# Patient Record
Sex: FEMALE | Race: WHITE | NOT HISPANIC OR LATINO | Employment: OTHER | URBAN - METROPOLITAN AREA
[De-identification: names, ages, dates, MRNs, and addresses within clinical notes are randomized per-mention and may not be internally consistent; named-entity substitution may affect disease eponyms.]

---

## 2017-10-10 RX ORDER — NORGESTIMATE AND ETHINYL ESTRADIOL 7DAYSX3 28
1 KIT ORAL DAILY
Qty: 28 TABLET | Refills: 11 | Status: SHIPPED | OUTPATIENT
Start: 2017-10-10 | End: 2017-11-02 | Stop reason: SDUPTHER

## 2017-10-10 NOTE — TELEPHONE ENCOUNTER
----- Message from Mitra Brown sent at 10/10/2017  2:00 PM CDT -----  Contact: Patient  Carol, patient [phone], Calling for refill on Rx Trinessa, birth control. Please advise. Thanks.      [facility] [address] [place] [address] JANELL SMITH - [address] AT [facility]  [address] [address]  Phone: [phone] Fax: [phone]

## 2017-11-02 ENCOUNTER — OFFICE VISIT (OUTPATIENT)
Dept: PRIMARY CARE CLINIC | Facility: CLINIC | Age: 47
End: 2017-11-02
Payer: MEDICARE

## 2017-11-02 VITALS
HEIGHT: 62 IN | DIASTOLIC BLOOD PRESSURE: 83 MMHG | HEART RATE: 74 BPM | WEIGHT: 139 LBS | RESPIRATION RATE: 18 BRPM | BODY MASS INDEX: 25.58 KG/M2 | SYSTOLIC BLOOD PRESSURE: 125 MMHG | OXYGEN SATURATION: 96 % | TEMPERATURE: 98 F

## 2017-11-02 DIAGNOSIS — M54.32 SCIATICA OF LEFT SIDE: ICD-10-CM

## 2017-11-02 DIAGNOSIS — M54.32 SCIATICA OF LEFT SIDE: Primary | ICD-10-CM

## 2017-11-02 PROBLEM — M48.02 FORAMINAL STENOSIS OF CERVICAL REGION: Status: ACTIVE | Noted: 2017-11-02

## 2017-11-02 PROBLEM — M47.816 FACET ARTHRITIS OF LUMBAR REGION: Status: ACTIVE | Noted: 2017-11-02

## 2017-11-02 PROCEDURE — 99999 PR PBB SHADOW E&M-EST. PATIENT-LVL III: CPT | Mod: PBBFAC,,, | Performed by: FAMILY MEDICINE

## 2017-11-02 PROCEDURE — 99213 OFFICE O/P EST LOW 20 MIN: CPT | Mod: S$GLB,,, | Performed by: FAMILY MEDICINE

## 2017-11-02 RX ORDER — NORGESTIMATE AND ETHINYL ESTRADIOL 7DAYSX3 28
1 KIT ORAL DAILY
Qty: 28 TABLET | Refills: 11 | Status: SHIPPED | OUTPATIENT
Start: 2017-11-02 | End: 2018-10-10 | Stop reason: SDUPTHER

## 2017-11-02 RX ORDER — IBUPROFEN 800 MG/1
TABLET ORAL
Qty: 270 TABLET | Refills: 2 | Status: SHIPPED | OUTPATIENT
Start: 2017-11-02 | End: 2018-10-10 | Stop reason: SDUPTHER

## 2017-11-02 RX ORDER — IBUPROFEN 800 MG/1
800 TABLET ORAL 3 TIMES DAILY PRN
Qty: 60 TABLET | Refills: 2 | Status: SHIPPED | OUTPATIENT
Start: 2017-11-02 | End: 2017-11-02 | Stop reason: SDUPTHER

## 2017-11-02 NOTE — PROGRESS NOTES
"Subjective:       Patient ID: Carol Dewey is a 47 y.o. female.    Chief Complaint: Neck Pain; Back Pain; and Medication Refill    Slipped and fell in laundry room at home a few weeks ago, and has been having increased lower back pain radiating down left leg for the past ~2 weeks. Has been doing acupuncture for the past few months w/good results      Review of Systems   Constitutional: Negative for fever.   HENT: Negative for trouble swallowing.    Respiratory: Negative for shortness of breath.    Cardiovascular: Negative for chest pain.   Musculoskeletal: Positive for back pain and neck pain.   Neurological: Positive for numbness. Negative for weakness.       Objective:      Vitals:    11/02/17 1428   BP: 125/83   BP Location: Left arm   Patient Position: Sitting   BP Method: Medium (Automatic)   Pulse: 74   Resp: 18   Temp: 98.2 °F (36.8 °C)   TempSrc: Oral   SpO2: 96%   Weight: 63 kg (139 lb)   Height: 5' 2" (1.575 m)     Physical Exam   Constitutional: She is oriented to person, place, and time. She appears well-developed and well-nourished.   HENT:   Head: Normocephalic and atraumatic.   Cardiovascular: Normal rate, regular rhythm and normal heart sounds.    Pulmonary/Chest: Effort normal and breath sounds normal.   Musculoskeletal: She exhibits no edema.        Lumbar back: She exhibits decreased range of motion. She exhibits no tenderness and no deformity.   Neurological: She is alert and oriented to person, place, and time. She has normal strength.   Reflex Scores:       Patellar reflexes are 2+ on the right side and 2+ on the left side.  Skin: Skin is warm and dry.   Vitals reviewed.      Assessment:       1. Sciatica of left side        Plan:       Sciatica of left side  -     ibuprofen (MOTRIN) 800 MG tablet; Take 1 tablet (800 mg total) by mouth 3 (three) times daily as needed for Pain. 1 Tablet Oral Three times a day  Dispense: 60 tablet; Refill: 2    Other orders  -     norgestimate-ethinyl " estradiol (TRINESSA, 28,) 0.18/0.215/0.25 mg-35 mcg (28) tablet; Take 1 tablet by mouth once daily.  Dispense: 28 tablet; Refill: 11      Medication List with Changes/Refills   Changed and/or Refilled Medications    Modified Medication Previous Medication    IBUPROFEN (MOTRIN) 800 MG TABLET ibuprofen (MOTRIN) 800 MG tablet       Take 1 tablet (800 mg total) by mouth 3 (three) times daily as needed for Pain. 1 Tablet Oral Three times a day    Take by mouth. 1 Tablet Oral Three times a day    NORGESTIMATE-ETHINYL ESTRADIOL (TRINESSA, 28,) 0.18/0.215/0.25 MG-35 MCG (28) TABLET norgestimate-ethinyl estradiol (TRINESSA, 28,) 0.18/0.215/0.25 mg-35 mcg (28) tablet       Take 1 tablet by mouth once daily.    Take 1 tablet by mouth once daily.   Discontinued Medications    LIDOCAINE (LIDODERM) 5 %(700 MG/PATCH)    1 Adhesive Patch, Medicated Topical 24 hours    NORGESTIMATE-ETHINYL ESTRADIOL (ORTHO TRI-CYCLEN LO) 0.18/0.215/0.25 MG-25 MCG TABLET    Take by mouth.  Tablet Oral Every day

## 2018-10-10 DIAGNOSIS — M54.32 SCIATICA OF LEFT SIDE: ICD-10-CM

## 2018-10-10 RX ORDER — IBUPROFEN 800 MG/1
TABLET ORAL
Qty: 270 TABLET | Refills: 0 | Status: SHIPPED | OUTPATIENT
Start: 2018-10-10 | End: 2019-01-06 | Stop reason: SDUPTHER

## 2019-01-06 DIAGNOSIS — M54.32 SCIATICA OF LEFT SIDE: ICD-10-CM

## 2019-01-07 RX ORDER — IBUPROFEN 800 MG/1
TABLET ORAL
Qty: 270 TABLET | Refills: 0 | Status: SHIPPED | OUTPATIENT
Start: 2019-01-07 | End: 2019-05-06 | Stop reason: SDUPTHER

## 2019-01-10 ENCOUNTER — OFFICE VISIT (OUTPATIENT)
Dept: PRIMARY CARE CLINIC | Facility: CLINIC | Age: 49
End: 2019-01-10
Payer: MEDICARE

## 2019-01-10 VITALS
BODY MASS INDEX: 24.98 KG/M2 | DIASTOLIC BLOOD PRESSURE: 80 MMHG | HEIGHT: 63 IN | WEIGHT: 141 LBS | TEMPERATURE: 98 F | HEART RATE: 69 BPM | SYSTOLIC BLOOD PRESSURE: 130 MMHG | OXYGEN SATURATION: 99 % | RESPIRATION RATE: 16 BRPM

## 2019-01-10 DIAGNOSIS — Z12.4 CERVICAL CANCER SCREENING: ICD-10-CM

## 2019-01-10 DIAGNOSIS — M47.816 FACET ARTHRITIS OF LUMBAR REGION: ICD-10-CM

## 2019-01-10 DIAGNOSIS — M48.02 FORAMINAL STENOSIS OF CERVICAL REGION: Primary | ICD-10-CM

## 2019-01-10 DIAGNOSIS — Z12.31 ENCOUNTER FOR SCREENING MAMMOGRAM FOR BREAST CANCER: ICD-10-CM

## 2019-01-10 DIAGNOSIS — M51.36 DDD (DEGENERATIVE DISC DISEASE), LUMBAR: ICD-10-CM

## 2019-01-10 PROBLEM — M51.369 DDD (DEGENERATIVE DISC DISEASE), LUMBAR: Status: ACTIVE | Noted: 2019-01-10

## 2019-01-10 PROCEDURE — 99999 PR PBB SHADOW E&M-EST. PATIENT-LVL V: CPT | Mod: PBBFAC,,, | Performed by: FAMILY MEDICINE

## 2019-01-10 PROCEDURE — 99214 PR OFFICE/OUTPT VISIT, EST, LEVL IV, 30-39 MIN: ICD-10-PCS | Mod: S$PBB,,, | Performed by: FAMILY MEDICINE

## 2019-01-10 PROCEDURE — 99999 PR PBB SHADOW E&M-EST. PATIENT-LVL V: ICD-10-PCS | Mod: PBBFAC,,, | Performed by: FAMILY MEDICINE

## 2019-01-10 PROCEDURE — 99215 OFFICE O/P EST HI 40 MIN: CPT | Mod: PBBFAC,PN | Performed by: FAMILY MEDICINE

## 2019-01-10 PROCEDURE — 99214 OFFICE O/P EST MOD 30 MIN: CPT | Mod: S$PBB,,, | Performed by: FAMILY MEDICINE

## 2019-01-10 PROCEDURE — 96372 THER/PROPH/DIAG INJ SC/IM: CPT | Mod: PBBFAC,PN

## 2019-01-10 RX ORDER — NORGESTIMATE AND ETHINYL ESTRADIOL 7DAYSX3 28
1 KIT ORAL DAILY
Qty: 28 TABLET | Refills: 1 | Status: SHIPPED | OUTPATIENT
Start: 2019-01-10 | End: 2019-01-21 | Stop reason: SDUPTHER

## 2019-01-10 RX ORDER — BETAMETHASONE SODIUM PHOSPHATE AND BETAMETHASONE ACETATE 3; 3 MG/ML; MG/ML
12 INJECTION, SUSPENSION INTRA-ARTICULAR; INTRALESIONAL; INTRAMUSCULAR; SOFT TISSUE
Status: COMPLETED | OUTPATIENT
Start: 2019-01-10 | End: 2019-01-10

## 2019-01-10 RX ADMIN — BETAMETHASONE SODIUM PHOSPHATE AND BETAMETHASONE ACETATE 12 MG: 3; 3 INJECTION, SUSPENSION INTRA-ARTICULAR; INTRALESIONAL; INTRAMUSCULAR at 05:01

## 2019-01-10 NOTE — PROGRESS NOTES
"Subjective:       Patient ID: Carol Dewey is a 48 y.o. female.    Chief Complaint: Back Pain and Shoulder Pain (should pain and numbness in both arms )    Chronic neck and lower back pain for many years.  Most recent MRIs done in August of 2017 at [facility] in [place].  Has had epidural injections in the past with minimal response.  More recently, had been getting acupuncture, but no relief.  Takes ibuprofen, but does not want to take any additional medications.  Has been told most recently by the provider she was seeing last year that she needs to see a neurosurgeon for further management options, but her insurance changed prior to her initial consultation and she is requesting a new referral.  Lower back pain frequently radiates down her legs, worse on the left with tingling and numbness in her feet.  No bowel or bladder incontinence.  Neck pain frequently radiates to both arms with numbness and loss of strength.  No difficulty swallowing.  No chest pain or shortness of breath.      Review of Systems   Constitutional: Negative for fever.   HENT: Negative for trouble swallowing.    Eyes: Negative for visual disturbance.   Respiratory: Negative for shortness of breath.    Cardiovascular: Negative for chest pain.   Gastrointestinal: Negative for nausea and vomiting.   Genitourinary: Negative for difficulty urinating.   Musculoskeletal: Positive for back pain and neck pain.   Skin: Negative for rash.   Allergic/Immunologic: Negative for immunocompromised state.   Neurological: Positive for weakness and numbness.   Hematological: Does not bruise/bleed easily.   Psychiatric/Behavioral: Negative for agitation and confusion.       Objective:      Vitals:    01/10/19 1636   BP: 130/80   BP Location: Left arm   Patient Position: Sitting   BP Method: Medium (Automatic)   Pulse: 69   Resp: 16   Temp: 98.1 °F (36.7 °C)   TempSrc: Oral   SpO2: 99%   Weight: 64 kg (141 lb)   Height: 5' 3" (1.6 m)     Physical Exam "   Constitutional: She is oriented to person, place, and time. She appears well-developed and well-nourished.   HENT:   Head: Normocephalic and atraumatic.   Eyes: EOM are normal.   Neck: No JVD present.   Cardiovascular: Normal rate, regular rhythm and normal heart sounds.   Pulmonary/Chest: Effort normal and breath sounds normal.   Musculoskeletal: She exhibits no edema.        Cervical back: She exhibits decreased range of motion. She exhibits no deformity.        Lumbar back: She exhibits decreased range of motion. She exhibits no deformity.   Neurological: She is alert and oriented to person, place, and time. She has normal strength. Gait normal.   Skin: Skin is warm and dry.   Psychiatric: She has a normal mood and affect. Her behavior is normal.   Nursing note and vitals reviewed.      Assessment:       1. Foraminal stenosis of cervical region    2. DDD (degenerative disc disease), lumbar    3. Facet arthritis of lumbar region    4. Encounter for screening mammogram for breast cancer    5. Cervical cancer screening        Plan:       Foraminal stenosis of cervical region  -     Ambulatory referral to Obstetrics / Gynecology  -     betamethasone acetate-betamethasone sodium phosphate injection 12 mg    DDD (degenerative disc disease), lumbar  -     Ambulatory referral to Obstetrics / Gynecology  -     betamethasone acetate-betamethasone sodium phosphate injection 12 mg    Facet arthritis of lumbar region  -     Ambulatory referral to Obstetrics / Gynecology  -     betamethasone acetate-betamethasone sodium phosphate injection 12 mg    Encounter for screening mammogram for breast cancer  -     Mammo Digital Screening Bilat without CA; Future; Expected date: 01/24/2019    Cervical cancer screening  -     Ambulatory Referral to Neurosurgery    Other orders  -     norgestimate-ethinyl estradiol (TRINESSA, 28,) 0.18/0.215/0.25 mg-35 mcg (28) tablet; Take 1 tablet by mouth once daily.  Dispense: 28 tablet; Refill:  1  Needs to follow up with gyn prior to any additional refills on contraceptives.       Medication List           Accurate as of 1/10/19  5:23 PM. If you have any questions, ask your nurse or doctor.               CHANGE how you take these medications    norgestimate-ethinyl estradiol 0.18/0.215/0.25 mg-35 mcg (28) tablet  Commonly known as:  TRINESSA (28)  Take 1 tablet by mouth once daily.  What changed:  Another medication with the same name was removed. Continue taking this medication, and follow the directions you see here.  Changed by:  Robin Lemos MD        CONTINUE taking these medications    ibuprofen 800 MG tablet  Commonly known as:  ADVIL,MOTRIN  TAKE 1 TABLET BY MOUTH THREE TIMES DAILY AS NEEDED FOR PAIN           Where to Get Your Medications      These medications were sent to [id number] - JANELL SMITH - [address] AT [facility] [address]  [address], [place] [address]    Phone:  [phone]   · norgestimate-ethinyl estradiol 0.18/0.215/0.25 mg-35 mcg (28) tablet

## 2019-01-21 ENCOUNTER — OFFICE VISIT (OUTPATIENT)
Dept: OBSTETRICS AND GYNECOLOGY | Facility: CLINIC | Age: 49
End: 2019-01-21
Payer: MEDICARE

## 2019-01-21 VITALS
HEIGHT: 63 IN | SYSTOLIC BLOOD PRESSURE: 116 MMHG | DIASTOLIC BLOOD PRESSURE: 82 MMHG | BODY MASS INDEX: 24.55 KG/M2 | WEIGHT: 138.56 LBS

## 2019-01-21 DIAGNOSIS — Z01.419 WELL WOMAN EXAM WITH ROUTINE GYNECOLOGICAL EXAM: Primary | ICD-10-CM

## 2019-01-21 PROCEDURE — G0101 CA SCREEN;PELVIC/BREAST EXAM: HCPCS | Mod: S$PBB,,, | Performed by: OBSTETRICS & GYNECOLOGY

## 2019-01-21 PROCEDURE — 88175 CYTOPATH C/V AUTO FLUID REDO: CPT

## 2019-01-21 PROCEDURE — G0101 PR CA SCREEN;PELVIC/BREAST EXAM: ICD-10-PCS | Mod: S$PBB,,, | Performed by: OBSTETRICS & GYNECOLOGY

## 2019-01-21 PROCEDURE — 87624 HPV HI-RISK TYP POOLED RSLT: CPT

## 2019-01-21 PROCEDURE — 99213 OFFICE O/P EST LOW 20 MIN: CPT | Mod: PBBFAC,PN | Performed by: OBSTETRICS & GYNECOLOGY

## 2019-01-21 PROCEDURE — G0101 CA SCREEN;PELVIC/BREAST EXAM: HCPCS | Mod: PBBFAC,PN | Performed by: OBSTETRICS & GYNECOLOGY

## 2019-01-21 PROCEDURE — 99999 PR PBB SHADOW E&M-EST. PATIENT-LVL III: CPT | Mod: PBBFAC,,, | Performed by: OBSTETRICS & GYNECOLOGY

## 2019-01-21 PROCEDURE — 99999 PR PBB SHADOW E&M-EST. PATIENT-LVL III: ICD-10-PCS | Mod: PBBFAC,,, | Performed by: OBSTETRICS & GYNECOLOGY

## 2019-01-21 RX ORDER — NORGESTIMATE AND ETHINYL ESTRADIOL 7DAYSX3 28
1 KIT ORAL DAILY
Qty: 28 TABLET | Refills: 10 | Status: SHIPPED | OUTPATIENT
Start: 2019-01-21 | End: 2020-01-09

## 2019-01-21 NOTE — LETTER
January 21, 2019      Robin Lemos MD  [address]  [address]  [place] [address]           [facility]  [address], [facility] [address]  [place] [address]  Phone: [phone]  Fax: [phone]          Patient: Carol Dewey   MR Number: [id number]   YOB: 1970   Date of Visit: 1/21/2019       Dear Dr. Robin Lemos:    Thank you for referring Carol Dewey to me for evaluation. Attached you will find relevant portions of my assessment and plan of care.    If you have questions, please do not hesitate to call me. I look forward to following Carol Dewey along with you.    Sincerely,    Tirso Leija MD    Enclosure  CC:  No Recipients    If you would like to receive this communication electronically, please contact [email or web address] or [phone] to request more information on [facility] [employer] access.    For providers and/or their staff who would like to refer a patient to [facility], please contact us through our one-stop-shop provider referral line, [facility] [place], at [phone].    If you feel you have received this communication in error or would no longer like to receive these types of communications, please e-mail [email or web address]

## 2019-01-21 NOTE — PROGRESS NOTES
History & Physical  Gynecology      SUBJECTIVE:     Chief Complaint: Gynecologic Exam       History of Present Illness:  Ms. Dewey is a 48 yr old female who presents for annual, well woman exam. She has no complaints. Denies any hx of abnormal paps. Last pap was many years ago. She is currently sexually active. Denies hx of STIs. She is on OCPs for contraception. Desires continuation. Has regular cycles. Denies any fam hx of breast, ovarian or colon cancer. Feels safe at home, wears seatbelts, exercises frequently.       Review of patient's allergies indicates:   Allergen Reactions    Iodine and iodide containing products Rash    Shellfish containing products Hives     Other reaction(s): Hives       Past Medical History:   Diagnosis Date    Allergy     Degenerative disc disease      Past Surgical History:   Procedure Laterality Date     SECTION      TONSILLECTOMY       OB History      Para Term  AB Living    2 1     1      SAB TAB Ectopic Multiple Live Births    1                Family History   Problem Relation Age of Onset    Asthma Mother     Heart disease Mother     Cancer Father     Breast cancer Paternal Cousin     Colon cancer Neg Hx     Ovarian cancer Neg Hx      Social History     Tobacco Use    Smoking status: Never Smoker    Smokeless tobacco: Never Used   Substance Use Topics    Alcohol use: No    Drug use: No       Current Outpatient Medications   Medication Sig    ibuprofen (ADVIL,MOTRIN) 800 MG tablet TAKE 1 TABLET BY MOUTH THREE TIMES DAILY AS NEEDED FOR PAIN    norgestimate-ethinyl estradiol (TRINESSA, 28,) 0.18/0.215/0.25 mg-35 mcg (28) tablet Take 1 tablet by mouth once daily.     No current facility-administered medications for this visit.          Review of Systems:  Review of Systems   Constitutional: Negative for activity change, fatigue, fever and unexpected weight change.   Respiratory: Negative for cough and shortness of breath.    Cardiovascular:  Negative for chest pain and palpitations.   Gastrointestinal: Negative for abdominal pain, constipation, diarrhea and nausea.   Endocrine: Negative for hot flashes.   Genitourinary: Negative for dyspareunia, dysuria, menorrhagia, menstrual problem, pelvic pain and vaginal discharge.   Musculoskeletal: Negative for back pain.   Neurological: Negative for headaches.   Psychiatric/Behavioral: The patient is not nervous/anxious.    Breast: Negative for nipple discharge       OBJECTIVE:     Physical Exam:  Physical Exam   Constitutional: She is oriented to person, place, and time. She appears well-developed and well-nourished.   HENT:   Head: Normocephalic and atraumatic.   Neck: Normal range of motion. Neck supple.   Cardiovascular: Normal rate, regular rhythm, normal heart sounds and intact distal pulses.   Pulmonary/Chest: Effort normal and breath sounds normal. Right breast exhibits no inverted nipple, no mass, no nipple discharge, no skin change and no tenderness. Left breast exhibits no inverted nipple, no mass, no nipple discharge, no skin change and no tenderness.   Abdominal: Soft. Bowel sounds are normal. There is no tenderness. There is no guarding.   Genitourinary: There is no rash, tenderness, lesion or injury on the right labia. There is no rash, tenderness, lesion or injury on the left labia. Uterus is not deviated, not enlarged, not fixed and not tender. Cervix exhibits no motion tenderness, no discharge and no friability. Right adnexum displays no mass, no tenderness and no fullness. Left adnexum displays no mass, no tenderness and no fullness. No erythema, tenderness or bleeding in the vagina. No foreign body in the vagina. No signs of injury around the vagina. No vaginal discharge found.   Neurological: She is alert and oriented to person, place, and time.   Skin: Skin is warm and dry.   Psychiatric: She has a normal mood and affect.   Vitals reviewed.        ASSESSMENT:       ICD-10-CM ICD-9-CM    1.  Well woman exam with routine gynecological exam Z01.419 V72.31 Liquid-based pap smear, screening      HPV High Risk Genotypes, PCR          Plan:      Annual, well woman, pap/cotesting done today. No hx of abnormal paps  No GYN complaints.  Undesired fertility. Currently on OCPs. Desires to continue. Discussed risk of VTE, stroke. Patient assumes risks  Mammogram ordered and scheduled by PCP  RTC in 1 yr for annual exam or PRN    Counseling time: 15 minutes    Tirso Leija

## 2019-01-23 LAB
HPV HR 12 DNA CVX QL NAA+PROBE: NEGATIVE
HPV16 AG SPEC QL: NEGATIVE
HPV18 DNA SPEC QL NAA+PROBE: NEGATIVE

## 2019-01-28 ENCOUNTER — TELEPHONE (OUTPATIENT)
Dept: PRIMARY CARE CLINIC | Facility: CLINIC | Age: 49
End: 2019-01-28

## 2019-01-28 NOTE — TELEPHONE ENCOUNTER
----- Message from Dasha Louis sent at 1/28/2019 10:34 AM CST -----  Contact: pt  Type:  Patient Returning Call    Who Called:  pt  Who Left Message for Patient: Sorin  Does the patient know what this is regarding?:  n/a  Best Call Back Number:    Additional Information: pt returned a missed call

## 2019-02-28 ENCOUNTER — OFFICE VISIT (OUTPATIENT)
Dept: PRIMARY CARE CLINIC | Facility: CLINIC | Age: 49
End: 2019-02-28
Payer: MEDICARE

## 2019-02-28 VITALS
TEMPERATURE: 98 F | SYSTOLIC BLOOD PRESSURE: 155 MMHG | HEIGHT: 63 IN | WEIGHT: 152 LBS | HEART RATE: 88 BPM | DIASTOLIC BLOOD PRESSURE: 88 MMHG | BODY MASS INDEX: 26.93 KG/M2 | OXYGEN SATURATION: 97 % | RESPIRATION RATE: 18 BRPM

## 2019-02-28 DIAGNOSIS — H81.10 BENIGN PAROXYSMAL POSITIONAL VERTIGO, UNSPECIFIED LATERALITY: Primary | ICD-10-CM

## 2019-02-28 DIAGNOSIS — M48.02 FORAMINAL STENOSIS OF CERVICAL REGION: ICD-10-CM

## 2019-02-28 DIAGNOSIS — M51.36 DDD (DEGENERATIVE DISC DISEASE), LUMBAR: ICD-10-CM

## 2019-02-28 DIAGNOSIS — R42 DIZZINESS: ICD-10-CM

## 2019-02-28 PROCEDURE — 93000 ELECTROCARDIOGRAM COMPLETE: CPT | Mod: S$GLB,,, | Performed by: INTERNAL MEDICINE

## 2019-02-28 PROCEDURE — 93000 EKG 12-LEAD: ICD-10-PCS | Mod: S$GLB,,, | Performed by: INTERNAL MEDICINE

## 2019-02-28 PROCEDURE — 99999 PR PBB SHADOW E&M-EST. PATIENT-LVL IV: ICD-10-PCS | Mod: PBBFAC,,, | Performed by: FAMILY MEDICINE

## 2019-02-28 PROCEDURE — 99999 PR PBB SHADOW E&M-EST. PATIENT-LVL IV: CPT | Mod: PBBFAC,,, | Performed by: FAMILY MEDICINE

## 2019-02-28 PROCEDURE — 3008F PR BODY MASS INDEX (BMI) DOCUMENTED: ICD-10-PCS | Mod: CPTII,S$GLB,, | Performed by: FAMILY MEDICINE

## 2019-02-28 PROCEDURE — 99214 PR OFFICE/OUTPT VISIT, EST, LEVL IV, 30-39 MIN: ICD-10-PCS | Mod: S$GLB,,, | Performed by: FAMILY MEDICINE

## 2019-02-28 PROCEDURE — 99214 OFFICE O/P EST MOD 30 MIN: CPT | Mod: S$GLB,,, | Performed by: FAMILY MEDICINE

## 2019-02-28 PROCEDURE — 3008F BODY MASS INDEX DOCD: CPT | Mod: CPTII,S$GLB,, | Performed by: FAMILY MEDICINE

## 2019-02-28 RX ORDER — LIDOCAINE 50 MG/G
2 PATCH TOPICAL
COMMUNITY
End: 2019-03-12 | Stop reason: SDUPTHER

## 2019-02-28 RX ORDER — MECLIZINE HYDROCHLORIDE 25 MG/1
25 TABLET ORAL 3 TIMES DAILY PRN
Qty: 30 TABLET | Refills: 2 | Status: SHIPPED | OUTPATIENT
Start: 2019-02-28 | End: 2019-11-21

## 2019-02-28 NOTE — PROGRESS NOTES
"Subjective:       Patient ID: Carol eDwey is a 48 y.o. female.    Chief Complaint: Dizziness and Back Pain (want a referral to PM for YENI )    Complains of recurrent dizziness attributed vertigo, has had episodic flare-ups since 2011.  Gets very dizzy with a rotational sensation when she lies down.  Occasional nausea, no vomiting.  Has not taken anything for.  No associated chest pain, occasional palpitations.  Also complains worsening neck and lower back pain, last MRI done at outside imaging center last fall, requesting referral to someone for epidural steroid injections.      Review of Systems   Constitutional: Negative for fever.   HENT: Negative for ear pain.    Eyes: Negative for visual disturbance.   Respiratory: Negative for shortness of breath.    Cardiovascular: Positive for palpitations. Negative for chest pain.   Gastrointestinal: Positive for nausea. Negative for vomiting.   Genitourinary: Negative for difficulty urinating.   Musculoskeletal: Positive for back pain and neck pain.   Skin: Negative for rash.   Neurological: Positive for dizziness. Negative for seizures and syncope.   Hematological: Does not bruise/bleed easily.   Psychiatric/Behavioral: Negative for agitation and confusion.       Objective:      Vitals:    02/28/19 1001 02/28/19 1022 02/28/19 1023 02/28/19 1024   BP: 136/78 (!) 160/90 (!) 154/101 (!) 155/88   BP Location: Left arm Left arm Left arm Left arm   Patient Position: Sitting Lying Sitting Standing   BP Method: Medium (Automatic) Medium (Automatic) Medium (Automatic) Medium (Automatic)   Pulse: 75 71 80 88   Resp: 18      Temp: 98.1 °F (36.7 °C)      TempSrc: Oral      SpO2: 97%      Weight: 68.9 kg (152 lb)      Height: 5' 3" (1.6 m)        Physical Exam   Constitutional: She is oriented to person, place, and time. She appears well-developed and well-nourished.   HENT:   Head: Normocephalic and atraumatic.   Neck: No JVD present. Carotid bruit is not present. "   Cardiovascular: Normal rate, regular rhythm and normal heart sounds.   Pulmonary/Chest: Effort normal and breath sounds normal.   Musculoskeletal: She exhibits no edema.   Neurological: She is alert and oriented to person, place, and time.   Positive Mober[place]-Hallpike   Skin: Skin is warm and dry.   Psychiatric: She has a normal mood and affect. Her behavior is normal.   Nursing note and vitals reviewed.      Assessment:       1. Benign paroxysmal positional vertigo, unspecified laterality    2. Dizziness    3. DDD (degenerative disc disease), lumbar    4. Foraminal stenosis of cervical region        Plan:       Benign paroxysmal positional vertigo, unspecified laterality  -     EKG 12-lead  -     meclizine (ANTIVERT) 25 mg tablet; Take 1 tablet (25 mg total) by mouth 3 (three) times daily as needed for Dizziness.  Dispense: 30 tablet; Refill: 2  No acute EKG changes.  Dizziness  -     EKG 12-lead    DDD (degenerative disc disease), lumbar  -     Ambulatory referral to Pain Clinic    Foraminal stenosis of cervical region  -     Ambulatory referral to Pain Clinic      Medication List with Changes/Refills   New Medications    MECLIZINE (ANTIVERT) 25 MG TABLET    Take 1 tablet (25 mg total) by mouth 3 (three) times daily as needed for Dizziness.   Current Medications    IBUPROFEN (ADVIL,MOTRIN) 800 MG TABLET    TAKE 1 TABLET BY MOUTH THREE TIMES DAILY AS NEEDED FOR PAIN    LIDOCAINE (LIDODERM) 5 %    Place 2 patches onto the skin every 24 hours.    NORGESTIMATE-ETHINYL ESTRADIOL (TRINESSA, 28,) 0.18/0.215/0.25 MG-35 MCG (28) TABLET    Take 1 tablet by mouth once daily.

## 2019-03-04 ENCOUNTER — TELEPHONE (OUTPATIENT)
Dept: PAIN MEDICINE | Facility: CLINIC | Age: 49
End: 2019-03-04

## 2019-03-04 NOTE — TELEPHONE ENCOUNTER
Left call back message. Calling patient to discuss IPM, exchange information and answer patient questions.

## 2019-03-06 ENCOUNTER — TELEPHONE (OUTPATIENT)
Dept: PAIN MEDICINE | Facility: CLINIC | Age: 49
End: 2019-03-06

## 2019-03-06 NOTE — TELEPHONE ENCOUNTER
----- Message from Mitra Brown sent at 3/4/2019 12:52 PM CST -----  Contact: Patient  Type:  Patient Returning Call    Who Called:  Carol, patient  Who Left Message for Patient:  Ramiro  Does the patient know what this is regarding?:  About appointment  Best Call Back Number:  [phone]  Additional Information:  Missed your call, please call her back. Thanks.

## 2019-03-06 NOTE — TELEPHONE ENCOUNTER
Spoke with patient about her coming appointment with IPM. Patient stated she has had imaging done at another location and she currently there to obtain a disk and report from the imaging to bring with her to the appointment. Patient stated she is familiar with IPM and has had spinal injections done in the past. No further issues discussed.

## 2019-03-12 ENCOUNTER — OFFICE VISIT (OUTPATIENT)
Dept: PAIN MEDICINE | Facility: CLINIC | Age: 49
End: 2019-03-12
Attending: ANESTHESIOLOGY
Payer: MEDICARE

## 2019-03-12 VITALS
SYSTOLIC BLOOD PRESSURE: 147 MMHG | BODY MASS INDEX: 25.43 KG/M2 | DIASTOLIC BLOOD PRESSURE: 85 MMHG | WEIGHT: 143.5 LBS | HEART RATE: 84 BPM | HEIGHT: 63 IN

## 2019-03-12 DIAGNOSIS — M54.2 CHRONIC NECK PAIN: ICD-10-CM

## 2019-03-12 DIAGNOSIS — M79.18 MYOFASCIAL PAIN: ICD-10-CM

## 2019-03-12 DIAGNOSIS — G89.29 CHRONIC NECK PAIN: ICD-10-CM

## 2019-03-12 DIAGNOSIS — M54.16 LUMBAR RADICULITIS: Primary | ICD-10-CM

## 2019-03-12 DIAGNOSIS — M51.36 DDD (DEGENERATIVE DISC DISEASE), LUMBAR: ICD-10-CM

## 2019-03-12 DIAGNOSIS — M47.812 SPONDYLOSIS OF CERVICAL REGION WITHOUT MYELOPATHY OR RADICULOPATHY: ICD-10-CM

## 2019-03-12 PROCEDURE — 99999 PR PBB SHADOW E&M-EST. PATIENT-LVL III: CPT | Mod: PBBFAC,,, | Performed by: ANESTHESIOLOGY

## 2019-03-12 PROCEDURE — 99204 PR OFFICE/OUTPT VISIT, NEW, LEVL IV, 45-59 MIN: ICD-10-PCS | Mod: S$GLB,,, | Performed by: ANESTHESIOLOGY

## 2019-03-12 PROCEDURE — 99204 OFFICE O/P NEW MOD 45 MIN: CPT | Mod: S$GLB,,, | Performed by: ANESTHESIOLOGY

## 2019-03-12 PROCEDURE — 3008F BODY MASS INDEX DOCD: CPT | Mod: CPTII,S$GLB,, | Performed by: ANESTHESIOLOGY

## 2019-03-12 PROCEDURE — 99999 PR PBB SHADOW E&M-EST. PATIENT-LVL III: ICD-10-PCS | Mod: PBBFAC,,, | Performed by: ANESTHESIOLOGY

## 2019-03-12 PROCEDURE — 3008F PR BODY MASS INDEX (BMI) DOCUMENTED: ICD-10-PCS | Mod: CPTII,S$GLB,, | Performed by: ANESTHESIOLOGY

## 2019-03-12 RX ORDER — LIDOCAINE 50 MG/G
2 PATCH TOPICAL DAILY
Qty: 60 PATCH | Refills: 5 | Status: SHIPPED | OUTPATIENT
Start: 2019-03-12 | End: 2019-11-21

## 2019-03-12 NOTE — PROGRESS NOTES
Chronic Pain - New Consult    Referring Physician: Robin Lemos MD    Chief Complaint   Patient presents with    Neck Pain        SUBJECTIVE:    Carol Dewey presents to the clinic for the evaluation of chronic low back pain. The back pain started 10 years ago following a fall at work and symptoms have been progressively worsening. The pain is located in the bilateral low back area and radiates down the lateral aspect of the legs into the feet. The pain is described as sharp and shooting. Current pain intensity is 8/10. Average pain intensity is 8/10. The pain is made worse with walking, sitting and standing. The pain is mitigated by rest and laying down. She has tried Ibuprofen and IM steroid injection with no improvement. She tried PT in the past with minimal improvement. She tried acupuncture which helped significantly but had to stop secondary to cost. She had a Bilateral L5/S1  Transforaminal YENI in January 2013 for a similar pain which brought her significant relief for 1 year.    She also complains of chronic neck pain. The neck pain has been present for 6 years. The pain is located in the posterior cervical spine area and radiates into the shoulders.  The pain is described as sharp and is rated as 7/10.  The pain is exacerbated by driving and prolonged sitting.  The neck pain is mitigated by heat, massage, Salon Pas and rest. The back pain is more significant than the neck pain.    Patient denies night fever/night sweats, urinary incontinence, bowel incontinence, significant weight loss and significant motor weakness.    Physical Therapy/Home Exercise: no      Pain Disability Index Review:  Last 3 PDI Scores 3/12/2019   Pain Disability Index (PDI) 64       Pain Medications:    - Adjuvant Medications: Advil,Motrin ( Ibuprofen) as needed     report:  Reviewed     Pain Procedures:   Bilateral L5 Lumbar Transforaminal YENI (1/5/2013)     Imaging:     LUMBAR MRI WO CONTRAST (6/18/2018):    No lumbar  canal or neural foraminal stenosis or disc protrusions.    Mild annular bulging at L3-4 and dessication which appears stable.    Interval development of minimal annular bulging dorsally at L4-5.      CERVICAL MRI WO CONTRAST (2018):  Full report in Media tab.    Mild cervical spondylosis at multiple levels. No cervical or foraminal stenosis present.    Past Medical History:   Diagnosis Date    Allergy     Degenerative disc disease      Past Surgical History:   Procedure Laterality Date     SECTION      TONSILLECTOMY       Social History     Socioeconomic History    Marital status:      Spouse name: Not on file    Number of children: Not on file    Years of education: Not on file    Highest education level: Not on file   Social Needs    Financial resource strain: Not on file    Food insecurity - worry: Not on file    Food insecurity - inability: Not on file    Transportation needs - medical: Not on file    Transportation needs - non-medical: Not on file   Occupational History    Not on file   Tobacco Use    Smoking status: Never Smoker    Smokeless tobacco: Never Used   Substance and Sexual Activity    Alcohol use: No    Drug use: No    Sexual activity: Yes     Partners: Male     Birth control/protection: OCP   Other Topics Concern    Not on file   Social History Narrative    Not on file     Family History   Problem Relation Age of Onset    Asthma Mother     Heart disease Mother     Cancer Father     Breast cancer Paternal Cousin     Colon cancer Neg Hx     Ovarian cancer Neg Hx        Review of patient's allergies indicates:   Allergen Reactions    Iodine and iodide containing products Rash    Shellfish containing products Hives     Other reaction(s): Hives       Current Outpatient Medications   Medication Sig    ibuprofen (ADVIL,MOTRIN) 800 MG tablet TAKE 1 TABLET BY MOUTH THREE TIMES DAILY AS NEEDED FOR PAIN    lidocaine (LIDODERM) 5 % Place 2 patches onto the  "skin every 24 hours.    meclizine (ANTIVERT) 25 mg tablet Take 1 tablet (25 mg total) by mouth 3 (three) times daily as needed for Dizziness.    norgestimate-ethinyl estradiol (TRINESSA, 28,) 0.18/0.215/0.25 mg-35 mcg (28) tablet Take 1 tablet by mouth once daily.     No current facility-administered medications for this visit.        REVIEW OF SYSTEMS:    GENERAL:  No weight loss, malaise or fevers.  HEENT:  Negative for frequent or significant headaches.  NECK:  + neck pain  RESPIRATORY:  Negative for cough, wheezing or shortness of breath.  CARDIOVASCULAR:  Negative for chest pain, leg swelling or palpitations.  GI:  Negative for blood in stools or black stools or change in bowel habits.  MUSCULOSKELETAL:  See HPI.  SKIN:  Negative for lesions, rash, and itching.  PSYCH:  +  sleep disturbance  HEMATOLOGY/LYMPHOLOGY:  Negative for prolonged bleeding, bruising easily or swollen nodes.  NEURO:   No history of seizures or tremors. + hx of vertigo  All other reviewed and negative other than HPI.    OBJECTIVE:    BP (!) 147/85   Pulse 84   Ht 5' 3" (1.6 m)   Wt 65.1 kg (143 lb 8.3 oz)   BMI 25.42 kg/m²     PHYSICAL EXAMINATION:    GENERAL: Well appearing, in no acute distress.   PSYCH:  Mood and affect is appropriate.  Awake, alert, and oriented x 3.  SKIN: Skin color, texture, turgor normal, no rashes or lesions  HEENT: Normocephalic, atraumatic.  EOM intact.  CV: Radial pulses are 2+.  RESP:  Respirations are unlabored.  GI: Abdomen soft and non-tender.  MSK:  No atrophy or tone abnormalities are noted.                 Neck: No obvious deformity or signs of trauma.  Normal cervical spine range of motion. Pain with neck extension.  Tenderness to palpation over the cervical paraspinous muscles.     Back: Straight leg raising in the sitting and supine positions is negative for radicular pain. Tenderness to palpation over the lumbar spine and paraspinous muscles.  + pain with facet back extension/rotation. " Decreased range of motion on flexion and extension.     Buttocks:  No pain to palpation over the PSIS.     Extremities:  Peripheral joint ROM is full and pain free without obvious instability or laxity in all four extremities. No edema or skin discolorations noted.      Gait:  Gait is normal.     NEUR:  Bilateral upper and lower extremity coordination and muscle stretch reflexes are physiologic and symmetric. Strength testing is 5/5 throughout all muscle groups in the upper and lower extremities. No loss of sensation is noted.       ASSESSMENT: 48 y.o.  female with chronic low back and bilateral leg pain. Lumbar MRI unremarkable aside from mild disc bulge present at L3-4 and L4-5. No canal or foraminal stenosis present. Neck pain appears myofascial in nature.    1. Lumbar radiculitis    2. DDD (degenerative disc disease), lumbar    3. Chronic neck pain    4. Spondylosis of cervical region without myelopathy or radiculopathy    5. Myofascial pain            PLAN:     - I have stressed the importance of physical activity and a home exercise plan to help with pain and improve health.  - Schedule for a Bilateral Transforaminal epidural steroid injection at L5/S1 to help with pain and progress with a home exercise plan.  - RTC after procedure.    The above plan and management options were discussed at length with patient. Patient is in agreement with the above and verbalized understanding. It will be communicated with the referring physician via electronic record, fax, or mail.    Sridhar Albert III  03/12/2019

## 2019-03-12 NOTE — TELEPHONE ENCOUNTER
----- Message from iMtra Brown sent at 3/12/2019  3:51 PM CDT -----  Contact: Patient  Type:  RX Refill Request    Who Called:  Carol, patient  Refill or New Rx:  Refill  RX Name and Strength:  lidocaine (LIDODERM) 5 %  How is the patient currently taking it? (ex. 1XDay):  Place 2 patches onto the skin every 24 hours  Is this a 30 day or 90 day RX:  ?  Preferred Pharmacy with phone number:    [facility]  [facility] - [address] [facility] [address]  [place]  [address] [address]  Phone: [phone] Fax: [phone]  Local or Mail Order:  Local  Ordering Provider:  Dr Lemos  [facility] Call Back Number:  [phone]  Additional Information:  Please advise. Thanks.

## 2019-03-12 NOTE — LETTER
March 12, 2019      Robin Lemos MD  [address]  [address]  [place] [address]           [facility]  [address], [facility] [address]  [place] [address]  Phone: [phone]  Fax: [phone]          Patient: Carol Dewey   MR Number: [id number]   YOB: 1970   Date of Visit: 3/12/2019       Dear Dr. Robin Lemos:    Thank you for referring Carol Dewey to me for evaluation. Attached you will find relevant portions of my assessment and plan of care.    If you have questions, please do not hesitate to call me. I look forward to following Carol Dewey along with you.    Sincerely,    Sridhar Albert III, MD    Enclosure  CC:  No Recipients    If you would like to receive this communication electronically, please contact [email or web address] or [phone] to request more information on [employer] access.    For providers and/or their staff who would like to refer a patient to [facility], please contact us through our one-stop-shop provider referral line, [facility], at [phone].    If you feel you have received this communication in error or would no longer like to receive these types of communications, please e-mail [email or web address]

## 2019-03-21 PROBLEM — M54.16 LUMBAR RADICULOPATHY: Status: ACTIVE | Noted: 2019-03-21

## 2019-04-23 ENCOUNTER — OFFICE VISIT (OUTPATIENT)
Dept: PAIN MEDICINE | Facility: CLINIC | Age: 49
End: 2019-04-23
Attending: ANESTHESIOLOGY
Payer: MEDICARE

## 2019-04-23 VITALS
HEIGHT: 63 IN | HEART RATE: 64 BPM | BODY MASS INDEX: 25.41 KG/M2 | SYSTOLIC BLOOD PRESSURE: 139 MMHG | DIASTOLIC BLOOD PRESSURE: 80 MMHG | WEIGHT: 143.44 LBS

## 2019-04-23 DIAGNOSIS — G89.29 CHRONIC NECK PAIN: ICD-10-CM

## 2019-04-23 DIAGNOSIS — M51.36 DDD (DEGENERATIVE DISC DISEASE), LUMBAR: ICD-10-CM

## 2019-04-23 DIAGNOSIS — R42 VERTIGO: ICD-10-CM

## 2019-04-23 DIAGNOSIS — M79.18 MYOFASCIAL PAIN: ICD-10-CM

## 2019-04-23 DIAGNOSIS — M54.2 CHRONIC NECK PAIN: ICD-10-CM

## 2019-04-23 DIAGNOSIS — M54.16 LUMBAR RADICULITIS: Primary | ICD-10-CM

## 2019-04-23 PROCEDURE — 3008F BODY MASS INDEX DOCD: CPT | Mod: CPTII,S$GLB,, | Performed by: ANESTHESIOLOGY

## 2019-04-23 PROCEDURE — 3008F PR BODY MASS INDEX (BMI) DOCUMENTED: ICD-10-PCS | Mod: CPTII,S$GLB,, | Performed by: ANESTHESIOLOGY

## 2019-04-23 PROCEDURE — 99214 OFFICE O/P EST MOD 30 MIN: CPT | Mod: S$GLB,,, | Performed by: ANESTHESIOLOGY

## 2019-04-23 PROCEDURE — 99214 PR OFFICE/OUTPT VISIT, EST, LEVL IV, 30-39 MIN: ICD-10-PCS | Mod: S$GLB,,, | Performed by: ANESTHESIOLOGY

## 2019-04-23 PROCEDURE — 99999 PR PBB SHADOW E&M-EST. PATIENT-LVL III: ICD-10-PCS | Mod: PBBFAC,,, | Performed by: ANESTHESIOLOGY

## 2019-04-23 PROCEDURE — 99999 PR PBB SHADOW E&M-EST. PATIENT-LVL III: CPT | Mod: PBBFAC,,, | Performed by: ANESTHESIOLOGY

## 2019-04-23 RX ORDER — GABAPENTIN 300 MG/1
300 CAPSULE ORAL 2 TIMES DAILY
Qty: 60 CAPSULE | Refills: 11 | Status: SHIPPED | OUTPATIENT
Start: 2019-04-23 | End: 2019-11-07 | Stop reason: SDUPTHER

## 2019-04-23 NOTE — PROGRESS NOTES
Chronic Pain - Established    INTERVAL HISTORY (04/23/2019):    Carol Dewey presents to the clinic today for a follow-up appointment for low back pain. Since the last visit, the pain has been persistent. The patient reports 80% pain relief after Bilateral L5/S1 Transforaminal epidural steroid injection which lasted one week. One week after the injection, she reports falling at home in her kitchen which she attributes to vertigo. She hit the back of her head and left shoulder. No LOC. Her low back and leg pain returned after the fall. Current pain intensity in the low back is 7/10. The pain is located in the bilateral lower back area and radiates down the lateral aspect of the legs (R>L).       SUBJECTIVE:    Carol Dewey presents to the clinic for the evaluation of chronic low back pain. The back pain started 10 years ago following a fall at work and symptoms have been progressively worsening. The pain is located in the bilateral low back area and radiates down the lateral aspect of the legs into the feet. The pain is described as sharp and shooting. Current pain intensity is 8/10. Average pain intensity is 8/10. The pain is made worse with walking, sitting and standing. The pain is mitigated by rest and laying down. She has tried Ibuprofen and IM steroid injection with no improvement. She tried PT in the past with minimal improvement. She tried acupuncture which helped significantly but had to stop secondary to cost. She had a Bilateral L5/S1  Transforaminal YENI in January 2013 for a similar pain which brought her significant relief for 1 year.    She also complains of chronic neck pain. The neck pain has been present for 6 years. The pain is located in the posterior cervical spine area and radiates into the shoulders.  The pain is described as sharp and is rated as 7/10.  The pain is exacerbated by driving and prolonged sitting.  The neck pain is mitigated by heat, massage, Salon Pas and rest. The back pain  is more significant than the neck pain.    Patient denies night fever/night sweats, urinary incontinence, bowel incontinence, significant weight loss and significant motor weakness.    Physical Therapy/Home Exercise: no      Pain Disability Index Review:  Last 3 PDI Scores 2019 3/12/2019   Pain Disability Index (PDI) 41 64       Pain Medications:    - Adjuvant Medications: Advil,Motrin ( Ibuprofen) as needed     report:  Reviewed     Pain Procedures:   Bilateral L5/S1 Lumbar Transforaminal YENI (3/21/2019)  Bilateral L5/S1 Lumbar Transforaminal YENI (2013)     Imaging:     LUMBAR MRI WO CONTRAST (2018):    No lumbar canal or neural foraminal stenosis or disc protrusions.    Mild annular bulging at L3-4 and dessication which appears stable.    Interval development of minimal annular bulging dorsally at L4-5.      CERVICAL MRI WO CONTRAST (2018):  Full report in Media tab.    Mild cervical spondylosis at multiple levels. No cervical or foraminal stenosis present.    Past Medical History:   Diagnosis Date    Allergy     Degenerative disc disease      Past Surgical History:   Procedure Laterality Date     SECTION      Injection,steroid,epidural,transforaminal approach---bilateral L5/S1 Bilateral 3/21/2019    Performed [facility]har [facility] at [facility] OR    TONSILLECTOMY       Social History     Socioeconomic History    Marital status:      Spouse name: Not on file    Number of children: Not on file    Years of education: Not on file    Highest education level: Not on file   Occupational History    Not on file   Social Needs    Financial resource strain: Not on file    Food insecurity:     Worry: Not on file     Inability: Not on file    Transportation needs:     Medical: Not on file     Non-medical: Not on file   Tobacco Use    Smoking status: Never Smoker    Smokeless tobacco: Never Used   Substance and Sexual Activity    Alcohol use: No    Drug use: No     Sexual activity: Yes     Partners: Male     Birth control/protection: OCP   Lifestyle    Physical activity:     Days per week: Not on file     Minutes per session: Not on file    Stress: Not on file   Relationships    Social connections:     Talks on phone: Not on file     Gets together: Not on file     Attends Anabaptism service: Not on file     Active member of club or organization: Not on file     Attends meetings of clubs or organizations: Not on file     Relationship status: Not on file   Other Topics Concern    Not on file   Social History Narrative    Not on file     Family History   Problem Relation Age of Onset    Asthma Mother     Heart disease Mother     Cancer Father     Breast cancer Paternal Cousin     Colon cancer Neg Hx     Ovarian cancer Neg Hx        Review of patient's allergies indicates:   Allergen Reactions    Iodine and iodide containing products Rash    Shellfish containing products Hives     Other reaction(s): Hives       Current Outpatient Medications   Medication Sig    ibuprofen (ADVIL,MOTRIN) 800 MG tablet TAKE 1 TABLET BY MOUTH THREE TIMES DAILY AS NEEDED FOR PAIN    meclizine (ANTIVERT) 25 mg tablet Take 1 tablet (25 mg total) by mouth 3 (three) times daily as needed for Dizziness.    norgestimate-ethinyl estradiol (TRINESSA, 28,) 0.18/0.215/0.25 mg-35 mcg (28) tablet Take 1 tablet by mouth once daily.    gabapentin (NEURONTIN) 300 MG capsule Take 1 capsule (300 mg total) by mouth 2 (two) times daily.    lidocaine (LIDODERM) 5 % Place 2 patches onto the skin once daily. Remove after 12 hr     No current facility-administered medications for this visit.        REVIEW OF SYSTEMS:    GENERAL:  No weight loss, malaise or fevers.  HEENT:  Negative for frequent or significant headaches.  NECK:  + neck pain  RESPIRATORY:  Negative for cough, wheezing or shortness of breath.  CARDIOVASCULAR:  Negative for chest pain, leg swelling or palpitations.  GI:  Negative for blood in  "stools or black stools or change in bowel habits.  MUSCULOSKELETAL:  See HPI.  SKIN:  Negative for lesions, rash, and itching.  PSYCH:  +  sleep disturbance  HEMATOLOGY/LYMPHOLOGY:  Negative for prolonged bleeding, bruising easily or swollen nodes.  NEURO:   No history of seizures or tremors. + hx of vertigo  All other reviewed and negative other than HPI.    OBJECTIVE:    /80   Pulse 64   Ht 5' 3" (1.6 m)   Wt 65.1 kg (143 lb 6.6 oz)   BMI 25.40 kg/m²     PHYSICAL EXAMINATION:    GENERAL: Well appearing, in no acute distress.   PSYCH:  Mood and affect is appropriate.  Awake, alert, and oriented x 3.  SKIN: Skin color, texture, turgor normal, no rashes or lesions  HEENT: Normocephalic, atraumatic.  EOM intact.  CV: Radial pulses are 2+.  RESP:  Respirations are unlabored.  GI: Abdomen soft and non-tender.  MSK:  No atrophy or tone abnormalities are noted.                 Neck: No obvious deformity or signs of trauma.  Normal cervical spine range of motion.     Back: Straight leg raising in the sitting and supine positions is negative for radicular pain. Tenderness to palpation over the lumbar spine and paraspinous muscles.  + pain with back extension/rotation.      Buttocks:  No pain to palpation over the PSIS.     Extremities:  Peripheral joint ROM is full and pain free without obvious instability or laxity in all four extremities. No edema or skin discolorations noted.      Gait:  Gait is normal.     NEUR:  Strength testing is 5/5 throughout all muscle groups in the upper and lower extremities. No loss of sensation is noted.       ASSESSMENT AND PLAN: 48 y.o.  female with chronic low back and bilateral leg (R>L) pain. Carol reports significant improvement of her back and leg pain after L5/S1 TESI which lasted up until a fall she had at home. We discussed additional options including PT and repeat TESI. She would like to discuss this with her  and will contact office with her decision. In the " meantime, will start Gabapentin 300 mg BID to help with pain in legs. Will also refer to Dr. Tran for further evaluation of her vertigo.    1. Lumbar radiculitis    2. DDD (degenerative disc disease), lumbar    3. Chronic neck pain    4. Myofascial pain    5. Vertigo        The above plan and management options were discussed at length with patient. Patient is in agreement with the above and verbalized understanding. It will be communicated with the referring physician via electronic record, fax, or mail.    Sridhar Albert III  04/23/2019

## 2019-05-06 DIAGNOSIS — M54.32 SCIATICA OF LEFT SIDE: ICD-10-CM

## 2019-05-07 RX ORDER — IBUPROFEN 800 MG/1
TABLET ORAL
Qty: 270 TABLET | Refills: 0 | Status: SHIPPED | OUTPATIENT
Start: 2019-05-07 | End: 2019-08-06 | Stop reason: SDUPTHER

## 2019-05-08 RX ORDER — NORGESTIMATE AND ETHINYL ESTRADIOL 7DAYSX3 28
KIT ORAL
Qty: 28 TABLET | Refills: 11 | Status: SHIPPED | OUTPATIENT
Start: 2019-05-08 | End: 2020-04-27

## 2019-08-06 DIAGNOSIS — M54.32 SCIATICA OF LEFT SIDE: ICD-10-CM

## 2019-08-06 RX ORDER — IBUPROFEN 800 MG/1
TABLET ORAL
Qty: 270 TABLET | Refills: 0 | Status: SHIPPED | OUTPATIENT
Start: 2019-08-06 | End: 2020-09-09 | Stop reason: ALTCHOICE

## 2019-11-07 ENCOUNTER — OFFICE VISIT (OUTPATIENT)
Dept: PRIMARY CARE CLINIC | Facility: CLINIC | Age: 49
End: 2019-11-07
Payer: MEDICARE

## 2019-11-07 VITALS
HEART RATE: 73 BPM | SYSTOLIC BLOOD PRESSURE: 140 MMHG | RESPIRATION RATE: 18 BRPM | OXYGEN SATURATION: 96 % | DIASTOLIC BLOOD PRESSURE: 78 MMHG | TEMPERATURE: 98 F | WEIGHT: 143.63 LBS | BODY MASS INDEX: 25.45 KG/M2 | HEIGHT: 63 IN

## 2019-11-07 DIAGNOSIS — M54.2 NECK PAIN: ICD-10-CM

## 2019-11-07 DIAGNOSIS — M54.16 LUMBAR RADICULOPATHY: ICD-10-CM

## 2019-11-07 DIAGNOSIS — M51.36 DDD (DEGENERATIVE DISC DISEASE), LUMBAR: Primary | ICD-10-CM

## 2019-11-07 DIAGNOSIS — H60.332 ACUTE SWIMMER'S EAR OF LEFT SIDE: ICD-10-CM

## 2019-11-07 DIAGNOSIS — M54.16 LUMBAR RADICULITIS: ICD-10-CM

## 2019-11-07 DIAGNOSIS — M48.02 FORAMINAL STENOSIS OF CERVICAL REGION: ICD-10-CM

## 2019-11-07 PROCEDURE — 99214 OFFICE O/P EST MOD 30 MIN: CPT | Mod: S$GLB,,, | Performed by: FAMILY MEDICINE

## 2019-11-07 PROCEDURE — 99214 PR OFFICE/OUTPT VISIT, EST, LEVL IV, 30-39 MIN: ICD-10-PCS | Mod: S$GLB,,, | Performed by: FAMILY MEDICINE

## 2019-11-07 PROCEDURE — 99999 PR PBB SHADOW E&M-EST. PATIENT-LVL IV: ICD-10-PCS | Mod: PBBFAC,,, | Performed by: FAMILY MEDICINE

## 2019-11-07 PROCEDURE — 3008F PR BODY MASS INDEX (BMI) DOCUMENTED: ICD-10-PCS | Mod: CPTII,S$GLB,, | Performed by: FAMILY MEDICINE

## 2019-11-07 PROCEDURE — 99999 PR PBB SHADOW E&M-EST. PATIENT-LVL IV: CPT | Mod: PBBFAC,,, | Performed by: FAMILY MEDICINE

## 2019-11-07 PROCEDURE — 3008F BODY MASS INDEX DOCD: CPT | Mod: CPTII,S$GLB,, | Performed by: FAMILY MEDICINE

## 2019-11-07 RX ORDER — GABAPENTIN 300 MG/1
300 CAPSULE ORAL 2 TIMES DAILY
Qty: 180 CAPSULE | Refills: 0 | Status: SHIPPED | OUTPATIENT
Start: 2019-11-07 | End: 2019-11-21

## 2019-11-07 RX ORDER — NEOMYCIN SULFATE, POLYMYXIN B SULFATE, HYDROCORTISONE 3.5; 10000; 1 MG/ML; [USP'U]/ML; MG/ML
4 SOLUTION/ DROPS AURICULAR (OTIC) 3 TIMES DAILY
Qty: 10 ML | Refills: 0 | Status: SHIPPED | OUTPATIENT
Start: 2019-11-07 | End: 2022-07-27 | Stop reason: SDUPTHER

## 2019-11-07 NOTE — PROGRESS NOTES
"Subjective:       Patient ID: Carol Dewey is a 49 y.o. female.    Chief Complaint: Back Pain (numbness/tingling everyday for "a long time")    Complains of persistent lower back and neck pain.  Had lumbar transforaminal YENI earlier this year, got about 80% pain relief, but then fell at home with increasing pain.  Followed up with Dr. Albert, who offered repeat YENI, but patient declined at that time.  He started her on gabapentin, but she only took for a month.  Unclear if this helped or not.  Has done physical therapy in the past, says it did not really help.  Has been having increasing neck pain numbness and tingling radiating down her left arm, as well.  Denies chest pain or palpitations.  No bowel or bladder incontinence.  Also complains of left ear drainage and mild discomfort for the past week or 2.    Review of Systems   Constitutional: Negative for fever.   HENT: Positive for ear discharge.    Respiratory: Negative for shortness of breath.    Cardiovascular: Negative for chest pain.   Musculoskeletal: Positive for back pain and neck pain.   Skin: Negative for rash and wound.   Allergic/Immunologic: Negative for immunocompromised state.   Neurological: Positive for numbness. Negative for weakness.   Psychiatric/Behavioral: Negative for agitation and confusion.       Objective:      Vitals:    11/07/19 1030   BP: (!) 140/78   BP Location: Left arm   Patient Position: Sitting   BP Method: Medium (Manual)   Pulse: 73   Resp: 18   Temp: 98.4 °F (36.9 °C)   TempSrc: Oral   SpO2: 96%   Weight: 65.1 kg (143 lb 9.6 oz)   Height: 5' 3" (1.6 m)     Physical Exam   Constitutional: She is oriented to person, place, and time. She appears well-developed and well-nourished.   HENT:   Head: Normocephalic and atraumatic.   Right Ear: External ear normal.   Mild erythema to left external ear canal   Cardiovascular: Normal rate, regular rhythm and normal heart sounds.   Pulmonary/Chest: Effort normal and breath sounds " normal.   Musculoskeletal: She exhibits no edema.        Cervical back: She exhibits decreased range of motion.        Lumbar back: She exhibits decreased range of motion.   Neurological: She is alert and oriented to person, place, and time. She has normal strength.   Reflex Scores:       Patellar reflexes are 2+ on the right side and 2+ on the left side.  Skin: Skin is warm and dry.   Psychiatric: She has a normal mood and affect. Her behavior is normal.   Nursing note and vitals reviewed.      Assessment:       1. DDD (degenerative disc disease), lumbar    2. Lumbar radiculopathy    3. Foraminal stenosis of cervical region    4. Lumbar radiculitis    5. Neck pain    6. Acute swimmer's ear of left side        Plan:       DDD (degenerative disc disease), lumbar  -     Ambulatory referral to Pain Clinic  -     MRI Lumbar Spine Without Contrast; Future; Expected date: 11/07/2019  Restart gabapentin.  Will refer back to interventional pain management.  Will get updated imaging.  Lumbar radiculopathy  -     Ambulatory referral to Pain Clinic  -     MRI Lumbar Spine Without Contrast; Future; Expected date: 11/07/2019    Foraminal stenosis of cervical region  -     Ambulatory referral to Pain Clinic  -     MRI Cervical Spine Without Contrast; Future; Expected date: 11/07/2019    Lumbar radiculitis  -     gabapentin (NEURONTIN) 300 MG capsule; Take 1 capsule (300 mg total) by mouth 2 (two) times daily.  Dispense: 180 capsule; Refill: 0  -     MRI Lumbar Spine Without Contrast; Future; Expected date: 11/07/2019    Neck pain  -     MRI Cervical Spine Without Contrast; Future; Expected date: 11/07/2019    Acute swimmer's ear of left side  -     neomycin-polymyxin-hydrocortisone (CORTISPORIN) otic solution; Place 4 drops into the left ear 3 (three) times daily. for 7 days  Dispense: 10 mL; Refill: 0      Medication List with Changes/Refills   New Medications    NEOMYCIN-POLYMYXIN-HYDROCORTISONE (CORTISPORIN) OTIC SOLUTION     Place 4 drops into the left ear 3 (three) times daily. for 7 days   Current Medications    IBUPROFEN (ADVIL,MOTRIN) 800 MG TABLET    TAKE 1 TABLET BY MOUTH THREE TIMES DAILY AS NEEDED FOR PAIN    LIDOCAINE (LIDODERM) 5 %    Place 2 patches onto the skin once daily. Remove after 12 hr    MECLIZINE (ANTIVERT) 25 MG TABLET    Take 1 tablet (25 mg total) by mouth 3 (three) times daily as needed for Dizziness.    NORGESTIMATE-ETHINYL ESTRADIOL (TRINESSA, 28,) 0.18/0.215/0.25 MG-35 MCG (28) TABLET    Take 1 tablet by mouth once daily.    TRI-SPRINTEC, 28, 0.18/0.215/0.25 MG-35 MCG (28) TABLET    TAKE 1 TABLET BY MOUTH EVERY DAY   Changed and/or Refilled Medications    Modified Medication Previous Medication    GABAPENTIN (NEURONTIN) 300 MG CAPSULE gabapentin (NEURONTIN) 300 MG capsule       Take 1 capsule (300 mg total) by mouth 2 (two) times daily.    Take 1 capsule (300 mg total) by mouth 2 (two) times daily.

## 2019-11-11 ENCOUNTER — TELEPHONE (OUTPATIENT)
Dept: PAIN MEDICINE | Facility: CLINIC | Age: 49
End: 2019-11-11

## 2019-11-11 NOTE — TELEPHONE ENCOUNTER
Spoke with patient about making an appointment with Dr Mcrae. Patient has been seen previously by Dr Albert. She is due to have an MRI on Friday so an appointment with [facility] has been scheduled after. No further issues discussed.

## 2019-11-11 NOTE — TELEPHONE ENCOUNTER
Left call back message. Discuss making an appointment with Dr Mcrae in [facility] and the treatment provided by [facility].

## 2019-11-11 NOTE — TELEPHONE ENCOUNTER
----- Message from Kacey Brennan sent at 11/11/2019  1:28 PM CST -----  Contact: pt  Pt returning  Your call about appt with pain mangement in [facility] call [phone]     Thanks

## 2019-11-21 ENCOUNTER — OFFICE VISIT (OUTPATIENT)
Dept: PAIN MEDICINE | Facility: CLINIC | Age: 49
End: 2019-11-21
Payer: MEDICARE

## 2019-11-21 DIAGNOSIS — R20.0 BILATERAL HAND NUMBNESS: ICD-10-CM

## 2019-11-21 DIAGNOSIS — R20.0 NUMBNESS AND TINGLING OF FOOT: ICD-10-CM

## 2019-11-21 DIAGNOSIS — R20.2 NUMBNESS AND TINGLING OF FOOT: ICD-10-CM

## 2019-11-21 PROCEDURE — 99999 PR PBB SHADOW E&M-EST. PATIENT-LVL II: CPT | Mod: PBBFAC,,, | Performed by: PAIN MEDICINE

## 2019-11-21 PROCEDURE — 99214 OFFICE O/P EST MOD 30 MIN: CPT | Mod: S$GLB,,, | Performed by: PAIN MEDICINE

## 2019-11-21 PROCEDURE — 99999 PR PBB SHADOW E&M-EST. PATIENT-LVL II: ICD-10-PCS | Mod: PBBFAC,,, | Performed by: PAIN MEDICINE

## 2019-11-21 PROCEDURE — 99214 PR OFFICE/OUTPT VISIT, EST, LEVL IV, 30-39 MIN: ICD-10-PCS | Mod: S$GLB,,, | Performed by: PAIN MEDICINE

## 2019-11-21 NOTE — PROGRESS NOTES
Subjective:     Patient ID: Carol Dewey is a 49 y.o. female    Chief Complaint: Back Pain and Neck Pain      Referred by: Robin Lemos MD      HPI:    Initial Encounter (11/21/19):  Carol Dewey is a 49 y.o. female who presents today with chronic neck and low back pain. Patient reports that her neck pain radiates the bilateral upper extremities with numbness and tingling.  Patient reports that she also shooting pain her low back to her lower extremities with numbness in her feet.  She reports diffuse weakness.  She denies any bowel bladder dysfunction.  Patient has been treated in the past by Dr. Seo.  He perform bilateral L5 transforaminal epidural steroid injections.  Patient reported 1 week of 80% relief.  After this period time she had a fall secondary to vertigo her pain returned.  Upon follow-up with Dr. audelia stafford she was not interested in repeat interventional procedures..   This pain is described in detail below.    Physical Therapy:  Yes.  Not effective    Non-pharmacologic Treatment:  Nothing helps         · TENS?  No    Pain Medications:         · Currently taking:  Ibuprofen    · Has tried in the past:  Gabapentin, Lidoderm patches, Tylenol    · Has not tried:  Opioids, Muscle relaxants, TCAs, SNRIs    Blood thinners:  None    Interventional Therapies:   3/21/19 - bilateral L5 transforaminal epidural steroid injections - 80% relief for 1 week    Relevant Surgeries:  None    Affecting sleep?  Yes    Affecting daily activities? yes    Depressive symptoms? yes          · SI/HI? No    Work status: Disabled    Pain Scores:    Best:       5/10  Worst:     10/10  Usually:   7/10  Today:    9/10    Review of Systems   Constitutional: Negative for activity change, appetite change, chills, fatigue, fever and unexpected weight change.   HENT: Negative for hearing loss.    Eyes: Negative for visual disturbance.   Respiratory: Negative for chest tightness and shortness of breath.     Cardiovascular: Negative for chest pain.   Gastrointestinal: Negative for abdominal pain, constipation, diarrhea, nausea and vomiting.   Genitourinary: Negative for difficulty urinating.   Musculoskeletal: Positive for back pain, myalgias and neck pain. Negative for gait problem.   Skin: Negative for rash.   Neurological: Positive for weakness and numbness. Negative for dizziness, light-headedness and headaches.   Psychiatric/Behavioral: Positive for sleep disturbance. Negative for hallucinations and suicidal ideas. The patient is not nervous/anxious.        Past Medical History:   Diagnosis Date    Allergy     Degenerative disc disease        Past Surgical History:   Procedure Laterality Date     SECTION      TONSILLECTOMY      TRANSFORAMINAL EPIDURAL INJECTION OF STEROID Bilateral 3/21/2019    Procedure: Injection,steroid,epidural,transforaminal approach---bilateral L5/S1;  Surgeon: Sridhar Albert III, MD;  Location: [facility];  Service: Pain Management;  Laterality: Bilateral;       Social History     Socioeconomic History    Marital status:      Spouse name: Not on file    Number of children: Not on file    Years of education: Not on file    Highest education level: Not on file   Occupational History    Not on file   Social Needs    Financial resource strain: Not on file    Food insecurity:     Worry: Not on file     Inability: Not on file    Transportation needs:     Medical: Not on file     Non-medical: Not on file   Tobacco Use    Smoking status: Never Smoker    Smokeless tobacco: Never Used   Substance and Sexual Activity    Alcohol use: No    Drug use: No    Sexual activity: Yes     Partners: Male     Birth control/protection: OCP   Lifestyle    Physical activity:     Days per week: Not on file     Minutes per session: Not on file    Stress: Not on file   Relationships    Social connections:     Talks on phone: Not on file     Gets together: Not on file      Attends [employer] service: Not on file     Active member of club or organization: Not on file     Attends meetings of clubs or organizations: Not on file     Relationship status: Not on file   Other Topics Concern    Not on file   Social History Narrative    Not on file       Review of patient's allergies indicates:   Allergen Reactions    Iodine and iodide containing products Rash    Shellfish containing products Hives     Other reaction(s): Hives       Current Outpatient Medications on File Prior to Visit   Medication Sig Dispense Refill    ibuprofen (ADVIL,MOTRIN) 800 MG tablet TAKE 1 TABLET BY MOUTH THREE TIMES DAILY AS NEEDED FOR PAIN 270 tablet 0    norgestimate-ethinyl estradiol (TRINESSA, 28,) 0.18/0.215/0.25 mg-35 mcg (28) tablet Take 1 tablet by mouth once daily. 28 tablet 10    TRI-SPRINTEC, 28, 0.18/0.215/0.25 mg-35 mcg (28) tablet TAKE 1 TABLET BY MOUTH EVERY DAY 28 tablet 11    [DISCONTINUED] gabapentin (NEURONTIN) 300 MG capsule Take 1 capsule (300 mg total) by mouth 2 (two) times daily. (Patient not taking: Reported on 11/21/2019) 180 capsule 0    [DISCONTINUED] lidocaine (LIDODERM) 5 % Place 2 patches onto the skin once daily. Remove after 12 hr (Patient not taking: Reported on 11/7/2019) 60 patch 5    [DISCONTINUED] meclizine (ANTIVERT) 25 mg tablet Take 1 tablet (25 mg total) by mouth 3 (three) times daily as needed for Dizziness. (Patient not taking: Reported on 11/7/2019) 30 tablet 2     No current facility-administered medications on file prior to visit.        Objective:      There were no vitals taken for this visit.    Exam:  GEN:  Well developed, well nourished.  No acute distress.  Normal pain behavior.  HEENT:  No trauma.  Mucous membranes moist.  Nares patent bilaterally.  PSYCH: Normal affect. Thought content appropriate.  CHEST:  Breathing symmetric.  No audible wheezing.  ABD: Soft, non-distended.  SKIN:  Warm, pink, dry.  No rash on exposed areas.    EXT:  No cyanosis,  clubbing, or edema.  No color change or changes in nail or hair growth.  NEURO/MUSCULOSKELETAL:  Fully alert, oriented, and appropriate. Speech normal [place]. No cranial nerve deficits.   Gait:   normal.  5/5 motor strength throughout upper extremities.   Sensory:   no  sensory deficit in the upper extremities.   Reflexes:   2 + and symmetric throughout.   absent  Escalera's bilaterally.  C-Spine:   full  ROM with pain on  all movements.  negative  facet loading bilaterally.   negative  Spurling's bilaterally.    Diffusely  TTP over cervical facet joints, cervical paraspinal muscles, shoulders, elbows or hands.          Imaging:  Narrative     EXAMINATION:  MRI LUMBAR SPINE WITHOUT CONTRAST    CLINICAL HISTORY:  Low back pain, >6wks conservative tx, persistent-progressive sx, surgical candidate;.  Other intervertebral disc degeneration, lumbar region    TECHNIQUE:  Sagittal and axial T1 and T2 W images    COMPARISON:  MRI lumbar spine 07/02/2008.    FINDINGS:  Lumbar alignment is anatomic.  Desiccation L3-4 and very mild desiccation L4-5 discs noted.    No disc herniation, canal compromise or foraminal compromise appreciated lumbar or visualized lower thoracic disc levels.    The conus medullaris has a normal contour and signal.  Small probable hemangioma right lateral aspect L3 vertebral body.   Impression       No evidence of lumbar disc herniation, canal stenosis or foraminal compromise.  Desiccation noted L3-4 disc with very mild desiccation L4-5.      Electronically signed by: Melani Ferris MD  Date: 11/15/2019  Time: 13:09       Narrative     EXAMINATION:  MRI CERVICAL SPINE WITHOUT CONTRAST    CLINICAL HISTORY:  Neck pain, xray bone or disc margin destruction;.  Cervicalgia    TECHNIQUE:  Sagittal and axial T1 and T2 W images    COMPARISON:  MRI cervical spine 07/26/2012.    FINDINGS:  Cervical alignment is anatomic.  Mild desiccation C2-3 through C5-6 discs noted.    C3-4, tiny posterior midline protrusion  of the disc unchanged.    C4-5, mild annular bulging may be present.    No disc herniation, canal stenosis or foraminal compromise appreciated remaining cervical or visualized upper thoracic disc levels.  The cervical cord has a normal contour and signal throughout.  No focal marrow lesion appreciated.   Impression       Tiny posterior midline protrusion C3-4, unchanged.    Mild annular bulging C4-5 as previously seen.      Electronically signed by: Melani Ferris MD  Date: 11/15/2019  Time: 10:03           Assessment:       Encounter Diagnoses   Name Primary?    Bilateral hand numbness     Numbness and tingling of foot          Plan:       Carol was seen today for back pain and neck pain.    Diagnoses and all orders for this visit:    Bilateral hand numbness  -     EMG W/ ULTRASOUND AND NERVE CONDUCTION TEST 4 Extremities; Future    Numbness and tingling of foot  -     EMG W/ ULTRASOUND AND NERVE CONDUCTION TEST 4 Extremities; Future        Carol Dewey is a 49 y.o. female with chronic neck and low back pain. Patient reports pain radiates to all 4 extremities with numbness and tingling.  Cervical and lumbar MRIs without any significant evidence to suggest neurocompressive process. No neurologic abnormalities of upper extremities noted on examination.  Low suspicion for radiculopathy or myelopathy.  May have peripheral polyneuropathy versus peripheral nerve entrapment..    1.  Pertinent imaging studies reviewed by me. Imaging results were discussed with patient.  2.  EMG/nerve conduction study to evaluate for peripheral neuropathy.  3.  Return to clinic after EMG to discuss results.

## 2019-11-21 NOTE — LETTER
November 21, 2019      Robin Lemos MD  [address]  [address]  [place] [address]           [facility]  [address], [facility] [address]  [place] [address]  Phone: [phone]  Fax: [phone]          Patient: Carol Dewey   MR Number: [id number]   YOB: 1970   Date of Visit: 11/21/2019       Dear Dr. Robin Lemos:    Thank you for referring Carol Dewey to me for evaluation. Attached you will find relevant portions of my assessment and plan of care.    If you have questions, please do not hesitate to call me. I look forward to following Carol Dewey along with you.    Sincerely,    Alexandre Mcrae Jr., MD    Enclosure  CC:  No Recipients    If you would like to receive this communication electronically, please contact [email or web address] or [phone] to request more information on [facility] access.    For providers and/or their staff who would like to refer a patient to [facility], please contact us through our one-stop-shop provider referral line, [facility], at [phone].    If you feel you have received this communication in error or would no longer like to receive these types of communications, please e-mail [email or web address]

## 2019-12-05 ENCOUNTER — PROCEDURE VISIT (OUTPATIENT)
Dept: PHYSICAL MEDICINE AND REHAB | Facility: CLINIC | Age: 49
End: 2019-12-05
Payer: MEDICARE

## 2019-12-05 DIAGNOSIS — R20.0 NUMBNESS AND TINGLING OF FOOT: ICD-10-CM

## 2019-12-05 DIAGNOSIS — R20.0 BILATERAL HAND NUMBNESS: ICD-10-CM

## 2019-12-05 DIAGNOSIS — R20.2 NUMBNESS AND TINGLING OF FOOT: ICD-10-CM

## 2019-12-05 PROCEDURE — 95886 PR EMG COMPLETE, W/ NERVE CONDUCTION STUDIES, 5+ MUSCLES: ICD-10-PCS | Mod: S$GLB,,, | Performed by: PHYSICAL MEDICINE & REHABILITATION

## 2019-12-05 PROCEDURE — 95913 NRV CNDJ TEST 13/> STUDIES: CPT | Mod: S$GLB,,, | Performed by: PHYSICAL MEDICINE & REHABILITATION

## 2019-12-05 PROCEDURE — 95886 MUSC TEST DONE W/N TEST COMP: CPT | Mod: S$GLB,,, | Performed by: PHYSICAL MEDICINE & REHABILITATION

## 2019-12-05 PROCEDURE — 95913 PR NERVE CONDUCTION STUDY; 13 OR MORE STUDIES: ICD-10-PCS | Mod: S$GLB,,, | Performed by: PHYSICAL MEDICINE & REHABILITATION

## 2019-12-05 NOTE — PROCEDURES
Test Date:  2019    Patient: Carol Dewey : 1970 Physician: Cristian Medellin D.O.   ID#:  SEX: Female Ref. Phys: Alexandre Mcrae Jr*     HPI: Carol Dewey is a 49 y.o.female who presents for NCS/EMG to evaluate bilateral hand and foot numbness/tingling.  She has right sided low back pain with right leg radicular symptoms to the dorsum of the foot. She has no radicular symptoms from the neck to the arms.  Her hand symptoms are equal bilaterally.      NCV & EMG Findings:   Evaluation of the left median sensory nerve showed prolonged distal peak latency and decreased conduction velocity.   All remaining nerves (as indicated in the following tables) were within normal limits.   All examined muscles (as indicated in the following table) showed no evidence of electrical instability.    Impression:  1. There is evidence of a mild left sensory median mononeuropathy across the wrist (I.e. Carpal tunnel syndrome).  There is no motor axonal loss and no active denervation.    2. Normal study of the right upper extremity and bilateral lower extremities.      ___________________________  Cristian Medellin D.O.        NCS+  Motor Nerve Results      Latency Amplitude F-Lat Segment Distance CV Comment   Site (ms) Norm (mV) Norm (ms)  (cm) (m/s) Norm    Left Median (APB)   Wrist 4.4  < 4.4 9.2  > 4.2  Wrist-Palm - - -    Elbow 7.7 - 8.2 -  Elbow-Wrist 24 73  > 51    Right Median (APB)   Wrist 4.0  < 4.4 5.8  > 4.2  Wrist-Palm - - -    Elbow 7.9 - 5.8 -  Elbow-Wrist 24 62  > 51    Left Ulnar (ADM)   Wrist 2.7  < 3.7 8.0  > 3.0         Bel Elbow 5.6 - 5.3 -  Bel Elbow-Wrist 28 97  > 52    Right Ulnar (ADM)   Wrist 2.2  < 3.7 4.1  > 3.0         Bel Elbow 5.3 - 4.1 -  Bel Elbow-Wrist 26 84  > 52    Left Fibular (EDB)   Ankle 2.5  < 6.5 3.6  > 1.10         Bel Fib Head 9.0 - 3.8 -  Bel Fib Head-Ankle 34 52  > 39    Right Fibular (EDB)   Ankle 2.3  < 6.5 5.8  > 1.10         Bel Fib Head 8.7 - 5.1 -  Bel  Fib Head-Ankle 36 56  > 39    Pop Fossa 10.8 - 5.2 -  Pop Fossa-Bel Fib Head 16 76  > 42    Left Tibial (AHB)   Ankle 4.0  < 6.1 5.7  > 5.3         Right Tibial (AHB)   Ankle 4.6  < 6.1 6.6  > 5.3           Sensory Nerve Results      Latency (Peak) Amplitude (P-P) Segment Distance CV Comment   Site (ms) Norm (µV) Norm  (cm) (m/s) Norm    Left Median   Wrist-Dig II *4.6  < 4.0 37  > 13 Wrist-Dig II 14 *30  > 39    Right Median   Wrist-Dig II 3.9  < 4.0 50  > 13 Wrist-Dig II 16 41  > 39    Left Ulnar   Wrist-Dig V 2.5  < 4.0 83  > 8 Wrist-Dig V 14 56  > 38    Right Ulnar   Wrist-Dig V 2.6  < 4.0 80  > 8 Wrist-Dig V 14 54  > 38    Right Radial   Forearm-Wrist 1.48  < 2.8 33  > 11 Forearm-Wrist 10 68 -    Left Sural   Calf-Lat Mall 3.0  < 4.5 16  > 4 Calf-Lat Mall 14 47  > 35    Right Sural   Calf-Lat Mall 2.3  < 4.5 36  > 4 Calf-Lat Mall 14 61  > 35    Left Superficial Fibular   14 cm-Ankle 1.68  < 4.2 41  > 5 14 cm-Ankle 14 83  > 32      EMG+     Side Muscle Nerve Root Ins Act Fibs Psw Amp Dur Poly Recrt Int Pat Comment   Right Vastus Med Femoral L2-L4 Nml Nml Nml Nml Nml 0 Nml Nml    Right Add Longus Obturator L2-L4 Nml Nml Nml Nml Nml 0 Nml Nml    Right Tib Anterior Fibular,  Deep Fibula... L4-L5 Nml Nml Nml Nml Nml 0 Nml Nml    Right Gastroc Tibial S1-S2 Nml Nml Nml Nml Nml 0 Nml Nml    Right Gluteus Max Inf Gluteal L5-S2 Nml Nml Nml Nml Nml 0 Nml Nml    Right Gluteus Med Sup Gluteal L5-S1 Nml Nml Nml Nml Nml 0 Nml Nml    Right Lumbo Parasp (Mid) Rami L3-L4 Nml Nml Nml         Right Lumbo Parasp (Lower) Rami L5-S1 Nml Nml Nml         Right EDB Fibular,  Deep Fibula... L5-S1 Nml Nml Nml Nml Nml 0 Nml Nml    Right FDI Pedis Lateral Plantar,  Anni... S1-S2 Nml Nml Nml Nml Nml 0 Nml Nml    Left Biceps Musculocut C5-C6 Nml Nml Nml Nml Nml 0 Nml Nml    Left Triceps Radial C6-C8 Nml Nml Nml Nml Nml 0 Nml Nml    Left Pronator Teres Median C6-C7 Nml Nml Nml Nml Nml 0 Nml Nml    Left Brachiorad Radial C5-C6 Nml Nml Nml Nml  Nml 0 Nml Nml    Left FDI Ulnar C8-T1 Nml Nml Nml Nml Nml 0 Nml Nml    Left APB Median C8-T1 Nml Nml Nml Nml Nml 0 Nml Nml            Waveforms:    Motor                         Sensory

## 2019-12-13 ENCOUNTER — TELEPHONE (OUTPATIENT)
Dept: PAIN MEDICINE | Facility: CLINIC | Age: 49
End: 2019-12-13

## 2019-12-13 ENCOUNTER — TELEPHONE (OUTPATIENT)
Dept: PRIMARY CARE CLINIC | Facility: CLINIC | Age: 49
End: 2019-12-13

## 2019-12-13 NOTE — TELEPHONE ENCOUNTER
----- Message from Chana June sent at 12/13/2019 11:23 AM CST -----  Contact: Self  Pt stated that she needs a call back to schedule a follow up appt     Pt can be reached @ [phone]

## 2019-12-13 NOTE — TELEPHONE ENCOUNTER
Spoke with patient about scheduling a follow up appointment with Dr Mcrae to discuss her EMG test results. Appointment has been established. No further issues discussed.

## 2019-12-13 NOTE — TELEPHONE ENCOUNTER
----- Message from Skylar Kebede sent at 12/13/2019 11:50 AM CST -----  Contact: self   Patient would like to get test results  Name of test (lab, mammo, etc.):   MRI  Date of test:  11/15  Ordering provider: Aminta  Where was the test performed:  [facility]  Would the patient rather a call back or a response via [facility] [employer]?:  Call back  Comments:  Pt states she needs to  the results to bring to her chiropractor

## 2020-01-07 ENCOUNTER — TELEPHONE (OUTPATIENT)
Dept: PAIN MEDICINE | Facility: CLINIC | Age: 50
End: 2020-01-07

## 2020-01-07 NOTE — TELEPHONE ENCOUNTER
----- Message from Bk Antunez sent at 1/7/2020  3:16 PM CST -----  Contact: self  Pt states need to cancel her appointment due to waiting for her child to come from school Pt ask for a call to reschedule    Contact info  [phone]

## 2020-01-07 NOTE — TELEPHONE ENCOUNTER
Spoke with patient about rescheduling her appointment with Dr Mcrae. New date has been selected by patient. No further issues discussed.

## 2020-01-09 ENCOUNTER — OFFICE VISIT (OUTPATIENT)
Dept: PAIN MEDICINE | Facility: CLINIC | Age: 50
End: 2020-01-09
Payer: MEDICARE

## 2020-01-09 VITALS
SYSTOLIC BLOOD PRESSURE: 143 MMHG | HEIGHT: 63 IN | BODY MASS INDEX: 25.16 KG/M2 | HEART RATE: 66 BPM | WEIGHT: 142 LBS | DIASTOLIC BLOOD PRESSURE: 87 MMHG

## 2020-01-09 DIAGNOSIS — M50.30 DDD (DEGENERATIVE DISC DISEASE), CERVICAL: ICD-10-CM

## 2020-01-09 DIAGNOSIS — R20.0 BILATERAL HAND NUMBNESS: Primary | ICD-10-CM

## 2020-01-09 DIAGNOSIS — M47.812 CERVICAL SPONDYLOSIS: ICD-10-CM

## 2020-01-09 PROCEDURE — 99999 PR PBB SHADOW E&M-EST. PATIENT-LVL III: CPT | Mod: PBBFAC,,, | Performed by: PAIN MEDICINE

## 2020-01-09 PROCEDURE — 99213 PR OFFICE/OUTPT VISIT, EST, LEVL III, 20-29 MIN: ICD-10-PCS | Mod: S$GLB,,, | Performed by: PAIN MEDICINE

## 2020-01-09 PROCEDURE — 3008F PR BODY MASS INDEX (BMI) DOCUMENTED: ICD-10-PCS | Mod: CPTII,S$GLB,, | Performed by: PAIN MEDICINE

## 2020-01-09 PROCEDURE — 99213 OFFICE O/P EST LOW 20 MIN: CPT | Mod: S$GLB,,, | Performed by: PAIN MEDICINE

## 2020-01-09 PROCEDURE — 3008F BODY MASS INDEX DOCD: CPT | Mod: CPTII,S$GLB,, | Performed by: PAIN MEDICINE

## 2020-01-09 PROCEDURE — 99999 PR PBB SHADOW E&M-EST. PATIENT-LVL III: ICD-10-PCS | Mod: PBBFAC,,, | Performed by: PAIN MEDICINE

## 2020-01-09 NOTE — PROGRESS NOTES
Subjective:     Patient ID: Carol Dewey is a 49 y.o. female    Chief Complaint: Results (EMG)      Referred by: No ref. provider found      HPI:    Interval History (1/9/20):  She returns today for follow up and EMG review.  She reports that her symptoms have been unchanged in quality and location since last encounter.  Recent EMG did show mild left carpal tunnel syndrome but otherwise did not show any peripheral nerve entrapment, peripheral polyneuropathy or evidence for cervical radiculopathy.  She states that she has been evaluated by a chiropractor felt as though she has treatable cervical pathology.      Initial Encounter (11/21/19):  Carol Dewey is a 49 y.o. female who presents today with chronic neck and low back pain. Patient reports that her neck pain radiates the bilateral upper extremities with numbness and tingling.  Patient reports that she also shooting pain her low back to her lower extremities with numbness in her feet.  She reports diffuse weakness.  She denies any bowel bladder dysfunction.  Patient has been treated in the past by Dr. Seo.  He perform bilateral L5 transforaminal epidural steroid injections.  Patient reported 1 week of 80% relief.  After this period time she had a fall secondary to vertigo her pain returned.  Upon follow-up with Dr. audelia stafford she was not interested in repeat interventional procedures..   This pain is described in detail below.    Physical Therapy:  Yes.  Not effective    Non-pharmacologic Treatment:  Nothing helps         · TENS?  No    Pain Medications:         · Currently taking:  Ibuprofen    · Has tried in the past:  Gabapentin, Lidoderm patches, Tylenol    · Has not tried:  Opioids, Muscle relaxants, TCAs, SNRIs    Blood thinners:  None    Interventional Therapies:   3/21/19 - bilateral L5 transforaminal epidural steroid injections - 80% relief for 1 week    Relevant Surgeries:  None    Affecting sleep?  Yes    Affecting daily activities?  yes    Depressive symptoms? yes          · SI/HI? No    Work status: Disabled    Pain Scores:    Best:       5/10  Worst:     10/10  Usually:   7/10  Today:    /10    Review of Systems   Constitutional: Negative for activity change, appetite change, chills, fatigue, fever and unexpected weight change.   HENT: Negative for hearing loss.    Eyes: Negative for visual disturbance.   Respiratory: Negative for chest tightness and shortness of breath.    Cardiovascular: Negative for chest pain.   Gastrointestinal: Negative for abdominal pain, constipation, diarrhea, nausea and vomiting.   Genitourinary: Negative for difficulty urinating.   Musculoskeletal: Positive for back pain, myalgias and neck pain. Negative for gait problem.   Skin: Negative for rash.   Neurological: Positive for weakness and numbness. Negative for dizziness, light-headedness and headaches.   Psychiatric/Behavioral: Positive for sleep disturbance. Negative for hallucinations and suicidal ideas. The patient is not nervous/anxious.        Past Medical History:   Diagnosis Date    Allergy     Degenerative disc disease        Past Surgical History:   Procedure Laterality Date     SECTION      TONSILLECTOMY      TRANSFORAMINAL EPIDURAL INJECTION OF STEROID Bilateral 3/21/2019    Procedure: Injection,steroid,epidural,transforaminal approach---bilateral L5/S1;  Surgeon: Sridhar Albert III, MD;  Location: [facility] OR;  Service: Pain Management;  Laterality: Bilateral;       Social History     Socioeconomic History    Marital status:      Spouse name: Not on file    Number of children: Not on file    Years of education: Not on file    Highest education level: Not on file   Occupational History    Not on file   Social Needs    Financial resource strain: Not on file    Food insecurity:     Worry: Not on file     Inability: Not on file    Transportation needs:     Medical: Not on file     Non-medical: Not on file   Tobacco Use     "Smoking status: Never Smoker    Smokeless tobacco: Never Used   Substance and Sexual Activity    Alcohol use: No    Drug use: No    Sexual activity: Yes     Partners: Male     Birth control/protection: OCP   Lifestyle    Physical activity:     Days per week: Not on file     Minutes per session: Not on file    Stress: Not on file   Relationships    Social connections:     Talks on phone: Not on file     Gets together: Not on file     Attends Evangelical service: Not on file     Active member of club or organization: Not on file     Attends meetings of clubs or organizations: Not on file     Relationship status: Not on file   Other Topics Concern    Not on file   Social History Narrative    Not on file       Review of patient's allergies indicates:   Allergen Reactions    Iodine and iodide containing products Rash    Shellfish containing products Hives     Other reaction(s): Hives       Current Outpatient Medications on File Prior to Visit   Medication Sig Dispense Refill    ibuprofen (ADVIL,MOTRIN) 800 MG tablet TAKE 1 TABLET BY MOUTH THREE TIMES DAILY AS NEEDED FOR PAIN 270 tablet 0    TRI-SPRINTEC, 28, 0.18/0.215/0.25 mg-35 mcg (28) tablet TAKE 1 TABLET BY MOUTH EVERY DAY 28 tablet 11    [DISCONTINUED] norgestimate-ethinyl estradiol (TRINESSA, 28,) 0.18/0.215/0.25 mg-35 mcg (28) tablet Take 1 tablet by mouth once daily. (Patient not taking: Reported on 1/9/2020) 28 tablet 10     No current facility-administered medications on file prior to visit.        Objective:      BP (!) 143/87 (BP Location: Left arm, Patient Position: Sitting, BP Method: Large (Automatic))   Pulse 66   Ht 5' 3" (1.6 m)   Wt 64.4 kg (141 lb 15.6 oz)   BMI 25.15 kg/m²     Exam:  GEN:  Well developed, well nourished.  No acute distress.   HEENT:  No trauma.  Mucous membranes moist.  Nares patent bilaterally.  PSYCH: Normal affect. Thought content appropriate.  CHEST:  Breathing symmetric.  No audible wheezing.  ABD: Soft, " non-distended.  SKIN:  Warm, pink, dry.  No rash on exposed areas.    EXT:  No cyanosis, clubbing, or edema.  No color change or changes in nail or hair growth.  NEURO/MUSCULOSKELETAL:  Fully alert, oriented, and appropriate. Speech normal [place]. No cranial nerve deficits.   Gait:  Normal.  No focal motor deficits.         Imaging:  Narrative     EXAMINATION:  MRI LUMBAR SPINE WITHOUT CONTRAST    CLINICAL HISTORY:  Low back pain, >6wks conservative tx, persistent-progressive sx, surgical candidate;.  Other intervertebral disc degeneration, lumbar region    TECHNIQUE:  Sagittal and axial T1 and T2 W images    COMPARISON:  MRI lumbar spine 07/02/2008.    FINDINGS:  Lumbar alignment is anatomic.  Desiccation L3-4 and very mild desiccation L4-5 discs noted.    No disc herniation, canal compromise or foraminal compromise appreciated lumbar or visualized lower thoracic disc levels.    The conus medullaris has a normal contour and signal.  Small probable hemangioma right lateral aspect L3 vertebral body.   Impression       No evidence of lumbar disc herniation, canal stenosis or foraminal compromise.  Desiccation noted L3-4 disc with very mild desiccation L4-5.      Electronically signed by: Melani Ferris MD  Date: 11/15/2019  Time: 13:09       Narrative     EXAMINATION:  MRI CERVICAL SPINE WITHOUT CONTRAST    CLINICAL HISTORY:  Neck pain, xray bone or disc margin destruction;.  Cervicalgia    TECHNIQUE:  Sagittal and axial T1 and T2 W images    COMPARISON:  MRI cervical spine 07/26/2012.    FINDINGS:  Cervical alignment is anatomic.  Mild desiccation C2-3 through C5-6 discs noted.    C3-4, tiny posterior midline protrusion of the disc unchanged.    C4-5, mild annular bulging may be present.    No disc herniation, canal stenosis or foraminal compromise appreciated remaining cervical or visualized upper thoracic disc levels.  The cervical cord has a normal contour and signal throughout.  No focal marrow lesion  appreciated.   Impression       Tiny posterior midline protrusion C3-4, unchanged.    Mild annular bulging C4-5 as previously seen.      Electronically signed by: Melani Ferris MD  Date: 11/15/2019  Time: 10:03           Assessment:       Encounter Diagnoses   Name Primary?    Bilateral hand numbness Yes    DDD (degenerative disc disease), cervical     Cervical spondylosis          Plan:       Carol was seen today for results.    Diagnoses and all orders for this visit:    Bilateral hand numbness    DDD (degenerative disc disease), cervical    Cervical spondylosis        Carol Dewey is a 49 y.o. female with chronic neck and low back pain. Patient reports pain radiates to all 4 extremities with numbness and tingling.  Cervical and lumbar MRIs without any significant evidence to suggest neurocompressive process. No neurologic abnormalities of upper extremities noted on examination.  EMG/nerve conduction study showed mild left carpal tunnel syndrome but otherwise no evidence for peripheral polyneuropathy, radiculopathy.  Low suspicion for radiculopathy or myelopathy.     1.  Given that her chiropractor feels as though she has cervical pathology that is treatable, I encouraged her to continue care under her chiropractor to see if he can provide relief.  2.  At this time it is unclear what the source of her symptoms are.  I am reluctant to perform cervical epidural steroid injection as I do not feel the benefits outweigh the risks.  3.  Return to clinic as needed.

## 2020-01-23 ENCOUNTER — OFFICE VISIT (OUTPATIENT)
Dept: PRIMARY CARE CLINIC | Facility: CLINIC | Age: 50
End: 2020-01-23
Payer: MEDICARE

## 2020-01-23 VITALS
RESPIRATION RATE: 18 BRPM | BODY MASS INDEX: 25.34 KG/M2 | TEMPERATURE: 98 F | DIASTOLIC BLOOD PRESSURE: 76 MMHG | SYSTOLIC BLOOD PRESSURE: 120 MMHG | OXYGEN SATURATION: 95 % | HEIGHT: 63 IN | WEIGHT: 143 LBS | HEART RATE: 78 BPM

## 2020-01-23 DIAGNOSIS — M46.96 UNSPECIFIED INFLAMMATORY SPONDYLOPATHY, LUMBAR REGION: ICD-10-CM

## 2020-01-23 DIAGNOSIS — J30.2 SEASONAL ALLERGIC RHINITIS, UNSPECIFIED TRIGGER: Primary | ICD-10-CM

## 2020-01-23 DIAGNOSIS — Z23 NEED FOR VACCINATION: ICD-10-CM

## 2020-01-23 PROCEDURE — 99999 PR PBB SHADOW E&M-EST. PATIENT-LVL III: ICD-10-PCS | Mod: PBBFAC,,, | Performed by: FAMILY MEDICINE

## 2020-01-23 PROCEDURE — 99214 PR OFFICE/OUTPT VISIT, EST, LEVL IV, 30-39 MIN: ICD-10-PCS | Mod: 25,S$GLB,, | Performed by: FAMILY MEDICINE

## 2020-01-23 PROCEDURE — 90714 TD VACC NO PRESV 7 YRS+ IM: CPT | Mod: S$GLB,,, | Performed by: FAMILY MEDICINE

## 2020-01-23 PROCEDURE — 99999 PR PBB SHADOW E&M-EST. PATIENT-LVL III: CPT | Mod: PBBFAC,,, | Performed by: FAMILY MEDICINE

## 2020-01-23 PROCEDURE — 90471 IMMUNIZATION ADMIN: CPT | Mod: S$GLB,,, | Performed by: FAMILY MEDICINE

## 2020-01-23 PROCEDURE — 90471 TD VACCINE GREATER THAN OR EQUAL TO 7YO PRESERVATIVE FREE IM: ICD-10-PCS | Mod: S$GLB,,, | Performed by: FAMILY MEDICINE

## 2020-01-23 PROCEDURE — 90714 TD VACCINE GREATER THAN OR EQUAL TO 7YO PRESERVATIVE FREE IM: ICD-10-PCS | Mod: S$GLB,,, | Performed by: FAMILY MEDICINE

## 2020-01-23 PROCEDURE — 3008F BODY MASS INDEX DOCD: CPT | Mod: CPTII,S$GLB,, | Performed by: FAMILY MEDICINE

## 2020-01-23 PROCEDURE — 3008F PR BODY MASS INDEX (BMI) DOCUMENTED: ICD-10-PCS | Mod: CPTII,S$GLB,, | Performed by: FAMILY MEDICINE

## 2020-01-23 PROCEDURE — 99214 OFFICE O/P EST MOD 30 MIN: CPT | Mod: 25,S$GLB,, | Performed by: FAMILY MEDICINE

## 2020-01-23 RX ORDER — CETIRIZINE HYDROCHLORIDE 10 MG/1
10 TABLET ORAL DAILY
Qty: 90 TABLET | Refills: 0 | Status: SHIPPED | OUTPATIENT
Start: 2020-01-23 | End: 2020-07-08

## 2020-01-23 RX ORDER — MONTELUKAST SODIUM 10 MG/1
10 TABLET ORAL NIGHTLY
Qty: 90 TABLET | Refills: 0 | Status: SHIPPED | OUTPATIENT
Start: 2020-01-23 | End: 2020-04-20

## 2020-01-23 NOTE — PROGRESS NOTES
Verified pt ID using name and . Verified allergies. Administered TD in left deltoid per physician order using aseptic technique. Aspirated and no blood return noted. Pt tolerated well with no adverse reactions noted.

## 2020-01-23 NOTE — PROGRESS NOTES
"Subjective:       Patient ID: Carol Dewey is a 49 y.o. female.    Chief Complaint: Watery Eyes (patient says she has has taken OTC meds for her allergies and nothing helps ) and DDD (says she saw pain managment but it didn't help )    Runny nose, itchy watery eyes, sneezing for the past 3 weeks. Taking OTC generic antihistamine (not sure which one), but not helping as much as it used to. Symptoms worse at night. No cough, fever, wheezing or SoB.    Review of Systems   Constitutional: Negative for chills and fever.   HENT: Positive for rhinorrhea and sneezing. Negative for congestion, sinus pressure and sinus pain.    Eyes: Positive for discharge and itching. Negative for visual disturbance.   Respiratory: Negative for cough, chest tightness, shortness of breath and wheezing.    Cardiovascular: Negative for chest pain.   Gastrointestinal: Negative for nausea and vomiting.   Musculoskeletal: Positive for back pain and neck pain.   Skin: Negative for rash.   Allergic/Immunologic: Positive for environmental allergies. Negative for immunocompromised state.   Hematological: Does not bruise/bleed easily.   Psychiatric/Behavioral: Negative for agitation and confusion.       Objective:      Vitals:    01/23/20 1100   BP: 120/76   BP Location: Left arm   Patient Position: Sitting   BP Method: Medium (Manual)   Pulse: 78   Resp: 18   Temp: 98.3 °F (36.8 °C)   TempSrc: Oral   SpO2: 95%   Weight: 64.9 kg (143 lb)   Height: 5' 3" (1.6 m)     Physical Exam   Constitutional: She is oriented to person, place, and time. She appears well-developed and well-nourished.   HENT:   Head: Normocephalic and atraumatic.   Nose: Rhinorrhea (clear) present. Right sinus exhibits no maxillary sinus tenderness and no frontal sinus tenderness. Left sinus exhibits no maxillary sinus tenderness and no frontal sinus tenderness.   Eyes: Pupils are equal, round, and reactive to light. EOM are normal.   Cardiovascular: Normal rate, regular rhythm and " normal heart sounds.   Pulmonary/Chest: Effort normal and breath sounds normal.   Musculoskeletal: She exhibits no edema.   Neurological: She is alert and oriented to person, place, and time.   Skin: Skin is warm and dry.   Nursing note and vitals reviewed.      Lab Results   Component Value Date    WBC 9.03 12/22/2010    HGB 13.5 12/22/2010    HCT 41.9 12/22/2010     12/22/2010    ALT 22 12/22/2010    AST 15 12/22/2010     12/22/2010    K 3.4 (L) 12/22/2010     12/22/2010    CREATININE 0.7 12/22/2010    BUN 8 12/22/2010    CO2 21 (L) 12/22/2010      Assessment:       1. Seasonal allergic rhinitis, unspecified trigger    2. Unspecified inflammatory spondylopathy, lumbar region    3. Need for vaccination        Plan:       Seasonal allergic rhinitis, unspecified trigger  -     cetirizine (ZYRTEC) 10 MG tablet; Take 1 tablet (10 mg total) by mouth once daily.  Dispense: 90 tablet; Refill: 0  -     montelukast (SINGULAIR) 10 mg tablet; Take 1 tablet (10 mg total) by mouth every evening.  Dispense: 90 tablet; Refill: 0    Unspecified inflammatory spondylopathy, lumbar region  Comments:  seeing chiropractor    Need for vaccination  -     Td Vaccine (Adult) - Preservative Free      Medication List with Changes/Refills   New Medications    CETIRIZINE (ZYRTEC) 10 MG TABLET    Take 1 tablet (10 mg total) by mouth once daily.    MONTELUKAST (SINGULAIR) 10 MG TABLET    Take 1 tablet (10 mg total) by mouth every evening.   Current Medications    IBUPROFEN (ADVIL,MOTRIN) 800 MG TABLET    TAKE 1 TABLET BY MOUTH THREE TIMES DAILY AS NEEDED FOR PAIN    TRI-SPRINTEC, 28, 0.18/0.215/0.25 MG-35 MCG (28) TABLET    TAKE 1 TABLET BY MOUTH EVERY DAY

## 2020-04-19 DIAGNOSIS — J30.2 SEASONAL ALLERGIC RHINITIS, UNSPECIFIED TRIGGER: ICD-10-CM

## 2020-04-20 RX ORDER — MONTELUKAST SODIUM 10 MG/1
TABLET ORAL
Qty: 90 TABLET | Refills: 1 | Status: SHIPPED | OUTPATIENT
Start: 2020-04-20 | End: 2022-04-07 | Stop reason: SDUPTHER

## 2020-04-27 RX ORDER — NORGESTIMATE AND ETHINYL ESTRADIOL 7DAYSX3 28
KIT ORAL
Qty: 28 TABLET | Refills: 11 | Status: SHIPPED | OUTPATIENT
Start: 2020-04-27 | End: 2021-07-13 | Stop reason: SDUPTHER

## 2020-07-03 DIAGNOSIS — Z12.31 ENCOUNTER FOR SCREENING MAMMOGRAM FOR BREAST CANCER: ICD-10-CM

## 2020-07-07 DIAGNOSIS — J30.2 SEASONAL ALLERGIC RHINITIS, UNSPECIFIED TRIGGER: ICD-10-CM

## 2020-07-08 RX ORDER — CETIRIZINE HYDROCHLORIDE 10 MG/1
TABLET ORAL
Qty: 90 TABLET | Refills: 1 | Status: SHIPPED | OUTPATIENT
Start: 2020-07-08 | End: 2022-10-20

## 2020-09-09 ENCOUNTER — OFFICE VISIT (OUTPATIENT)
Dept: PRIMARY CARE CLINIC | Facility: CLINIC | Age: 50
End: 2020-09-09
Payer: MEDICARE

## 2020-09-09 VITALS
SYSTOLIC BLOOD PRESSURE: 138 MMHG | RESPIRATION RATE: 18 BRPM | WEIGHT: 141.13 LBS | BODY MASS INDEX: 25.01 KG/M2 | DIASTOLIC BLOOD PRESSURE: 80 MMHG | HEIGHT: 63 IN | HEART RATE: 71 BPM | TEMPERATURE: 99 F | OXYGEN SATURATION: 98 %

## 2020-09-09 DIAGNOSIS — M54.16 LUMBAR RADICULITIS: ICD-10-CM

## 2020-09-09 DIAGNOSIS — Z13.6 SCREENING FOR CARDIOVASCULAR CONDITION: ICD-10-CM

## 2020-09-09 DIAGNOSIS — R20.2 NUMBNESS AND TINGLING OF FOOT: ICD-10-CM

## 2020-09-09 DIAGNOSIS — M54.32 SCIATICA OF LEFT SIDE: ICD-10-CM

## 2020-09-09 DIAGNOSIS — M47.812 CERVICAL SPONDYLOSIS: ICD-10-CM

## 2020-09-09 DIAGNOSIS — M50.30 DDD (DEGENERATIVE DISC DISEASE), CERVICAL: Primary | ICD-10-CM

## 2020-09-09 DIAGNOSIS — R20.0 NUMBNESS AND TINGLING OF FOOT: ICD-10-CM

## 2020-09-09 PROCEDURE — 96372 PR INJECTION,THERAP/PROPH/DIAG2ST, IM OR SUBCUT: ICD-10-PCS | Mod: S$GLB,,, | Performed by: STUDENT IN AN ORGANIZED HEALTH CARE EDUCATION/TRAINING PROGRAM

## 2020-09-09 PROCEDURE — 3008F BODY MASS INDEX DOCD: CPT | Mod: CPTII,S$GLB,, | Performed by: STUDENT IN AN ORGANIZED HEALTH CARE EDUCATION/TRAINING PROGRAM

## 2020-09-09 PROCEDURE — 99214 OFFICE O/P EST MOD 30 MIN: CPT | Mod: 25,S$GLB,, | Performed by: STUDENT IN AN ORGANIZED HEALTH CARE EDUCATION/TRAINING PROGRAM

## 2020-09-09 PROCEDURE — 99214 PR OFFICE/OUTPT VISIT, EST, LEVL IV, 30-39 MIN: ICD-10-PCS | Mod: 25,S$GLB,, | Performed by: STUDENT IN AN ORGANIZED HEALTH CARE EDUCATION/TRAINING PROGRAM

## 2020-09-09 PROCEDURE — 99999 PR PBB SHADOW E&M-EST. PATIENT-LVL IV: ICD-10-PCS | Mod: PBBFAC,,, | Performed by: STUDENT IN AN ORGANIZED HEALTH CARE EDUCATION/TRAINING PROGRAM

## 2020-09-09 PROCEDURE — 3008F PR BODY MASS INDEX (BMI) DOCUMENTED: ICD-10-PCS | Mod: CPTII,S$GLB,, | Performed by: STUDENT IN AN ORGANIZED HEALTH CARE EDUCATION/TRAINING PROGRAM

## 2020-09-09 PROCEDURE — 96372 THER/PROPH/DIAG INJ SC/IM: CPT | Mod: S$GLB,,, | Performed by: STUDENT IN AN ORGANIZED HEALTH CARE EDUCATION/TRAINING PROGRAM

## 2020-09-09 PROCEDURE — 99999 PR PBB SHADOW E&M-EST. PATIENT-LVL IV: CPT | Mod: PBBFAC,,, | Performed by: STUDENT IN AN ORGANIZED HEALTH CARE EDUCATION/TRAINING PROGRAM

## 2020-09-09 RX ORDER — MELOXICAM 15 MG/1
15 TABLET ORAL DAILY
Qty: 30 TABLET | Refills: 0 | Status: SHIPPED | OUTPATIENT
Start: 2020-09-09 | End: 2020-10-09

## 2020-09-09 RX ORDER — BETAMETHASONE SODIUM PHOSPHATE AND BETAMETHASONE ACETATE 3; 3 MG/ML; MG/ML
12 INJECTION, SUSPENSION INTRA-ARTICULAR; INTRALESIONAL; INTRAMUSCULAR; SOFT TISSUE ONCE
Status: COMPLETED | OUTPATIENT
Start: 2020-09-09 | End: 2020-09-09

## 2020-09-09 RX ORDER — IBUPROFEN 800 MG/1
800 TABLET ORAL 3 TIMES DAILY PRN
Qty: 270 TABLET | Refills: 0 | Status: CANCELLED | OUTPATIENT
Start: 2020-09-09

## 2020-09-09 RX ORDER — BETAMETHASONE SODIUM PHOSPHATE AND BETAMETHASONE ACETATE 3; 3 MG/ML; MG/ML
12 INJECTION, SUSPENSION INTRA-ARTICULAR; INTRALESIONAL; INTRAMUSCULAR; SOFT TISSUE ONCE
Qty: 2 ML | Refills: 0 | Status: CANCELLED | OUTPATIENT
Start: 2020-09-09 | End: 2020-09-09

## 2020-09-09 RX ADMIN — BETAMETHASONE SODIUM PHOSPHATE AND BETAMETHASONE ACETATE 12 MG: 3; 3 INJECTION, SUSPENSION INTRA-ARTICULAR; INTRALESIONAL; INTRAMUSCULAR; SOFT TISSUE at 05:09

## 2020-09-09 NOTE — PROGRESS NOTES
Subjective:       Patient ID: Carol Dewey is a 49 y.o. female.    Chief Complaint: Neck Pain and Low-back Pain      State that she has pain in her neck after sitting in a chair at her friends house last night and worse after sleeping overnight.    She has chronic issues with her cervical and lumbar regions, but this is worse than usual.  Pain 8 of 10, radiates down back and into arms/legs.  Sharp sensation.  Has numbness and tingling that are more than typically.     States she get swelling in her hands bilaterally.      Ibuprofen 800mg 1 to 3 times daily which gives some relief.    States she has seen a chiropractor before with good effect.  But has not been able to go since COVID occurred.     States that she cannot use her hands at times due to numbness.    With walking, the pain can make walking difficult.  She need to stop and stretch her leg at times to prevent falls.        Review of Systems   Constitutional: Negative for chills, diaphoresis, fever and unexpected weight change.   HENT: Positive for ear discharge (uses q-tips daily) and tinnitus. Negative for congestion, facial swelling, sinus pressure and voice change.    Eyes: Negative for discharge and itching.   Respiratory: Negative for cough, choking, chest tightness and wheezing.    Cardiovascular: Negative for chest pain, palpitations and leg swelling.   Gastrointestinal: Positive for nausea (with pain at times). Negative for constipation, diarrhea and vomiting.   Endocrine: Negative for polydipsia and polyuria.   Genitourinary: Positive for frequency (at night). Negative for difficulty urinating, dysuria and hematuria.   Musculoskeletal: Positive for back pain, gait problem, joint swelling, myalgias and neck pain.   Skin: Negative for rash.   Neurological: Positive for dizziness (with pain at times), tremors (in legs with pain in certain postions), weakness, numbness and headaches (chronic migarnes). Negative for speech difficulty.  "  Psychiatric/Behavioral: Negative for agitation, hallucinations, self-injury and suicidal ideas. The patient is nervous/anxious.        Objective:      Vitals:    09/09/20 1613   BP: 138/80   BP Location: Right arm   Patient Position: Sitting   BP Method: Medium (Manual)   Pulse: 71   Resp: 18   Temp: 98.5 °F (36.9 °C)   TempSrc: Oral   SpO2: 98%   Weight: 64 kg (141 lb 1.6 oz)   Height: 5' 3" (1.6 m)     Physical Exam  Constitutional:       General: She is in acute distress.      Appearance: Normal appearance. She is obese. She is ill-appearing.   HENT:      Head: Normocephalic.      Right Ear: External ear normal.      Left Ear: External ear normal.      Mouth/Throat:      Mouth: Mucous membranes are moist.   Eyes:      Extraocular Movements: Extraocular movements intact.      Pupils: Pupils are equal, round, and reactive to light.   Neck:      Musculoskeletal: Neck rigidity present.      Comments: Neck extension limited to 20 degrees above neutral, 40 degrees below.  Can turn 70 degrees to left and 50 degrees to right on exam.   Cardiovascular:      Rate and Rhythm: Normal rate and regular rhythm.      Heart sounds: No murmur. No gallop.    Pulmonary:      Effort: Pulmonary effort is normal. No respiratory distress.      Breath sounds: Normal breath sounds.   Abdominal:      General: Abdomen is flat. Bowel sounds are normal. There is no distension.      Palpations: Abdomen is soft.   Musculoskeletal:         General: Tenderness present.      Comments: Patient has obvious pain to neck and lower back.  Moving gingerly in room, protecting herself from flexing/extending her back.  Mild pain on palpation over L4-5 area.  No pain over hip bursa.  Pain with leg raise bilaterally.  Strength 5/5 bilaterally for leg and ankle flexion/extension.     Lymphadenopathy:      Cervical: No cervical adenopathy.   Skin:     General: Skin is warm.      Capillary Refill: Capillary refill takes less than 2 seconds.      Coloration: " Skin is not jaundiced.   Neurological:      General: No focal deficit present.      Mental Status: She is alert and oriented to person, place, and time.      Motor: No weakness.   Psychiatric:         Mood and Affect: Mood normal.             Lab Results   Component Value Date     12/22/2010    K 3.4 (L) 12/22/2010     12/22/2010    CO2 21 (L) 12/22/2010    BUN 8 12/22/2010    CREATININE 0.7 12/22/2010    ANIONGAP 14 12/22/2010     No results found for: HGBA1C  No results found for: BNP, BNPTRIAGEBLO    Lab Results   Component Value Date    WBC 9.03 12/22/2010    HGB 13.5 12/22/2010    HCT 41.9 12/22/2010     12/22/2010    GRAN 5.2 12/22/2010    GRAN 57.8 12/22/2010     No results found for: CHOL, HDL, LDLCALC, TRIG       Current Outpatient Medications:     cetirizine (ZYRTEC) 10 MG tablet, TAKE 1 TABLET(10 MG) BY MOUTH EVERY DAY, Disp: 90 tablet, Rfl: 1    montelukast (SINGULAIR) 10 mg tablet, TAKE 1 TABLET(10 MG) BY MOUTH EVERY EVENING, Disp: 90 tablet, Rfl: 1    TRI-SPRINTEC, 28, 0.18/0.215/0.25 mg-35 mcg (28) tablet, TAKE 1 TABLET BY MOUTH EVERY DAY, Disp: 28 tablet, Rfl: 11    meloxicam (MOBIC) 15 MG tablet, Take 1 tablet (15 mg total) by mouth once daily., Disp: 30 tablet, Rfl: 0    Current Facility-Administered Medications:     betamethasone acetate-betamethasone sodium phosphate injection 12 mg, 12 mg, Intramuscular, Once, José Miguel Begum MD        Assessment:       1. DDD (degenerative disc disease), cervical    2. Sciatica of left side    3. Cervical spondylosis    4. Lumbar radiculitis    5. Numbness and tingling of foot    6. Screening for cardiovascular condition           Plan:       DDD (degenerative disc disease), cervical  -  meloxicam (MOBIC) 15 MG tablet fir 30 days, then resume ibuprofen or use could rx Meloxicam 7.5mg.    -  [facility] injection today - betamethasone acetate-betamethasone sodium phosphate injection 12 mg        -  CMP, Lipids, CBC        -  Patient  states she had gotten decent relief from chiropracter, but stopped going with COVID outbreak, advised to return to their care.   - Referring to Pain Management with hope that she can get another Epidural steroid injection as gave her years of relief earlier.   - Advised stretching and keeping mobile.    Sciatica of left side   - Encrouraged stretching and moving to keep from back tightening.   - Advised use of heat or cold as needed for pains.    - Should return to her Chiropractor for further treatment since that was helping.    Cervical spondylosis  -     meloxicam (MOBIC) 15 MG tablet; Take 1 tablet (15 mg total) by mouth once daily.  Dispense: 30 tablet; Refill: 0  -     betamethasone acetate-betamethasone sodium phosphate injection 12 mg   -     Lumbar radiculitis  -     meloxicam (MOBIC) 15 MG tablet; Take 1 tablet (15 mg total) by mouth once daily.  Dispense: 30 tablet; Refill: 0  -     betamethasone acetate-betamethasone sodium phosphate injection 12 mg   -     Numbness and tingling of foot   - Referral to Pain Mangment for possible Epidural spine injection.  Patient states she got years of relief previously.  Requested a new pain management doc, referral made to Dr. Napoles   - Per Neuro note: patient has had a transforaminal L5 injection and has previous EMG of the lower extremities that was done back in 06/2012 did show L5 radiculopathy.    Screening for cardiovascular condition  -     Lipid Panel; Future; Expected date: 09/09/2020

## 2020-09-09 NOTE — PROGRESS NOTES
Verified pt ID using name and . Allergies verified. Administered betamethasone acetate-betamethasone sodium phosphate injection 12 mg (2mL) in right VG per physician order using aseptic technique. Aspirated and no blood return noted. Pt tolerated well with no adverse reactions noted.

## 2020-09-14 ENCOUNTER — OFFICE VISIT (OUTPATIENT)
Dept: PAIN MEDICINE | Facility: CLINIC | Age: 50
End: 2020-09-14
Payer: MEDICARE

## 2020-09-14 VITALS
SYSTOLIC BLOOD PRESSURE: 147 MMHG | BODY MASS INDEX: 24.98 KG/M2 | DIASTOLIC BLOOD PRESSURE: 94 MMHG | HEART RATE: 67 BPM | WEIGHT: 141 LBS

## 2020-09-14 DIAGNOSIS — M50.30 DDD (DEGENERATIVE DISC DISEASE), CERVICAL: Primary | ICD-10-CM

## 2020-09-14 DIAGNOSIS — G89.29 CHRONIC NECK PAIN: ICD-10-CM

## 2020-09-14 DIAGNOSIS — M54.2 CHRONIC NECK PAIN: ICD-10-CM

## 2020-09-14 DIAGNOSIS — R42 VERTIGO: ICD-10-CM

## 2020-09-14 DIAGNOSIS — R20.0 NUMBNESS AND TINGLING OF FOOT: ICD-10-CM

## 2020-09-14 DIAGNOSIS — R20.2 NUMBNESS AND TINGLING OF FOOT: ICD-10-CM

## 2020-09-14 DIAGNOSIS — M79.18 MYOFASCIAL PAIN: ICD-10-CM

## 2020-09-14 PROCEDURE — 99999 PR PBB SHADOW E&M-EST. PATIENT-LVL III: ICD-10-PCS | Mod: PBBFAC,,, | Performed by: NURSE PRACTITIONER

## 2020-09-14 PROCEDURE — 99999 PR PBB SHADOW E&M-EST. PATIENT-LVL III: CPT | Mod: PBBFAC,,, | Performed by: NURSE PRACTITIONER

## 2020-09-14 PROCEDURE — 99214 PR OFFICE/OUTPT VISIT, EST, LEVL IV, 30-39 MIN: ICD-10-PCS | Mod: S$GLB,,, | Performed by: NURSE PRACTITIONER

## 2020-09-14 PROCEDURE — 3008F PR BODY MASS INDEX (BMI) DOCUMENTED: ICD-10-PCS | Mod: CPTII,S$GLB,, | Performed by: NURSE PRACTITIONER

## 2020-09-14 PROCEDURE — 99214 OFFICE O/P EST MOD 30 MIN: CPT | Mod: S$GLB,,, | Performed by: NURSE PRACTITIONER

## 2020-09-14 PROCEDURE — 3008F BODY MASS INDEX DOCD: CPT | Mod: CPTII,S$GLB,, | Performed by: NURSE PRACTITIONER

## 2020-09-14 RX ORDER — GABAPENTIN 100 MG/1
100 CAPSULE ORAL 3 TIMES DAILY
Qty: 90 CAPSULE | Refills: 0 | Status: SHIPPED | OUTPATIENT
Start: 2020-09-14 | End: 2020-09-14 | Stop reason: CLARIF

## 2020-09-14 RX ORDER — GABAPENTIN 100 MG/1
100 CAPSULE ORAL 3 TIMES DAILY
Qty: 90 CAPSULE | Refills: 0 | Status: SHIPPED | OUTPATIENT
Start: 2020-09-14 | End: 2022-01-31

## 2020-09-14 NOTE — PROGRESS NOTES
[facility] Established Patient Evaluation    Chief Complaint  Chief Complaint   Patient presents with    Low-back Pain       Last 3 PDI Scores 9/14/2020 1/9/2020 4/23/2019   Pain Disability Index (PDI) 30 29 41       Interval Updates  9/14/2020  Carol Dewey presents to me for the 1st time, she previously has seen Dr Gomez and Dr. Seo at the [facility] she is reporting  chronic neck and low back pain. Patient reports that her neck pain radiates the bilateral upper extremities with numbness and tingling.  Patient reports that she also shooting pain her low back to her lower extremities with numbness in her feet.  She reports diffuse weakness.  She denies any bowel bladder dysfunction.  Patient has been treated in the past by Dr. Seo.  He performed bilateral L5 transforaminal epidural steroid injections.  Patient reported 1 week of 80% relief for a few weeks,  After this period time she had a fall secondary to vertigo her pain returned.  Upon follow-up with Dr. audelia stafford she was not interested in repeat interventional procedures..   This pain is described in detail below.   Her most recent follow-up with  reported symptoms that were unchanged in quality and location since she previously saw him.  She did have EMG showed mild left carpal tunnel syndrome otherwise did not show any peripheral nerve entrapment, peripheral neuropathy or any evidence for cervical radiculopathy.  She did state that she has seen a chiropractor and felt as though she has been treating her cervical pathology.      Location: bilateral low back   Onset: 2011  Current Pain Score: 5/10  Daily Pain of Range: 7-8/10  Quality: Aching and Sharp  Radiation: right leg  Worsened by: sitting and standing  Improved by: heat, ice and medications    Background from Chart Review  49 y.o. female who presents today with chronic neck and low back pain. Patient reports that her neck pain radiates the  bilateral upper extremities with numbness and tingling.  Patient reports that she also shooting pain her low back to her lower extremities with numbness in her feet.  She reports diffuse weakness.  She denies any bowel bladder dysfunction.  Patient has been treated in the past by Dr. Seo.  He perform bilateral L5 transforaminal epidural steroid injections.  Patient reported 1 week of 80% relief.  After this period time she had a fall secondary to vertigo her pain returned.  Upon follow-up with Dr. audelia stafford she was not interested in repeat interventional procedures..   This pain is described in detail below.    Treatment History  PT/OT: Yes.  Not effective  HEP:   TENS:  Injections: 3/21/19 - bilateral L5 transforaminal epidural steroid injections - 80% relief for 1 week  Surgery:  No relevant surgeries  Medications:   - NSAIDS:   - MSK Relaxants:   - TCAs:   - SNRIs:   - Topicals:   - Opioids:   - Anticonvulsants:     Current Pain Medications  1. Meloxicam 15mg     Full Medication List    Current Outpatient Medications:     cetirizine (ZYRTEC) 10 MG tablet, TAKE 1 TABLET(10 MG) BY MOUTH EVERY DAY, Disp: 90 tablet, Rfl: 1    meloxicam (MOBIC) 15 MG tablet, Take 1 tablet (15 mg total) by mouth once daily., Disp: 30 tablet, Rfl: 0    montelukast (SINGULAIR) 10 mg tablet, TAKE 1 TABLET(10 MG) BY MOUTH EVERY EVENING, Disp: 90 tablet, Rfl: 1    TRI-SPRINTEC, 28, 0.18/0.215/0.25 mg-35 mcg (28) tablet, TAKE 1 TABLET BY MOUTH EVERY DAY, Disp: 28 tablet, Rfl: 11     Review of Systems  Review of Systems   Constitutional: Negative.    HENT: Negative.    Eyes: Negative.    Respiratory: Negative.    Gastrointestinal: Negative.    Genitourinary: Negative.    Musculoskeletal: Positive for back pain, falls, joint pain, myalgias and neck pain.   Skin: Negative.    Psychiatric/Behavioral: The patient has insomnia.        Medical History  Past Medical History:   Diagnosis Date    Allergy     Degenerative disc disease          Surgical History  Past Surgical History:   Procedure Laterality Date     SECTION      TONSILLECTOMY      TRANSFORAMINAL EPIDURAL INJECTION OF STEROID Bilateral 3/21/2019    Procedure: Injection,steroid,epidural,transforaminal approach---bilateral L5/S1;  Surgeon: Sridhar Albert III, MD;  Location: [facility];  Service: Pain Management;  Laterality: Bilateral;        Physical Exam  Vitals:    20 1331   BP: (!) 147/94   Pulse: 67   Weight: 64 kg (141 lb)   PainSc:   5         General Musculoskeletal Exam   Gait: normal     Back (L-Spine & T-Spine) / Neck (C-Spine) Exam     Neck (C-Spine) Range of Motion   Flexion:     Normal  Extension: Normal  Right Lateral Bend: normal  Left Lateral Bend: normal  Right Rotation: normal  Left Rotation: normal      Muscle Strength   Right Upper Extremity   Deltoid:  4/5  Triceps:  4/5  Left Upper Extremity  Deltoid:  4/5  Triceps:  4/5    Reflexes     Left Side  Biceps:  2+  Triceps:  2+  Achilles:  2+  Quadriceps:  2+    Right Side   Biceps:  2+  Triceps:  2+  Achilles:  2+  Quadriceps:  2+      Imaging  EXAMINATION:  MRI LUMBAR SPINE WITHOUT CONTRAST  MRI LUMBAR SPINE WITHOUT CONTRAST    CLINICAL HISTORY:  Low back pain, >6wks conservative tx, persistent-progressive sx, surgical candidate;.  Other intervertebral disc degeneration, lumbar region    TECHNIQUE:  Sagittal and axial T1 and T2 W images    COMPARISON:  MRI lumbar spine 2008.    FINDINGS:  Lumbar alignment is anatomic.  Desiccation L3-4 and very mild desiccation L4-5 discs noted.    No disc herniation, canal compromise or foraminal compromise appreciated lumbar or visualized lower thoracic disc levels.    The conus medullaris has a normal contour and signal.  Small probable hemangioma right lateral aspect L3 vertebral body.   Impression        No evidence of lumbar disc herniation, canal stenosis or foraminal compromise.  Desiccation noted L3-4 disc with very mild desiccation L4-5.       MRI CERVICAL SPINE WITHOUT CONTRAST    CLINICAL HISTORY:  Neck pain, xray bone or disc margin destruction;.  Cervicalgia    TECHNIQUE:  Sagittal and axial T1 and T2 W images    COMPARISON:  MRI cervical spine 07/26/2012.    FINDINGS:  Cervical alignment is anatomic.  Mild desiccation C2-3 through C5-6 discs noted.    C3-4, tiny posterior midline protrusion of the disc unchanged.    C4-5, mild annular bulging may be present.    No disc herniation, canal stenosis or foraminal compromise appreciated remaining cervical or visualized upper thoracic disc levels.  The cervical cord has a normal contour and signal throughout.  No focal marrow lesion appreciated.   Impression           CLINICAL HISTORY:  Low back pain, >6wks conservative tx, persistent-progressive sx, surgical candidate;.  Other intervertebral disc degeneration, lumbar region     TECHNIQUE:  Sagittal and axial T1 and T2 W images     COMPARISON:  MRI lumbar spine 07/02/2008.     FINDINGS:  Lumbar alignment is anatomic.  Desiccation L3-4 and very mild desiccation L4-5 discs noted.     No disc herniation, canal compromise or foraminal compromise appreciated lumbar or visualized lower thoracic disc levels.     The conus medullaris has a normal contour and signal.  Small probable hemangioma right lateral aspect L3 vertebral body.      Labs:  BMP  Lab Results   Component Value Date     12/22/2010    K 3.4 (L) 12/22/2010     12/22/2010    CO2 21 (L) 12/22/2010    BUN 8 12/22/2010    CREATININE 0.7 12/22/2010    CALCIUM 8.6 (L) 12/22/2010    ANIONGAP 14 12/22/2010    ESTGFRAFRICA >60 12/22/2010    EGFRNONAA >60 12/22/2010     Lab Results   Component Value Date    ALT 22 12/22/2010    AST 15 12/22/2010    ALKPHOS 42 (L) 12/22/2010    BILITOT 0.2 12/22/2010       Assessment and Recommendations     Problem List Items Addressed This Visit        Neuro    DDD (degenerative disc disease), cervical - Primary    Relevant Medications    gabapentin (NEURONTIN)  100 MG capsule       Other    Numbness and tingling of foot    Relevant Medications    gabapentin (NEURONTIN) 100 MG capsule      Other Visit Diagnoses     Myofascial pain        Vertigo        Chronic neck pain               9/14/2020-  Carol Dewey is a 49 y.o. female 49-year-old female with chronic neck and low back pain.  Patient reports pain radiates all 4 extremities with numbness and tingling.  Her cervical and lumbar MRI are without any significant evidence to suggest a neural compressive process.  No neurological abnormalities of the upper extremities noted upon exam pain after reviewing her EMG/nerve conduction study showed mild left see Carpal tunnel  Syndrome but otherwise no evidence for peripheral neuropathy, radiculopathy.  Low suspicion of radiculopathy or myelopathy.  Patient reported that her chiropractic and did support her cervical pathology and that I would recommend and encourage her to return to chiropractic care see if a provider for some relief.  This time I do not recommend any cervical or lumbar epidural injections.  My recommendations are noted         Activity: I recommend initiation of a home exercise regimen that includes daily cardiovascular exercise lasting at least 30 minutes.  This may include yoga, felipa chi, walking, swimming, aqua aerobics, or other exercises that maintain a heart rate of 50-70% of the calculated maximum heart rate.  I also recommend light, daily stretching focused on the neck, low back, gluteal, and thigh muscles.  These recommendations will help to improve function and facilitate weight loss when coupled with appropriate dietary modifications.       Diet: I recommend referral to Nutrition Services for evaluation of the patient's diet and so that the patient may receive recommendations for improved eating habits.  The patient was counseled to consider a low-meat or vegetarian diet that avoids animal fats toward reducing systemic inflammation, losing weight,  "and promoting overall health.   Psych: I recommend referral to psychiatry as the perception of pain is significantly impacted by comorbid conditions such as anxiety and depression, which a psychiatrist is best equipped to diagnose and manage.  Stress reduction and avoidance of Maladaptive Chronic Illness Behavior are also important and may be achieved through Cognitive Behavioral Therapy or mindfulness-based therapy.  I recommend referral to psychology for access to these programs.  CBT was found to be the most cost effective treatment for Fibromyalgia, and would be an excellent first-line therapy.  Other: I recommended patient education starting with "The Fibro Manual" which is an excellent book containing insight into this condition and ways to improve daily life.  Medications:  We recommend starting  Gabapentin 100 mg TID with the goal of 400 -800 mg TID for pain or Lyrica may also be trialed and titrated to effect.    Nortriptyline, Amitriptyline, and Duloxetine would all be excellent first line therapies.    There is little to no evidence supporting the use of chronic opioid therapy in patients with Fibromyalgia.      Follow Up: I will follow this patient along with the referring provider and/or PCP.    BUBBA Henson  [facility]     Disclaimer: This note was partly generated using dictation software which may occasionally result in transcription errors.      "

## 2020-09-14 NOTE — LETTER
September 14, 2020      José Miguel Begum MD  [address]  [address]  [facility] [address]           [facility]  [address]  [facility] [address]  Phone: [phone]          Patient: Carol Dewey   MR Number: [id number]   YOB: 1970   Date of Visit: 9/14/2020       Dear Dr. José Miguel Begum:    Thank you for referring Carol Dewey to me for evaluation. Attached you will find relevant portions of my assessment and plan of care.    If you have questions, please do not hesitate to call me. I look forward to following Carol Dewey along with you.    Sincerely,    Antoni Veloz, [facility]    Enclosure  CC:  No Recipients    If you would like to receive this communication electronically, please contact [email or web address] or [phone] to request more information on [employer] Link access.    For providers and/or their staff who would like to refer a patient to [facility], please contact us through our one-stop-shop provider referral line, [facility], at [phone].    If you feel you have received this communication in error or would no longer like to receive these types of communications, please e-mail [email or web address]

## 2020-09-15 ENCOUNTER — TELEPHONE (OUTPATIENT)
Dept: PAIN MEDICINE | Facility: CLINIC | Age: 50
End: 2020-09-15

## 2020-09-15 NOTE — TELEPHONE ENCOUNTER
----- Message from KINGSLEY Palm sent at 9/14/2020  3:57 PM CDT -----  Regarding: please call this patient  Please let this pt know that her cervical and lumbar pathology is not significant for an injection her cervical and lumbar mri's show no evidence for peripheral neuropathy, radiculopathy.  Low suspicion of radiculopathy or myelopathy. I would recommend she f/u with her chiropractor to continue treating her neck pain as this previously helped. Also she can f/u with her PCP as our  specialty does not support or can provide anything for her at this time.     Kiarra humphreys

## 2020-10-20 ENCOUNTER — TELEPHONE (OUTPATIENT)
Dept: PRIMARY CARE CLINIC | Facility: CLINIC | Age: 50
End: 2020-10-20

## 2020-10-20 NOTE — TELEPHONE ENCOUNTER
----- Message from Beata Boo sent at 10/20/2020 12:59 PM CDT -----  Contact: self/[phone]  Is this a refill or new RX:  refill  RX name and strength: meloxicam (MOBIC) 15 MG tablet 30 tablet    Directions: Sig - Route: Take 1 tablet (15 mg total) by mouth once daily.  Is this a 30 day or 90 day RX:    Pharmacy name and phone #: [id number] - [place], [place] - [address] [place] [facility] [address] [phone]     Comments:  talk about the back issues she is having    Please advise

## 2021-05-27 DIAGNOSIS — R22.43 LOCALIZED SWELLING, MASS, OR LUMP OF LOWER EXTREMITY, BILATERAL: Primary | ICD-10-CM

## 2021-07-06 ENCOUNTER — PATIENT MESSAGE (OUTPATIENT)
Dept: ADMINISTRATIVE | Facility: HOSPITAL | Age: 51
End: 2021-07-06

## 2021-07-12 ENCOUNTER — PATIENT OUTREACH (OUTPATIENT)
Dept: ADMINISTRATIVE | Facility: OTHER | Age: 51
End: 2021-07-12

## 2021-07-13 ENCOUNTER — OFFICE VISIT (OUTPATIENT)
Dept: OBSTETRICS AND GYNECOLOGY | Facility: CLINIC | Age: 51
End: 2021-07-13
Payer: COMMERCIAL

## 2021-07-13 VITALS
DIASTOLIC BLOOD PRESSURE: 82 MMHG | WEIGHT: 145.81 LBS | BODY MASS INDEX: 25.83 KG/M2 | SYSTOLIC BLOOD PRESSURE: 130 MMHG

## 2021-07-13 DIAGNOSIS — Z30.41 ENCOUNTER FOR SURVEILLANCE OF CONTRACEPTIVE PILLS: ICD-10-CM

## 2021-07-13 DIAGNOSIS — Z30.9 ENCOUNTER FOR CONTRACEPTIVE MANAGEMENT, UNSPECIFIED TYPE: ICD-10-CM

## 2021-07-13 DIAGNOSIS — Z01.419 ENCOUNTER FOR WELL WOMAN EXAM WITH ROUTINE GYNECOLOGICAL EXAM: Primary | ICD-10-CM

## 2021-07-13 LAB
B-HCG UR QL: NEGATIVE
CTP QC/QA: YES

## 2021-07-13 PROCEDURE — 87624 HPV HI-RISK TYP POOLED RSLT: CPT | Performed by: NURSE PRACTITIONER

## 2021-07-13 PROCEDURE — 81025 POCT URINE PREGNANCY: ICD-10-PCS | Mod: S$GLB,,, | Performed by: NURSE PRACTITIONER

## 2021-07-13 PROCEDURE — G0101 PR CA SCREEN;PELVIC/BREAST EXAM: ICD-10-PCS | Mod: S$GLB,,, | Performed by: NURSE PRACTITIONER

## 2021-07-13 PROCEDURE — G0101 CA SCREEN;PELVIC/BREAST EXAM: HCPCS | Mod: S$GLB,,, | Performed by: NURSE PRACTITIONER

## 2021-07-13 PROCEDURE — 88175 CYTOPATH C/V AUTO FLUID REDO: CPT | Performed by: NURSE PRACTITIONER

## 2021-07-13 PROCEDURE — 99999 PR PBB SHADOW E&M-EST. PATIENT-LVL II: ICD-10-PCS | Mod: PBBFAC,,, | Performed by: NURSE PRACTITIONER

## 2021-07-13 PROCEDURE — 99999 PR PBB SHADOW E&M-EST. PATIENT-LVL II: CPT | Mod: PBBFAC,,, | Performed by: NURSE PRACTITIONER

## 2021-07-13 PROCEDURE — 81025 URINE PREGNANCY TEST: CPT | Mod: S$GLB,,, | Performed by: NURSE PRACTITIONER

## 2021-07-13 RX ORDER — NORGESTIMATE AND ETHINYL ESTRADIOL 7DAYSX3 28
1 KIT ORAL DAILY
Qty: 28 TABLET | Refills: 11 | Status: SHIPPED | OUTPATIENT
Start: 2021-07-13 | End: 2022-04-08 | Stop reason: SDUPTHER

## 2021-07-18 LAB
FINAL PATHOLOGIC DIAGNOSIS: NORMAL
Lab: NORMAL

## 2021-07-20 LAB
HPV HR 12 DNA SPEC QL NAA+PROBE: NEGATIVE
HPV16 AG SPEC QL: NEGATIVE
HPV18 DNA SPEC QL NAA+PROBE: NEGATIVE

## 2021-10-05 ENCOUNTER — PATIENT MESSAGE (OUTPATIENT)
Dept: ADMINISTRATIVE | Facility: HOSPITAL | Age: 51
End: 2021-10-05

## 2022-01-31 ENCOUNTER — OFFICE VISIT (OUTPATIENT)
Dept: PRIMARY CARE CLINIC | Facility: CLINIC | Age: 52
End: 2022-01-31
Payer: MEDICARE

## 2022-01-31 VITALS
SYSTOLIC BLOOD PRESSURE: 128 MMHG | WEIGHT: 141.75 LBS | OXYGEN SATURATION: 98 % | HEIGHT: 63 IN | RESPIRATION RATE: 18 BRPM | HEART RATE: 60 BPM | BODY MASS INDEX: 25.12 KG/M2 | DIASTOLIC BLOOD PRESSURE: 82 MMHG

## 2022-01-31 DIAGNOSIS — M46.96 UNSPECIFIED INFLAMMATORY SPONDYLOPATHY, LUMBAR REGION: ICD-10-CM

## 2022-01-31 DIAGNOSIS — I10 ESSENTIAL HYPERTENSION, BENIGN: ICD-10-CM

## 2022-01-31 DIAGNOSIS — Z12.11 COLON CANCER SCREENING: ICD-10-CM

## 2022-01-31 DIAGNOSIS — M50.30 DDD (DEGENERATIVE DISC DISEASE), CERVICAL: Primary | ICD-10-CM

## 2022-01-31 DIAGNOSIS — M51.36 DDD (DEGENERATIVE DISC DISEASE), LUMBAR: ICD-10-CM

## 2022-01-31 PROCEDURE — 99999 PR PBB SHADOW E&M-EST. PATIENT-LVL III: ICD-10-PCS | Mod: PBBFAC,,, | Performed by: FAMILY MEDICINE

## 2022-01-31 PROCEDURE — 99214 PR OFFICE/OUTPT VISIT, EST, LEVL IV, 30-39 MIN: ICD-10-PCS | Mod: S$GLB,,, | Performed by: FAMILY MEDICINE

## 2022-01-31 PROCEDURE — 99999 PR PBB SHADOW E&M-EST. PATIENT-LVL III: CPT | Mod: PBBFAC,,, | Performed by: FAMILY MEDICINE

## 2022-01-31 PROCEDURE — 1159F PR MEDICATION LIST DOCUMENTED IN MEDICAL RECORD: ICD-10-PCS | Mod: CPTII,S$GLB,, | Performed by: FAMILY MEDICINE

## 2022-01-31 PROCEDURE — 1160F RVW MEDS BY RX/DR IN RCRD: CPT | Mod: CPTII,S$GLB,, | Performed by: FAMILY MEDICINE

## 2022-01-31 PROCEDURE — 3008F PR BODY MASS INDEX (BMI) DOCUMENTED: ICD-10-PCS | Mod: CPTII,S$GLB,, | Performed by: FAMILY MEDICINE

## 2022-01-31 PROCEDURE — 1159F MED LIST DOCD IN RCRD: CPT | Mod: CPTII,S$GLB,, | Performed by: FAMILY MEDICINE

## 2022-01-31 PROCEDURE — 3079F PR MOST RECENT DIASTOLIC BLOOD PRESSURE 80-89 MM HG: ICD-10-PCS | Mod: CPTII,S$GLB,, | Performed by: FAMILY MEDICINE

## 2022-01-31 PROCEDURE — 3079F DIAST BP 80-89 MM HG: CPT | Mod: CPTII,S$GLB,, | Performed by: FAMILY MEDICINE

## 2022-01-31 PROCEDURE — 3074F PR MOST RECENT SYSTOLIC BLOOD PRESSURE < 130 MM HG: ICD-10-PCS | Mod: CPTII,S$GLB,, | Performed by: FAMILY MEDICINE

## 2022-01-31 PROCEDURE — 99214 OFFICE O/P EST MOD 30 MIN: CPT | Mod: S$GLB,,, | Performed by: FAMILY MEDICINE

## 2022-01-31 PROCEDURE — 3074F SYST BP LT 130 MM HG: CPT | Mod: CPTII,S$GLB,, | Performed by: FAMILY MEDICINE

## 2022-01-31 PROCEDURE — 3008F BODY MASS INDEX DOCD: CPT | Mod: CPTII,S$GLB,, | Performed by: FAMILY MEDICINE

## 2022-01-31 PROCEDURE — 1160F PR REVIEW ALL MEDS BY PRESCRIBER/CLIN PHARMACIST DOCUMENTED: ICD-10-PCS | Mod: CPTII,S$GLB,, | Performed by: FAMILY MEDICINE

## 2022-01-31 RX ORDER — TIZANIDINE 4 MG/1
4 TABLET ORAL 3 TIMES DAILY PRN
Qty: 30 TABLET | Refills: 2 | Status: SHIPPED | OUTPATIENT
Start: 2022-01-31 | End: 2022-08-02

## 2022-01-31 RX ORDER — AMLODIPINE BESYLATE 5 MG/1
10 TABLET ORAL DAILY
COMMUNITY
Start: 2021-10-13 | End: 2022-08-02

## 2022-01-31 NOTE — PROGRESS NOTES
"  Subjective:       Patient ID: Carol Dewey is a 51 y.o. female.    Chief Complaint: DDD    Chronic neck and lower back pain.  No recent falls or injury.  No numbness or weakness.  Denies chest pain, palpitations or shortness of breath.  Diagnosed with hypertension in the past year so, well controlled on current regimen.    Review of Systems   Constitutional: Negative for chills, fatigue and fever.   HENT: Negative for congestion.    Eyes: Negative for visual disturbance.   Respiratory: Negative for cough and shortness of breath.    Cardiovascular: Negative for chest pain.   Gastrointestinal: Negative for abdominal pain, nausea and vomiting.   Genitourinary: Negative for difficulty urinating.   Musculoskeletal: Positive for arthralgias, back pain and neck pain.   Skin: Negative for rash.   Neurological: Negative for dizziness.   Psychiatric/Behavioral: Negative for agitation, confusion and sleep disturbance.       Objective:      Vitals:    01/31/22 1431   BP: 128/82   BP Location: Right arm   Patient Position: Sitting   BP Method: Medium (Manual)   Pulse: 60   Resp: 18   SpO2: 98%   Weight: 64.3 kg (141 lb 12.1 oz)   Height: 5' 3" (1.6 m)     Physical Exam  Vitals and nursing note reviewed.   Constitutional:       Appearance: She is well-developed and well-nourished.   HENT:      Head: Normocephalic and atraumatic.   Cardiovascular:      Rate and Rhythm: Normal rate and regular rhythm.      Heart sounds: Normal heart sounds.   Pulmonary:      Effort: Pulmonary effort is normal.      Breath sounds: Normal breath sounds.   Musculoskeletal:         General: No edema.      Cervical back: Spasms and tenderness present. No bony tenderness. Pain with movement present. Decreased range of motion.      Lumbar back: Decreased range of motion.        Back:    Skin:     General: Skin is warm and dry.   Neurological:      Mental Status: She is alert and oriented to person, place, and time.         Lab Results   Component " Value Date    WBC 6.70 09/10/2021    HGB 13.4 09/10/2021    HCT 40.0 09/10/2021     09/10/2021    CHOL 210 (H) 04/01/2021    TRIG 183 (H) 04/01/2021    HDL 50 04/01/2021    ALT 19 09/10/2021    AST 14 09/10/2021     09/10/2021    K 3.7 09/10/2021     09/10/2021    CREATININE 0.7 09/10/2021    BUN 8 09/10/2021    CO2 22 (L) 09/10/2021    TSH 0.83 04/01/2021    INR 0.9 09/10/2021    HGBA1C 5.8 (H) 04/01/2021      Assessment:       1. DDD (degenerative disc disease), cervical    2. Essential hypertension, benign    3. Unspecified inflammatory spondylopathy, lumbar region    4. DDD (degenerative disc disease), lumbar    5. Colon cancer screening        Plan:       DDD (degenerative disc disease), cervical  -     tiZANidine (ZANAFLEX) 4 MG tablet; Take 1 tablet (4 mg total) by mouth 3 (three) times daily as needed (muscle spasm).  Dispense: 30 tablet; Refill: 2  Might benefit from acupuncture  Essential hypertension, benign  Well controlled  Unspecified inflammatory spondylopathy, lumbar region    DDD (degenerative disc disease), lumbar  -     tiZANidine (ZANAFLEX) 4 MG tablet; Take 1 tablet (4 mg total) by mouth 3 (three) times daily as needed (muscle spasm).  Dispense: 30 tablet; Refill: 2    Colon cancer screening  -     Cologuard Screening (Multitarget Stool DNA); Future; Expected date: 01/31/2022      Medication List with Changes/Refills   New Medications    TIZANIDINE (ZANAFLEX) 4 MG TABLET    Take 1 tablet (4 mg total) by mouth 3 (three) times daily as needed (muscle spasm).   Current Medications    AMLODIPINE (NORVASC) 5 MG TABLET    Take 1 tablet by mouth once daily.    CETIRIZINE (ZYRTEC) 10 MG TABLET    TAKE 1 TABLET(10 MG) BY MOUTH EVERY DAY    MONTELUKAST (SINGULAIR) 10 MG TABLET    TAKE 1 TABLET(10 MG) BY MOUTH EVERY EVENING    NORGESTIMATE-ETHINYL ESTRADIOL (TRI-SPRINTEC, 28,) 0.18/0.215/0.25 MG-35 MCG (28) TABLET    Take 1 tablet by mouth once daily.   Discontinued Medications     DICLOFENAC (VOLTAREN) 75 MG EC TABLET    Take 1 tablet (75 mg total) by mouth 2 (two) times daily.    GABAPENTIN (NEURONTIN) 100 MG CAPSULE    Take 1 capsule (100 mg total) by mouth 3 (three) times daily.    HYDROCODONE-ACETAMINOPHEN (NORCO) 5-325 MG PER TABLET    Take 1 tablet by mouth every 4 (four) hours as needed.    PANTOPRAZOLE (PROTONIX) 40 MG TABLET    TAKE 1 TABLET BY MOUTH EVERY DAY

## 2022-04-07 DIAGNOSIS — J30.2 SEASONAL ALLERGIC RHINITIS, UNSPECIFIED TRIGGER: ICD-10-CM

## 2022-04-07 RX ORDER — MONTELUKAST SODIUM 10 MG/1
10 TABLET ORAL NIGHTLY
Qty: 90 TABLET | Refills: 1 | Status: SHIPPED | OUTPATIENT
Start: 2022-04-07 | End: 2022-10-20

## 2022-04-07 RX ORDER — PANTOPRAZOLE SODIUM 40 MG/1
40 TABLET, DELAYED RELEASE ORAL DAILY
Qty: 30 TABLET | Refills: 1 | Status: SHIPPED | OUTPATIENT
Start: 2022-04-07 | End: 2022-07-18

## 2022-04-07 NOTE — TELEPHONE ENCOUNTER
Patient called for refill of Singulair and Protonix. I pended the Singulair for refill, but the Protonix is not on her medication list. Called patient and she said that she takes the Protonix sometimes when her reflux acts up. Told patient I would send message on that medication with the Singulair refill request.

## 2022-04-07 NOTE — TELEPHONE ENCOUNTER
No new care gaps identified.  Powered by [employer]. Reference number: [id number].   4/07/2022 2:48:49 PM CDT

## 2022-04-08 DIAGNOSIS — Z30.41 ENCOUNTER FOR SURVEILLANCE OF CONTRACEPTIVE PILLS: ICD-10-CM

## 2022-04-08 RX ORDER — NORGESTIMATE AND ETHINYL ESTRADIOL 7DAYSX3 28
1 KIT ORAL DAILY
Qty: 90 TABLET | Refills: 0 | Status: SHIPPED | OUTPATIENT
Start: 2022-04-08 | End: 2022-08-04 | Stop reason: SDUPTHER

## 2022-07-19 ENCOUNTER — PATIENT OUTREACH (OUTPATIENT)
Dept: ADMINISTRATIVE | Facility: HOSPITAL | Age: 52
End: 2022-07-19
Payer: MEDICARE

## 2022-07-19 ENCOUNTER — PATIENT MESSAGE (OUTPATIENT)
Dept: ADMINISTRATIVE | Facility: HOSPITAL | Age: 52
End: 2022-07-19
Payer: MEDICARE

## 2022-07-26 ENCOUNTER — PATIENT OUTREACH (OUTPATIENT)
Dept: ADMINISTRATIVE | Facility: HOSPITAL | Age: 52
End: 2022-07-26
Payer: MEDICARE

## 2022-07-27 ENCOUNTER — OFFICE VISIT (OUTPATIENT)
Dept: PRIMARY CARE CLINIC | Facility: CLINIC | Age: 52
End: 2022-07-27
Payer: MEDICARE

## 2022-07-27 VITALS
HEIGHT: 63 IN | TEMPERATURE: 98 F | WEIGHT: 144.31 LBS | HEART RATE: 80 BPM | OXYGEN SATURATION: 99 % | DIASTOLIC BLOOD PRESSURE: 86 MMHG | RESPIRATION RATE: 18 BRPM | BODY MASS INDEX: 25.57 KG/M2 | SYSTOLIC BLOOD PRESSURE: 150 MMHG

## 2022-07-27 DIAGNOSIS — H92.13 EAR DRAINAGE, BILATERAL: ICD-10-CM

## 2022-07-27 DIAGNOSIS — R07.9 CHEST PAIN, UNSPECIFIED TYPE: ICD-10-CM

## 2022-07-27 DIAGNOSIS — H92.02 EAR PAIN, LEFT: ICD-10-CM

## 2022-07-27 DIAGNOSIS — Z13.6 ENCOUNTER FOR SCREENING FOR CARDIOVASCULAR DISORDERS: ICD-10-CM

## 2022-07-27 DIAGNOSIS — H73.92 ABNORMAL TYMPANIC MEMBRANE OF LEFT EAR: Primary | ICD-10-CM

## 2022-07-27 DIAGNOSIS — I10 ESSENTIAL HYPERTENSION: ICD-10-CM

## 2022-07-27 PROCEDURE — 1159F MED LIST DOCD IN RCRD: CPT | Mod: CPTII,S$GLB,, | Performed by: STUDENT IN AN ORGANIZED HEALTH CARE EDUCATION/TRAINING PROGRAM

## 2022-07-27 PROCEDURE — 4010F ACE/ARB THERAPY RXD/TAKEN: CPT | Mod: CPTII,S$GLB,, | Performed by: STUDENT IN AN ORGANIZED HEALTH CARE EDUCATION/TRAINING PROGRAM

## 2022-07-27 PROCEDURE — 99999 PR PBB SHADOW E&M-EST. PATIENT-LVL IV: ICD-10-PCS | Mod: PBBFAC,,, | Performed by: STUDENT IN AN ORGANIZED HEALTH CARE EDUCATION/TRAINING PROGRAM

## 2022-07-27 PROCEDURE — 3080F DIAST BP >= 90 MM HG: CPT | Mod: CPTII,S$GLB,, | Performed by: STUDENT IN AN ORGANIZED HEALTH CARE EDUCATION/TRAINING PROGRAM

## 2022-07-27 PROCEDURE — 1160F PR REVIEW ALL MEDS BY PRESCRIBER/CLIN PHARMACIST DOCUMENTED: ICD-10-PCS | Mod: CPTII,S$GLB,, | Performed by: STUDENT IN AN ORGANIZED HEALTH CARE EDUCATION/TRAINING PROGRAM

## 2022-07-27 PROCEDURE — 3008F PR BODY MASS INDEX (BMI) DOCUMENTED: ICD-10-PCS | Mod: CPTII,S$GLB,, | Performed by: STUDENT IN AN ORGANIZED HEALTH CARE EDUCATION/TRAINING PROGRAM

## 2022-07-27 PROCEDURE — 99999 PR PBB SHADOW E&M-EST. PATIENT-LVL IV: CPT | Mod: PBBFAC,,, | Performed by: STUDENT IN AN ORGANIZED HEALTH CARE EDUCATION/TRAINING PROGRAM

## 2022-07-27 PROCEDURE — 99214 PR OFFICE/OUTPT VISIT, EST, LEVL IV, 30-39 MIN: ICD-10-PCS | Mod: S$GLB,,, | Performed by: STUDENT IN AN ORGANIZED HEALTH CARE EDUCATION/TRAINING PROGRAM

## 2022-07-27 PROCEDURE — 1159F PR MEDICATION LIST DOCUMENTED IN MEDICAL RECORD: ICD-10-PCS | Mod: CPTII,S$GLB,, | Performed by: STUDENT IN AN ORGANIZED HEALTH CARE EDUCATION/TRAINING PROGRAM

## 2022-07-27 PROCEDURE — 3077F PR MOST RECENT SYSTOLIC BLOOD PRESSURE >= 140 MM HG: ICD-10-PCS | Mod: CPTII,S$GLB,, | Performed by: STUDENT IN AN ORGANIZED HEALTH CARE EDUCATION/TRAINING PROGRAM

## 2022-07-27 PROCEDURE — 99214 OFFICE O/P EST MOD 30 MIN: CPT | Mod: S$GLB,,, | Performed by: STUDENT IN AN ORGANIZED HEALTH CARE EDUCATION/TRAINING PROGRAM

## 2022-07-27 PROCEDURE — 93005 ELECTROCARDIOGRAM TRACING: CPT | Mod: S$GLB,,, | Performed by: STUDENT IN AN ORGANIZED HEALTH CARE EDUCATION/TRAINING PROGRAM

## 2022-07-27 PROCEDURE — 1160F RVW MEDS BY RX/DR IN RCRD: CPT | Mod: CPTII,S$GLB,, | Performed by: STUDENT IN AN ORGANIZED HEALTH CARE EDUCATION/TRAINING PROGRAM

## 2022-07-27 PROCEDURE — 93005 EKG 12-LEAD: ICD-10-PCS | Mod: S$GLB,,, | Performed by: STUDENT IN AN ORGANIZED HEALTH CARE EDUCATION/TRAINING PROGRAM

## 2022-07-27 PROCEDURE — 3080F PR MOST RECENT DIASTOLIC BLOOD PRESSURE >= 90 MM HG: ICD-10-PCS | Mod: CPTII,S$GLB,, | Performed by: STUDENT IN AN ORGANIZED HEALTH CARE EDUCATION/TRAINING PROGRAM

## 2022-07-27 PROCEDURE — 4010F PR ACE/ARB THEARPY RXD/TAKEN: ICD-10-PCS | Mod: CPTII,S$GLB,, | Performed by: STUDENT IN AN ORGANIZED HEALTH CARE EDUCATION/TRAINING PROGRAM

## 2022-07-27 PROCEDURE — 93010 EKG 12-LEAD: ICD-10-PCS | Mod: S$GLB,,, | Performed by: INTERNAL MEDICINE

## 2022-07-27 PROCEDURE — 3077F SYST BP >= 140 MM HG: CPT | Mod: CPTII,S$GLB,, | Performed by: STUDENT IN AN ORGANIZED HEALTH CARE EDUCATION/TRAINING PROGRAM

## 2022-07-27 PROCEDURE — 93010 ELECTROCARDIOGRAM REPORT: CPT | Mod: S$GLB,,, | Performed by: INTERNAL MEDICINE

## 2022-07-27 PROCEDURE — 3008F BODY MASS INDEX DOCD: CPT | Mod: CPTII,S$GLB,, | Performed by: STUDENT IN AN ORGANIZED HEALTH CARE EDUCATION/TRAINING PROGRAM

## 2022-07-27 RX ORDER — VALSARTAN 80 MG/1
80 TABLET ORAL DAILY
Qty: 90 TABLET | Refills: 3 | Status: SHIPPED | OUTPATIENT
Start: 2022-07-27 | End: 2022-08-02

## 2022-07-27 RX ORDER — AMOXICILLIN AND CLAVULANATE POTASSIUM 875; 125 MG/1; MG/1
1 TABLET, FILM COATED ORAL EVERY 12 HOURS
Qty: 20 TABLET | Refills: 0 | Status: SHIPPED | OUTPATIENT
Start: 2022-07-27 | End: 2022-07-28

## 2022-07-27 RX ORDER — IBUPROFEN 800 MG/1
800 TABLET ORAL 3 TIMES DAILY PRN
COMMUNITY
End: 2022-08-02 | Stop reason: SDUPTHER

## 2022-07-27 RX ORDER — NEOMYCIN SULFATE, POLYMYXIN B SULFATE, HYDROCORTISONE 3.5; 10000; 1 MG/ML; [USP'U]/ML; MG/ML
4 SOLUTION/ DROPS AURICULAR (OTIC) 3 TIMES DAILY
Qty: 10 ML | Refills: 0 | Status: SHIPPED | OUTPATIENT
Start: 2022-07-27 | End: 2022-07-27

## 2022-07-27 NOTE — PROGRESS NOTES
"Subjective:       Patient ID: Carol Dewey is a 51 y.o. female.    Chief Complaint: Otalgia (With drainage)    HPI:  51 y.o. female presents to [facility] with complaints of otalgia with ear drainage    Patient reports has been experiencing ear drainage for years. Has been experiencing ringing and pressure. If stops leaking pressure will build and recently noticed stopped leaking. Has seen ENT in the past for allergies. Didn't discuss ear symptoms. Would like referral for concerns at this time.    Uses Q-tips?: Yes, often  History of ear infection?: Uncertain, no smell, just leakage. Dr. Lemos Typically gives ear drops for symptoms in the past and will help with symptoms, reports some burning associated with ear drop use.    Review of Systems   Constitutional: Negative for chills, diaphoresis, fatigue and fever.   HENT: Negative for rhinorrhea, sinus pressure, sneezing and sore throat.    Eyes: Negative for discharge and itching.   Respiratory: Negative for cough and shortness of breath.    Cardiovascular: Positive for chest pain (Off and on. Can occur at rest. Needle sensation. Occurs randomly. [place] often this month. Can have jaw sensation right tingling when present. No nausea or sweats). Negative for palpitations.   Gastrointestinal: Negative for abdominal pain, diarrhea, nausea and vomiting.   Skin: Negative for rash and wound.   Neurological: Positive for headaches (associated with ear pain).       Objective:      Vitals:    07/27/22 1438   BP: (!) 150/90   BP Location: Left arm   Patient Position: Sitting   BP Method: Medium (Manual)   Pulse: 80   Resp: 18   Temp: 97.6 °F (36.4 °C)   TempSrc: Oral   SpO2: 99%   Weight: 65.5 kg (144 lb 4.7 oz)   Height: 5' 3" (1.6 m)     Physical Exam  Vitals reviewed.   Constitutional:       General: She is not in acute distress.     Appearance: Normal appearance. She is not ill-appearing.   HENT:      Head: Normocephalic and atraumatic.      Comments: Possible " cholesteatoma left ear     Right Ear: Tympanic membrane and external ear normal. There is no impacted cerumen.      Left Ear: External ear normal. There is no impacted cerumen.      Ears:      Comments: Bilateral erythema ear canals  Eyes:      General:         Right eye: No discharge.         Left eye: No discharge.      Conjunctiva/sclera: Conjunctivae normal.   Cardiovascular:      Rate and Rhythm: Normal rate and regular rhythm.      Heart sounds: Normal heart sounds.   Pulmonary:      Effort: Pulmonary effort is normal.      Breath sounds: Normal breath sounds.   Musculoskeletal:         General: No deformity.   Skin:     Coloration: Skin is not jaundiced or pale.   Neurological:      General: No focal deficit present.      Mental Status: She is alert and oriented to person, place, and time.   Psychiatric:         Mood and Affect: Mood normal.         Behavior: Behavior normal.             Lab Results   Component Value Date     09/10/2021    K 3.7 09/10/2021     09/10/2021    CO2 22 (L) 09/10/2021    BUN 8 09/10/2021    CREATININE 0.7 09/10/2021    ANIONGAP 9 09/10/2021     Lab Results   Component Value Date    HGBA1C 5.8 (H) 04/01/2021     Lab Results   Component Value Date    BNP 34 09/10/2021       Lab Results   Component Value Date    WBC 6.70 09/10/2021    HGB 13.4 09/10/2021    HCT 40.0 09/10/2021     09/10/2021    GRAN 3.5 09/10/2021    GRAN 52.5 09/10/2021     Lab Results   Component Value Date    CHOL 210 (H) 04/01/2021    HDL 50 04/01/2021    LDLCALC 123 (H) 04/01/2021    TRIG 183 (H) 04/01/2021          Current Outpatient Medications:     amLODIPine (NORVASC) 5 MG tablet, Take 10 mg by mouth once daily., Disp: , Rfl:     cetirizine (ZYRTEC) 10 MG tablet, TAKE 1 TABLET(10 MG) BY MOUTH EVERY DAY, Disp: 90 tablet, Rfl: 1    ibuprofen (ADVIL,MOTRIN) 800 MG tablet, Take 800 mg by mouth 3 (three) times daily as needed for Pain., Disp: , Rfl:     montelukast (SINGULAIR) 10 mg tablet,  Take 1 tablet (10 mg total) by mouth every evening., Disp: 90 tablet, Rfl: 1    norgestimate-ethinyl estradioL (TRI-SPRINTEC, 28,) 0.18/0.215/0.25 mg-35 mcg (28) tablet, Take 1 tablet by mouth once daily., Disp: 90 tablet, Rfl: 0    pantoprazole (PROTONIX) 40 MG tablet, TAKE 1 TABLET(40 MG) BY MOUTH EVERY DAY, Disp: 30 tablet, Rfl: 1    tiZANidine (ZANAFLEX) 4 MG tablet, Take 1 tablet (4 mg total) by mouth 3 (three) times daily as needed (muscle spasm)., Disp: 30 tablet, Rfl: 2    amoxicillin-clavulanate 875-125mg (AUGMENTIN) 875-125 mg per tablet, Take 1 tablet by mouth every 12 (twelve) hours. for 10 days, Disp: 20 tablet, Rfl: 0    valsartan (DIOVAN) 80 MG tablet, Take 1 tablet (80 mg total) by mouth once daily., Disp: 90 tablet, Rfl: 3        Assessment:       1. Abnormal tympanic membrane of left ear    2. Ear drainage, bilateral    3. Ear pain, left    4. Chest pain, unspecified type    5. Essential hypertension    6. Encounter for screening for cardiovascular disorders           Plan:       Abnormal tympanic membrane of left ear  Ear drainage, bilateral  Ear pain, left   -     Ambulatory referral/consult to ENT; Future; Expected date: 08/03/2022  -     amoxicillin-clavulanate 875-125mg (AUGMENTIN) 875-125 mg per tablet; Take 1 tablet by mouth every 12 (twelve) hours. for 10 days  Dispense: 20 tablet; Refill: 0  - Discouraged Q-tip use  - Will attempt antibiotic trial and refer to ENT with chronicity of symoptoms    Chest pain, unspecified type  -     EKG 12-lead  -     X-Ray Chest PA And Lateral; Future; Expected date: 07/27/2022  -     Exercise Stress - EKG; Future  -     CBC Auto Differential; Future; Expected date: 07/27/2022  -     Comprehensive Metabolic Panel; Future; Expected date: 07/27/2022    Essential hypertension  -     valsartan (DIOVAN) 80 MG tablet; Take 1 tablet (80 mg total) by mouth once daily.  Dispense: 90 tablet; Refill: 3    Encounter for screening for cardiovascular disorders  -      Lipid Panel; Future; Expected date: 07/27/2022    Other orders  -     Discontinue: neomycin-polymyxin-hydrocortisone (CORTISPORIN) otic solution; Place 4 drops into the left ear 3 (three) times daily. for 7 days  Dispense: 10 mL; Refill: 0    RTC PRN

## 2022-07-28 ENCOUNTER — TELEPHONE (OUTPATIENT)
Dept: PRIMARY CARE CLINIC | Facility: CLINIC | Age: 52
End: 2022-07-28
Payer: MEDICARE

## 2022-07-28 RX ORDER — AZITHROMYCIN 250 MG/1
TABLET, FILM COATED ORAL
Qty: 6 TABLET | Refills: 0 | Status: SHIPPED | OUTPATIENT
Start: 2022-07-28 | End: 2022-08-02

## 2022-07-28 NOTE — TELEPHONE ENCOUNTER
----- Message from Pooja Huynh sent at 7/28/2022  4:51 PM CDT -----  Contact: [phone]  Pt states she called this morning and no one has returned her call. She believes she is having an rash caused by antibiotics she was prescribed. She states this has happened to her in the past and she breaks out in a rash. She is having no other symptoms. She is requesting something be called in to assist with the itching and help her sleep. Please advise.     She is requesting something be called in by the end of the day. She states she cannot take benadryl.       [id number] - [place] - [address] [facility] [address]  [address] [address]  Phone: [phone] Fax: [phone]

## 2022-07-28 NOTE — TELEPHONE ENCOUNTER
----- Message from Aylin Erazo sent at 7/28/2022  8:39 AM CDT -----  Contact: Pt [phone]  [email or web address]     Patient is calling for Medical Advice regarding: allergic reaction to   amoxicillin-clavulanate 875-125mg (AUGMENTIN) 875-125 mg per tablet    How long has patient had these symptoms: 1 day    Pharmacy name and phone#:   [id number] - [place] - [address] AT [facility] [address]  [address] [address]  Phone: [phone] Fax: [phone]      Would like response via RetSKUhart:  call back    Comments: broke out with rash in arm pits, inner thighs, back of knee.  Requesting a different prescription as well as to sandra it in her file      Please call and advise.    Thank You

## 2022-07-29 ENCOUNTER — TELEPHONE (OUTPATIENT)
Dept: PRIMARY CARE CLINIC | Facility: CLINIC | Age: 52
End: 2022-07-29
Payer: MEDICARE

## 2022-07-29 NOTE — TELEPHONE ENCOUNTER
----- Message from Renae Cantu sent at 7/29/2022  9:31 AM CDT -----  Contact: [phone]  Pt called to see if she can have allergy labs added to her appointment for labs on Tuesday. Please Advise

## 2022-08-01 ENCOUNTER — TELEPHONE (OUTPATIENT)
Dept: PRIMARY CARE CLINIC | Facility: CLINIC | Age: 52
End: 2022-08-01
Payer: MEDICARE

## 2022-08-01 NOTE — TELEPHONE ENCOUNTER
----- Message from Maryann Quintanilla sent at 8/1/2022 11:33 AM CDT -----  Contact: [phone]  Pt is asking to be seen tomorrow she states still has a rash please give return call

## 2022-08-01 NOTE — TELEPHONE ENCOUNTER
----- Message from Lakesha Espana sent at 8/1/2022  2:26 PM CDT -----  Contact: Self/[phone]  Pt said that she is calling in regards to needing to speak with the nurse about a rash that she got from an antibiotic that she was prescribed by Dr Dickson pt stated that she has used Otc cortisone cream but it Is not helping pt stated that she wants to know if she can be seen tomorrow bc the rash is all over her body. Please advise

## 2022-08-02 ENCOUNTER — OFFICE VISIT (OUTPATIENT)
Dept: PRIMARY CARE CLINIC | Facility: CLINIC | Age: 52
End: 2022-08-02
Payer: MEDICARE

## 2022-08-02 VITALS
SYSTOLIC BLOOD PRESSURE: 124 MMHG | OXYGEN SATURATION: 98 % | DIASTOLIC BLOOD PRESSURE: 68 MMHG | TEMPERATURE: 98 F | HEIGHT: 63 IN | BODY MASS INDEX: 25.52 KG/M2 | HEART RATE: 86 BPM | WEIGHT: 144.06 LBS | RESPIRATION RATE: 16 BRPM

## 2022-08-02 DIAGNOSIS — R73.03 PREDIABETES: ICD-10-CM

## 2022-08-02 DIAGNOSIS — L27.2 FOOD ALLERGIC SKIN REACTION: ICD-10-CM

## 2022-08-02 DIAGNOSIS — H92.02 EAR PAIN, LEFT: ICD-10-CM

## 2022-08-02 DIAGNOSIS — T78.40XD ALLERGIC REACTION TO DRUG, SUBSEQUENT ENCOUNTER: Primary | ICD-10-CM

## 2022-08-02 DIAGNOSIS — M50.30 DDD (DEGENERATIVE DISC DISEASE), CERVICAL: ICD-10-CM

## 2022-08-02 PROCEDURE — 1159F MED LIST DOCD IN RCRD: CPT | Mod: CPTII,S$GLB,, | Performed by: STUDENT IN AN ORGANIZED HEALTH CARE EDUCATION/TRAINING PROGRAM

## 2022-08-02 PROCEDURE — 99214 OFFICE O/P EST MOD 30 MIN: CPT | Mod: S$GLB,,, | Performed by: STUDENT IN AN ORGANIZED HEALTH CARE EDUCATION/TRAINING PROGRAM

## 2022-08-02 PROCEDURE — 1160F RVW MEDS BY RX/DR IN RCRD: CPT | Mod: CPTII,S$GLB,, | Performed by: STUDENT IN AN ORGANIZED HEALTH CARE EDUCATION/TRAINING PROGRAM

## 2022-08-02 PROCEDURE — 3078F PR MOST RECENT DIASTOLIC BLOOD PRESSURE < 80 MM HG: ICD-10-PCS | Mod: CPTII,S$GLB,, | Performed by: STUDENT IN AN ORGANIZED HEALTH CARE EDUCATION/TRAINING PROGRAM

## 2022-08-02 PROCEDURE — 1160F PR REVIEW ALL MEDS BY PRESCRIBER/CLIN PHARMACIST DOCUMENTED: ICD-10-PCS | Mod: CPTII,S$GLB,, | Performed by: STUDENT IN AN ORGANIZED HEALTH CARE EDUCATION/TRAINING PROGRAM

## 2022-08-02 PROCEDURE — 3074F PR MOST RECENT SYSTOLIC BLOOD PRESSURE < 130 MM HG: ICD-10-PCS | Mod: CPTII,S$GLB,, | Performed by: STUDENT IN AN ORGANIZED HEALTH CARE EDUCATION/TRAINING PROGRAM

## 2022-08-02 PROCEDURE — 3078F DIAST BP <80 MM HG: CPT | Mod: CPTII,S$GLB,, | Performed by: STUDENT IN AN ORGANIZED HEALTH CARE EDUCATION/TRAINING PROGRAM

## 2022-08-02 PROCEDURE — 4010F PR ACE/ARB THEARPY RXD/TAKEN: ICD-10-PCS | Mod: CPTII,S$GLB,, | Performed by: STUDENT IN AN ORGANIZED HEALTH CARE EDUCATION/TRAINING PROGRAM

## 2022-08-02 PROCEDURE — 4010F ACE/ARB THERAPY RXD/TAKEN: CPT | Mod: CPTII,S$GLB,, | Performed by: STUDENT IN AN ORGANIZED HEALTH CARE EDUCATION/TRAINING PROGRAM

## 2022-08-02 PROCEDURE — 3008F BODY MASS INDEX DOCD: CPT | Mod: CPTII,S$GLB,, | Performed by: STUDENT IN AN ORGANIZED HEALTH CARE EDUCATION/TRAINING PROGRAM

## 2022-08-02 PROCEDURE — 99214 PR OFFICE/OUTPT VISIT, EST, LEVL IV, 30-39 MIN: ICD-10-PCS | Mod: S$GLB,,, | Performed by: STUDENT IN AN ORGANIZED HEALTH CARE EDUCATION/TRAINING PROGRAM

## 2022-08-02 PROCEDURE — 99999 PR PBB SHADOW E&M-EST. PATIENT-LVL IV: CPT | Mod: PBBFAC,,, | Performed by: STUDENT IN AN ORGANIZED HEALTH CARE EDUCATION/TRAINING PROGRAM

## 2022-08-02 PROCEDURE — 3008F PR BODY MASS INDEX (BMI) DOCUMENTED: ICD-10-PCS | Mod: CPTII,S$GLB,, | Performed by: STUDENT IN AN ORGANIZED HEALTH CARE EDUCATION/TRAINING PROGRAM

## 2022-08-02 PROCEDURE — 99999 PR PBB SHADOW E&M-EST. PATIENT-LVL IV: ICD-10-PCS | Mod: PBBFAC,,, | Performed by: STUDENT IN AN ORGANIZED HEALTH CARE EDUCATION/TRAINING PROGRAM

## 2022-08-02 PROCEDURE — 3074F SYST BP LT 130 MM HG: CPT | Mod: CPTII,S$GLB,, | Performed by: STUDENT IN AN ORGANIZED HEALTH CARE EDUCATION/TRAINING PROGRAM

## 2022-08-02 PROCEDURE — 1159F PR MEDICATION LIST DOCUMENTED IN MEDICAL RECORD: ICD-10-PCS | Mod: CPTII,S$GLB,, | Performed by: STUDENT IN AN ORGANIZED HEALTH CARE EDUCATION/TRAINING PROGRAM

## 2022-08-02 RX ORDER — AMLODIPINE BESYLATE 10 MG/1
10 TABLET ORAL DAILY
COMMUNITY
End: 2022-10-20 | Stop reason: SDUPTHER

## 2022-08-02 RX ORDER — IBUPROFEN 800 MG/1
800 TABLET ORAL 3 TIMES DAILY PRN
Qty: 60 TABLET | Refills: 2 | Status: SHIPPED | OUTPATIENT
Start: 2022-08-02 | End: 2023-12-01 | Stop reason: SDUPTHER

## 2022-08-02 RX ORDER — HYDROXYZINE PAMOATE 25 MG/1
25 CAPSULE ORAL EVERY 8 HOURS PRN
Qty: 21 CAPSULE | Refills: 0 | Status: SHIPPED | OUTPATIENT
Start: 2022-08-02 | End: 2022-08-09

## 2022-08-02 NOTE — PROGRESS NOTES
"Subjective:           Patient ID: Carol Dewey is a 51 y.o. female who presents today with a chief complaint of rash.    Chief Complaint:   Rash (Allergic reaction)      History of Present Illness:    52yo female with ear issue.  Was started on Augmentin for ear infection, but had allergic skin reaction immediately.  Stopped med that day, was started on Azithromycin which was tolerated well.    Has seen urgent ENT since the regular ENT would not see her until November.   Drops in the ear was painful, so ENT changed to a different drop that is not painful.     Was advised that she would need a CT scan of the head.     Has been using Montelicask and Citirzine.   Has used hydrocortisone     Review of Systems   Constitutional: Negative for chills, diaphoresis, fatigue and fever.   HENT: Positive for ear discharge and ear pain. Negative for rhinorrhea, sinus pressure, sneezing and sore throat.    Eyes: Negative for discharge and itching.   Respiratory: Negative for cough and shortness of breath.    Cardiovascular: Positive for chest pain (occasional with benadryl, not current). Negative for palpitations.   Gastrointestinal: Negative for abdominal pain, diarrhea, nausea and vomiting.   Musculoskeletal: Positive for back pain (chronic).   Skin: Positive for rash (skin itching and irriatation). Negative for wound.   Neurological: Negative for headaches.   Psychiatric/Behavioral: Negative for sleep disturbance.           Objective:        Vitals:    08/02/22 1127   BP: 124/68   BP Location: Right arm   Patient Position: Sitting   BP Method: Medium (Manual)   Pulse: 86   Resp: 16   Temp: 97.8 °F (36.6 °C)   TempSrc: Oral   SpO2: 98%   Weight: 65.4 kg (144 lb 1.1 oz)   Height: 5' 3" (1.6 m)       Body mass index is 25.52 kg/m².      Physical Exam  Vitals reviewed.   Constitutional:       General: She is not in acute distress.     Appearance: Normal appearance. She is not ill-appearing.   HENT:      Head: Normocephalic and " atraumatic.      Right Ear: Tympanic membrane and external ear normal. There is no impacted cerumen.      Left Ear: External ear normal. There is no impacted cerumen.      Ears:      Comments: Left TM with some red speckling, no pus and not distended.  Eyes:      General:         Right eye: No discharge.         Left eye: No discharge.      Conjunctiva/sclera: Conjunctivae normal.   Cardiovascular:      Rate and Rhythm: Normal rate and regular rhythm.      Heart sounds: Normal heart sounds.   Pulmonary:      Effort: Pulmonary effort is normal.      Breath sounds: Normal breath sounds.   Musculoskeletal:         General: No deformity.   Skin:     Capillary Refill: Capillary refill takes less than 2 seconds.      Coloration: Skin is not jaundiced or pale.      Findings: Rash (to arms, thighs, legs and torso) present.   Neurological:      General: No focal deficit present.      Mental Status: She is alert and oriented to person, place, and time.   Psychiatric:         Mood and Affect: Mood normal.         Behavior: Behavior normal.             Lab Results   Component Value Date     08/02/2022    K 3.6 08/02/2022     08/02/2022    CO2 21 (L) 08/02/2022    BUN 14 08/02/2022    CREATININE 0.8 08/02/2022    ANIONGAP 11 08/02/2022     Lab Results   Component Value Date    HGBA1C 5.8 (H) 04/01/2021     Lab Results   Component Value Date    BNP 34 09/10/2021       Lab Results   Component Value Date    WBC 8.60 08/02/2022    HGB 14.5 08/02/2022    HCT 44.5 08/02/2022     08/02/2022    GRAN 4.2 08/02/2022    GRAN 48.3 08/02/2022     Lab Results   Component Value Date    CHOL 230 (H) 08/02/2022    HDL 48 08/02/2022    LDLCALC 150.4 08/02/2022    TRIG 158 (H) 08/02/2022          Current Outpatient Medications:     amLODIPine (NORVASC) 10 MG tablet, Take 10 mg by mouth once daily., Disp: , Rfl:     cetirizine (ZYRTEC) 10 MG tablet, TAKE 1 TABLET(10 MG) BY MOUTH EVERY DAY, Disp: 90 tablet, Rfl: 1    ibuprofen  (ADVIL,MOTRIN) 800 MG tablet, Take 1 tablet (800 mg total) by mouth 3 (three) times daily as needed for Pain., Disp: 60 tablet, Rfl: 2    montelukast (SINGULAIR) 10 mg tablet, Take 1 tablet (10 mg total) by mouth every evening., Disp: 90 tablet, Rfl: 1    norgestimate-ethinyl estradioL (TRI-SPRINTEC, 28,) 0.18/0.215/0.25 mg-35 mcg (28) tablet, Take 1 tablet by mouth once daily., Disp: 90 tablet, Rfl: 0    pantoprazole (PROTONIX) 40 MG tablet, TAKE 1 TABLET(40 MG) BY MOUTH EVERY DAY, Disp: 30 tablet, Rfl: 1    hydrOXYzine pamoate (VISTARIL) 25 MG Cap, Take 1 capsule (25 mg total) by mouth every 8 (eight) hours as needed (itching and skin rash)., Disp: 21 capsule, Rfl: 0     Outpatient Encounter Medications as of 8/2/2022   Medication Sig Dispense Refill    amLODIPine (NORVASC) 10 MG tablet Take 10 mg by mouth once daily.      cetirizine (ZYRTEC) 10 MG tablet TAKE 1 TABLET(10 MG) BY MOUTH EVERY DAY 90 tablet 1    ibuprofen (ADVIL,MOTRIN) 800 MG tablet Take 1 tablet (800 mg total) by mouth 3 (three) times daily as needed for Pain. 60 tablet 2    montelukast (SINGULAIR) 10 mg tablet Take 1 tablet (10 mg total) by mouth every evening. 90 tablet 1    norgestimate-ethinyl estradioL (TRI-SPRINTEC, 28,) 0.18/0.215/0.25 mg-35 mcg (28) tablet Take 1 tablet by mouth once daily. 90 tablet 0    pantoprazole (PROTONIX) 40 MG tablet TAKE 1 TABLET(40 MG) BY MOUTH EVERY DAY 30 tablet 1    [DISCONTINUED] ibuprofen (ADVIL,MOTRIN) 800 MG tablet Take 800 mg by mouth 3 (three) times daily as needed for Pain.      hydrOXYzine pamoate (VISTARIL) 25 MG Cap Take 1 capsule (25 mg total) by mouth every 8 (eight) hours as needed (itching and skin rash). 21 capsule 0    [DISCONTINUED] amLODIPine (NORVASC) 5 MG tablet Take 10 mg by mouth once daily.      [DISCONTINUED] azithromycin (Z-BRADY) 250 MG tablet Take 2 tablets by mouth on day 1; Take 1 tablet by mouth on days 2-5 6 tablet 0    [DISCONTINUED] tiZANidine (ZANAFLEX) 4 MG tablet  Take 1 tablet (4 mg total) by mouth 3 (three) times daily as needed (muscle spasm). 30 tablet 2    [DISCONTINUED] valsartan (DIOVAN) 80 MG tablet Take 1 tablet (80 mg total) by mouth once daily. 90 tablet 3     No facility-administered encounter medications on file as of 8/2/2022.          Assessment:       1. Allergic reaction to drug, subsequent encounter    2. Prediabetes    3. Ear pain, left    4. Food allergic skin reaction    5. DDD (degenerative disc disease), cervical           Plan:       Allergic reaction to drug, subsequent encounter  -     Ambulatory referral/consult to Allergy; Future; Expected date: 08/09/2022  -     hydrOXYzine pamoate (VISTARIL) 25 MG Cap; Take 1 capsule (25 mg total) by mouth every 8 (eight) hours as needed (itching and skin rash).  Dispense: 21 capsule; Refill: 0    Prediabetes    Ear pain, left    Food allergic skin reaction  -     Ambulatory referral/consult to Allergy; Future; Expected date: 08/09/2022    DDD (degenerative disc disease), cervical  -     ibuprofen (ADVIL,MOTRIN) 800 MG tablet; Take 1 tablet (800 mg total) by mouth 3 (three) times daily as needed for Pain.  Dispense: 60 tablet; Refill: 2           Left ear pain:   - patient has new drops from ENT that she is using, states have been helpful and not painful like previous drops.   - exam shows red speckling on TM, is not protuberant and no pus is visualized.   - patient completed antibiotics as directed.    Drug reaction:   - patient had a diffuse body rash after starting Augmentin, stopped after 1 dose.  Was switched to azithromycin which she tolerated well and has completed.   - patient requesting to see Allergy/immunology to discuss her allergic reaction to this medicine into the multiple foods.   - patient's rash is not fully resolved at this time, was advised that she can use Vistaril 25 mg as needed up to 3 times a day to help reduce skin irritation, additionally can use a topical steroid.  Had used an oral  steroid with her recent ear issues and did not significantly reduce the rash, therefore will not use oral steroid at this time.    Food allergies:   - patient states she has several food allergies as well as an intolerance to several medications, would like to see Allergy.  Referral has been placed.    Mammogram:   - discussed mammogram with patient, states that they had seen something abnormal on her mammogram last year and had to do follow-up.  States that the exams were quite uncomfortable and ultimately she was found to not have concerning findings last year, declines having testing repeated this year due to degree of discomfort previously.

## 2022-08-02 NOTE — PATIENT INSTRUCTIONS
Left ear pain:   - patient has new drops from ENT that she is using, states have been helpful and not painful like previous drops.   - exam shows red speckling on TM, is not protuberant and no pus is visualized.   - patient completed antibiotics as directed.    Drug reaction:   - patient had a diffuse body rash after starting Augmentin, stopped after 1 dose.  Was switched to azithromycin which she tolerated well and has completed.   - patient requesting to see Allergy/immunology to discuss her allergic reaction to this medicine into the multiple foods.   - patient's rash is not fully resolved at this time, was advised that she can use Vistaril 25 mg as needed up to 3 times a day to help reduce skin irritation, additionally can use a topical steroid.  Had used an oral steroid with her recent ear issues and did not significantly reduce the rash, therefore will not use oral steroid at this time.    Food allergies:   - patient states she has several food allergies as well as an intolerance to several medications, would like to see Allergy.  Referral has been placed.    Mammogram:   - discussed mammogram with patient, states that they had seen something abnormal on her mammogram last year and had to do follow-up.  States that the exams were quite uncomfortable and ultimately she was found to not have concerning findings last year, declines having testing repeated this year due to degree of discomfort previously.

## 2022-08-04 DIAGNOSIS — Z30.41 ENCOUNTER FOR SURVEILLANCE OF CONTRACEPTIVE PILLS: ICD-10-CM

## 2022-08-04 RX ORDER — NORGESTIMATE AND ETHINYL ESTRADIOL 7DAYSX3 28
1 KIT ORAL DAILY
Qty: 30 TABLET | Refills: 0 | Status: SHIPPED | OUTPATIENT
Start: 2022-08-04 | End: 2022-09-02

## 2022-08-12 ENCOUNTER — LAB VISIT (OUTPATIENT)
Dept: LAB | Facility: HOSPITAL | Age: 52
End: 2022-08-12
Attending: ALLERGY & IMMUNOLOGY
Payer: MEDICARE

## 2022-08-12 ENCOUNTER — OFFICE VISIT (OUTPATIENT)
Dept: ALLERGY | Facility: CLINIC | Age: 52
End: 2022-08-12
Payer: MEDICARE

## 2022-08-12 VITALS — BODY MASS INDEX: 25.66 KG/M2 | HEART RATE: 82 BPM | HEIGHT: 63 IN | WEIGHT: 144.81 LBS | OXYGEN SATURATION: 98 %

## 2022-08-12 DIAGNOSIS — T78.1XXD POLLEN-FOOD ALLERGY, SUBSEQUENT ENCOUNTER: Primary | ICD-10-CM

## 2022-08-12 DIAGNOSIS — J31.0 CHRONIC RHINITIS: ICD-10-CM

## 2022-08-12 DIAGNOSIS — T78.40XD ALLERGIC REACTION TO DRUG, SUBSEQUENT ENCOUNTER: ICD-10-CM

## 2022-08-12 DIAGNOSIS — L27.2 FOOD ALLERGIC SKIN REACTION: ICD-10-CM

## 2022-08-12 DIAGNOSIS — T78.1XXD POLLEN-FOOD ALLERGY, SUBSEQUENT ENCOUNTER: ICD-10-CM

## 2022-08-12 PROCEDURE — 1159F MED LIST DOCD IN RCRD: CPT | Mod: CPTII,S$GLB,, | Performed by: ALLERGY & IMMUNOLOGY

## 2022-08-12 PROCEDURE — 99999 PR PBB SHADOW E&M-EST. PATIENT-LVL III: ICD-10-PCS | Mod: PBBFAC,,, | Performed by: ALLERGY & IMMUNOLOGY

## 2022-08-12 PROCEDURE — 36415 COLL VENOUS BLD VENIPUNCTURE: CPT | Mod: PO | Performed by: ALLERGY & IMMUNOLOGY

## 2022-08-12 PROCEDURE — 3008F PR BODY MASS INDEX (BMI) DOCUMENTED: ICD-10-PCS | Mod: CPTII,S$GLB,, | Performed by: ALLERGY & IMMUNOLOGY

## 2022-08-12 PROCEDURE — 1159F PR MEDICATION LIST DOCUMENTED IN MEDICAL RECORD: ICD-10-PCS | Mod: CPTII,S$GLB,, | Performed by: ALLERGY & IMMUNOLOGY

## 2022-08-12 PROCEDURE — 3008F BODY MASS INDEX DOCD: CPT | Mod: CPTII,S$GLB,, | Performed by: ALLERGY & IMMUNOLOGY

## 2022-08-12 PROCEDURE — 86008 ALLG SPEC IGE RECOMB EA: CPT | Mod: 59 | Performed by: ALLERGY & IMMUNOLOGY

## 2022-08-12 PROCEDURE — 99204 PR OFFICE/OUTPT VISIT, NEW, LEVL IV, 45-59 MIN: ICD-10-PCS | Mod: S$GLB,,, | Performed by: ALLERGY & IMMUNOLOGY

## 2022-08-12 PROCEDURE — 86003 ALLG SPEC IGE CRUDE XTRC EA: CPT | Mod: 59 | Performed by: ALLERGY & IMMUNOLOGY

## 2022-08-12 PROCEDURE — 1160F RVW MEDS BY RX/DR IN RCRD: CPT | Mod: CPTII,S$GLB,, | Performed by: ALLERGY & IMMUNOLOGY

## 2022-08-12 PROCEDURE — 4010F ACE/ARB THERAPY RXD/TAKEN: CPT | Mod: CPTII,S$GLB,, | Performed by: ALLERGY & IMMUNOLOGY

## 2022-08-12 PROCEDURE — 86003 ALLG SPEC IGE CRUDE XTRC EA: CPT | Performed by: ALLERGY & IMMUNOLOGY

## 2022-08-12 PROCEDURE — 1160F PR REVIEW ALL MEDS BY PRESCRIBER/CLIN PHARMACIST DOCUMENTED: ICD-10-PCS | Mod: CPTII,S$GLB,, | Performed by: ALLERGY & IMMUNOLOGY

## 2022-08-12 PROCEDURE — 4010F PR ACE/ARB THEARPY RXD/TAKEN: ICD-10-PCS | Mod: CPTII,S$GLB,, | Performed by: ALLERGY & IMMUNOLOGY

## 2022-08-12 PROCEDURE — 99999 PR PBB SHADOW E&M-EST. PATIENT-LVL III: CPT | Mod: PBBFAC,,, | Performed by: ALLERGY & IMMUNOLOGY

## 2022-08-12 PROCEDURE — 99204 OFFICE O/P NEW MOD 45 MIN: CPT | Mod: S$GLB,,, | Performed by: ALLERGY & IMMUNOLOGY

## 2022-08-12 NOTE — PROGRESS NOTES
Subjective:       Patient ID: Carol Dewey is a 51 y.o. female.    Chief Complaint:  Allergic Reaction (Seafood, fruits and nuts)      52 yo woman presents for consult from Dr José Miguel Begum for allergies. She states twice she has had Augmentin and reacted. first she had redness, itch and swelling under arms, groin. Second was recently and had red rash all over from ead to toe. Was given steroid and still took 2 weeks to resolve. No blisters but very itchy. No SOB.   She has H/ HA and nausea with muscle relaxer and narcotics.  she as jaw swelling and stomach pain for 3 days with crab and bumps al over with crawfish and shrimp so avoids seafood.   Several fruit - watermelon, avocado, khadijah fruit, papaya, German apple, all cause throat itch and swollen and goes down esophagus. Itchy throat wit pecans almonds, pistachio but ok with other nuts. She eats zaira and slight itch but eats.   No insect or latex. Has rhinitis with congestion mostly and on montelukast nightly and loratadine BID and controls her very well..no asthma. No eczema.       Environmental History: see history section for home environment  Review of Systems   Constitutional: Negative for appetite change, chills, fatigue and fever.   HENT: Positive for congestion, facial swelling, rhinorrhea, sneezing and trouble swallowing. Negative for ear discharge, ear pain, nosebleeds, postnasal drip, sinus pressure, sore throat and voice change.    Eyes: Negative for discharge, redness, itching and visual disturbance.   Respiratory: Negative for cough, choking, chest tightness, shortness of breath and wheezing.    Cardiovascular: Negative for chest pain, palpitations and leg swelling.   Gastrointestinal: Negative for abdominal distention, abdominal pain, constipation, diarrhea, nausea and vomiting.   Genitourinary: Negative for difficulty urinating.   Musculoskeletal: Positive for arthralgias and back pain. Negative for gait problem, joint swelling and  myalgias.   Skin: Positive for color change and rash.   Neurological: Negative for dizziness, syncope, weakness, light-headedness and headaches.   Hematological: Negative for adenopathy. Does not bruise/bleed easily.   Psychiatric/Behavioral: Negative for agitation, behavioral problems, confusion and sleep disturbance. The patient is not nervous/anxious.         Objective:      Physical Exam  Vitals and nursing note reviewed.   Constitutional:       General: She is not in acute distress.     Appearance: Normal appearance. She is not ill-appearing.   HENT:      Head: Normocephalic and atraumatic.      Right Ear: External ear normal.      Left Ear: External ear normal.      Nose: No rhinorrhea.   Eyes:      General:         Right eye: No discharge.         Left eye: No discharge.      Conjunctiva/sclera: Conjunctivae normal.   Cardiovascular:      Rate and Rhythm: Normal rate and regular rhythm.   Pulmonary:      Effort: Pulmonary effort is normal. No respiratory distress.   Abdominal:      General: There is no distension.   Musculoskeletal:         General: Normal range of motion.      Cervical back: Normal range of motion.   Skin:     General: Skin is warm and dry.      Findings: No erythema or rash.   Neurological:      Mental Status: She is alert and oriented to person, place, and time.   Psychiatric:         Mood and Affect: Mood normal.         Behavior: Behavior normal.         Thought Content: Thought content normal.         Judgment: Judgment normal.         Laboratory:   none performed   Assessment:       1. Pollen-food allergy, subsequent encounter    2. Food allergic skin reaction    3. Allergic reaction to drug, subsequent encounter    4. Chronic rhinitis         Plan:       1. Advised reaction to Augmentin is drug reaction given full rash twice on it but not C/w IgE mediated allergy. Avoid PCN   2. continue seafood and fruit avoidance but will send immunocaps to identify triggers  3. Also immunocaps to  inhalants  4. continue montelukast daily and loratadine BID  5. Phone review

## 2022-08-16 LAB
A ALTERNATA IGE QN: <0.1 KU/L
A FUMIGATUS IGE QN: <0.1 KU/L
ALLERGEN CHAETOMIUM GLOBOSUM IGE: <0.1 KU/L
ALLERGEN MELON IGE: <0.1 KU/L
ALLERGEN WALNUT TREE IGE: 0.43 KU/L
ALLERGEN WHITE PINE TREE IGE: <0.1 KU/L
ALLERGY INTERPRETATION: ABNORMAL
ALMOND IGE QN: 0.19 KU/L
APPLE IGE QN: 0.71 KU/L
AVOCADO IGE QN: <0.1 KU/L
BAHIA GRASS IGE QN: 0.11 KU/L
BALD CYPRESS IGE QN: 0.36 KU/L
BERMUDA GRASS IGE QN: 0.19 KU/L
BRAZIL NUT IGE QN: <0.1 KU/L
C HERBARUM IGE QN: <0.1 KU/L
C LUNATA IGE QN: <0.1 KU/L
CASHEW NUT IGE QN: <0.1 KU/L
CAT DANDER IGE QN: <0.1 KU/L
CATFISH IGE QN: <0.1 KU/L
CHAETOMIUM GLOB. CLASS: NORMAL
CLAM IGE QN: <0.1 KU/L
COCONUT IGE QN: <0.1 KU/L
CODFISH IGE QN: <0.1 KU/L
COMMON RAGWEED IGE QN: 0.32 KU/L
COTTONWOOD IGE QN: <0.1 KU/L
CRAB IGE QN: <0.1 KU/L
CRAWFISH IGE QN: <0.1 KU/L
D FARINAE IGE QN: 0.19 KU/L
D PTERONYSS IGE QN: 0.12 KU/L
DEPRECATED A ALTERNATA IGE RAST QL: NORMAL
DEPRECATED A FUMIGATUS IGE RAST QL: NORMAL
DEPRECATED ALMOND IGE RAST QL: ABNORMAL
DEPRECATED APPLE IGE RAST QL: ABNORMAL
DEPRECATED AVOCADO IGE RAST QL: NORMAL
DEPRECATED BAHIA GRASS IGE RAST QL: ABNORMAL
DEPRECATED BALD CYPRESS IGE RAST QL: ABNORMAL
DEPRECATED BERMUDA GRASS IGE RAST QL: ABNORMAL
DEPRECATED BRAZIL NUT IGE RAST QL: NORMAL
DEPRECATED C HERBARUM IGE RAST QL: NORMAL
DEPRECATED C LUNATA IGE RAST QL: NORMAL
DEPRECATED CASHEW NUT IGE RAST QL: NORMAL
DEPRECATED CAT DANDER IGE RAST QL: NORMAL
DEPRECATED CATFISH IGE RAST QL: NORMAL
DEPRECATED CLAM IGE RAST QL: NORMAL
DEPRECATED COCONUT IGE RAST QL: NORMAL
DEPRECATED CODFISH IGE RAST QL: NORMAL
DEPRECATED COMMON RAGWEED IGE RAST QL: ABNORMAL
DEPRECATED COTTONWOOD IGE RAST QL: NORMAL
DEPRECATED CRAB IGE RAST QL: NORMAL
DEPRECATED CRAWFISH IGE RAST QL: NORMAL
DEPRECATED D FARINAE IGE RAST QL: ABNORMAL
DEPRECATED D PTERONYSS IGE RAST QL: ABNORMAL
DEPRECATED DOG DANDER IGE RAST QL: NORMAL
DEPRECATED ELDER IGE RAST QL: NORMAL
DEPRECATED ENGL PLANTAIN IGE RAST QL: NORMAL
DEPRECATED FLOUNDER IGE RAST QL: NORMAL
DEPRECATED HAZELNUT IGE RAST QL: ABNORMAL
DEPRECATED JOHNSON GRASS IGE RAST QL: NORMAL
DEPRECATED LOBSTER IGE RAST QL: NORMAL
DEPRECATED LONDON PLANE IGE RAST QL: NORMAL
DEPRECATED MACADAMIA IGE RAST QL: NORMAL
DEPRECATED MANGO IGE RAST QL: NORMAL
DEPRECATED MUGWORT IGE RAST QL: NORMAL
DEPRECATED ORANGE IGE RAST QL: NORMAL
DEPRECATED OYSTER IGE RAST QL: NORMAL
DEPRECATED P NOTATUM IGE RAST QL: NORMAL
DEPRECATED PAPAYA IGE RAST QL: ABNORMAL
DEPRECATED PEACH IGE RAST QL: ABNORMAL
DEPRECATED PEANUT (RARA H) 2 IGE RAST QL: ABNORMAL
DEPRECATED PEANUT (RARA H) 2 IGE RAST QL: ABNORMAL
DEPRECATED PEANUT (RARA H) 3 IGE RAST QL: ABNORMAL
DEPRECATED PEANUT (RARA H) 6 IGE RAST QL: ABNORMAL
DEPRECATED PEANUT (RARA H) 8 IGE RAST QL: ABNORMAL
DEPRECATED PEANUT IGE RAST QL: ABNORMAL
DEPRECATED PEAR IGE RAST QL: ABNORMAL
DEPRECATED PECAN/HICK NUT IGE RAST QL: NORMAL
DEPRECATED PECAN/HICK TREE IGE RAST QL: ABNORMAL
DEPRECATED PISTACHIO IGE RAST QL: ABNORMAL
DEPRECATED ROACH IGE RAST QL: ABNORMAL
DEPRECATED S ROSTRATA IGE RAST QL: NORMAL
DEPRECATED SALMON IGE RAST QL: NORMAL
DEPRECATED SALTWORT IGE RAST QL: ABNORMAL
DEPRECATED SCALLOP IGE RAST QL: NORMAL
DEPRECATED SHRIMP IGE RAST QL: ABNORMAL
DEPRECATED SILVER BIRCH IGE RAST QL: ABNORMAL
DEPRECATED TIMOTHY IGE RAST QL: NORMAL
DEPRECATED TROUT IGE RAST QL: NORMAL
DEPRECATED TUNA IGE RAST QL: NORMAL
DEPRECATED WALNUT IGE RAST QL: NORMAL
DEPRECATED WATERMELON IGE RAST QL: NORMAL
DEPRECATED WHITE OAK IGE RAST QL: ABNORMAL
DEPRECATED WILLOW IGE RAST QL: NORMAL
DOG DANDER IGE QN: <0.1 KU/L
ELDER IGE QN: <0.1 KU/L
ENGL PLANTAIN IGE QN: <0.1 KU/L
FLOUNDER IGE QN: <0.1 KU/L
HAZELNUT IGE QN: 1.87 KU/L
JOHNSON GRASS IGE QN: <0.1 KU/L
LOBSTER IGE QN: <0.1 KU/L
LONDON PLANE IGE QN: 0.1 KU/L
MACADAMIA IGE QN: <0.1 KU/L
MANGO IGE QN: <0.1 KU/L
MELON CLASS: NORMAL
MUGWORT IGE QN: <0.1 KU/L
ORANGE IGE QN: <0.1 KU/L
OYSTER IGE QN: <0.1 KU/L
P NOTATUM IGE QN: <0.1 KU/L
PAPAYA IGE QN: 0.16 KU/L
PEACH IGE QN: 0.64 KU/L
PEANUT (RARA H) 1 IGE QN: <0.1 KU/L
PEANUT (RARA H) 2 IGE QN: <0.1 KU/L
PEANUT (RARA H) 3 IGE QN: <0.1 KU/L
PEANUT (RARA H) 6 IGE QN: <0.1 KU/L
PEANUT (RARA H) 8 IGE QN: 1.52 KU/L
PEANUT (RARA H) 9 IGE QN: <0.1 KU/L
PEANUT (RARA H) 9 IGE QN: ABNORMAL
PEANUT IGE QN: 0.23 KU/L
PEAR IGE QN: 0.57 KU/L
PECAN/HICK NUT IGE QN: <0.1 KU/L
PECAN/HICK TREE IGE QN: 2.23 KU/L
PISTACHIO IGE QN: 0.15 KU/L
ROACH IGE QN: 0.61 KU/L
S ROSTRATA IGE QN: <0.1 KU/L
SALMON IGE QN: <0.1 KU/L
SALTWORT IGE QN: 0.15 KU/L
SCALLOP IGE QN: <0.1 KU/L
SHRIMP IGE QN: 0.28 KU/L
SILVER BIRCH IGE QN: 3.12 KU/L
TIMOTHY IGE QN: <0.1 KU/L
TROUT IGE QN: <0.1 KU/L
TUNA IGE QN: <0.1 KU/L
WALNUT IGE QN: <0.1 KU/L
WALNUT TREE CLASS: ABNORMAL
WATERMELON IGE QN: <0.1 KU/L
WHITE OAK IGE QN: 4.3 KU/L
WHITE PINE CLASS: NORMAL
WILLOW IGE QN: <0.1 KU/L

## 2022-08-18 ENCOUNTER — PATIENT MESSAGE (OUTPATIENT)
Dept: ALLERGY | Facility: CLINIC | Age: 52
End: 2022-08-18
Payer: MEDICARE

## 2022-09-06 ENCOUNTER — OFFICE VISIT (OUTPATIENT)
Dept: OBSTETRICS AND GYNECOLOGY | Facility: CLINIC | Age: 52
End: 2022-09-06
Payer: MEDICARE

## 2022-09-06 ENCOUNTER — PATIENT MESSAGE (OUTPATIENT)
Dept: UROLOGY | Facility: CLINIC | Age: 52
End: 2022-09-06
Payer: MEDICARE

## 2022-09-06 VITALS
WEIGHT: 142.63 LBS | DIASTOLIC BLOOD PRESSURE: 80 MMHG | BODY MASS INDEX: 25.27 KG/M2 | SYSTOLIC BLOOD PRESSURE: 122 MMHG | HEIGHT: 63 IN

## 2022-09-06 DIAGNOSIS — R35.0 URINARY FREQUENCY: ICD-10-CM

## 2022-09-06 DIAGNOSIS — Z00.00 ANNUAL PHYSICAL EXAM: Primary | ICD-10-CM

## 2022-09-06 DIAGNOSIS — R35.1 NOCTURIA: ICD-10-CM

## 2022-09-06 DIAGNOSIS — Z30.011 OCP (ORAL CONTRACEPTIVE PILLS) INITIATION: ICD-10-CM

## 2022-09-06 DIAGNOSIS — N39.3 SUI (STRESS URINARY INCONTINENCE, FEMALE): ICD-10-CM

## 2022-09-06 DIAGNOSIS — Z12.31 ENCOUNTER FOR SCREENING MAMMOGRAM FOR BREAST CANCER: ICD-10-CM

## 2022-09-06 PROCEDURE — G0101 PR CA SCREEN;PELVIC/BREAST EXAM: ICD-10-PCS | Mod: S$GLB,,, | Performed by: OBSTETRICS & GYNECOLOGY

## 2022-09-06 PROCEDURE — 1159F MED LIST DOCD IN RCRD: CPT | Mod: CPTII,S$GLB,, | Performed by: OBSTETRICS & GYNECOLOGY

## 2022-09-06 PROCEDURE — 4010F ACE/ARB THERAPY RXD/TAKEN: CPT | Mod: CPTII,S$GLB,, | Performed by: OBSTETRICS & GYNECOLOGY

## 2022-09-06 PROCEDURE — 3008F PR BODY MASS INDEX (BMI) DOCUMENTED: ICD-10-PCS | Mod: CPTII,S$GLB,, | Performed by: OBSTETRICS & GYNECOLOGY

## 2022-09-06 PROCEDURE — 99999 PR PBB SHADOW E&M-EST. PATIENT-LVL III: CPT | Mod: PBBFAC,,, | Performed by: OBSTETRICS & GYNECOLOGY

## 2022-09-06 PROCEDURE — 87088 URINE BACTERIA CULTURE: CPT | Performed by: OBSTETRICS & GYNECOLOGY

## 2022-09-06 PROCEDURE — 3079F PR MOST RECENT DIASTOLIC BLOOD PRESSURE 80-89 MM HG: ICD-10-PCS | Mod: CPTII,S$GLB,, | Performed by: OBSTETRICS & GYNECOLOGY

## 2022-09-06 PROCEDURE — 3008F BODY MASS INDEX DOCD: CPT | Mod: CPTII,S$GLB,, | Performed by: OBSTETRICS & GYNECOLOGY

## 2022-09-06 PROCEDURE — 3079F DIAST BP 80-89 MM HG: CPT | Mod: CPTII,S$GLB,, | Performed by: OBSTETRICS & GYNECOLOGY

## 2022-09-06 PROCEDURE — 87077 CULTURE AEROBIC IDENTIFY: CPT | Performed by: OBSTETRICS & GYNECOLOGY

## 2022-09-06 PROCEDURE — 99999 PR PBB SHADOW E&M-EST. PATIENT-LVL III: ICD-10-PCS | Mod: PBBFAC,,, | Performed by: OBSTETRICS & GYNECOLOGY

## 2022-09-06 PROCEDURE — 3074F SYST BP LT 130 MM HG: CPT | Mod: CPTII,S$GLB,, | Performed by: OBSTETRICS & GYNECOLOGY

## 2022-09-06 PROCEDURE — 1160F RVW MEDS BY RX/DR IN RCRD: CPT | Mod: CPTII,S$GLB,, | Performed by: OBSTETRICS & GYNECOLOGY

## 2022-09-06 PROCEDURE — 1160F PR REVIEW ALL MEDS BY PRESCRIBER/CLIN PHARMACIST DOCUMENTED: ICD-10-PCS | Mod: CPTII,S$GLB,, | Performed by: OBSTETRICS & GYNECOLOGY

## 2022-09-06 PROCEDURE — 87186 SC STD MICRODIL/AGAR DIL: CPT | Performed by: OBSTETRICS & GYNECOLOGY

## 2022-09-06 PROCEDURE — 3074F PR MOST RECENT SYSTOLIC BLOOD PRESSURE < 130 MM HG: ICD-10-PCS | Mod: CPTII,S$GLB,, | Performed by: OBSTETRICS & GYNECOLOGY

## 2022-09-06 PROCEDURE — 87086 URINE CULTURE/COLONY COUNT: CPT | Performed by: OBSTETRICS & GYNECOLOGY

## 2022-09-06 PROCEDURE — G0101 CA SCREEN;PELVIC/BREAST EXAM: HCPCS | Mod: S$GLB,,, | Performed by: OBSTETRICS & GYNECOLOGY

## 2022-09-06 PROCEDURE — 1159F PR MEDICATION LIST DOCUMENTED IN MEDICAL RECORD: ICD-10-PCS | Mod: CPTII,S$GLB,, | Performed by: OBSTETRICS & GYNECOLOGY

## 2022-09-06 PROCEDURE — 4010F PR ACE/ARB THEARPY RXD/TAKEN: ICD-10-PCS | Mod: CPTII,S$GLB,, | Performed by: OBSTETRICS & GYNECOLOGY

## 2022-09-06 RX ORDER — ACETAMINOPHEN AND CODEINE PHOSPHATE 120; 12 MG/5ML; MG/5ML
1 SOLUTION ORAL DAILY
Qty: 30 TABLET | Refills: 11 | Status: SHIPPED | OUTPATIENT
Start: 2022-09-06 | End: 2023-08-02

## 2022-09-06 NOTE — PROGRESS NOTES
GYN Annual exam  2022    Chief Complaint   Patient presents with    Well Woman    Urinary Frequency         HISTORY OF PRESENT ILLNESS:  Pt is a  51 y.o.  alert female who presents today for GYN annual exam. She has regular periods each month. Sexually active with her  without issues. Feels safe.   She endorses worsening urinary frequency recently. She also notes that for the past year or so she has had worsening urinary incontinence with laughing. She also notes she wakes up multiple times throughout out the night to use the restroom which has been disrupting for her sleep. She denies concerns with pelvic pain.  She has HTN well controlled on amlodipine. She has been taking combined OCP's.   She denies breast concerns. She did not follow up after her last US in 2021 because her mammogram was painful.     Patient's last menstrual period was 2022.    Review of patient's allergies indicates:   Allergen Reactions    Augmentin [amoxicillin-pot clavulanate] Rash     Rash to arms, legs, shoulders and torso.  States has happened 2 times with this medication.    Iodine and iodide containing products Rash    Shellfish containing products Hives     Other reaction(s): Hives    Amoxapine Rash    Penicillins Rash       Current Outpatient Medications on File Prior to Visit   Medication Sig Dispense Refill    amLODIPine (NORVASC) 10 MG tablet Take 10 mg by mouth once daily.      cetirizine (ZYRTEC) 10 MG tablet TAKE 1 TABLET(10 MG) BY MOUTH EVERY DAY 90 tablet 1    ibuprofen (ADVIL,MOTRIN) 800 MG tablet Take 1 tablet (800 mg total) by mouth 3 (three) times daily as needed for Pain. 60 tablet 2    montelukast (SINGULAIR) 10 mg tablet Take 1 tablet (10 mg total) by mouth every evening. 90 tablet 1    pantoprazole (PROTONIX) 40 MG tablet TAKE 1 TABLET(40 MG) BY MOUTH EVERY DAY 30 tablet 1    [DISCONTINUED] norgestimate-ethinyl estradioL (TRI-SPRINTEC, 28,) 0.18/0.215/0.25 mg-35 mcg (28) tablet TAKE 1  "TABLET BY MOUTH EVERY DAY 28 tablet 0     No current facility-administered medications on file prior to visit.       Past Medical History:   Diagnosis Date    Allergy     Angio-edema     Degenerative disc disease     Urticaria             Past Surgical History:   Procedure Laterality Date     SECTION      TONSILLECTOMY      TRANSFORAMINAL EPIDURAL INJECTION OF STEROID Bilateral 3/21/2019    Procedure: Injection,steroid,epidural,transforaminal approach---bilateral L5/S1;  Surgeon: Sridhar Albert III, MD;  Location: [facility];  Service: Pain Management;  Laterality: Bilateral;       Social History     Socioeconomic History    Marital status:    Tobacco Use    Smoking status: Never    Smokeless tobacco: Never   Substance and Sexual Activity    Alcohol use: No    Drug use: No    Sexual activity: Yes     Partners: Male     Birth control/protection: OCP                     Family History   Problem Relation Age of Onset    Asthma Mother     Heart disease Mother     Cancer Father     Breast cancer Paternal Cousin     Colon cancer Neg Hx     Ovarian cancer Neg Hx        OB History    Para Term  AB Living   2 1 1   1     SAB IAB Ectopic Multiple Live Births   1              # Outcome Date GA Lbr Darrick/2nd Weight Sex Delivery Anes PTL Lv   2 SAB            1 Term                  Gynecological History:     Menses monthly and regular. Denies hx of abnormal paps.     REVIEW OF SYSTEMS:  Negative except as above.       PHYSICAL EXAM  /80   Ht 5' 3" (1.6 m)   Wt 64.7 kg (142 lb 10.2 oz)   LMP 2022   BMI 25.27 kg/m²   GENERAL APPEARANCE:  The patient is a pleasant, normal appearing female with normal affect and in no distress.  BREASTS: Breast exam performed supine.  No masses, non-tender, no nipple discharge or lymphadenopathy.  ABDOMEN: Soft, non-tender, non-distended.  No hepatosplenomegaly.  No umbilical or inguinal hernias.  SKIN:  Warm and dry to touch.  No lesions or rashes " noted.    PSYCHIATRIC/NEUROLOGIC:  Appropriate mood and affect, normal recall, alert and oriented x 3  EXTREMITIES:  Warm and well perfused.  No edema noted.  Muscle strength and sensation are normal bilaterally 5/5 in both upper and lower extremities.   :  Vulva: Inspection of her external genitalia reveals normal mons pubis, labia minora and labia majora.  Normal appearing clitoris, urethral meatus and Arnegard's glands.    Bladder:  No evidence of urethral or bladder tenderness.    Vagina:  Speculum exam reveals pink and moist vaginal mucosa.  Bartholin gland is normal to palpation.  Cervix:  Cervix is normal in appearance with no lesions.  There is no cervical motion tenderness.  Uterus:  Uterus is normal size, mobile and non-tender.  No adnexal masses are palpated.  Adnexa are non-tender to palpation  Perineum:  Perineum appears normal.  Anus:  Normal with no apparent lesions.       ASSESSMENT/PLAN:  Pt is a  51 y.o.  alert female who presents today for GYN annual exam.    - Pap up to date. NILM/HPV negative on 21. Denies hx of abnormal paps.   - Contraception: recommended stopping combined OCP's given cHTN on meds. She prefers pills. Discussed micronor and risks and benefits. Recommended taking at the same time every day. Micronor prescribed.   - Mammogram: the most current breast cancer screening guidelines were discussed with the patient; bilateral screening mammogram was discussed and offered which the patient is amenable to; order placed for imaging;     - Cologard ordered by PCP. Encouraged her to do this test.   - Pt has no major risk factors that would necessitate DEXA prior to the recommended age of 65  - We discussed consistent weight bearing aerobic exercise, consistent seatbelt use, and consistent women's multivitamin use with both vitamin D and calcium supplementation (dosing discussed - pt to aim for vitamin D 600 international units daily and calcium 1200 mg daily); self-breast  awareness was discussed and taught; the new Pap smear guidelines were reviewed     Urinary urgency, incontinence and nocturia: Urine culture sent.   Refer to urogyn    Pt voiced understanding of all counseling and instructions; no barriers to learning  RTO in 1 year or PRN      Tico Ramirez  9/6/2022

## 2022-09-07 ENCOUNTER — HOSPITAL ENCOUNTER (OUTPATIENT)
Dept: RADIOLOGY | Facility: OTHER | Age: 52
Discharge: HOME | End: 2022-09-07
Attending: OBSTETRICS & GYNECOLOGY
Payer: MEDICARE

## 2022-09-07 DIAGNOSIS — Z12.31 ENCOUNTER FOR SCREENING MAMMOGRAM FOR BREAST CANCER: ICD-10-CM

## 2022-09-07 PROCEDURE — 77067 SCR MAMMO BI INCL CAD: CPT | Mod: 26,,, | Performed by: RADIOLOGY

## 2022-09-07 PROCEDURE — 77067 SCR MAMMO BI INCL CAD: CPT | Mod: TC

## 2022-09-07 PROCEDURE — 77063 MAMMO DIGITAL SCREENING BILAT WITH TOMO: ICD-10-PCS | Mod: 26,,, | Performed by: RADIOLOGY

## 2022-09-07 PROCEDURE — 77063 BREAST TOMOSYNTHESIS BI: CPT | Mod: 26,,, | Performed by: RADIOLOGY

## 2022-09-07 PROCEDURE — 77067 MAMMO DIGITAL SCREENING BILAT WITH TOMO: ICD-10-PCS | Mod: 26,,, | Performed by: RADIOLOGY

## 2022-09-09 LAB — BACTERIA UR CULT: ABNORMAL

## 2022-09-09 RX ORDER — NITROFURANTOIN 25; 75 MG/1; MG/1
100 CAPSULE ORAL 2 TIMES DAILY
Qty: 14 CAPSULE | Refills: 0 | Status: SHIPPED | OUTPATIENT
Start: 2022-09-09 | End: 2022-09-16

## 2022-09-13 ENCOUNTER — PATIENT MESSAGE (OUTPATIENT)
Dept: UROGYNECOLOGY | Facility: CLINIC | Age: 52
End: 2022-09-13
Payer: MEDICARE

## 2022-09-13 ENCOUNTER — TELEPHONE (OUTPATIENT)
Dept: UROLOGY | Facility: CLINIC | Age: 52
End: 2022-09-13

## 2022-09-13 NOTE — TELEPHONE ENCOUNTER
----- Message from Tico Ramirez MD sent at 9/6/2022 11:32 AM CDT -----  Referral for DEJA and nocturia    Tico Ramirez

## 2022-09-14 ENCOUNTER — PATIENT MESSAGE (OUTPATIENT)
Dept: SURGERY | Facility: CLINIC | Age: 52
End: 2022-09-14
Payer: MEDICARE

## 2022-09-14 ENCOUNTER — OFFICE VISIT (OUTPATIENT)
Dept: UROGYNECOLOGY | Facility: CLINIC | Age: 52
End: 2022-09-14
Payer: MEDICARE

## 2022-09-14 VITALS
DIASTOLIC BLOOD PRESSURE: 72 MMHG | WEIGHT: 141.56 LBS | BODY MASS INDEX: 25.08 KG/M2 | HEIGHT: 63 IN | SYSTOLIC BLOOD PRESSURE: 118 MMHG

## 2022-09-14 DIAGNOSIS — R35.1 NOCTURIA: ICD-10-CM

## 2022-09-14 DIAGNOSIS — N94.10 DYSPAREUNIA, FEMALE: ICD-10-CM

## 2022-09-14 DIAGNOSIS — N39.46 MIXED URGE AND STRESS INCONTINENCE: Primary | ICD-10-CM

## 2022-09-14 DIAGNOSIS — R39.89 BLADDER PAIN: ICD-10-CM

## 2022-09-14 PROCEDURE — 99215 OFFICE O/P EST HI 40 MIN: CPT | Mod: S$GLB,,, | Performed by: OBSTETRICS & GYNECOLOGY

## 2022-09-14 PROCEDURE — 3008F PR BODY MASS INDEX (BMI) DOCUMENTED: ICD-10-PCS | Mod: CPTII,S$GLB,, | Performed by: OBSTETRICS & GYNECOLOGY

## 2022-09-14 PROCEDURE — 3078F DIAST BP <80 MM HG: CPT | Mod: CPTII,S$GLB,, | Performed by: OBSTETRICS & GYNECOLOGY

## 2022-09-14 PROCEDURE — 3074F PR MOST RECENT SYSTOLIC BLOOD PRESSURE < 130 MM HG: ICD-10-PCS | Mod: CPTII,S$GLB,, | Performed by: OBSTETRICS & GYNECOLOGY

## 2022-09-14 PROCEDURE — 3008F BODY MASS INDEX DOCD: CPT | Mod: CPTII,S$GLB,, | Performed by: OBSTETRICS & GYNECOLOGY

## 2022-09-14 PROCEDURE — 4010F ACE/ARB THERAPY RXD/TAKEN: CPT | Mod: CPTII,S$GLB,, | Performed by: OBSTETRICS & GYNECOLOGY

## 2022-09-14 PROCEDURE — 99999 PR PBB SHADOW E&M-EST. PATIENT-LVL IV: ICD-10-PCS | Mod: PBBFAC,,, | Performed by: OBSTETRICS & GYNECOLOGY

## 2022-09-14 PROCEDURE — 87086 URINE CULTURE/COLONY COUNT: CPT | Performed by: OBSTETRICS & GYNECOLOGY

## 2022-09-14 PROCEDURE — 99215 PR OFFICE/OUTPT VISIT, EST, LEVL V, 40-54 MIN: ICD-10-PCS | Mod: S$GLB,,, | Performed by: OBSTETRICS & GYNECOLOGY

## 2022-09-14 PROCEDURE — 3078F PR MOST RECENT DIASTOLIC BLOOD PRESSURE < 80 MM HG: ICD-10-PCS | Mod: CPTII,S$GLB,, | Performed by: OBSTETRICS & GYNECOLOGY

## 2022-09-14 PROCEDURE — 4010F PR ACE/ARB THEARPY RXD/TAKEN: ICD-10-PCS | Mod: CPTII,S$GLB,, | Performed by: OBSTETRICS & GYNECOLOGY

## 2022-09-14 PROCEDURE — 1159F PR MEDICATION LIST DOCUMENTED IN MEDICAL RECORD: ICD-10-PCS | Mod: CPTII,S$GLB,, | Performed by: OBSTETRICS & GYNECOLOGY

## 2022-09-14 PROCEDURE — 3074F SYST BP LT 130 MM HG: CPT | Mod: CPTII,S$GLB,, | Performed by: OBSTETRICS & GYNECOLOGY

## 2022-09-14 PROCEDURE — 99999 PR PBB SHADOW E&M-EST. PATIENT-LVL IV: CPT | Mod: PBBFAC,,, | Performed by: OBSTETRICS & GYNECOLOGY

## 2022-09-14 PROCEDURE — 1159F MED LIST DOCD IN RCRD: CPT | Mod: CPTII,S$GLB,, | Performed by: OBSTETRICS & GYNECOLOGY

## 2022-09-14 NOTE — PATIENT INSTRUCTIONS
Mixed urinary incontinence, urge > stress:   --Bladder diary for 3-7 days, write the number of times you go to the rest room and associated symptoms of urgency or leakage.   --Empty bladder every 3 hours.  Empty well: wait a minute, lean forward on toilet.    --Avoid dietary irritants (see sheet).  Keep diary x 3-5 days to determine your irritants.  --KEGELS: do 10 in AM and 10 in PM, holding each x 10 seconds.  When you feel urge to go, STOP, KEGEL, and when urge has passed, then go to bathroom.  Start pelvic floor PT.     --URGE: start Myrbetriq 25 mg night.  SE profile reviewed.    Takes 2-4 weeks to see if will have effect.  For dry mouth: get sour, sugar free lozenge or gum.    --STRESS:  Non surgical options: Pessary vs. Impressa vs Rivive - which represent urethral and bladder support devices; Surgical options including 1.  mid urethral sling; retropubic, transobturator vs single incision 2. Fascial slings 3. Muller procedure 4. Periurethral bulking-     2. Chronic right flank pain, bladder pain and Dyspareunia: CT in 2010 essentially negative   --Long discussion about possible causes for the pain, suspect bladder   --Start Pelvic floor therapy with    --Consider Elavil daily in the future, SE profile discussed   --Keep pelvic pain diary, detail when the pain is felt and where the pain extends to  --Discussed liberal use of lubrication and digitation prior to attempted penetration     3. Colonoscopy overdue will schedule with Colorectal     4. Recommend rheumatology to rule out Sjogren if the cotton mouth and excessive dry eyes persist.

## 2022-09-14 NOTE — PROGRESS NOTES
Subjective:       Patient ID: Carol Dewey is a 51 y.o. female.    Chief Complaint:  Urinary Incontinence and Nocturia      History of Present Illness  HPI 51 Y O F  has a past medical history of Allergy, Angio-edema, Degenerative disc disease, and Urticaria.  Referred by Dr. Ramirez for evaluation of urinary urgency and night time urination. Currently being treated for UTI with Macrobid. She has very bothersome cough since starting an ACE inhibitor which worsened the stress incontinence. She is most bothered by the leakage which happens without insensible loss.      [facility]    Question 9/14/2022  2:40 PM CDT - Filed by Emani Dunn MA   General Urogynecology: Are you experiencing the following?    Dysuria (painful urination) No   Nocturia:  waking up at night to empty your bladder  Yes   If you answered yes to the previous question, how many times does this happen per night? 5-6   Enuresis (urine loss during sleep) No   Dribbling urine after you urinate Yes   Hematuria (urine appears red) No   Type of stream Strong   Urinary frequency: How often a day are you going to the bathroom per day?  Less than 10   Urinary Tract Infections: How many Urinary Tract Infections have you had in the past year? I have not had a UTI in the past year   If you have had a UTI in the past year, what treatments have you had so far?  I have not had a UTI in the past year   Urinary Incontinence (General): Are you experiencing the following?    Past consultation for incontinence: Have you ever seen someone for the evaluation of incontinence? No   If you answered yes to the previous question, please select all the therapies you have tried.  N/a- I answered no to the previous question   Please note the effectiveness of the therapies.    Need to wear protection to keep clothes dry  Yes   If you answered yes to the previous question, please sandra the protection you use.  Panty liner    Pads    Poise   If you wear protection, how  "much wetness is typically on each pad? Moderate   If you wear protection, how often do you have to change per day, if applicable?  2   Stress Symptoms: Are you experiencing the following?    Leakage of urine with cough, laugh and/or sneeze Yes   If you answered yes to the previous question, what is the frequency in days, weeks and/or months? Several times a day   Leakage of urine with sex No   Leakage of urine with bending/ lifting No   Leakage of urine with briskly walking or jogging No   If you lose urine for any other reason not previously mentioned, please note it below, if applicable.     Urge Symptoms: Are you experiencing the following?    Urgency ("got to go" feeling) Yes   Urge: How frequently do you feel an urge to urinate (feeling like you "gotta go" to the bathroom and can't wait) Several times a day   Do you experience a leakage of urine when you have a feeling of urgency?  Yes   Leakage of urine when unaware Yes   Past use of anticholinergics (medications used to treat overactive bladder) No   If you answered yes to the previous question, please sandra the anticholinergics you have used:     Have you ever used Mirbetriq (aka Mirabegron)?  No   Prolapse Symptoms: Are you experiencing any of the following?     Falling out/ Bulging/ Heaviness in the vagina Yes   Vaginal/ Abdominal Pain/ Pressure No   Need to strain/ Push to void No   Need to wait on the toilet before you void Yes   Unusual position to urinate (using your hands to push back the vaginal bulge) No   Sensation of incomplete emptying Yes   Past use of pessary device No   If you answered yes to the previous question, please list the devices you have used below.     Bowel Symptoms: Are you experiencing any of the following?    Constipation No   Diarrhea  No   Hematochezia (bloody stool) No   Incomplete evacuation of stool No   Involuntary loss of formed stool No   Fecal smearing/urgency No   Involuntary loss of gas No   Vaginal Symptoms: Are you " experiencing any of the following?     Abnormal vaginal bleeding  Yes   Vaginal dryness Yes   Sexually active  Yes   Dyspareunia (painful intercourse) Yes   Estrogen use  No       GYN & OB History  Patient's last menstrual period was 2022.   Date of Last Pap: 2021    OB History    Para Term  AB Living   2 1 1   1     SAB IAB Ectopic Multiple Live Births   1              # Outcome Date GA Lbr Darrick/2nd Weight Sex Delivery Anes PTL Lv   2 SAB            1 Term              OB     x 0.  C/s x 1.    Largest: 7 # 12 oz     GYN  Menarche:  14  Menstrual cycle: 28/3-5/moderate flow/ no pain - micronor   Menopause: n/a   Hysterectomy:  N/a  Ovaries:  present  Tubal ligation: NO   Other abdominal surgeries:     Review of Systems  Review of Systems   Constitutional: Negative.  Negative for activity change, appetite change, chills, diaphoresis, fatigue, fever and unexpected weight change.   HENT: Negative.     Eyes: Negative.    Respiratory: Negative.  Negative for apnea, cough and wheezing.    Cardiovascular: Negative.  Negative for chest pain and palpitations.   Gastrointestinal:  Positive for constipation. Negative for abdominal distention, abdominal pain, anal bleeding, blood in stool, diarrhea, nausea, rectal pain and vomiting.   Endocrine: Negative.    Genitourinary:  Positive for frequency and urgency. Negative for decreased urine volume, difficulty urinating, dyspareunia, dysuria, enuresis, flank pain, genital sores, hematuria, menstrual problem, pelvic pain, vaginal bleeding, vaginal discharge and vaginal pain.   Musculoskeletal:  Negative for back pain and gait problem.   Skin:  Negative for color change, pallor, rash and wound.   Allergic/Immunologic: Negative for immunocompromised state.   Neurological: Negative.  Negative for dizziness and speech difficulty.   Hematological:  Negative for adenopathy.   Psychiatric/Behavioral:  Negative for agitation, behavioral problems, confusion and  sleep disturbance.          Objective:     Physical Exam   Constitutional: She is oriented to person, place, and time. She appears well-developed.   Patient has difficulty going from a lying down to a sitting position, due to back pain. S/p cortisone injection.    HENT:   Head: Normocephalic and atraumatic.   Eyes: Conjunctivae and EOM are normal.   Cardiovascular: Normal rate, regular rhythm, S1 normal, S2 normal, normal heart sounds and intact distal pulses.   Pulmonary/Chest: Effort normal and breath sounds normal. She exhibits no tenderness.   Abdominal: Soft. Bowel sounds are normal. She exhibits no distension and no mass. There is no splenomegaly or hepatomegaly. There is no abdominal tenderness. There is no rigidity, no rebound and no guarding. No hernia.   Genitourinary: Pelvic exam was performed with patient supine. Rectum normal, vagina normal, cervix normal, skenes normal and bartholins normal. Right labia normal and left labia normal. Urethra exhibits hypermobility. Urethra exhibits no urethral caruncle, no urethral diverticulum and no urethral mass. Right bartholin is not enlarged and not tender. Left bartholin is not enlarged and not tender. Rectal exam shows resting tone normal and active tone normal. Rectal exam shows no external hemorrhoid, no fissure, no tenderness, anal tone normal and no dovetailing. Guaiac negative stool. There is atrophy and lavator tenderness in the vagina. No foreign body, tenderness, bleeding, unspecified prolapse of vaginal walls, fistula or mesh exposure in the vagina. Right adnexum displays no mass and no tenderness. Left adnexum displays no mass and no tenderness. Cervix exhibits no motion tenderness and no discharge. Uterus is tender. Uterus is not experiencing uterine prolapse.   PVR: 10 ML  Empty cough stress test: Negative.  Kegel: 3/5    POP-Q  Aa: -2 Ba: -2 C: -5   GH: 2 PB: 2 TVL: 9   Ap: -2 Bp: -2 D: -6                      Genitourinary Comments: Bladder base  tenderness      Musculoskeletal:         General: Normal range of motion.      Cervical back: Normal range of motion and neck supple.   Neurological: She is alert and oriented to person, place, and time. She has normal strength and normal reflexes. Cranial nerves II through XII intact. No cranial nerve deficit.   Skin: Skin is warm and dry.   Psychiatric: She has a normal mood and affect. Her speech is normal and behavior is normal. Judgment normal.        HCM  Pap's: Normal last Pap: 2022  High Risk HPV: Negative  Mammo: BIRADS: 1 ; Last imaging  2022   Colonoscopy: N/a: overdue   Dexa:  n/a        Assessment:        1. Mixed urge and stress incontinence    2. Nocturia    3. Bladder pain    4. Dyspareunia, female                Plan:     Mixed urinary incontinence, urge > stress:   --Bladder diary for 3-7 days, write the number of times you go to the rest room and associated symptoms of urgency or leakage.   --Empty bladder every 3 hours.  Empty well: wait a minute, lean forward on toilet.    --Avoid dietary irritants (see sheet).  Keep diary x 3-5 days to determine your irritants.  --KEGELS: do 10 in AM and 10 in PM, holding each x 10 seconds.  When you feel urge to go, STOP, KEGEL, and when urge has passed, then go to bathroom.  Start pelvic floor PT.     --URGE: start Myrbetriq 25 mg night.  SE profile reviewed.    Takes 2-4 weeks to see if will have effect.  For dry mouth: get sour, sugar free lozenge or gum.    --STRESS:  Non surgical options: Pessary vs. Impressa vs Rivive - which represent urethral and bladder support devices; Surgical options including 1.  mid urethral sling; retropubic, transobturator vs single incision 2. Fascial slings 3. Muller procedure 4. Periurethral bulking-     2. Chronic right flank pain ( not reproducible on exam ), bladder pain and Dyspareunia: CT in 2010 essentially negative   --Long discussion about possible causes for the pain, suspect bladder   --Start Pelvic floor  therapy  --Consider Elavil daily in the future, SE profile discussed   --Keep pelvic pain diary, detail when the pain is felt and where the pain extends to  --Discussed liberal use of lubrication and digitation prior to attempted penetration     3. Colonoscopy overdue will schedule with Colorectal     4. Recommend rheumatology to rule out Sjogren if the cotton mouth and excessive dry eyes persist, medication list reviewed.      Approximately 45 minutes of total time spent in consult, 75 % in discussion. This includes face to face time and non-face to face time preparing to see the patient, obtaining and/or reviewing separately obtained history, documenting clinical information in the electronic or other health record, independently interpreting results (not separately reported) and communicating results to the patient/family/caregiver, or care coordination (not separately reported).        Thank you for requesting consultation of your patient.  I look forward to participating in her care.    Cristal Murphy DO  Female Pelvic Medicine and Reconstructive Surgery  [facility] [place], [place]

## 2022-09-15 ENCOUNTER — TELEPHONE (OUTPATIENT)
Dept: SURGERY | Facility: CLINIC | Age: 52
End: 2022-09-15
Payer: MEDICARE

## 2022-09-15 DIAGNOSIS — Z12.11 ENCOUNTER FOR SCREENING COLONOSCOPY: Primary | ICD-10-CM

## 2022-09-15 LAB — BACTERIA UR CULT: NO GROWTH

## 2022-09-15 NOTE — TELEPHONE ENCOUNTER
Spoke with pt regarding colonoscopy screening at [facility]. Explained to patient that a request can be made for pt to have scope done at [facility] d/t [facility] being contracted to [facility]. Pt verbalized understanding and confirmed number.    ----- Message from Paget Bazile, MA sent at 9/14/2022  3:37 PM CDT -----  This is a patient of Dr. Bear.  She is requesting she have a colonoscopy, but Dr. FORD is having issues putting it in the system.  Can we please have Dr. Chung put in the request for the colonoscopy and have it done at [facility] preferably before Dr. Chung goes out on maternity leave.    Thank you  Paget

## 2022-09-19 ENCOUNTER — PATIENT MESSAGE (OUTPATIENT)
Dept: UROGYNECOLOGY | Facility: CLINIC | Age: 52
End: 2022-09-19
Payer: MEDICARE

## 2022-09-21 ENCOUNTER — CLINICAL SUPPORT (OUTPATIENT)
Dept: REHABILITATION | Facility: HOSPITAL | Age: 52
End: 2022-09-21
Payer: MEDICARE

## 2022-09-21 DIAGNOSIS — M54.2 CHRONIC NECK PAIN: Primary | ICD-10-CM

## 2022-09-21 DIAGNOSIS — G89.29 CHRONIC NECK PAIN: Primary | ICD-10-CM

## 2022-09-21 DIAGNOSIS — G89.29 CHRONIC PAIN OF BOTH SHOULDERS: ICD-10-CM

## 2022-09-21 DIAGNOSIS — M25.512 CHRONIC PAIN OF BOTH SHOULDERS: ICD-10-CM

## 2022-09-21 DIAGNOSIS — M54.41 CHRONIC BILATERAL LOW BACK PAIN WITH BILATERAL SCIATICA: ICD-10-CM

## 2022-09-21 DIAGNOSIS — M25.511 CHRONIC PAIN OF BOTH SHOULDERS: ICD-10-CM

## 2022-09-21 DIAGNOSIS — M54.42 CHRONIC BILATERAL LOW BACK PAIN WITH BILATERAL SCIATICA: ICD-10-CM

## 2022-09-21 DIAGNOSIS — G89.29 CHRONIC BILATERAL LOW BACK PAIN WITH BILATERAL SCIATICA: ICD-10-CM

## 2022-09-21 PROCEDURE — 97810 ACUP 1/> WO ESTIM 1ST 15 MIN: CPT | Mod: PN

## 2022-09-21 PROCEDURE — 97811 ACUP 1/> W/O ESTIM EA ADD 15: CPT | Mod: PN

## 2022-09-21 NOTE — PROGRESS NOTES
"  Acupuncture Evaluation Note     Name: Carol Dewey  Clinic Number: [id number]    TCM Diagnosis: Qi and Blood stagnation in Neck-Shoulder, in Lower Back, and in both Thighs  Physician: Stephon Mehtareflissette    Date of Service: 9/21/2022     Medical Diagnosis: Neck-Shoulder Pain, Lower Back Pain radiating to both Thighs    Evaluation Date: 9/21/2022    Plan of Care Certification Period: 12  Visit #/Visits authorized: 1/ 12     Precautions: Standard    Subjective     Chief Concern: Pain in Neck-Shoulder area, Lower Back Pain radiating to both Thighs    Onset:  Neck Pain for 11 years, Lower Back Pain for 11 years                  Progression since onset:  has worsened    Aggravating Factors:  movement   Relieving Factors:  rest    Quality:  Sharp    Radiation: radiating to both Thighs    Severity:  7    Frequency:  continuously    Treatments Tried:  Injection(s), Medication, Therapy , and Chiropractic treatment    Diet/Taste/Fluids:  in general, a "healthy" diet  /is drinking plenty of fluids/denies use    Digestion:  WNL    Head and Body: WNL    Sleep: WNL    Exercise:  Doing some exercise    Energy Levels:  6/10    Emotional Symptoms:  None    Women's Symptoms:  None    Objective     Tongue:  Red with white fur    Pulse:  wiry    Observation:  Looks normal    Treatment     Treatment:  Increasing Qi and Blood  in Neck-Shoulder, in Lower Back, and in both Thighs    Physician: Tyson, Aaareferral    Acupuncture points used:      Bilateral: GB 20, Mandy on neck + 5, GB 21, Mandy on shoulder + 5, UB 25, Arjun Jah, UB 54, GB 30, Mandy on lower back + 4, GB 31, GB 32      Auricular Treatment: None    NEEDLES IN: 44    NEEDLES OUT: 44    NEEDLES W/O STIM  AT: 1:10 PM    NEEDLES W/O STIM REMOVED AT: 1:55 PM    Modalities:  None    Assessment     Patient felt good     Cause of Condition: Chronic muscle injury    Associated Risk Factors:  None    Traditional Chinese Medicine Diagnosis:  Qi and Blood stagnation in Neck-Shoulder, in Lower " Back, and in both Thighs    Patient prognosis is Good.     Patient will continue to benefit from acupuncture treatment to address the deficits listed in the problem list box on initial evaluation, provide patient family education and to maximize pt's level of independence in the home and community environment.     Patient's spiritual, cultural and educational needs considered and pt agreeable to plan of care and goals.     Anticipated barriers to treatment: None    Plan     Recommend 1 sessions per week for 12 sessions then re-assess.      Recommendations:  Exercise    Education:  Patient does understand the nature the acupuncture treatment

## 2022-09-26 ENCOUNTER — PATIENT MESSAGE (OUTPATIENT)
Dept: UROLOGY | Facility: CLINIC | Age: 52
End: 2022-09-26
Payer: MEDICARE

## 2022-10-03 ENCOUNTER — TELEPHONE (OUTPATIENT)
Dept: PRIMARY CARE CLINIC | Facility: CLINIC | Age: 52
End: 2022-10-03
Payer: MEDICARE

## 2022-10-03 NOTE — TELEPHONE ENCOUNTER
----- Message from Myriam Blas sent at 10/3/2022  1:14 PM CDT -----  Contact: [phone] Patient  Pt is calling regarding her ears. Pt stated she is leaving to go out of the country oct 20th and would like to be seen before that please. Pt will not be back in the country until the end of nov. Please call and advise.

## 2022-10-05 ENCOUNTER — CLINICAL SUPPORT (OUTPATIENT)
Dept: REHABILITATION | Facility: HOSPITAL | Age: 52
End: 2022-10-05
Payer: MEDICARE

## 2022-10-05 DIAGNOSIS — M54.41 CHRONIC BILATERAL LOW BACK PAIN WITH BILATERAL SCIATICA: ICD-10-CM

## 2022-10-05 DIAGNOSIS — G89.29 CHRONIC PAIN OF BOTH SHOULDERS: ICD-10-CM

## 2022-10-05 DIAGNOSIS — M54.42 CHRONIC BILATERAL LOW BACK PAIN WITH BILATERAL SCIATICA: ICD-10-CM

## 2022-10-05 DIAGNOSIS — G89.29 CHRONIC BILATERAL LOW BACK PAIN WITH BILATERAL SCIATICA: ICD-10-CM

## 2022-10-05 DIAGNOSIS — G89.29 CHRONIC NECK PAIN: Primary | ICD-10-CM

## 2022-10-05 DIAGNOSIS — M25.512 CHRONIC PAIN OF BOTH SHOULDERS: ICD-10-CM

## 2022-10-05 DIAGNOSIS — M25.511 CHRONIC PAIN OF BOTH SHOULDERS: ICD-10-CM

## 2022-10-05 DIAGNOSIS — M54.2 CHRONIC NECK PAIN: Primary | ICD-10-CM

## 2022-10-05 PROCEDURE — 97811 ACUP 1/> W/O ESTIM EA ADD 15: CPT | Mod: PN

## 2022-10-05 PROCEDURE — 97810 ACUP 1/> WO ESTIM 1ST 15 MIN: CPT | Mod: PN

## 2022-10-05 NOTE — PROGRESS NOTES
Acupuncture Treatment Note     Name: Carol Dewey  [facility] Number: [id number]    Traditional Chinese Medicine Diagnosis: Qi and Blood stagnation in Neck-Shoulder, in Lower Back, and in both Thighs    Physician: Self, Aaareferral    Date of Service: 10/5/2022     Medical Diagnosis: Neck-Shoulder Pain, Lower Back Pain with Sciatica    Evaluation Date: 9/21/2022    Plan of Care Certification Period: 12  Visit #/Visits authorized: 2/ 12     Precautions: Standard    Subjective     Chief Complaint:   Pain in Neck-Shoulder area, Lower Back Pain radiating to both Thighs    Response to Previous Treatment:  Good    Quality of Symptoms (Better/Worse):  Better    Other Condition/Symptoms:  None    Objective      New Findings:  None    Treatment Principles:   Increasing Qi and Blood  in Neck-Shoulder, in Lower Back, and in both Thighs    Acupuncture Points:     Bilateral: GB 20, Mandy on neck + 5, GB 21, Mandy on shoulder + 5, UB 25, Arjun Jah, UB 54, GB 30, Mandy on lower back + 4, GB 31, GB 32      NEEDLES W/O STIM  AT: 11:10 AM    NEEDLES W/O STIM REMOVED AT: 11:50 AM    #NEEDLES IN: 44    #NEEDLES OUT: 44    Other Traditional Chinese Medicine Modalities:  None    Recommendations:  Exercise    Education:  Patient is aware of cumulative effect of acupuncture      Assessment      Analysis of Treatment:  Patient felt good    Pt prognosis is Good.     Patient will continue to benefit from acupuncture treatment to address the deficits listed in the problem list box on initial evaluation, provide patient family education and to maximize pt's level of independence in the home and community environment.     Patient's spiritual, cultural and educational needs considered and pt agreeable to plan of care and goals.     Anticipated barriers to treatment: None    Plan     Recommend     1   /week for 12  treatments and re-assess.

## 2022-10-11 ENCOUNTER — OFFICE VISIT (OUTPATIENT)
Dept: PRIMARY CARE CLINIC | Facility: CLINIC | Age: 52
End: 2022-10-11
Payer: MEDICARE

## 2022-10-11 VITALS
HEART RATE: 77 BPM | TEMPERATURE: 98 F | BODY MASS INDEX: 25.24 KG/M2 | HEIGHT: 63 IN | OXYGEN SATURATION: 97 % | RESPIRATION RATE: 16 BRPM | DIASTOLIC BLOOD PRESSURE: 66 MMHG | WEIGHT: 142.44 LBS | SYSTOLIC BLOOD PRESSURE: 104 MMHG

## 2022-10-11 DIAGNOSIS — H92.13 EAR DRAINAGE, BILATERAL: ICD-10-CM

## 2022-10-11 DIAGNOSIS — H93.13 TINNITUS OF BOTH EARS: Primary | ICD-10-CM

## 2022-10-11 DIAGNOSIS — H93.8X3 EAR FULLNESS, BILATERAL: ICD-10-CM

## 2022-10-11 PROCEDURE — 3008F PR BODY MASS INDEX (BMI) DOCUMENTED: ICD-10-PCS | Mod: CPTII,S$GLB,, | Performed by: STUDENT IN AN ORGANIZED HEALTH CARE EDUCATION/TRAINING PROGRAM

## 2022-10-11 PROCEDURE — 3078F DIAST BP <80 MM HG: CPT | Mod: CPTII,S$GLB,, | Performed by: STUDENT IN AN ORGANIZED HEALTH CARE EDUCATION/TRAINING PROGRAM

## 2022-10-11 PROCEDURE — 3074F PR MOST RECENT SYSTOLIC BLOOD PRESSURE < 130 MM HG: ICD-10-PCS | Mod: CPTII,S$GLB,, | Performed by: STUDENT IN AN ORGANIZED HEALTH CARE EDUCATION/TRAINING PROGRAM

## 2022-10-11 PROCEDURE — 3008F BODY MASS INDEX DOCD: CPT | Mod: CPTII,S$GLB,, | Performed by: STUDENT IN AN ORGANIZED HEALTH CARE EDUCATION/TRAINING PROGRAM

## 2022-10-11 PROCEDURE — 1160F PR REVIEW ALL MEDS BY PRESCRIBER/CLIN PHARMACIST DOCUMENTED: ICD-10-PCS | Mod: CPTII,S$GLB,, | Performed by: STUDENT IN AN ORGANIZED HEALTH CARE EDUCATION/TRAINING PROGRAM

## 2022-10-11 PROCEDURE — 99999 PR PBB SHADOW E&M-EST. PATIENT-LVL IV: ICD-10-PCS | Mod: PBBFAC,,, | Performed by: STUDENT IN AN ORGANIZED HEALTH CARE EDUCATION/TRAINING PROGRAM

## 2022-10-11 PROCEDURE — 99213 PR OFFICE/OUTPT VISIT, EST, LEVL III, 20-29 MIN: ICD-10-PCS | Mod: S$GLB,,, | Performed by: STUDENT IN AN ORGANIZED HEALTH CARE EDUCATION/TRAINING PROGRAM

## 2022-10-11 PROCEDURE — 4010F ACE/ARB THERAPY RXD/TAKEN: CPT | Mod: CPTII,S$GLB,, | Performed by: STUDENT IN AN ORGANIZED HEALTH CARE EDUCATION/TRAINING PROGRAM

## 2022-10-11 PROCEDURE — 1159F MED LIST DOCD IN RCRD: CPT | Mod: CPTII,S$GLB,, | Performed by: STUDENT IN AN ORGANIZED HEALTH CARE EDUCATION/TRAINING PROGRAM

## 2022-10-11 PROCEDURE — 99999 PR PBB SHADOW E&M-EST. PATIENT-LVL IV: CPT | Mod: PBBFAC,,, | Performed by: STUDENT IN AN ORGANIZED HEALTH CARE EDUCATION/TRAINING PROGRAM

## 2022-10-11 PROCEDURE — 99213 OFFICE O/P EST LOW 20 MIN: CPT | Mod: S$GLB,,, | Performed by: STUDENT IN AN ORGANIZED HEALTH CARE EDUCATION/TRAINING PROGRAM

## 2022-10-11 PROCEDURE — 3074F SYST BP LT 130 MM HG: CPT | Mod: CPTII,S$GLB,, | Performed by: STUDENT IN AN ORGANIZED HEALTH CARE EDUCATION/TRAINING PROGRAM

## 2022-10-11 PROCEDURE — 4010F PR ACE/ARB THEARPY RXD/TAKEN: ICD-10-PCS | Mod: CPTII,S$GLB,, | Performed by: STUDENT IN AN ORGANIZED HEALTH CARE EDUCATION/TRAINING PROGRAM

## 2022-10-11 PROCEDURE — 1159F PR MEDICATION LIST DOCUMENTED IN MEDICAL RECORD: ICD-10-PCS | Mod: CPTII,S$GLB,, | Performed by: STUDENT IN AN ORGANIZED HEALTH CARE EDUCATION/TRAINING PROGRAM

## 2022-10-11 PROCEDURE — 1160F RVW MEDS BY RX/DR IN RCRD: CPT | Mod: CPTII,S$GLB,, | Performed by: STUDENT IN AN ORGANIZED HEALTH CARE EDUCATION/TRAINING PROGRAM

## 2022-10-11 PROCEDURE — 3078F PR MOST RECENT DIASTOLIC BLOOD PRESSURE < 80 MM HG: ICD-10-PCS | Mod: CPTII,S$GLB,, | Performed by: STUDENT IN AN ORGANIZED HEALTH CARE EDUCATION/TRAINING PROGRAM

## 2022-10-11 NOTE — PROGRESS NOTES
"Subjective:           Patient ID: Carol Dewey is a 51 y.o. female who presents today with a chief complaint of tinnitus and otalgia.    Chief Complaint:   Tinnitus and Otalgia (bilateral)      History of Present Illness:    Was last seen on August 2nd for ear issues.    States was [place] [facility] [place]  [place].  Had a CT ordered, last week.  Has not gotten a result on that and she will be going back to the [facility] this month (October 20th - end of November) again and gets pain with change in altitude each time.      She reports having had a CT done on sight at the ENT office, but has not gotten a result.   After asking several times, was advised by a staff member that her image was fine and she is healthy.     She recalls them taking a picture of the side of her head.    Has been having some migraines now, having continued tinnitus and pain.  Reporting some eching in her ear on the right side.         Review of Systems   Constitutional:  Negative for chills, diaphoresis, fatigue and fever.   HENT:  Positive for ear discharge, ear pain and tinnitus. Negative for rhinorrhea, sinus pressure, sneezing and sore throat.    Eyes:  Negative for discharge and itching.   Respiratory:  Negative for cough and shortness of breath.    Cardiovascular:  Positive for chest pain (occasional with benadryl, not current). Negative for palpitations.   Gastrointestinal:  Negative for abdominal pain, diarrhea, nausea and vomiting.   Musculoskeletal:  Positive for back pain (chronic).   Skin:  Negative for rash and wound.   Neurological:  Positive for headaches.   Psychiatric/Behavioral:  Negative for sleep disturbance.          Objective:        Vitals:    10/11/22 1116   BP: 104/66   BP Location: Right arm   Patient Position: Sitting   BP Method: Medium (Manual)   Pulse: 77   Resp: 16   Temp: 97.7 °F (36.5 °C)   TempSrc: Oral   SpO2: 97%   Weight: 64.6 kg (142 lb 6.7 oz)   Height: 5' 3" (1.6 m)       Body mass index is 25.23 " kg/m².      Physical Exam  Vitals reviewed.   Constitutional:       General: She is not in acute distress.     Appearance: Normal appearance. She is not ill-appearing.   HENT:      Head: Normocephalic and atraumatic.      Right Ear: External ear normal. There is no impacted cerumen.      Left Ear: External ear normal. There is no impacted cerumen.      Ears:      Comments: Bilateral TM injected with redness and increased fluid.   No pus.    Throat clear.  No nodes.     Nose: No rhinorrhea.      Mouth/Throat:      Pharynx: No posterior oropharyngeal erythema.   Eyes:      General:         Right eye: No discharge.         Left eye: No discharge.      Conjunctiva/sclera: Conjunctivae normal.   Cardiovascular:      Rate and Rhythm: Normal rate and regular rhythm.      Pulses: Normal pulses.      Heart sounds: Normal heart sounds. No murmur heard.  Pulmonary:      Effort: Pulmonary effort is normal.      Breath sounds: Normal breath sounds. No wheezing.   Musculoskeletal:         General: No deformity.   Lymphadenopathy:      Cervical: No cervical adenopathy.   Skin:     Capillary Refill: Capillary refill takes less than 2 seconds.      Coloration: Skin is not jaundiced or pale.      Findings: No rash.   Neurological:      General: No focal deficit present.      Mental Status: She is alert and oriented to person, place, and time.   Psychiatric:         Mood and Affect: Mood normal.         Behavior: Behavior normal.           Lab Results   Component Value Date     08/02/2022    K 3.6 08/02/2022     08/02/2022    CO2 21 (L) 08/02/2022    BUN 14 08/02/2022    CREATININE 0.8 08/02/2022    ANIONGAP 11 08/02/2022     Lab Results   Component Value Date    HGBA1C 5.8 (H) 04/01/2021     Lab Results   Component Value Date    BNP 34 09/10/2021       Lab Results   Component Value Date    WBC 8.60 08/02/2022    HGB 14.5 08/02/2022    HCT 44.5 08/02/2022     08/02/2022    GRAN 4.2 08/02/2022    GRAN 48.3 08/02/2022      Lab Results   Component Value Date    CHOL 230 (H) 08/02/2022    HDL 48 08/02/2022    LDLCALC 150.4 08/02/2022    TRIG 158 (H) 08/02/2022          Current Outpatient Medications:     amLODIPine (NORVASC) 10 MG tablet, Take 10 mg by mouth once daily., Disp: , Rfl:     cetirizine (ZYRTEC) 10 MG tablet, TAKE 1 TABLET(10 MG) BY MOUTH EVERY DAY, Disp: 90 tablet, Rfl: 1    ibuprofen (ADVIL,MOTRIN) 800 MG tablet, Take 1 tablet (800 mg total) by mouth 3 (three) times daily as needed for Pain., Disp: 60 tablet, Rfl: 2    mirabegron (MYRBETRIQ) 25 mg Tb24 ER tablet, Take 1 tablet (25 mg total) by mouth once daily., Disp: 30 tablet, Rfl: 11    montelukast (SINGULAIR) 10 mg tablet, Take 1 tablet (10 mg total) by mouth every evening., Disp: 90 tablet, Rfl: 1    norethindrone (ORTHO MICRONOR) 0.35 mg tablet, Take 1 tablet (0.35 mg total) by mouth once daily., Disp: 30 tablet, Rfl: 11    pantoprazole (PROTONIX) 40 MG tablet, TAKE 1 TABLET(40 MG) BY MOUTH EVERY DAY, Disp: 30 tablet, Rfl: 1     Outpatient Encounter Medications as of 10/11/2022   Medication Sig Dispense Refill    amLODIPine (NORVASC) 10 MG tablet Take 10 mg by mouth once daily.      cetirizine (ZYRTEC) 10 MG tablet TAKE 1 TABLET(10 MG) BY MOUTH EVERY DAY 90 tablet 1    ibuprofen (ADVIL,MOTRIN) 800 MG tablet Take 1 tablet (800 mg total) by mouth 3 (three) times daily as needed for Pain. 60 tablet 2    mirabegron (MYRBETRIQ) 25 mg Tb24 ER tablet Take 1 tablet (25 mg total) by mouth once daily. 30 tablet 11    montelukast (SINGULAIR) 10 mg tablet Take 1 tablet (10 mg total) by mouth every evening. 90 tablet 1    norethindrone (ORTHO MICRONOR) 0.35 mg tablet Take 1 tablet (0.35 mg total) by mouth once daily. 30 tablet 11    pantoprazole (PROTONIX) 40 MG tablet TAKE 1 TABLET(40 MG) BY MOUTH EVERY DAY 30 tablet 1     No facility-administered encounter medications on file as of 10/11/2022.          Assessment:       1. Tinnitus of both ears    2. Ear drainage, bilateral     3. Ear fullness, bilateral           Plan:       Tinnitus of both ears  -     Ambulatory referral/consult to ENT; Future; Expected date: 10/18/2022    Ear drainage, bilateral  -     Ambulatory referral/consult to ENT; Future; Expected date: 10/18/2022    Ear fullness, bilateral  -     Ambulatory referral/consult to ENT; Future; Expected date: 10/18/2022             Tenderness, ear pain:   - patient having continued tenderness in ear pain as well as drainage from left ear.   - reports she had testing done with [facility] in [place], was under impression this was a CT but happened in the [facility], therefore was likely an x-ray or other image.   - will request records.    - exam today shows some inflammation, but no pus.  No temp.  Clear lungs.  Not likely infected.   - patient has been having difficulty getting feedback from urgent ENT, advised to see an [facility].  - advised to use Debrox drops.

## 2022-10-11 NOTE — PATIENT INSTRUCTIONS
Tenderness, ear pain:   - patient having continued tenderness in ear pain as well as drainage from left ear.   - reports she had testing done with [facility] in [place], was under impression this was a CT but happened in the [facility], therefore was likely an x-ray or other image.   - will request records.    - exam today shows some inflammation, but no pus.  No temp.  Clear lungs.  Not likely infected.   - patient has been having difficulty getting feedback from urgent ENT, advised to see an [facility].  - advised to use Debrox drops.

## 2022-10-12 ENCOUNTER — CLINICAL SUPPORT (OUTPATIENT)
Dept: REHABILITATION | Facility: HOSPITAL | Age: 52
End: 2022-10-12
Attending: OBSTETRICS & GYNECOLOGY
Payer: MEDICARE

## 2022-10-12 DIAGNOSIS — N39.46 MIXED URGE AND STRESS INCONTINENCE: ICD-10-CM

## 2022-10-12 PROCEDURE — 97530 THERAPEUTIC ACTIVITIES: CPT | Mod: PO

## 2022-10-12 PROCEDURE — 97162 PT EVAL MOD COMPLEX 30 MIN: CPT | Mod: PO

## 2022-10-12 NOTE — PATIENT INSTRUCTIONS
"How to perform "the Knack"    This can be performed in any position. Firstly, try to relax your abdomen and pelvic floor. Next, without holding your breath, you will perform a Kegel (pelvic floor contraction) at about 50% strength (NOT a maximal contraction), then gently cough or clear your throat. You may do this up to 10 times, ensuring that you are fully relaxing your muscles between each one. You should perform this 3-4 times per day.    Home Exercise Program: 10/12/2022    DIAPHRAGMATIC BREATHING     The diaphragm is a dome shaped muscle that forms the floor of the rib cage. It is the most efficient muscle for breathing and relaxation, although most people are not used to using the diaphragm. Diaphragmatic or belly breathing is an important technique to learn because it helps settle down or relax the autonomic nervous system. The correct use of diaphragmatic breathing can help to quiet brain activity resulting in the relaxation of all the muscles and organs of the body. This is accomplished by slow rhythmic breathing concentrated in the diaphragm muscle rather than the chest.    How to do proper relaxation breathing:    Start by lying on your back or reclining in a chair in a relaxed position. Place one hand on your chest and the other on your abdomen.  Relax your jaw by placing your tongue on the roof of your mouth and keeping your teeth slightly apart.   Take a deep breath in, letting the abdomen expand and rise while you keep your upper chest, neck and shoulders relaxed. Think about sending air downwards into your pelvic floor and allow it to gently bulge outward.  As you breathe out, allow your abdomen and chest to fall. Exhale completely. Allow your pelvic floor to naturally come back to its resting position.  It doesn't matter if you breathe in/out through your nose and/or mouth. Do whichever feels comfortable.  Remember to breathe slowly.  Do not force your breathing. Do not hold your breath.  Repeat for " 3-5 minutes every day.

## 2022-10-13 ENCOUNTER — CLINICAL SUPPORT (OUTPATIENT)
Dept: REHABILITATION | Facility: HOSPITAL | Age: 52
End: 2022-10-13
Payer: MEDICARE

## 2022-10-13 DIAGNOSIS — M54.42 CHRONIC BILATERAL LOW BACK PAIN WITH BILATERAL SCIATICA: ICD-10-CM

## 2022-10-13 DIAGNOSIS — G89.29 CHRONIC NECK PAIN: Primary | ICD-10-CM

## 2022-10-13 DIAGNOSIS — M25.512 CHRONIC PAIN OF BOTH SHOULDERS: ICD-10-CM

## 2022-10-13 DIAGNOSIS — M54.2 CHRONIC NECK PAIN: Primary | ICD-10-CM

## 2022-10-13 DIAGNOSIS — M54.41 CHRONIC BILATERAL LOW BACK PAIN WITH BILATERAL SCIATICA: ICD-10-CM

## 2022-10-13 DIAGNOSIS — G89.29 CHRONIC BILATERAL LOW BACK PAIN WITH BILATERAL SCIATICA: ICD-10-CM

## 2022-10-13 DIAGNOSIS — M25.511 CHRONIC PAIN OF BOTH SHOULDERS: ICD-10-CM

## 2022-10-13 DIAGNOSIS — G89.29 CHRONIC PAIN OF BOTH SHOULDERS: ICD-10-CM

## 2022-10-13 PROCEDURE — 97811 ACUP 1/> W/O ESTIM EA ADD 15: CPT

## 2022-10-13 PROCEDURE — 97810 ACUP 1/> WO ESTIM 1ST 15 MIN: CPT

## 2022-10-13 NOTE — PROGRESS NOTES
Acupuncture Treatment Note     Name: Carol Dewey  [facility] Number: [id number]    Traditional Chinese Medicine Diagnosis: Qi and Blood stagnation in Neck-Shoulder, in Lower Back, and in both Thighs    Physician: Self, Aaareferral    Date of Service: 10/13/2022     Medical Diagnosis: Neck-Shoulder Pain, Lower Back Pain with Sciatica    Evaluation Date: 9/21/2022    Plan of Care Certification Period: 12  Visit #/Visits authorized: 3 / 12     Precautions: Standard    Subjective     Chief Complaint:   Pain in Neck-Shoulder area, Lower Back Pain radiating to both Thighs    Response to Previous Treatment:  Good    Quality of Symptoms (Better/Worse):  Better    Other Condition/Symptoms:  None    Objective      New Findings:  None    Treatment Principles:   Increasing Qi and Blood  in Neck-Shoulder, in Lower Back, and in both Thighs    Acupuncture Points:     Bilateral: GB 20, Mandy on neck + 4 , GB 21, Mandy on shoulder + 4, UB 25, Arjun Jah, UB 54, GB 30, Mandy on lower back + 4, GB 31, GB 32      NEEDLES W/O STIM  AT: 4:30 PM    NEEDLES W/O STIM REMOVED AT: 5:00 PM    #NEEDLES IN: 40    #NEEDLES OUT: 40    Other Traditional Chinese Medicine Modalities:  None    Recommendations:  Exercise    Education:  Patient is aware of cumulative effect of acupuncture      Assessment      Analysis of Treatment:  Patient felt good    Pt prognosis is Good.     Patient will continue to benefit from acupuncture treatment to address the deficits listed in the problem list box on initial evaluation, provide patient family education and to maximize pt's level of independence in the home and community environment.     Patient's spiritual, cultural and educational needs considered and pt agreeable to plan of care and goals.     Anticipated barriers to treatment: None    Plan     Recommend     1   /week for 12  treatments and re-assess.

## 2022-10-14 NOTE — PLAN OF CARE
[facility]  Pelvic Health Physical Therapy Initial Evaluation    Date: 10/12/2022   Name: Carol Dewey  Clinic Number: [id number]  Therapy Diagnosis:   Encounter Diagnosis   Name Primary?    Mixed urge and stress incontinence      Physician: Cristal Murphy,*    Physician Orders: Pelvic PT Eval and Treat  Medical Diagnosis from Referral: Mixed urge and stress incontinence [N39.46]  Evaluation Date: 10/12/2022  Authorization Period Expiration: TBD  Plan of Care Expiration: 02/01/23  Visit # / Visits authorized: 1/ 1    Time In: 1:03  Time Out: 2:00  Total Appointment Time (timed & untimed codes): 57 minutes    Precautions: universal    Subjective     Date of onset: a few months ago?    History of current condition - Carol reports: She was having some pelvic pain and urinary urgency - it was found that she had a UTI. She took antibiotics as prescribed and got better, but continues with urinary frequency. She took myrbetriq for several days and found complete resolution of this symptom. She stopped taking the Myrbetriq and now feels worsening urgency/frequency again (pt did not know that medication was supposed to be taken daily). Also notes DEJA when not taking Myrbetriq (laughing and sneezing).    OB/GYN History:  she changed birth controls recently, not getting regular periods. Uses pads  Sexually active? No  Pain with vaginal exams, intercourse or tampon use? Yes - throbbing in the pelvis with sex    Bladder/Bowel History:   Frequency of urination:   Daytime: while at work, has to hold her urine for several hours          Nighttime: a few times per night  Difficulty initiating urine stream: No  Urine stream: strong  Complete emptying: No - does not feel like she fully empties  Bladder leakage: Yes  Frequency of incidents: with laughing, sneezing. Happening up to once a day  Amount leaked (urine): large squirt  Urinary Urgency: Yes  Able to delay the urge for several minute(s) - hours while  "at work.  Frequency of bowel movements: 1 to 2 times a day  Difficulty initiating BM: No  Quality/Shape of BM: normal  Does Patient Feel Empty after BM? Yes  Fiber Supplements or Laxative Use?  No  Colon leakage: No  Form of protection: panty liner  Number of pads required in 24 hours: 1-2, depending on the day    Prior Therapy/Previous treatment included: medication for OAB  Social History: lives with their spouse  Current exercise: walks a lot at work  Occupation: Pt works at a store and job-related duties include standing, walking, lifting.  Prior Level of Function: independent   Current Level of Function: independent. DEJA, urgency, frequency when not taking medications.    Types of fluid intake: water 3-5 bottles per day. Sometimes sprite. Milk   Diet: traditional  - "lots of rice"  Habitus:well developed, well nourished  Abuse/Neglect: No     PAIN:  Location: suprapubic  Current 6/10, worst 9/10, best 0/10   Description: Aching and Throbbing  Aggravating Factors/Activities that cause symptoms: when she's very active   Easing Factors: nothing     Medical History: Carol  has a past medical history of Allergy, Angio-edema, Degenerative disc disease, and Urticaria.     Surgical History:  Carlo Dewey  has a past surgical history that includes  section; Tonsillectomy; and Transforaminal epidural injection of steroid (Bilateral, 3/21/2019).    Medications: Carol has a current medication list which includes the following prescription(s): amlodipine, carbamide peroxide, cetirizine, ibuprofen, mirabegron, montelukast, norethindrone, and pantoprazole.    Allergies:   Review of patient's allergies indicates:   Allergen Reactions    Augmentin [amoxicillin-pot clavulanate] Rash     Rash to arms, legs, shoulders and torso.  States has happened 2 times with this medication.    Iodine and iodide containing products Rash    Shellfish containing products Hives     Other reaction(s): Hives    Amoxapine Rash    " Penicillins Rash        Imaging See EMR for full imaging workup    Pts goals: reduce urinary urge, frequency, and leakage    OBJECTIVE     See EMR under MEDIA for written consent provided 10/12/2022  Chaperone: declined    ORTHO SCREEN  Posture in sitting: slouched   Posture in standing: forward head and forward and rounded shoulders   Pelvic alignment: Not assessed today      BREATHING MECHANICS ASSESSMENT   Thorax Assessment During Quiet Respiration: Decreased excursion of abdominal wall  and Decreased excursion bilaterally of lateral ribs   Thorax Assessment During Deep Respiration: WNL excursion of ribcage and WNL excursion of abdominal wall    VAGINAL PELVIC FLOOR EXAM    EXTERNAL ASSESSMENT  Introitus: WNL  Skin condition: WNL  Scarring: none   Sensation: WNL   Pain:  none  Voluntary contraction: accessory muscle use and minima lift  Voluntary relaxation: minimal drop  Involuntary contraction: nil  Bearing down: minimal excursion  Perineal descent: absent  Comments:        INTERNAL ASSESSMENT  Pain: none   Sensation: able to localized pressure appropriately   Vaginal vault: WNL   Muscle Bulk: WFL   Muscle Power: 3/5  Muscle Endurance: 5 sec  # Reps To Fatigue: not tested    Fast Contractions in 10 seconds: 3     Quality of contraction: decreased hold and slow relaxation   Specificity: patient contracts: adductors   Coordination: tends to hold breath during PFM contration   Prolapse check:did not assess  Comments: minimal excursion noted with active contraction/relaxation and diaphragmatic breath    Limitation/Restriction for FOTO Survey    FOTO not captured at initial evaluation.       TREATMENT     Treatment Time In: 1:50  Treatment Time Out: 2:00  Total Treatment time (time-based codes) separate from Evaluation: 10 minutes    Therapeutic Activity Patient participated in dynamic functional therapeutic activities to improve functional performance for 10 minutes. Including: Education as described below.      Patient Education provided:   general anatomy/physiology of urinary/ bowel  system, benefits of treatment, risks of treatment, and alternative methods of treatment were discussed with the pt. Additionally, anatomy/physiology of pelvic floor, diaphragmatic breathing, and Coordination of kegels with functional activities such as cough, laugh, sneeze, lift, etc.  were reviewed.     Home Exercises Provided:  Written Home Exercises Provided: yes.  Exercises were reviewed and Carol was able to demonstrate them prior to the end of the session.    Carol demonstrated good  understanding of the education provided.     See EMR under Patient Instructions for exercises provided 10/12/2022.    Assessment     Carol is a 51 y.o. female referred to outpatient Physical Therapy with a medical diagnosis of Mixed urge and stress incontinence [N39.46]. Pt presents with decreased pelvic muscle strength, decreased endurance of the pelvic muscles, decreased phasic ability of the pelvic muscles, poor quality of pelvic muscle contraction, poor coordination of pelvic floor muscles during ADL's leading to urinary or fecal leakage, and dysfunctional voiding. Symptom reports were somewhat inconsistent, unclear which symptoms are related to UTI vs. Current and how the OAB medication has affected her.    Pt prognosis is Good.   Pt will benefit from skilled outpatient Physical Therapy to address the deficits stated above and in the chart below, provide pt/family education, and to maximize pt's level of independence.     Plan of care discussed with patient: Yes  Pt's spiritual, cultural and educational needs considered and patient is agreeable to the plan of care and goals as stated below:       Anticipated Barriers for therapy: none    Medical Necessity is demonstrated by the following    History  Co-morbidities and personal factors that may impact the plan of care Co-morbidities:       Personal Factors:   lifestyle     moderate    Examination  Body Structures and Functions, activity limitations and participation restrictions that may impact the plan of care Body Regions/Systems/Functions:  decreased pelvic muscle strength, decreased endurance of the pelvic muscles, decreased phasic ability of the pelvic muscles, poor quality of pelvic muscle contraction, poor coordination of pelvic floor muscles during ADL's leading to urinary or fecal leakage, and dysfunctional voiding     Activity Limitations:  urgency  and incontinence with ADLs    Participation Restrictions:  work duties and exercise restrictions due to incontinence     Activity limitations:   Learning and applying knowledge  no deficits    General Tasks and Commands  no deficits    Communication  no deficits    Mobility  no deficits    Self care  no deficits    Domestic Life  no deficits    Interactions/Relationships  no deficits    Life Areas  no deficits    Community and Social Life  no deficits       moderate   Clinical Presentation evolving clinical presentation with changing clinical characteristics moderate   Decision Making/ Complexity Score: moderate       Short Term Goals: 6 weeks  Pt will verbalize improved awareness of PFM activity as palpated by PT in order to improve activity involvement with HEP.  Pt to be edu pelvic muscle bracing and be able to consistently perform correctly and quickly to help decrease incontinence with cough/laugh/sneeze.  Pt to demonstrate being able to correctly and consistently perform a kegel which is needed  to increase pelvic floor muscle coordination and strength needed for continence.  Pt to be able to delay the urge to urinate at least 5minutes with a strong urge to urinate in order to make it to the bathroom without leaking.  Pt to voice understanding of the role that diet plays on urinary urgency.       Long Term Goals: 16 weeks  Pt to be discharged with home plan for carry over after discharge.    Pt to be able to perform a 10 second kegel  x 10 reps to demonstrate improving strength and endurance needed for continence.  Pt to be able to delay the urge to urinate at least 10 minutes with a strong urge to urinate in order to make it to the bathroom without leaking.  Pt to report no longer feeling the need to urinate just in case when shopping or participating in social activities to demonstrate improving pelvic floor and bladder control.  Pt to report elimination of incontinence with ADLs to demonstrate improved pelvic floor muscle strength and coordination  Pt to increase pelvic floor strength to at least 4/5 to demonstrate improved strength needed for continence with ADLs.       Plan     Plan of care Certification: 10/12/2022 to 02/01/23.    Outpatient Physical Therapy 1 times weekly for 16 weeks to include the following interventions: therapeutic exercises, therapeutic activity, neuromuscular re-education, manual therapy, modalities PRN, patient/family education, dry needling, and self care/home management    I appreciate your consult and look forward to participating in this patient's care.    Audrey Barone, PT, DPT

## 2022-10-20 ENCOUNTER — OFFICE VISIT (OUTPATIENT)
Dept: OTOLARYNGOLOGY | Facility: CLINIC | Age: 52
End: 2022-10-20
Payer: MEDICARE

## 2022-10-20 VITALS
RESPIRATION RATE: 18 BRPM | SYSTOLIC BLOOD PRESSURE: 140 MMHG | HEART RATE: 76 BPM | DIASTOLIC BLOOD PRESSURE: 93 MMHG | HEIGHT: 63 IN | WEIGHT: 142.44 LBS | BODY MASS INDEX: 25.24 KG/M2

## 2022-10-20 DIAGNOSIS — H93.8X3 EAR FULLNESS, BILATERAL: ICD-10-CM

## 2022-10-20 DIAGNOSIS — H93.13 TINNITUS OF BOTH EARS: ICD-10-CM

## 2022-10-20 DIAGNOSIS — J30.2 SEASONAL ALLERGIC RHINITIS, UNSPECIFIED TRIGGER: ICD-10-CM

## 2022-10-20 DIAGNOSIS — H81.13 BPPV (BENIGN PAROXYSMAL POSITIONAL VERTIGO), BILATERAL: ICD-10-CM

## 2022-10-20 DIAGNOSIS — H92.13 EAR DRAINAGE, BILATERAL: ICD-10-CM

## 2022-10-20 DIAGNOSIS — H69.93 DYSFUNCTION OF BOTH EUSTACHIAN TUBES: Primary | ICD-10-CM

## 2022-10-20 PROCEDURE — 99213 PR OFFICE/OUTPT VISIT, EST, LEVL III, 20-29 MIN: ICD-10-PCS | Mod: S$GLB,,, | Performed by: PHYSICIAN ASSISTANT

## 2022-10-20 PROCEDURE — 99999 PR PBB SHADOW E&M-EST. PATIENT-LVL V: CPT | Mod: PBBFAC,,, | Performed by: PHYSICIAN ASSISTANT

## 2022-10-20 PROCEDURE — 3077F SYST BP >= 140 MM HG: CPT | Mod: CPTII,S$GLB,, | Performed by: PHYSICIAN ASSISTANT

## 2022-10-20 PROCEDURE — 3080F PR MOST RECENT DIASTOLIC BLOOD PRESSURE >= 90 MM HG: ICD-10-PCS | Mod: CPTII,S$GLB,, | Performed by: PHYSICIAN ASSISTANT

## 2022-10-20 PROCEDURE — 3077F PR MOST RECENT SYSTOLIC BLOOD PRESSURE >= 140 MM HG: ICD-10-PCS | Mod: CPTII,S$GLB,, | Performed by: PHYSICIAN ASSISTANT

## 2022-10-20 PROCEDURE — 3080F DIAST BP >= 90 MM HG: CPT | Mod: CPTII,S$GLB,, | Performed by: PHYSICIAN ASSISTANT

## 2022-10-20 PROCEDURE — 99213 OFFICE O/P EST LOW 20 MIN: CPT | Mod: S$GLB,,, | Performed by: PHYSICIAN ASSISTANT

## 2022-10-20 PROCEDURE — 4010F PR ACE/ARB THEARPY RXD/TAKEN: ICD-10-PCS | Mod: CPTII,S$GLB,, | Performed by: PHYSICIAN ASSISTANT

## 2022-10-20 PROCEDURE — 1160F RVW MEDS BY RX/DR IN RCRD: CPT | Mod: CPTII,S$GLB,, | Performed by: PHYSICIAN ASSISTANT

## 2022-10-20 PROCEDURE — 4010F ACE/ARB THERAPY RXD/TAKEN: CPT | Mod: CPTII,S$GLB,, | Performed by: PHYSICIAN ASSISTANT

## 2022-10-20 PROCEDURE — 1160F PR REVIEW ALL MEDS BY PRESCRIBER/CLIN PHARMACIST DOCUMENTED: ICD-10-PCS | Mod: CPTII,S$GLB,, | Performed by: PHYSICIAN ASSISTANT

## 2022-10-20 PROCEDURE — 99999 PR PBB SHADOW E&M-EST. PATIENT-LVL V: ICD-10-PCS | Mod: PBBFAC,,, | Performed by: PHYSICIAN ASSISTANT

## 2022-10-20 PROCEDURE — 1159F MED LIST DOCD IN RCRD: CPT | Mod: CPTII,S$GLB,, | Performed by: PHYSICIAN ASSISTANT

## 2022-10-20 PROCEDURE — 1159F PR MEDICATION LIST DOCUMENTED IN MEDICAL RECORD: ICD-10-PCS | Mod: CPTII,S$GLB,, | Performed by: PHYSICIAN ASSISTANT

## 2022-10-20 RX ORDER — LEVOCETIRIZINE DIHYDROCHLORIDE 5 MG/1
5 TABLET, FILM COATED ORAL NIGHTLY
Qty: 30 TABLET | Refills: 3 | Status: SHIPPED | OUTPATIENT
Start: 2022-10-20 | End: 2023-10-04 | Stop reason: SDUPTHER

## 2022-10-20 RX ORDER — AMLODIPINE BESYLATE 10 MG/1
10 TABLET ORAL DAILY
Qty: 90 TABLET | Refills: 1 | Status: SHIPPED | OUTPATIENT
Start: 2022-10-20 | End: 2023-04-05 | Stop reason: SDUPTHER

## 2022-10-20 RX ORDER — FLUTICASONE PROPIONATE 50 MCG
1 SPRAY, SUSPENSION (ML) NASAL 2 TIMES DAILY
Qty: 16 G | Refills: 3 | Status: SHIPPED | OUTPATIENT
Start: 2022-10-20

## 2022-10-20 RX ORDER — MONTELUKAST SODIUM 10 MG/1
TABLET ORAL
Qty: 90 TABLET | Refills: 1 | Status: SHIPPED | OUTPATIENT
Start: 2022-10-20 | End: 2023-04-24

## 2022-10-20 RX ORDER — PANTOPRAZOLE SODIUM 40 MG/1
TABLET, DELAYED RELEASE ORAL
Qty: 90 TABLET | Refills: 1 | Status: SHIPPED | OUTPATIENT
Start: 2022-10-20 | End: 2023-04-05 | Stop reason: SDUPTHER

## 2022-10-20 NOTE — TELEPHONE ENCOUNTER
No new care gaps identified.  [facility] Embedded Care Gaps. Reference number: [id number]. 10/20/2022   10:12:18 AM CDT

## 2022-10-20 NOTE — TELEPHONE ENCOUNTER
Refill Decision Note   Carol Dewey  is requesting a refill authorization.  Brief Assessment and Rationale for Refill:  Approve     Medication Therapy Plan:       Medication Reconciliation Completed: No   Comments:     No Care Gaps recommended.     Note composed:4:19 PM 10/20/2022

## 2022-10-20 NOTE — PATIENT INSTRUCTIONS
Pop your ears 4-6 times a day. Use flonase 1 puff to each nostril twice daily. Take xyzal 5mg at night to help with ear fullness. Follow up with physical therapy for correction of BPPV
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
## 2022-10-20 NOTE — TELEPHONE ENCOUNTER
No new care gaps identified.  [facility] Embedded Care Gaps. Reference number: [id number]. 10/20/2022   5:03:11 PM CDT

## 2022-10-20 NOTE — TELEPHONE ENCOUNTER
----- Message from Lakesha Espana sent at 10/20/2022  4:46 PM CDT -----  Contact: Self/[phone]  Requesting an RX refill or new RX.  Is this a refill or new RX: New  RX name and strength:  amLODIPine (NORVASC) 10 MG tablet  Is this a 30 day or 90 day RX: 30  [id number] - [place] - [address] AT [facility] [address]  [address] [address]  Phone: [phone] Fax: [phone]      The doctors have asked that we provide their patients with the following 2 reminders -- prescription refills can take up to 72 hours, and a friendly reminder that in the future you can use your [facility] account to request refills:

## 2022-11-01 ENCOUNTER — PATIENT OUTREACH (OUTPATIENT)
Dept: ADMINISTRATIVE | Facility: HOSPITAL | Age: 52
End: 2022-11-01
Payer: MEDICARE

## 2022-11-01 NOTE — PROGRESS NOTES
Health Maintenance Due   Topic Date Due    Colorectal Cancer Screening  Never done    Influenza Vaccine (1) Never done    COVID-19 Vaccine (4 - Booster for Pfizer series) 10/25/2022    Shingles Vaccine (1 of 2) Never done     Chart review done.  updated. Immunizations reviewed & updated. Care Everywhere updated.

## 2022-12-07 ENCOUNTER — OFFICE VISIT (OUTPATIENT)
Dept: PRIMARY CARE CLINIC | Facility: CLINIC | Age: 52
End: 2022-12-07
Payer: MEDICARE

## 2022-12-07 VITALS
RESPIRATION RATE: 18 BRPM | OXYGEN SATURATION: 98 % | HEART RATE: 69 BPM | WEIGHT: 143.44 LBS | HEIGHT: 63 IN | SYSTOLIC BLOOD PRESSURE: 118 MMHG | BODY MASS INDEX: 25.41 KG/M2 | DIASTOLIC BLOOD PRESSURE: 64 MMHG | TEMPERATURE: 98 F

## 2022-12-07 DIAGNOSIS — M50.30 DDD (DEGENERATIVE DISC DISEASE), CERVICAL: Primary | ICD-10-CM

## 2022-12-07 DIAGNOSIS — H93.13 TINNITUS OF BOTH EARS: ICD-10-CM

## 2022-12-07 PROCEDURE — 1160F PR REVIEW ALL MEDS BY PRESCRIBER/CLIN PHARMACIST DOCUMENTED: ICD-10-PCS | Mod: CPTII,S$GLB,, | Performed by: FAMILY MEDICINE

## 2022-12-07 PROCEDURE — 99999 PR PBB SHADOW E&M-EST. PATIENT-LVL III: ICD-10-PCS | Mod: PBBFAC,,, | Performed by: FAMILY MEDICINE

## 2022-12-07 PROCEDURE — 1159F MED LIST DOCD IN RCRD: CPT | Mod: CPTII,S$GLB,, | Performed by: FAMILY MEDICINE

## 2022-12-07 PROCEDURE — 3008F PR BODY MASS INDEX (BMI) DOCUMENTED: ICD-10-PCS | Mod: CPTII,S$GLB,, | Performed by: FAMILY MEDICINE

## 2022-12-07 PROCEDURE — 3074F PR MOST RECENT SYSTOLIC BLOOD PRESSURE < 130 MM HG: ICD-10-PCS | Mod: CPTII,S$GLB,, | Performed by: FAMILY MEDICINE

## 2022-12-07 PROCEDURE — 1159F PR MEDICATION LIST DOCUMENTED IN MEDICAL RECORD: ICD-10-PCS | Mod: CPTII,S$GLB,, | Performed by: FAMILY MEDICINE

## 2022-12-07 PROCEDURE — 3078F DIAST BP <80 MM HG: CPT | Mod: CPTII,S$GLB,, | Performed by: FAMILY MEDICINE

## 2022-12-07 PROCEDURE — 4010F ACE/ARB THERAPY RXD/TAKEN: CPT | Mod: CPTII,S$GLB,, | Performed by: FAMILY MEDICINE

## 2022-12-07 PROCEDURE — 3078F PR MOST RECENT DIASTOLIC BLOOD PRESSURE < 80 MM HG: ICD-10-PCS | Mod: CPTII,S$GLB,, | Performed by: FAMILY MEDICINE

## 2022-12-07 PROCEDURE — 99999 PR PBB SHADOW E&M-EST. PATIENT-LVL III: CPT | Mod: PBBFAC,,, | Performed by: FAMILY MEDICINE

## 2022-12-07 PROCEDURE — 3074F SYST BP LT 130 MM HG: CPT | Mod: CPTII,S$GLB,, | Performed by: FAMILY MEDICINE

## 2022-12-07 PROCEDURE — 3008F BODY MASS INDEX DOCD: CPT | Mod: CPTII,S$GLB,, | Performed by: FAMILY MEDICINE

## 2022-12-07 PROCEDURE — 4010F PR ACE/ARB THEARPY RXD/TAKEN: ICD-10-PCS | Mod: CPTII,S$GLB,, | Performed by: FAMILY MEDICINE

## 2022-12-07 PROCEDURE — 1160F RVW MEDS BY RX/DR IN RCRD: CPT | Mod: CPTII,S$GLB,, | Performed by: FAMILY MEDICINE

## 2022-12-07 PROCEDURE — 99214 PR OFFICE/OUTPT VISIT, EST, LEVL IV, 30-39 MIN: ICD-10-PCS | Mod: S$GLB,,, | Performed by: FAMILY MEDICINE

## 2022-12-07 PROCEDURE — 99214 OFFICE O/P EST MOD 30 MIN: CPT | Mod: S$GLB,,, | Performed by: FAMILY MEDICINE

## 2022-12-07 RX ORDER — PREGABALIN 50 MG/1
50 CAPSULE ORAL 2 TIMES DAILY
Qty: 60 CAPSULE | Refills: 0 | Status: SHIPPED | OUTPATIENT
Start: 2022-12-07 | End: 2023-04-05

## 2022-12-07 NOTE — PROGRESS NOTES
"Subjective:       Patient ID: Carol Dewey is a 52 y.o. female.    Chief Complaint: Follow-up (Pt in for follow-up on bilateral arm numbness) and Numbness    Says ears drain off and on for years, worse on the left. No pain recently. Has intermittent tinnitus when ears are clogged.  Also c/o numbness and tingling to upper extremities off and on for years due to cervical DDD, gradually worsening. Sometimes wakes her up at night, has to get up and shake her arms. No weakness. No trouble swallowing  At last visit with interventional pain management, "Cervical and lumbar MRIs without any significant evidence to suggest neurocompressive process. No neurologic abnormalities of upper extremities noted on examination.  EMG/nerve conduction study showed mild left carpal tunnel syndrome but otherwise no evidence for peripheral polyneuropathy, radiculopathy.  Low suspicion for radiculopathy or myelopathy.      1.  Given that her chiropractor feels as though she has cervical pathology that is treatable, I encouraged her to continue care under her chiropractor to see if he can provide relief.  2.  At this time it is unclear what the source of her symptoms are.  I am reluctant to perform cervical epidural steroid injection as I do not feel the benefits outweigh the risks"    Pt reports nothing has changed significantly in the interim. Does get temporary relief from chiropractic treatment, but only gets short-term relief, and say she cannot afford q2week visits. Says acupuncture didn't help. Tried gabapentin, but caused HA    Review of Systems   Constitutional:  Negative for chills and fever.   HENT:  Positive for ear discharge.    Respiratory:  Negative for shortness of breath.    Cardiovascular:  Negative for chest pain.   Neurological:  Positive for numbness. Negative for weakness.   Psychiatric/Behavioral:  Positive for sleep disturbance.      Objective:      Vitals:    12/07/22 1013   BP: 118/64   BP Location: Right arm " "  Patient Position: Sitting   BP Method: Medium (Manual)   Pulse: 69   Resp: 18   Temp: 97.8 °F (36.6 °C)   TempSrc: Temporal   SpO2: 98%   Weight: 65.1 kg (143 lb 6.6 oz)   Height: 5' 3" (1.6 m)     Physical Exam  Vitals and nursing note reviewed.   Constitutional:       General: She is not in acute distress.     Appearance: Normal appearance. She is well-developed.   HENT:      Head: Normocephalic and atraumatic.      Right Ear: Tympanic membrane normal.      Left Ear: A middle ear effusion is present.   Cardiovascular:      Rate and Rhythm: Normal rate and regular rhythm.      Heart sounds: Normal heart sounds.   Pulmonary:      Effort: Pulmonary effort is normal.      Breath sounds: Normal breath sounds.   Musculoskeletal:      Right lower leg: No edema.      Left lower leg: No edema.   Skin:     General: Skin is warm and dry.   Neurological:      Mental Status: She is alert and oriented to person, place, and time.      Motor: Motor function is intact.      Deep Tendon Reflexes:      Reflex Scores:       Patellar reflexes are 2+ on the right side and 2+ on the left side.  Psychiatric:         Mood and Affect: Mood normal.         Behavior: Behavior normal.       Lab Results   Component Value Date    WBC 8.60 08/02/2022    HGB 14.5 08/02/2022    HCT 44.5 08/02/2022     08/02/2022    CHOL 230 (H) 08/02/2022    TRIG 158 (H) 08/02/2022    HDL 48 08/02/2022    ALT 46 (H) 08/02/2022    AST 27 08/02/2022     08/02/2022    K 3.6 08/02/2022     08/02/2022    CREATININE 0.8 08/02/2022    BUN 14 08/02/2022    CO2 21 (L) 08/02/2022    TSH 0.83 04/01/2021    INR 0.9 09/10/2021    HGBA1C 5.8 (H) 04/01/2021      Assessment:       1. DDD (degenerative disc disease), cervical    2. Tinnitus of both ears        Plan:       DDD (degenerative disc disease), cervical  -     pregabalin (LYRICA) 50 MG capsule; Take 1 capsule (50 mg total) by mouth 2 (two) times daily.  Dispense: 60 capsule; Refill: 0  Trial of " Lyrica, can titrate to effect. May ultimately need to resume chiropractic treatments, as this has been most effective thus far  Tinnitus of both ears  F/u with ENT  Medication List with Changes/Refills   New Medications    PREGABALIN (LYRICA) 50 MG CAPSULE    Take 1 capsule (50 mg total) by mouth 2 (two) times daily.   Current Medications    AMLODIPINE (NORVASC) 10 MG TABLET    Take 1 tablet (10 mg total) by mouth once daily.    FLUTICASONE PROPIONATE (FLONASE) 50 MCG/ACTUATION NASAL SPRAY    1 spray (50 mcg total) by Each Nostril route 2 (two) times daily.    IBUPROFEN (ADVIL,MOTRIN) 800 MG TABLET    Take 1 tablet (800 mg total) by mouth 3 (three) times daily as needed for Pain.    LEVOCETIRIZINE (XYZAL) 5 MG TABLET    Take 1 tablet (5 mg total) by mouth every evening.    MIRABEGRON (MYRBETRIQ) 25 MG TB24 ER TABLET    Take 1 tablet (25 mg total) by mouth once daily.    MONTELUKAST (SINGULAIR) 10 MG TABLET    TAKE 1 TABLET(10 MG) BY MOUTH EVERY EVENING    NORETHINDRONE (ORTHO MICRONOR) 0.35 MG TABLET    Take 1 tablet (0.35 mg total) by mouth once daily.    PANTOPRAZOLE (PROTONIX) 40 MG TABLET    TAKE 1 TABLET(40 MG) BY MOUTH EVERY DAY

## 2023-03-13 ENCOUNTER — PATIENT OUTREACH (OUTPATIENT)
Dept: ADMINISTRATIVE | Facility: HOSPITAL | Age: 53
End: 2023-03-13
Payer: MEDICARE

## 2023-03-13 NOTE — PROGRESS NOTES
Health Maintenance Due   Topic Date Due    Colorectal Cancer Screening  Never done    Hemoglobin A1c (Prediabetes)  04/01/2022    COVID-19 Vaccine (4 - Booster for Pfizer series) 10/25/2022    Shingles Vaccine (1 of 2) Never done        Chart review done.    updated.   Immunizations reviewed & updated.   Care Everywhere updated.   [facility] reviewed

## 2023-03-15 ENCOUNTER — OFFICE VISIT (OUTPATIENT)
Dept: PRIMARY CARE CLINIC | Facility: CLINIC | Age: 53
End: 2023-03-15
Payer: MEDICARE

## 2023-03-15 VITALS
SYSTOLIC BLOOD PRESSURE: 118 MMHG | DIASTOLIC BLOOD PRESSURE: 70 MMHG | RESPIRATION RATE: 18 BRPM | HEIGHT: 63 IN | OXYGEN SATURATION: 98 % | BODY MASS INDEX: 25.98 KG/M2 | HEART RATE: 75 BPM | WEIGHT: 146.63 LBS

## 2023-03-15 DIAGNOSIS — M46.96 UNSPECIFIED INFLAMMATORY SPONDYLOPATHY, LUMBAR REGION: ICD-10-CM

## 2023-03-15 DIAGNOSIS — I10 ESSENTIAL HYPERTENSION, BENIGN: ICD-10-CM

## 2023-03-15 DIAGNOSIS — M50.30 DDD (DEGENERATIVE DISC DISEASE), CERVICAL: ICD-10-CM

## 2023-03-15 DIAGNOSIS — M51.36 DDD (DEGENERATIVE DISC DISEASE), LUMBAR: ICD-10-CM

## 2023-03-15 DIAGNOSIS — M54.16 LUMBAR RADICULOPATHY, CHRONIC: Primary | ICD-10-CM

## 2023-03-15 DIAGNOSIS — M54.2 CERVICALGIA: ICD-10-CM

## 2023-03-15 PROCEDURE — 99214 PR OFFICE/OUTPT VISIT, EST, LEVL IV, 30-39 MIN: ICD-10-PCS | Mod: 25,S$GLB,, | Performed by: FAMILY MEDICINE

## 2023-03-15 PROCEDURE — 99999 PR PBB SHADOW E&M-EST. PATIENT-LVL V: CPT | Mod: PBBFAC,,, | Performed by: FAMILY MEDICINE

## 2023-03-15 PROCEDURE — 3074F SYST BP LT 130 MM HG: CPT | Mod: CPTII,S$GLB,, | Performed by: FAMILY MEDICINE

## 2023-03-15 PROCEDURE — 1159F PR MEDICATION LIST DOCUMENTED IN MEDICAL RECORD: ICD-10-PCS | Mod: CPTII,S$GLB,, | Performed by: FAMILY MEDICINE

## 2023-03-15 PROCEDURE — 96372 PR INJECTION,THERAP/PROPH/DIAG2ST, IM OR SUBCUT: ICD-10-PCS | Mod: S$GLB,,, | Performed by: FAMILY MEDICINE

## 2023-03-15 PROCEDURE — 1159F MED LIST DOCD IN RCRD: CPT | Mod: CPTII,S$GLB,, | Performed by: FAMILY MEDICINE

## 2023-03-15 PROCEDURE — 3008F BODY MASS INDEX DOCD: CPT | Mod: CPTII,S$GLB,, | Performed by: FAMILY MEDICINE

## 2023-03-15 PROCEDURE — 3008F PR BODY MASS INDEX (BMI) DOCUMENTED: ICD-10-PCS | Mod: CPTII,S$GLB,, | Performed by: FAMILY MEDICINE

## 2023-03-15 PROCEDURE — 3078F DIAST BP <80 MM HG: CPT | Mod: CPTII,S$GLB,, | Performed by: FAMILY MEDICINE

## 2023-03-15 PROCEDURE — 3074F PR MOST RECENT SYSTOLIC BLOOD PRESSURE < 130 MM HG: ICD-10-PCS | Mod: CPTII,S$GLB,, | Performed by: FAMILY MEDICINE

## 2023-03-15 PROCEDURE — 99999 PR PBB SHADOW E&M-EST. PATIENT-LVL V: ICD-10-PCS | Mod: PBBFAC,,, | Performed by: FAMILY MEDICINE

## 2023-03-15 PROCEDURE — 99214 OFFICE O/P EST MOD 30 MIN: CPT | Mod: 25,S$GLB,, | Performed by: FAMILY MEDICINE

## 2023-03-15 PROCEDURE — 3078F PR MOST RECENT DIASTOLIC BLOOD PRESSURE < 80 MM HG: ICD-10-PCS | Mod: CPTII,S$GLB,, | Performed by: FAMILY MEDICINE

## 2023-03-15 PROCEDURE — 96372 THER/PROPH/DIAG INJ SC/IM: CPT | Mod: S$GLB,,, | Performed by: FAMILY MEDICINE

## 2023-03-15 RX ORDER — MELOXICAM 7.5 MG/1
TABLET ORAL
Qty: 60 TABLET | Refills: 5 | Status: SHIPPED | OUTPATIENT
Start: 2023-03-15 | End: 2023-12-01

## 2023-03-15 RX ORDER — PREDNISONE 20 MG/1
20 TABLET ORAL DAILY
Qty: 10 TABLET | Refills: 0 | Status: SHIPPED | OUTPATIENT
Start: 2023-03-15 | End: 2023-03-15

## 2023-03-15 RX ORDER — CYCLOBENZAPRINE HCL 10 MG
10 TABLET ORAL 3 TIMES DAILY PRN
Qty: 30 TABLET | Refills: 5 | Status: SHIPPED | OUTPATIENT
Start: 2023-03-15 | End: 2023-03-25

## 2023-03-15 RX ORDER — MELOXICAM 7.5 MG/1
7.5 TABLET ORAL DAILY
Qty: 60 TABLET | Refills: 5 | Status: SHIPPED | OUTPATIENT
Start: 2023-03-15 | End: 2023-03-15

## 2023-03-15 RX ORDER — PREDNISONE 20 MG/1
TABLET ORAL
Qty: 10 TABLET | Refills: 0 | Status: SHIPPED | OUTPATIENT
Start: 2023-03-15 | End: 2023-04-05

## 2023-03-15 RX ORDER — TRIAMCINOLONE ACETONIDE 40 MG/ML
40 INJECTION, SUSPENSION INTRA-ARTICULAR; INTRAMUSCULAR ONCE
Status: COMPLETED | OUTPATIENT
Start: 2023-03-15 | End: 2023-03-15

## 2023-03-15 RX ADMIN — TRIAMCINOLONE ACETONIDE 40 MG: 40 INJECTION, SUSPENSION INTRA-ARTICULAR; INTRAMUSCULAR at 03:03

## 2023-03-15 NOTE — PATIENT INSTRUCTIONS
Bilateral hand and arm numbness rule out carpal tunnel syndrome  Cervical disc disease with upper trapezius pain bilaterally  MRI the cervical spine  EMG of the arms bilaterally rule out cervical radiculopathy versus carpal tunnel syndrome  Lumbosacral strain  MRI the lumbar spine  For both neck and back  Kenalog 40 mg IM  Prednisone 20 mg 1 p.o. q.d. times 10 days start after Kenalog shot do not take with NSAIDs okay to take with Tylenol  Mobic 7.5 mg start after prednisone dose complete 1 p.o. b.i.d. for swelling of the neck and back  If needed patient can add Flexeril 1 p.o. q.h.s.  Moist heat/theragesic/range of motion exercise  See neurosurgeon for any additional testing may need physical therapy may do well with epidural steroid injection  Return 3 months see Dr. Lemos

## 2023-03-15 NOTE — PROGRESS NOTES
Subjective:       Patient ID: Carol Dewey is a 52 y.o. female.    Chief Complaint: Numbness (Both arms/)    HPI:  52-year-old white female in for pain and numbness in both arms--went to chiropractor --had neck cracked used to help but no longer gets relief.  Patient has problems especially at night left arm greater than right occurs from pain is from the left side of the neck to the left left greater than right.  Hx EMG's about 2 yrs ago. Told has carpel tunnel   Problems with neck and back for years---2011 fell [facility]--unable walk for 2 months--had epidural steroid injections in the lumbar spine]  Neck pain--occasionally feels good in if tries to map or clean house after few hours the pain comes back  Lower back painful if sits in 1 spot for long time swelling in both feet  No lupus rheumatoid gout  Work used work [employer]. Out of work since Oct 2022  Pain being treated with myrbetriq    ROS:  Skin: no psoriasis, eczema, skin cancer  HEENT: No headache, ocular pain, blurred vision, diplopia, epistaxis, hoarseness change in voice, thyroid trouble  Lung: No pneumonia, asthma, Tb, wheezing, SOB,  Heart: No chest pain, ankle edema, palpitations, MI, timoteo murmur, +hypertension, no  hyperlipidemia-no stent bypass arrhythmia  Abdomen:  History GERD helped with Protonix No nausea, vomiting, diarrhea, constipation, ulcers, hepatitis, gallbladder disease, melena, hematochezia, hematemesis  : no UTI, renal disease, stones  MS: no fractures, O/A, lupus, rheumatoid, gout  Neuro: No dizziness, LOC, seizures  see history of present illness  No diabetes, no anemia, no anxiety, no depression     Objective:   Physical Exam:  General: Well nourished, well developed, no acute distress  Skin: No lesions  HEENT: Eyes PERRLA, EOM intact, nose patent, throat non-erythematous   NECK: Supple, no bruits, No JVD, no nodes  Lungs: Clear, no rales, rhonchi, wheezing  Heart: Regular rate and rhythm, no murmurs, gallops, or  rubs  Abdomen: flat, bowel sounds positive, no tenderness, or organomegaly  MS:  Tenderness cervical spine C3 through 7--pain with lateral flexion rotation bilaterally with some muscle spasm in the left and right upper trapezius muscle---decreased hand grasps right hand decreased flexion extension forearms bilaterally some pain with abduction bilaterally can not raise arms overhead only up to 90°  Tenderness lumbar spine L1-S1 bilaterally anterior flexion 10° extension 10° lateral flexion rotation 10° straight leg lift negative able squat detention down hard to arise  Neuro: Alert, CN intact, oriented X 3  Extremities: No cyanosis, clubbing, or edema         Assessment:       1. Lumbar radiculopathy, chronic    2. Cervicalgia        Plan:       Lumbar radiculopathy, chronic    Cervicalgia

## 2023-03-15 NOTE — PROGRESS NOTES
Verified pt ID using name and . AMEE Lugo. Administered Kenalog 40  in Right Dorsal Gluteal  per physician order using aseptic technique. Aspirated and no blood return noted. Pt tolerated well with no adverse reactions noted.

## 2023-03-20 ENCOUNTER — TELEPHONE (OUTPATIENT)
Dept: PRIMARY CARE CLINIC | Facility: CLINIC | Age: 53
End: 2023-03-20
Payer: MEDICARE

## 2023-03-20 NOTE — TELEPHONE ENCOUNTER
Eyelid twitching is unlikely to be related to her back and neck issues.  This type of involuntary twitching can sometimes improve with over-the-counter magnesium supplementation.

## 2023-03-20 NOTE — TELEPHONE ENCOUNTER
Called pt regarding message. Pt should she concerned with her eye twitching. Pt stated she was seen on 03/15 with  for numbness in left arm. Pt stated the numbness started 1 month ago.

## 2023-03-20 NOTE — TELEPHONE ENCOUNTER
----- Message from Myriam Blas sent at 3/20/2023  2:36 PM CDT -----  Contact: [phone] Patient  [email or web address]     Patient is calling for Medical Advice regarding: eye twitching    How long has patient had these symptoms: 03/19/2023    Pharmacy name and phone#:    Would like response via [employer]:  Call Back Please. Pt saw Dr Sorin Begum on 03/15/2023 for numbness in arm and pt is wanting to know if the eye twitching has something to do with that. Please call back.     Comments:

## 2023-04-05 ENCOUNTER — TELEPHONE (OUTPATIENT)
Dept: PRIMARY CARE CLINIC | Facility: CLINIC | Age: 53
End: 2023-04-05
Payer: MEDICARE

## 2023-04-05 ENCOUNTER — OFFICE VISIT (OUTPATIENT)
Dept: PRIMARY CARE CLINIC | Facility: CLINIC | Age: 53
End: 2023-04-05
Payer: MEDICARE

## 2023-04-05 VITALS
OXYGEN SATURATION: 98 % | DIASTOLIC BLOOD PRESSURE: 74 MMHG | TEMPERATURE: 98 F | HEIGHT: 63 IN | RESPIRATION RATE: 16 BRPM | HEART RATE: 73 BPM | BODY MASS INDEX: 25.37 KG/M2 | SYSTOLIC BLOOD PRESSURE: 120 MMHG | WEIGHT: 143.19 LBS

## 2023-04-05 DIAGNOSIS — R73.03 PREDIABETES: ICD-10-CM

## 2023-04-05 DIAGNOSIS — I10 ESSENTIAL HYPERTENSION: ICD-10-CM

## 2023-04-05 DIAGNOSIS — M50.30 DDD (DEGENERATIVE DISC DISEASE), CERVICAL: ICD-10-CM

## 2023-04-05 DIAGNOSIS — M54.16 LUMBAR RADICULITIS: ICD-10-CM

## 2023-04-05 DIAGNOSIS — R20.0 ARM NUMBNESS: Primary | ICD-10-CM

## 2023-04-05 DIAGNOSIS — K21.9 GASTROESOPHAGEAL REFLUX DISEASE, UNSPECIFIED WHETHER ESOPHAGITIS PRESENT: ICD-10-CM

## 2023-04-05 DIAGNOSIS — M51.36 DDD (DEGENERATIVE DISC DISEASE), LUMBAR: ICD-10-CM

## 2023-04-05 DIAGNOSIS — R42 VERTIGO: ICD-10-CM

## 2023-04-05 DIAGNOSIS — R73.09 ELEVATED GLUCOSE: ICD-10-CM

## 2023-04-05 PROCEDURE — 99999 PR PBB SHADOW E&M-EST. PATIENT-LVL IV: CPT | Mod: PBBFAC,,, | Performed by: STUDENT IN AN ORGANIZED HEALTH CARE EDUCATION/TRAINING PROGRAM

## 2023-04-05 PROCEDURE — 3078F PR MOST RECENT DIASTOLIC BLOOD PRESSURE < 80 MM HG: ICD-10-PCS | Mod: CPTII,S$GLB,, | Performed by: STUDENT IN AN ORGANIZED HEALTH CARE EDUCATION/TRAINING PROGRAM

## 2023-04-05 PROCEDURE — 3074F SYST BP LT 130 MM HG: CPT | Mod: CPTII,S$GLB,, | Performed by: STUDENT IN AN ORGANIZED HEALTH CARE EDUCATION/TRAINING PROGRAM

## 2023-04-05 PROCEDURE — 99214 PR OFFICE/OUTPT VISIT, EST, LEVL IV, 30-39 MIN: ICD-10-PCS | Mod: S$GLB,,, | Performed by: STUDENT IN AN ORGANIZED HEALTH CARE EDUCATION/TRAINING PROGRAM

## 2023-04-05 PROCEDURE — 3008F PR BODY MASS INDEX (BMI) DOCUMENTED: ICD-10-PCS | Mod: CPTII,S$GLB,, | Performed by: STUDENT IN AN ORGANIZED HEALTH CARE EDUCATION/TRAINING PROGRAM

## 2023-04-05 PROCEDURE — 3074F PR MOST RECENT SYSTOLIC BLOOD PRESSURE < 130 MM HG: ICD-10-PCS | Mod: CPTII,S$GLB,, | Performed by: STUDENT IN AN ORGANIZED HEALTH CARE EDUCATION/TRAINING PROGRAM

## 2023-04-05 PROCEDURE — 1159F MED LIST DOCD IN RCRD: CPT | Mod: CPTII,S$GLB,, | Performed by: STUDENT IN AN ORGANIZED HEALTH CARE EDUCATION/TRAINING PROGRAM

## 2023-04-05 PROCEDURE — 99999 PR PBB SHADOW E&M-EST. PATIENT-LVL IV: ICD-10-PCS | Mod: PBBFAC,,, | Performed by: STUDENT IN AN ORGANIZED HEALTH CARE EDUCATION/TRAINING PROGRAM

## 2023-04-05 PROCEDURE — 1159F PR MEDICATION LIST DOCUMENTED IN MEDICAL RECORD: ICD-10-PCS | Mod: CPTII,S$GLB,, | Performed by: STUDENT IN AN ORGANIZED HEALTH CARE EDUCATION/TRAINING PROGRAM

## 2023-04-05 PROCEDURE — 99214 OFFICE O/P EST MOD 30 MIN: CPT | Mod: S$GLB,,, | Performed by: STUDENT IN AN ORGANIZED HEALTH CARE EDUCATION/TRAINING PROGRAM

## 2023-04-05 PROCEDURE — 3078F DIAST BP <80 MM HG: CPT | Mod: CPTII,S$GLB,, | Performed by: STUDENT IN AN ORGANIZED HEALTH CARE EDUCATION/TRAINING PROGRAM

## 2023-04-05 PROCEDURE — 3008F BODY MASS INDEX DOCD: CPT | Mod: CPTII,S$GLB,, | Performed by: STUDENT IN AN ORGANIZED HEALTH CARE EDUCATION/TRAINING PROGRAM

## 2023-04-05 RX ORDER — PANTOPRAZOLE SODIUM 40 MG/1
40 TABLET, DELAYED RELEASE ORAL DAILY
Qty: 90 TABLET | Refills: 3 | Status: SHIPPED | OUTPATIENT
Start: 2023-04-05 | End: 2024-03-04

## 2023-04-05 RX ORDER — AMLODIPINE BESYLATE 10 MG/1
10 TABLET ORAL DAILY
Qty: 90 TABLET | Refills: 3 | Status: SHIPPED | OUTPATIENT
Start: 2023-04-05

## 2023-04-05 NOTE — PATIENT INSTRUCTIONS
Lumbar MRI:  - MRI lumbar shows some concern at L3-4, L4-5.    - States she gets some pain in the lower back when laying down.  Also gets swelling in feet.  Tingling to her body.    - Using ibuprofen and salon pas for pain.   States this does help some.  After salon pas does not feel the pain.    - keep appt with Neurosurgery on 4/12/23.    Vertigo:  Has repeated episodes, worse when lots moving about her.  Has to be careful when driving.    Then will sit still for a time, lay rosa maria flat with bright lights to help herself    HTN:    Continue current meds.    GERD:   - taking PPI, continue for this time.   - consider transition to H2 blocker in future.

## 2023-04-05 NOTE — TELEPHONE ENCOUNTER
----- Message from Mitra Brown sent at 4/5/2023  4:03 PM CDT -----  Contact: Patient, [phone]  Calling again because she wants all her prescriptions transferred to         [facility] - [place] - [address]  [address]  [place] [address]  Phone: [phone] Fax: [phone]

## 2023-04-05 NOTE — PROGRESS NOTES
Subjective:           Patient ID: Carol Dewey is a 52 y.o. female who presents today with a chief complaint of transfer care.    Chief Complaint:   Follow-up (MRI, numbness,vertigo), Low-back Pain, and Neck Pain      History of Present Illness:    51yo female presenting with request to transfer care and review MRI.    Neck pain/Arm numbness:  States that both arms had been tingling bilaterally.   Left arm is always tingling as well.  States her left eye started twitching.  Was advised to use OTC magnesium which helps some.  Is not resolved, but not as bad with supplement.      Had EMGs done 2 year ago, dx with carpal tunnel 2 years ago.       Reports hx of neck and back pain.  Has had numbness in both arms.  Some vertigo.    Last seen by Dr. Sorin Begum on 3/15/23.  States pt has been seeing chiropracter, used to help but is not helping now.     MRI neck was reassuring, MRI back showing possible annular fissure, previous PCP advised could speak with Neurosurgery.   Has neurosurgery appt on 4/12/23.         Lumbar MRI:  MRI lumbar shows some concern at L3-4, L4-5.    States she gets some pain in the lower back when laying down.  Also gets swelling in feet.  Tingling to her body.       Using ibuprofen and salon pas for pain.   States this does help some.  After salon pas does not feel the pain.       Vertigo:  Has repeated episodes, worse when lots moving about her.  Has to be careful when driving.    Then will sit still for a time, lay [place]sa [place] flat with bright lights to help herself.      Review of Systems   Constitutional:  Negative for activity change, chills, fatigue, fever and unexpected weight change.   HENT:  Positive for ear discharge. Negative for congestion, nosebleeds, sinus pressure and sneezing.    Respiratory:  Negative for cough, shortness of breath and wheezing.    Cardiovascular:  Negative for chest pain, palpitations and leg swelling.   Gastrointestinal:  Negative for abdominal distention,  "constipation, diarrhea and nausea.   Genitourinary:  Negative for difficulty urinating and dysuria.   Musculoskeletal:  Positive for back pain and neck pain. Negative for gait problem.   Skin:  Negative for pallor and rash.   Neurological:  Positive for dizziness (gets vertigo if has rapid movement around her.), weakness and numbness. Negative for headaches.   Psychiatric/Behavioral:  Positive for sleep disturbance. Negative for agitation. The patient is not nervous/anxious.          Objective:        Vitals:    04/05/23 1356   BP: 120/74   BP Location: Right arm   Patient Position: Sitting   BP Method: Medium (Manual)   Pulse: 73   Resp: 16   Temp: 97.7 °F (36.5 °C)   TempSrc: Temporal   SpO2: 98%   Weight: 64.9 kg (143 lb 3 oz)   Height: 5' 3" (1.6 m)       Body mass index is 25.36 kg/m².      Physical Exam  Vitals reviewed.   Constitutional:       General: She is not in acute distress.     Appearance: Normal appearance. She is not ill-appearing.   HENT:      Head: Normocephalic and atraumatic.      Right Ear: External ear normal. There is no impacted cerumen.      Left Ear: External ear normal. There is no impacted cerumen.      Ears:      Comments: Right TM dry. Left TM with small bead of fluid.      Nose: No rhinorrhea.      Mouth/Throat:      Pharynx: No posterior oropharyngeal erythema.   Eyes:      General:         Right eye: No discharge.         Left eye: No discharge.      Conjunctiva/sclera: Conjunctivae normal.   Cardiovascular:      Rate and Rhythm: Normal rate and regular rhythm.      Pulses: Normal pulses.      Heart sounds: Normal heart sounds. No murmur heard.  Pulmonary:      Effort: Pulmonary effort is normal.      Breath sounds: Normal breath sounds. No wheezing.   Musculoskeletal:         General: No deformity.   Lymphadenopathy:      Cervical: No cervical adenopathy.   Skin:     Capillary Refill: Capillary refill takes less than 2 seconds.      Coloration: Skin is not jaundiced or pale.      " Findings: No rash.   Neurological:      General: No focal deficit present.      Mental Status: She is alert and oriented to person, place, and time.   Psychiatric:         Mood and Affect: Mood normal.         Behavior: Behavior normal.           Lab Results   Component Value Date     08/02/2022    K 3.6 08/02/2022     08/02/2022    CO2 21 (L) 08/02/2022    BUN 14 08/02/2022    CREATININE 0.8 08/02/2022    ANIONGAP 11 08/02/2022     Lab Results   Component Value Date    HGBA1C 5.8 (H) 04/01/2021     Lab Results   Component Value Date    BNP 34 09/10/2021       Lab Results   Component Value Date    WBC 8.60 08/02/2022    HGB 14.5 08/02/2022    HCT 44.5 08/02/2022     08/02/2022    GRAN 4.2 08/02/2022    GRAN 48.3 08/02/2022     Lab Results   Component Value Date    CHOL 230 (H) 08/02/2022    HDL 48 08/02/2022    LDLCALC 150.4 08/02/2022    TRIG 158 (H) 08/02/2022          Current Outpatient Medications:     fluticasone propionate (FLONASE) 50 mcg/actuation nasal spray, 1 spray (50 mcg total) by Each Nostril route 2 (two) times daily., Disp: 16 g, Rfl: 3    ibuprofen (ADVIL,MOTRIN) 800 MG tablet, Take 1 tablet (800 mg total) by mouth 3 (three) times daily as needed for Pain., Disp: 60 tablet, Rfl: 2    levocetirizine (XYZAL) 5 MG tablet, Take 1 tablet (5 mg total) by mouth every evening., Disp: 30 tablet, Rfl: 3    meloxicam (MOBIC) 7.5 MG tablet, After prednisone complete Mobic 7.5 mg 1 p.o. b.i.d. for swelling no other NSAIDs can take with Tylenol, Disp: 60 tablet, Rfl: 5    mirabegron (MYRBETRIQ) 25 mg Tb24 ER tablet, Take 1 tablet (25 mg total) by mouth once daily., Disp: 30 tablet, Rfl: 11    montelukast (SINGULAIR) 10 mg tablet, TAKE 1 TABLET(10 MG) BY MOUTH EVERY EVENING, Disp: 90 tablet, Rfl: 1    norethindrone (ORTHO MICRONOR) 0.35 mg tablet, Take 1 tablet (0.35 mg total) by mouth once daily., Disp: 30 tablet, Rfl: 11    amLODIPine (NORVASC) 10 MG tablet, Take 1 tablet (10 mg total) by  mouth once daily., Disp: 90 tablet, Rfl: 3    pantoprazole (PROTONIX) 40 MG tablet, Take 1 tablet (40 mg total) by mouth once daily., Disp: 90 tablet, Rfl: 3     Outpatient Encounter Medications as of 2023   Medication Sig Dispense Refill    fluticasone propionate (FLONASE) 50 mcg/actuation nasal spray 1 spray (50 mcg total) by Each Nostril route 2 (two) times daily. 16 g 3    ibuprofen (ADVIL,MOTRIN) 800 MG tablet Take 1 tablet (800 mg total) by mouth 3 (three) times daily as needed for Pain. 60 tablet 2    levocetirizine (XYZAL) 5 MG tablet Take 1 tablet (5 mg total) by mouth every evening. 30 tablet 3    meloxicam (MOBIC) 7.5 MG tablet After prednisone complete Mobic 7.5 mg 1 p.o. b.i.d. for swelling no other NSAIDs can take with Tylenol 60 tablet 5    mirabegron (MYRBETRIQ) 25 mg Tb24 ER tablet Take 1 tablet (25 mg total) by mouth once daily. 30 tablet 11    montelukast (SINGULAIR) 10 mg tablet TAKE 1 TABLET(10 MG) BY MOUTH EVERY EVENING 90 tablet 1    norethindrone (ORTHO MICRONOR) 0.35 mg tablet Take 1 tablet (0.35 mg total) by mouth once daily. 30 tablet 11    [DISCONTINUED] amLODIPine (NORVASC) 10 MG tablet Take 1 tablet (10 mg total) by mouth once daily. 90 tablet 1    [DISCONTINUED] pantoprazole (PROTONIX) 40 MG tablet TAKE 1 TABLET(40 MG) BY MOUTH EVERY DAY 90 tablet 1    amLODIPine (NORVASC) 10 MG tablet Take 1 tablet (10 mg total) by mouth once daily. 90 tablet 3    [] cyclobenzaprine (FLEXERIL) 10 MG tablet Take 1 tablet (10 mg total) by mouth 3 (three) times daily as needed. 30 tablet 5    pantoprazole (PROTONIX) 40 MG tablet Take 1 tablet (40 mg total) by mouth once daily. 90 tablet 3    [DISCONTINUED] predniSONE (DELTASONE) 20 MG tablet Start Thursday a.m. 1 p.o. q.d. for swelling no other NSAIDs can take with Tylenol 10 tablet 0    [DISCONTINUED] pregabalin (LYRICA) 50 MG capsule Take 1 capsule (50 mg total) by mouth 2 (two) times daily. 60 capsule 0     No facility-administered  encounter medications on file as of 4/5/2023.          Assessment:       1. Arm numbness    2. Vertigo    3. DDD (degenerative disc disease), cervical    4. DDD (degenerative disc disease), lumbar    5. Lumbar radiculitis    6. Prediabetes    7. Elevated glucose    8. Essential hypertension    9. Gastroesophageal reflux disease, unspecified whether esophagitis present           Plan:       Arm numbness    Vertigo  -     CBC Auto Differential; Future; Expected date: 04/05/2023  -     Comprehensive Metabolic Panel; Future; Expected date: 04/05/2023  -     TSH; Future; Expected date: 04/05/2023    DDD (degenerative disc disease), cervical    DDD (degenerative disc disease), lumbar  -     CBC Auto Differential; Future; Expected date: 04/05/2023  -     Comprehensive Metabolic Panel; Future; Expected date: 04/05/2023  -     TSH; Future; Expected date: 04/05/2023    Lumbar radiculitis  -     CBC Auto Differential; Future; Expected date: 04/05/2023  -     Comprehensive Metabolic Panel; Future; Expected date: 04/05/2023    Prediabetes  -     CBC Auto Differential; Future; Expected date: 04/05/2023  -     Comprehensive Metabolic Panel; Future; Expected date: 04/05/2023  -     Hemoglobin A1C; Future; Expected date: 04/05/2023  -     TSH; Future; Expected date: 04/05/2023    Elevated glucose  -     Hemoglobin A1C; Future; Expected date: 04/05/2023    Essential hypertension  -     amLODIPine (NORVASC) 10 MG tablet; Take 1 tablet (10 mg total) by mouth once daily.  Dispense: 90 tablet; Refill: 3    Gastroesophageal reflux disease, unspecified whether esophagitis present  -     pantoprazole (PROTONIX) 40 MG tablet; Take 1 tablet (40 mg total) by mouth once daily.  Dispense: 90 tablet; Refill: 3               Lumbar MRI:  - MRI lumbar shows some concern at L3-4, L4-5.    - States she gets some pain in the lower back when laying down.  Also gets swelling in feet.  Tingling to her body.    - Using ibuprofen and salon pas for pain.    States this does help some.  After salon pas does not feel the pain.    - keep appt with Neurosurgery on 4/12/23.    Vertigo:  Has repeated episodes, worse when lots moving about her.  Has to be careful when driving.    Then will sit still for a time, lay rosa maria flat with bright lights to help herself    HTN:    Continue current meds.    GERD:   - taking PPI, continue for this time.   - consider transition to H2 blocker in future.

## 2023-04-11 ENCOUNTER — TELEPHONE (OUTPATIENT)
Dept: NEUROSURGERY | Facility: CLINIC | Age: 53
End: 2023-04-11
Payer: MEDICARE

## 2023-04-11 DIAGNOSIS — M51.36 DDD (DEGENERATIVE DISC DISEASE), LUMBAR: Primary | ICD-10-CM

## 2023-04-12 ENCOUNTER — HOSPITAL ENCOUNTER (OUTPATIENT)
Dept: RADIOLOGY | Facility: HOSPITAL | Age: 53
Discharge: HOME | End: 2023-04-12
Attending: NEUROLOGICAL SURGERY
Payer: MEDICARE

## 2023-04-12 ENCOUNTER — TELEPHONE (OUTPATIENT)
Dept: NEUROSURGERY | Facility: CLINIC | Age: 53
End: 2023-04-12

## 2023-04-12 ENCOUNTER — OFFICE VISIT (OUTPATIENT)
Dept: NEUROSURGERY | Facility: CLINIC | Age: 53
End: 2023-04-12
Payer: MEDICARE

## 2023-04-12 ENCOUNTER — OFFICE VISIT (OUTPATIENT)
Dept: UROGYNECOLOGY | Facility: CLINIC | Age: 53
End: 2023-04-12
Payer: MEDICARE

## 2023-04-12 VITALS
WEIGHT: 146.19 LBS | OXYGEN SATURATION: 96 % | DIASTOLIC BLOOD PRESSURE: 74 MMHG | BODY MASS INDEX: 25.9 KG/M2 | HEIGHT: 63 IN | SYSTOLIC BLOOD PRESSURE: 126 MMHG | HEART RATE: 75 BPM

## 2023-04-12 VITALS
DIASTOLIC BLOOD PRESSURE: 74 MMHG | HEART RATE: 73 BPM | BODY MASS INDEX: 25.35 KG/M2 | HEIGHT: 63 IN | SYSTOLIC BLOOD PRESSURE: 120 MMHG | WEIGHT: 143.06 LBS

## 2023-04-12 DIAGNOSIS — M53.3 SI (SACROILIAC) JOINT DYSFUNCTION: Primary | ICD-10-CM

## 2023-04-12 DIAGNOSIS — M35.09 SJOGREN'S SYNDROME WITH OTHER ORGAN INVOLVEMENT: ICD-10-CM

## 2023-04-12 DIAGNOSIS — M47.818 SI JOINT ARTHRITIS: ICD-10-CM

## 2023-04-12 DIAGNOSIS — M51.36 DDD (DEGENERATIVE DISC DISEASE), LUMBAR: ICD-10-CM

## 2023-04-12 DIAGNOSIS — R39.89 BLADDER PAIN: Primary | ICD-10-CM

## 2023-04-12 DIAGNOSIS — M46.1 SACROILIITIS: ICD-10-CM

## 2023-04-12 DIAGNOSIS — Z12.11 SCREENING FOR COLON CANCER: ICD-10-CM

## 2023-04-12 PROCEDURE — 72110 XR LUMBAR SPINE AP AND LAT WITH FLEX/EXT: ICD-10-PCS | Mod: 26,59,, | Performed by: RADIOLOGY

## 2023-04-12 PROCEDURE — 3078F DIAST BP <80 MM HG: CPT | Mod: HCNC,CPTII,S$GLB, | Performed by: OBSTETRICS & GYNECOLOGY

## 2023-04-12 PROCEDURE — 99214 OFFICE O/P EST MOD 30 MIN: CPT | Mod: HCNC,S$GLB,, | Performed by: OBSTETRICS & GYNECOLOGY

## 2023-04-12 PROCEDURE — 1159F MED LIST DOCD IN RCRD: CPT | Mod: HCNC,CPTII,S$GLB, | Performed by: OBSTETRICS & GYNECOLOGY

## 2023-04-12 PROCEDURE — 3008F PR BODY MASS INDEX (BMI) DOCUMENTED: ICD-10-PCS | Mod: HCNC,CPTII,S$GLB, | Performed by: NEUROLOGICAL SURGERY

## 2023-04-12 PROCEDURE — 3074F PR MOST RECENT SYSTOLIC BLOOD PRESSURE < 130 MM HG: ICD-10-PCS | Mod: HCNC,CPTII,S$GLB, | Performed by: NEUROLOGICAL SURGERY

## 2023-04-12 PROCEDURE — 99999 PR PBB SHADOW E&M-EST. PATIENT-LVL IV: CPT | Mod: PBBFAC,,, | Performed by: NEUROLOGICAL SURGERY

## 2023-04-12 PROCEDURE — 99999 PR PBB SHADOW E&M-EST. PATIENT-LVL IV: ICD-10-PCS | Mod: PBBFAC,HCNC,, | Performed by: OBSTETRICS & GYNECOLOGY

## 2023-04-12 PROCEDURE — 72082 X-RAY EXAM ENTIRE SPI 2/3 VW: CPT | Mod: 26,,, | Performed by: RADIOLOGY

## 2023-04-12 PROCEDURE — 3008F BODY MASS INDEX DOCD: CPT | Mod: HCNC,CPTII,S$GLB, | Performed by: OBSTETRICS & GYNECOLOGY

## 2023-04-12 PROCEDURE — 3078F PR MOST RECENT DIASTOLIC BLOOD PRESSURE < 80 MM HG: ICD-10-PCS | Mod: HCNC,CPTII,S$GLB, | Performed by: OBSTETRICS & GYNECOLOGY

## 2023-04-12 PROCEDURE — 87086 URINE CULTURE/COLONY COUNT: CPT | Mod: HCNC | Performed by: OBSTETRICS & GYNECOLOGY

## 2023-04-12 PROCEDURE — 72110 X-RAY EXAM L-2 SPINE 4/>VWS: CPT | Mod: TC,FY

## 2023-04-12 PROCEDURE — 3008F BODY MASS INDEX DOCD: CPT | Mod: HCNC,CPTII,S$GLB, | Performed by: NEUROLOGICAL SURGERY

## 2023-04-12 PROCEDURE — 1159F PR MEDICATION LIST DOCUMENTED IN MEDICAL RECORD: ICD-10-PCS | Mod: HCNC,CPTII,S$GLB, | Performed by: OBSTETRICS & GYNECOLOGY

## 2023-04-12 PROCEDURE — 3074F PR MOST RECENT SYSTOLIC BLOOD PRESSURE < 130 MM HG: ICD-10-PCS | Mod: HCNC,CPTII,S$GLB, | Performed by: OBSTETRICS & GYNECOLOGY

## 2023-04-12 PROCEDURE — 3074F SYST BP LT 130 MM HG: CPT | Mod: HCNC,CPTII,S$GLB, | Performed by: OBSTETRICS & GYNECOLOGY

## 2023-04-12 PROCEDURE — 99204 OFFICE O/P NEW MOD 45 MIN: CPT | Mod: HCNC,S$GLB,, | Performed by: NEUROLOGICAL SURGERY

## 2023-04-12 PROCEDURE — 99214 PR OFFICE/OUTPT VISIT, EST, LEVL IV, 30-39 MIN: ICD-10-PCS | Mod: HCNC,S$GLB,, | Performed by: OBSTETRICS & GYNECOLOGY

## 2023-04-12 PROCEDURE — 1159F MED LIST DOCD IN RCRD: CPT | Mod: HCNC,CPTII,S$GLB, | Performed by: NEUROLOGICAL SURGERY

## 2023-04-12 PROCEDURE — 99999 PR PBB SHADOW E&M-EST. PATIENT-LVL IV: CPT | Mod: PBBFAC,HCNC,, | Performed by: OBSTETRICS & GYNECOLOGY

## 2023-04-12 PROCEDURE — 3008F PR BODY MASS INDEX (BMI) DOCUMENTED: ICD-10-PCS | Mod: HCNC,CPTII,S$GLB, | Performed by: OBSTETRICS & GYNECOLOGY

## 2023-04-12 PROCEDURE — 99999 PR PBB SHADOW E&M-EST. PATIENT-LVL IV: ICD-10-PCS | Mod: PBBFAC,,, | Performed by: NEUROLOGICAL SURGERY

## 2023-04-12 PROCEDURE — 3044F HG A1C LEVEL LT 7.0%: CPT | Mod: HCNC,CPTII,S$GLB, | Performed by: OBSTETRICS & GYNECOLOGY

## 2023-04-12 PROCEDURE — 72082 X-RAY EXAM ENTIRE SPI 2/3 VW: CPT | Mod: TC,FY

## 2023-04-12 PROCEDURE — 3078F PR MOST RECENT DIASTOLIC BLOOD PRESSURE < 80 MM HG: ICD-10-PCS | Mod: HCNC,CPTII,S$GLB, | Performed by: NEUROLOGICAL SURGERY

## 2023-04-12 PROCEDURE — 72110 X-RAY EXAM L-2 SPINE 4/>VWS: CPT | Mod: 26,59,, | Performed by: RADIOLOGY

## 2023-04-12 PROCEDURE — 3044F HG A1C LEVEL LT 7.0%: CPT | Mod: HCNC,CPTII,S$GLB, | Performed by: NEUROLOGICAL SURGERY

## 2023-04-12 PROCEDURE — 1159F PR MEDICATION LIST DOCUMENTED IN MEDICAL RECORD: ICD-10-PCS | Mod: HCNC,CPTII,S$GLB, | Performed by: NEUROLOGICAL SURGERY

## 2023-04-12 PROCEDURE — 3074F SYST BP LT 130 MM HG: CPT | Mod: HCNC,CPTII,S$GLB, | Performed by: NEUROLOGICAL SURGERY

## 2023-04-12 PROCEDURE — 3044F PR MOST RECENT HEMOGLOBIN A1C LEVEL <7.0%: ICD-10-PCS | Mod: HCNC,CPTII,S$GLB, | Performed by: OBSTETRICS & GYNECOLOGY

## 2023-04-12 PROCEDURE — 72082 XR SCOLIOSIS COMPLETE: ICD-10-PCS | Mod: 26,,, | Performed by: RADIOLOGY

## 2023-04-12 PROCEDURE — 99204 PR OFFICE/OUTPT VISIT, NEW, LEVL IV, 45-59 MIN: ICD-10-PCS | Mod: HCNC,S$GLB,, | Performed by: NEUROLOGICAL SURGERY

## 2023-04-12 PROCEDURE — 3078F DIAST BP <80 MM HG: CPT | Mod: HCNC,CPTII,S$GLB, | Performed by: NEUROLOGICAL SURGERY

## 2023-04-12 PROCEDURE — 3044F PR MOST RECENT HEMOGLOBIN A1C LEVEL <7.0%: ICD-10-PCS | Mod: HCNC,CPTII,S$GLB, | Performed by: NEUROLOGICAL SURGERY

## 2023-04-12 RX ORDER — NITROFURANTOIN 25; 75 MG/1; MG/1
100 CAPSULE ORAL 2 TIMES DAILY
Qty: 10 CAPSULE | Refills: 0 | Status: SHIPPED | OUTPATIENT
Start: 2023-04-12 | End: 2023-06-21

## 2023-04-12 NOTE — PROGRESS NOTES
NEUROSURGICAL OUTPATIENT CONSULTATION NOTE    DATE OF SERVICE:  04/12/2023    ATTENDING PHYSICIAN:  Andi Watson MD    CONSULT REQUESTED BY:  Dr Begum    REASON FOR CONSULT:  Low back pain        SUBJECTIVE:    HISTORY:  This is a 52 y.o. female who has been on disability since 2011 for low back pain.  She reports worsening low back pain.  Pain is radiating towards the lateral hip area.  Pain is worse on the right side at this time but sometimes can be worse on the left side.  Pain is worse with prolonged sitting.  Pain can last several days and then improved partially.  Pain interferes with walking and any kind of physical activity.  She has not done physical therapy recently for her back pain issue.  She has had bilateral L5-S1 transforaminal epidural steroid injection at 2 occasion with partial pain relief.  No new onset of motor weakness numbness or sphincter dysfunction symptoms.  She also complains of neck pain.  She has become quite sedentary due to the chronic pain.      PAST MEDICAL HISTORY:  Active Ambulatory Problems     Diagnosis Date Noted    Foraminal stenosis of cervical region 11/02/2017    Facet arthritis of lumbar region 11/02/2017    Sciatica of left side 11/02/2017    DDD (degenerative disc disease), lumbar 01/10/2019    Lumbar radiculitis 03/21/2019    Bilateral hand numbness 11/21/2019    Numbness and tingling of foot 11/21/2019    Cervical spondylosis 01/09/2020    DDD (degenerative disc disease), cervical 01/09/2020    Unspecified inflammatory spondylopathy, lumbar region 01/23/2020    Essential hypertension, benign 01/31/2022    Tinnitus of both ears 10/11/2022    Arm numbness 04/05/2023    Prediabetes 04/05/2023     Resolved Ambulatory Problems     Diagnosis Date Noted    Lumbar pain 08/31/2012    Right leg pain 08/31/2012    Weakness of right arm 01/20/2013    Cervical pain (neck) 01/20/2013    Low back pain 01/29/2013    Neck pain 01/29/2013    Cervical pain 02/08/2013     Past  Medical History:   Diagnosis Date    Allergy     Angio-edema     Degenerative disc disease     Urticaria        PAST SURGICAL HISTORY:  Past Surgical History:   Procedure Laterality Date     SECTION      TONSILLECTOMY      TRANSFORAMINAL EPIDURAL INJECTION OF STEROID Bilateral 3/21/2019    Procedure: Injection,steroid,epidural,transforaminal approach---bilateral L5/S1;  Surgeon: Sridhar Albert III, MD;  Location: [facility] [place];  Service: Pain Management;  Laterality: Bilateral;       SOCIAL HISTORY:   Social History     Socioeconomic History    Marital status:    Tobacco Use    Smoking status: Never    Smokeless tobacco: Never   Substance and Sexual Activity    Alcohol use: No    Drug use: No    Sexual activity: Yes     Partners: Male     Birth control/protection: OCP       FAMILY HISTORY:  Family History   Problem Relation Age of Onset    Asthma Mother     Heart disease Mother     Cancer Father     Breast cancer Paternal Cousin     Colon cancer Neg Hx     Ovarian cancer Neg Hx        CURRENTS MEDICATIONS:  Current Outpatient Medications on File Prior to Visit   Medication Sig Dispense Refill    amLODIPine (NORVASC) 10 MG tablet Take 1 tablet (10 mg total) by mouth once daily. 90 tablet 3    fluticasone propionate (FLONASE) 50 mcg/actuation nasal spray 1 spray (50 mcg total) by Each Nostril route 2 (two) times daily. 16 g 3    ibuprofen (ADVIL,MOTRIN) 800 MG tablet Take 1 tablet (800 mg total) by mouth 3 (three) times daily as needed for Pain. 60 tablet 2    levocetirizine (XYZAL) 5 MG tablet Take 1 tablet (5 mg total) by mouth every evening. 30 tablet 3    meloxicam (MOBIC) 7.5 MG tablet After prednisone complete Mobic 7.5 mg 1 p.o. b.i.d. for swelling no other NSAIDs can take with Tylenol 60 tablet 5    mirabegron (MYRBETRIQ) 25 mg Tb24 ER tablet Take 1 tablet (25 mg total) by mouth once daily. 30 tablet 11    montelukast (SINGULAIR) 10 mg tablet TAKE 1 TABLET(10 MG) BY MOUTH EVERY EVENING 90  tablet 1    norethindrone (ORTHO MICRONOR) 0.35 mg tablet Take 1 tablet (0.35 mg total) by mouth once daily. 30 tablet 11    pantoprazole (PROTONIX) 40 MG tablet Take 1 tablet (40 mg total) by mouth once daily. 90 tablet 3     No current facility-administered medications on file prior to visit.       ALLERGIES:  Review of patient's allergies indicates:   Allergen Reactions    Augmentin [amoxicillin-pot clavulanate] Rash     Rash to arms, legs, shoulders and torso.  States has happened 2 times with this medication.    Iodine and iodide containing products Rash    Shellfish containing products Hives     Other reaction(s): Hives    Watermelon Other (See Comments)     Irritates esophagus    Amoxapine Rash    Papaya Rash    Penicillins Rash       REVIEW OF SYSTEMS:  Review of Systems   Constitutional:  Negative for diaphoresis, fever and weight loss.   Respiratory:  Negative for shortness of breath.    Cardiovascular:  Negative for chest pain.   Gastrointestinal:  Negative for blood in stool.   Genitourinary:  Negative for hematuria.   Endo/Heme/Allergies:  Does not bruise/bleed easily.   All other systems reviewed and are negative.    OBJECTIVE:    PHYSICAL EXAMINATION:   Vitals:    04/12/23 1008   BP: 120/74   Pulse: 73       Physical Exam:  Vitals reviewed.    Constitutional: She appears well-developed and well-nourished.     Eyes: Pupils are equal, round, and reactive to light. Conjunctivae and EOM are normal.     Cardiovascular: Normal distal pulses and no edema.     Abdominal: Soft.     Skin: Skin displays no rash on trunk and no rash on extremities. Skin displays no lesions on trunk and no lesions on extremities.     Psych/Behavior: She is alert. She is oriented to person, place, and time. She has a normal mood and affect.     Musculoskeletal:        Neck: Range of motion is full.     Neurological:        DTRs: Tricep reflexes are 2+ on the right side and 2+ on the left side. Bicep reflexes are 2+ on the right  side and 2+ on the left side. Brachioradialis reflexes are 2+ on the right side and 2+ on the left side. Patellar reflexes are 2+ on the right side and 2+ on the left side. Achilles reflexes are 2+ on the right side and 2+ on the left side.     Back Exam     Tenderness   The patient is experiencing tenderness in the sacroiliac.    Range of Motion   Extension:  normal   Flexion:  40 abnormal   Lateral bend right:  normal   Lateral bend left:  normal   Rotation right:  normal   Rotation left:  normal     Muscle Strength   Right Quadriceps:  5/5   Left Quadriceps:  5/5   Right Hamstrings:  5/5   Left Hamstrings:  5/5     Tests   Straight leg raise right: negative  Straight leg raise left: negative    Other   Toe walk: normal  Heel walk: normal            Neurologic Exam     Mental Status   Oriented to person, place, and time.   Speech: speech is normal   Level of consciousness: alert    Cranial Nerves   Cranial nerves II through XII intact.     CN III, IV, VI   Pupils are equal, round, and reactive to light.  Extraocular motions are normal.     Motor Exam   Muscle bulk: normal  Overall muscle tone: normal    Strength   Right deltoid: 5/5  Left deltoid: 5/5  Right biceps: 5/5  Left biceps: 5/5  Right triceps: 5/5  Left triceps: 5/5  Right wrist flexion: 5/5  Left wrist flexion: 5/5  Right wrist extension: 5/5  Left wrist extension: 5/5  Right interossei: 5/5  Left interossei: 5/5  Right iliopsoas: 5/5  Left iliopsoas: 5/5  Right quadriceps: 5/5  Left quadriceps: 5/5  Right hamstrin/5  Left hamstrin/5  Right anterior tibial: 5/5  Left anterior tibial: 5/5  Right posterior tibial: 5/5  Left posterior tibial: 5/5  Right peroneal: 5/5  Left peroneal: 5/5  Right gastroc: 5/5  Left gastroc: 5/5    Sensory Exam   Light touch normal.   Pinprick normal.     Gait, Coordination, and Reflexes     Gait  Gait: normal    Coordination   Finger to nose coordination: normal  Tandem walking coordination: normal    Reflexes   Right  brachioradialis: 2+  Left brachioradialis: 2+  Right biceps: 2+  Left biceps: 2+  Right triceps: 2+  Left triceps: 2+  Right patellar: 2+  Left patellar: 2+  Right achilles: 2+  Left achilles: 2+  Right plantar: normal  Left plantar: normal  Right Escalera: absent  Left Escalera: absent  Right ankle clonus: absent  Left ankle clonus: absent    Sacroiliac joint exam shows positive Danika's sign bilaterally, positive TREASURE sign bilaterally, positive Gaenslen sign bilaterally, positive thigh thrust sign bilaterally    DIAGNOSTIC DATA:  I personally interpreted the following imaging:   Lumbar spine MRI shows mild disc space degeneration at L3-4 and L4-5, no significant stenosis.  Cervical spine MRI reviewed showing no significant stenosis or no significant degeneration.    Scoliosis parameters    Normal alignment  No significant hip pathology on x-ray      ASSESMENT:  This is a 52 y.o. female with     Problem List Items Addressed This Visit    None  Visit Diagnoses       SI (sacroiliac) joint dysfunction    -  Primary    Relevant Orders    CT Pelvis Without Contrast    Ambulatory referral/consult to Physical/Occupational Therapy    SI joint arthritis        Sacroiliitis        Relevant Orders    CT Pelvis Without Contrast    Ambulatory referral/consult to Physical/Occupational Therapy            PLAN:  Hip strengthening and flexibility exercises 3 times a week for 6 weeks for SI joint dysfunction  Bilateral SI joint block and steroid injection to diagnose and treat her SI joint dysfunction  The patient does not have lumbar radiculopathy  All questions answered            Andi Watson MD  Cell:[phone]

## 2023-04-12 NOTE — PROGRESS NOTES
Subjective:       Patient ID: Carol Dewey is a 52 y.o. female.    Chief Complaint:  Cystitis        History of Present Illness  HPI 51 Y O F  has a past medical history of Allergy, Angio-edema, Degenerative disc disease, and Urticaria.  Referred by Dr. Ramirez for evaluation of urinary urgency and night time urination. Currently being treated for UTI with Macrobid. She has very bothersome cough since starting an ACE inhibitor which worsened the stress incontinence. She is most bothered by the leakage which happens without insensible loss.        4/12/2023:  Bothersome leakage with cough and urgency  She wears depends and changes it twice a day.   Taking myrbetriq with little improvement of symptoms     [facility]    Question 9/14/2022  2:40 PM CDT - Filed by Emani Dunn MA   General Urogynecology: Are you experiencing the following?    Dysuria (painful urination) No   Nocturia:  waking up at night to empty your bladder  Yes   If you answered yes to the previous question, how many times does this happen per night? 5-6   Enuresis (urine loss during sleep) No   Dribbling urine after you urinate Yes   Hematuria (urine appears red) No   Type of stream Strong   Urinary frequency: How often a day are you going to the bathroom per day?  Less than 10   Urinary Tract Infections: How many Urinary Tract Infections have you had in the past year? I have not had a UTI in the past year   If you have had a UTI in the past year, what treatments have you had so far?  I have not had a UTI in the past year   Urinary Incontinence (General): Are you experiencing the following?    Past consultation for incontinence: Have you ever seen someone for the evaluation of incontinence? No   If you answered yes to the previous question, please select all the therapies you have tried.  N/a- I answered no to the previous question   Please note the effectiveness of the therapies.    Need to wear protection to keep clothes dry  Yes   If  "you answered yes to the previous question, please sandra the protection you use.  Panty liner    Pads    Poise   If you wear protection, how much wetness is typically on each pad? Moderate   If you wear protection, how often do you have to change per day, if applicable?  2   Stress Symptoms: Are you experiencing the following?    Leakage of urine with cough, laugh and/or sneeze Yes   If you answered yes to the previous question, what is the frequency in days, weeks and/or months? Several times a day   Leakage of urine with sex No   Leakage of urine with bending/ lifting No   Leakage of urine with briskly walking or jogging No   If you lose urine for any other reason not previously mentioned, please note it below, if applicable.     Urge Symptoms: Are you experiencing the following?    Urgency ("got to go" feeling) Yes   Urge: How frequently do you feel an urge to urinate (feeling like you "gotta go" to the bathroom and can't wait) Several times a day   Do you experience a leakage of urine when you have a feeling of urgency?  Yes   Leakage of urine when unaware Yes   Past use of anticholinergics (medications used to treat overactive bladder) No   If you answered yes to the previous question, please sandra the anticholinergics you have used:     Have you ever used Mirbetriq (aka Mirabegron)?  No   Prolapse Symptoms: Are you experiencing any of the following?     Falling out/ Bulging/ Heaviness in the vagina Yes   Vaginal/ Abdominal Pain/ Pressure No   Need to strain/ Push to void No   Need to wait on the toilet before you void Yes   Unusual position to urinate (using your hands to push back the vaginal bulge) No   Sensation of incomplete emptying Yes   Past use of pessary device No   If you answered yes to the previous question, please list the devices you have used below.     Bowel Symptoms: Are you experiencing any of the following?    Constipation No   Diarrhea  No   Hematochezia (bloody stool) No   Incomplete " evacuation of stool No   Involuntary loss of formed stool No   Fecal smearing/urgency No   Involuntary loss of gas No   Vaginal Symptoms: Are you experiencing any of the following?     Abnormal vaginal bleeding  Yes   Vaginal dryness Yes   Sexually active  Yes   Dyspareunia (painful intercourse) Yes   Estrogen use  No       GYN & OB History  Patient's last menstrual period was 2023 (approximate).   Date of Last Pap: 2021    OB History    Para Term  AB Living   2 1 1   1     SAB IAB Ectopic Multiple Live Births   1              # Outcome Date GA Lbr Darrick/2nd Weight Sex Delivery Anes PTL Lv   2 SAB            1 Term              OB     x 0.  C/s x 1.    Largest: 7 # 12 oz     GYN  Menarche:  14  Menstrual cycle: 28/3-5/moderate flow/ no pain - micronor   Menopause: n/a   Hysterectomy:  N/a  Ovaries:  present  Tubal ligation: NO   Other abdominal surgeries:     Review of Systems  Review of Systems   Constitutional: Negative.  Negative for activity change, appetite change, chills, diaphoresis, fatigue, fever and unexpected weight change.   HENT: Negative.     Eyes: Negative.    Respiratory: Negative.  Negative for apnea, cough and wheezing.    Cardiovascular: Negative.  Negative for chest pain and palpitations.   Gastrointestinal:  Positive for constipation. Negative for abdominal distention, abdominal pain, anal bleeding, blood in stool, diarrhea, nausea, rectal pain and vomiting.   Endocrine: Negative.    Genitourinary:  Positive for frequency and urgency. Negative for decreased urine volume, difficulty urinating, dyspareunia, dysuria, enuresis, flank pain, genital sores, hematuria, menstrual problem, pelvic pain, vaginal bleeding, vaginal discharge and vaginal pain.   Musculoskeletal:  Negative for back pain and gait problem.   Skin:  Negative for color change, pallor, rash and wound.   Allergic/Immunologic: Negative for immunocompromised state.   Neurological: Negative.  Negative for  dizziness and speech difficulty.   Hematological:  Negative for adenopathy.   Psychiatric/Behavioral:  Negative for agitation, behavioral problems, confusion and sleep disturbance.          Objective:     Physical Exam   Constitutional: She is oriented to person, place, and time. She appears well-developed.   Patient has difficulty going from a lying down to a sitting position, due to back pain. S/p cortisone injection.    HENT:   Head: Normocephalic and atraumatic.   Eyes: Conjunctivae and EOM are normal.   Cardiovascular: Normal rate, regular rhythm, S1 normal, S2 normal, normal heart sounds and intact distal pulses.   Pulmonary/Chest: Effort normal and breath sounds normal. She exhibits no tenderness.   Abdominal: Soft. Bowel sounds are normal. She exhibits no distension and no mass. There is no splenomegaly or hepatomegaly. There is no abdominal tenderness. There is no rigidity, no rebound and no guarding. No hernia.   Genitourinary: Pelvic exam was performed with patient supine. Rectum normal, vagina normal, cervix normal, skenes normal and bartholins normal. Right labia normal and left labia normal. Urethra exhibits hypermobility. Urethra exhibits no urethral caruncle, no urethral diverticulum and no urethral mass. Right bartholin is not enlarged and not tender. Left bartholin is not enlarged and not tender. Rectal exam shows resting tone normal and active tone normal. Rectal exam shows no external hemorrhoid, no fissure, no tenderness, anal tone normal and no dovetailing. Guaiac negative stool. There is atrophy and lavator tenderness in the vagina. No foreign body, tenderness, bleeding, unspecified prolapse of vaginal walls, fistula or mesh exposure in the vagina. Right adnexum displays no mass and no tenderness. Left adnexum displays no mass and no tenderness. Cervix exhibits no motion tenderness and no discharge. Uterus is tender. Uterus is not experiencing uterine prolapse.   PVR: 10 ML  Empty cough stress  test: Negative.  Kegel: 3/5    POP-Q  Aa: -2 Ba: -2 C: -5   GH: 2 PB: 2 TVL: 9   Ap: -2 Bp: -2 D: -6                      Genitourinary Comments: Bladder base tenderness      Musculoskeletal:         General: Normal range of motion.      Cervical back: Normal range of motion and neck supple.   Neurological: She is alert and oriented to person, place, and time. She has normal strength and normal reflexes. Cranial nerves II through XII intact. No cranial nerve deficit.   Skin: Skin is warm and dry.   Psychiatric: She has a normal mood and affect. Her speech is normal and behavior is normal. Judgment normal.        HCM  Pap's: Normal last Pap: 2022  High Risk HPV: Negative  Mammo: BIRADS: 1 ; Last imaging  2022   Colonoscopy: N/a: overdue   Dexa:  n/a        Assessment:        1. Bladder pain    2. Screening for colon cancer    3. Sjogren's syndrome with other organ involvement                  Plan:     Mixed urinary incontinence, urge > stress: very bothersome   --Bladder diary for 3-7 days, not done   --Empty bladder every 3 hours.  Empty well: wait a minute, lean forward on toilet.    --Avoid dietary irritants (see sheet).  Keep diary x 3-5 days to determine your irritants.  --KEGELS: do 10 in AM and 10 in PM, holding each x 10 seconds.  When you feel urge to go, STOP, KEGEL, and when urge has passed, then go to bathroom.    --URGE: increase  Myrbetriq 50 mg night.  SE profile reviewed.    Takes 2-4 weeks to see if will have effect.  For dry mouth: get sour, sugar free lozenge or gum.    --STRESS:  Non surgical options: Pessary vs. Impressa vs Rivive - which represent urethral and bladder support devices; Surgical options including 1.  mid urethral sling; retropubic, transobturator vs single incision 2. Fascial slings 3. Muller procedure 4. Periurethral bulking-     2. Chronic right flank pain ( not reproducible on exam ), bladder pain and Dyspareunia: CT in 2010 essentially negative   --Long discussion about  possible causes for the pain, suspect bladder   --S/p Pelvic floor therapy- only help a little bit   --Consider Elavil daily in the future, SE profile discussed   --Keep pelvic pain diary, detail when the pain is felt and where the pain extends to  --Discussed liberal use of lubrication and digitation prior to attempted penetration   --Suds     3. Colonoscopy overdue will schedule with Colorectal     4. Recommend rheumatology to rule out Sjogren       Approximately 35 minutes of total time spent in consult, 75 % in discussion. This includes face to face time and non-face to face time preparing to see the patient, obtaining and/or reviewing separately obtained history, documenting clinical information in the electronic or other health record, independently interpreting results (not separately reported) and communicating results to the patient/family/caregiver, or care coordination (not separately reported).        Cristal Murphy DO  Female Pelvic Medicine and Reconstructive Surgery  [facility] [place], [place]

## 2023-04-12 NOTE — PATIENT INSTRUCTIONS
Mixed urinary incontinence, urge > stress: very bothersome   --Bladder diary for 3-7 days, not done   --Empty bladder every 3 hours.  Empty well: wait a minute, lean forward on toilet.    --Avoid dietary irritants (see sheet).  Keep diary x 3-5 days to determine your irritants.  --KEGELS: do 10 in AM and 10 in PM, holding each x 10 seconds.  When you feel urge to go, STOP, KEGEL, and when urge has passed, then go to bathroom.    --URGE: increase  Myrbetriq 50 mg night.  SE profile reviewed.    Takes 2-4 weeks to see if will have effect.  For dry mouth: get sour, sugar free lozenge or gum.    --STRESS:  Non surgical options: Pessary vs. Impressa vs Rivive - which represent urethral and bladder support devices; Surgical options including 1.  mid urethral sling; retropubic, transobturator vs single incision 2. Fascial slings 3. Muller procedure 4. Periurethral bulking-     2. Chronic right flank pain ( not reproducible on exam ), bladder pain and Dyspareunia: CT in 2010 essentially negative   --Long discussion about possible causes for the pain, suspect bladder   --S/p Pelvic floor therapy- only help a little bit   --Consider Elavil daily in the future, SE profile discussed   --Keep pelvic pain diary, detail when the pain is felt and where the pain extends to  --Discussed liberal use of lubrication and digitation prior to attempted penetration   --Suds     3. Colonoscopy overdue will schedule with Colorectal     4. Recommend rheumatology to rule out Sjogren if the cotton mouth and excessive dry eyes persist, medication list reviewed.

## 2023-04-13 ENCOUNTER — TELEPHONE (OUTPATIENT)
Dept: UROGYNECOLOGY | Facility: CLINIC | Age: 53
End: 2023-04-13
Payer: MEDICARE

## 2023-04-13 LAB
BACTERIA UR CULT: NORMAL
BACTERIA UR CULT: NORMAL

## 2023-04-19 ENCOUNTER — TELEPHONE (OUTPATIENT)
Dept: UROGYNECOLOGY | Facility: CLINIC | Age: 53
End: 2023-04-19
Payer: MEDICARE

## 2023-04-19 NOTE — TELEPHONE ENCOUNTER
----- Message from Myriam Buckner RN sent at 4/19/2023 11:54 AM CDT -----    ----- Message -----  From: Kadie June  Sent: 4/19/2023  11:52 AM CDT  To: Jeffrey Marks Staff    Name of Who is Calling: FLOR DURANT [[id number]]              What is the request in detail: Patient requesting a call back to discuss rescheduling SUDS appt scheduled for 5/16. Patient says this is her 3rd time calling to reschedule              Can the clinic reply by [facility]: No              What Number to Call Back if not in [facility]: [phone]

## 2023-04-21 ENCOUNTER — PATIENT MESSAGE (OUTPATIENT)
Dept: ADMINISTRATIVE | Facility: HOSPITAL | Age: 53
End: 2023-04-21
Payer: MEDICARE

## 2023-04-25 PROBLEM — M46.1 SACROILIITIS: Status: ACTIVE | Noted: 2023-04-25

## 2023-04-25 PROBLEM — M53.3 SI (SACROILIAC) JOINT DYSFUNCTION: Status: ACTIVE | Noted: 2023-04-25

## 2023-05-04 ENCOUNTER — TELEPHONE (OUTPATIENT)
Dept: NEUROSURGERY | Facility: CLINIC | Age: 53
End: 2023-05-04
Payer: MEDICARE

## 2023-05-04 NOTE — TELEPHONE ENCOUNTER
----- Message from Ruthy Cornelius MA sent at 5/2/2023  4:23 PM CDT -----    ----- Message -----  From: Bessy Gibson MA  Sent: 5/1/2023   4:19 PM CDT  To: Rutyh Cornelius MA    Please se msg below, patient wants to reschedule surgery   ----- Message -----  From: Haydee Brown  Sent: 5/1/2023   3:48 PM CDT  To: Walter Escamilla Staff    Type:  Needs Medical Advice    Who Called:  pt  Symptoms (please be specific):  would like to get a call back to discuss rescheduling procedure to the follow ing week because she has another procedure scheduled for 5/16/2023  If possible pt would like to be scheduled for 5/24/2023    Would the patient rather a call back or a response via [facility]?  Call   Best Call Back Number:  [phone]  Additional Information:

## 2023-05-10 ENCOUNTER — PROCEDURE VISIT (OUTPATIENT)
Dept: NEUROLOGY | Facility: CLINIC | Age: 53
End: 2023-05-10
Payer: MEDICARE

## 2023-05-10 DIAGNOSIS — M54.2 CERVICALGIA: ICD-10-CM

## 2023-05-10 PROCEDURE — 95886 MUSC TEST DONE W/N TEST COMP: CPT | Mod: S$GLB,,, | Performed by: PHYSICAL MEDICINE & REHABILITATION

## 2023-05-10 PROCEDURE — 95911 NRV CNDJ TEST 9-10 STUDIES: CPT | Mod: S$GLB,,, | Performed by: PHYSICAL MEDICINE & REHABILITATION

## 2023-05-10 PROCEDURE — 95911 PR NERVE CONDUCTION STUDY; 9-10 STUDIES: ICD-10-PCS | Mod: S$GLB,,, | Performed by: PHYSICAL MEDICINE & REHABILITATION

## 2023-05-10 PROCEDURE — 95886 PR EMG COMPLETE, W/ NERVE CONDUCTION STUDIES, 5+ MUSCLES: ICD-10-PCS | Mod: S$GLB,,, | Performed by: PHYSICAL MEDICINE & REHABILITATION

## 2023-05-10 NOTE — PROCEDURES
Test Date:  5/10/2023    Patient: lana pinon : 1970 Physician: Cristian Medellin D.O.   ID#: [id number] SEX: Female Ref. Phys: Sorin Begum MD     HPI: Lana Pinon is a 52 y.o.female who presents for NCS/EMG to evaluate cervical radiculopathy.  Patient has a several year history of L>R hand n/t.  She had a 4 limb NCS/EMG done in 2019 which showed mild CTS on the left.  Recent MRI of the C and L spine were essentially unrevealing with no significant stenosis.        NCV & EMG Findings:  Evaluation of the left median motor nerve showed prolonged distal onset latency.  The left median sensory nerve showed prolonged distal peak latency and decreased conduction velocity.  The right Median-Radial (Dig I) sensory nerve showed abnormal peak latency difference ((Median Wrist-Dig I)-(Radial Wrist-Dig I)).  The right Median-Ulnar Palmar sensory nerve showed abnormal peak latency difference ((Median Palm-Wrist)-(Ulnar Palm-Wrist)).  All remaining nerves (as indicated in the following tables) were within normal limits.  All examined muscles (as indicated in the following table) showed no evidence of electrical instability.    Impression:  There is electrophysiologic evidence of a bilateral (sensory on the right, sensorimotor on the left) median mononeuropathy across the wrist (I.e. Carpal tunnel syndrome).  There is no motor axonal loss.  There is no active denervation.  This is graded as Mild in severity on the right, and Mild-Moderate on the left.  When compared to the prior study from 2019, there has been no significant change on the left.      There is no definitive evidence of a cervical radiculopathy on today's electrophysiologic study.  Please note that there are a few caveats to consider with radiculopathy as it relates to NCS/EMG testing.  NCS is typically normal in most radiculopathy, so we rely on the needle EMG for diagnosis.  The needle EMG will also be normal in purely demyelinating  radiculopathy lesions, in predominant sensory nerve root lesions, and in some cases hyperacute lesions.  In these cases, the EMG/NCS will be normal, and can miss radiculopathy.  Sensitivity for needle EMG is estimated to be between 50%-71% for cervical radiculopathy, so ruling out radiculopathy solely with NCS/EMG shouldn't be considered if there is a reasonable clinical suspicion.           ___________________________  Cristian Medellin D.O.        NCS+  Motor Nerve Results      Latency Amplitude F-Lat Segment Distance CV Comment   Site (ms) Norm (mV) Norm (ms)  (cm) (m/s) Norm    Left Median (APB)   Wrist *4.5  < 4.4 6.2  > 4.2  Wrist-Palm - - -    Elbow 8.3 - 5.3 -  Elbow-Wrist 21 55  > 51    Right Median (APB)   Wrist 4.1  < 4.4 9.0  > 4.2  Wrist-Palm - - -    Elbow 7.8 - 8.9 -  Elbow-Wrist 21 57  > 51    Left Ulnar (ADM)   Wrist 2.2  < 3.7 9.1  > 3.0         Bel Elbow 5.7 - 9.1 -  Bel Elbow-Wrist 22 63  > 52    Abv Elbow 6.8 - 9.7 -  Abv Elbow-Bel Elbow 8 73  > 43    Right Ulnar (ADM)   Wrist 2.3  < 3.7 8.0  > 3.0         Bel Elbow 5.7 - 8.6 -  Bel Elbow-Wrist 24 71  > 52    Abv Elbow 6.6 - 9.4 -  Abv Elbow-Bel Elbow 8 89  > 43      Sensory Nerve Results      Latency (Peak) Amplitude (P-P) Segment Distance CV Comment   Site (ms) Norm (µV) Norm  (cm) (m/s) Norm    Left Median   Wrist-Dig II *4.5  < 4.0 29  > 8 Wrist-Dig II 14 *31  > 39    Right Median   Wrist-Dig I 3.2 - 28 - Wrist-Dig I 10 31 -    Wrist-Dig II 3.6  < 4.0 42  > 8 Wrist-Dig II 15 42  > 39    Palm-Wrist 2.4 - 31 - Palm-Wrist - - -    Left Ulnar   Wrist-Dig V 2.5  < 4.0 60  > 4 Wrist-Dig V 14 56  > 38    Right Ulnar   Wrist-Dig V 2.4  < 4.0 84  > 4 Wrist-Dig V 14 58  > 38    Palm-Wrist 1.60 - 21 - Palm-Wrist - - -    Right Radial   Wrist-Dig I 2.5 - 18 - Wrist-Dig I 10 40 -      Inter-Nerve Comparisons     Nerve 1 Value 1 Nerve 2 Value 2 Parameter Result Normal   Sensory Sites   R Median Wrist-Dig I 3.2 ms R Radial Wrist-Dig I 2.5 ms Peak Lat  Diff *0.70 ms <0.40   R Median Palm-Wrist 2.4 ms R Ulnar Palm-Wrist 1.6 ms Peak Lat Diff *0.80 ms <0.30     EMG+     Side Muscle Nerve Root Ins Act Fibs Psw Amp Dur Poly Recrt Int Pat Comment   Left Deltoid Axillary C5-C6 Nml Nml Nml Nml Nml 0 Nml Nml    Left Triceps Radial C6-C8 Nml Nml Nml Nml Nml 0 Nml Nml    Left Biceps Musculocut C5-C6 Nml Nml Nml Nml Nml 0 Nml Nml    Left Pronator Teres Median C6-C7 Nml Nml Nml Nml Nml 0 Nml Nml    Left APB Median C8-T1 Nml Nml Nml Nml Nml 0 Nml Nml    Right Deltoid Axillary C5-C6 Nml Nml Nml Nml Nml 0 Nml Nml    Right Biceps Musculocut C5-C6 Nml Nml Nml Nml Nml 0 Nml Nml    Right Triceps Radial C6-C8 Nml Nml Nml Nml Nml 0 Nml Nml    Right Pronator Teres Median C6-C7 Nml Nml Nml Nml Nml 0 Nml Nml    Right APB Median C8-T1 Nml Nml Nml Nml Nml 0 Nml Nml            Waveforms:    Motor              Sensory

## 2023-05-11 ENCOUNTER — TELEPHONE (OUTPATIENT)
Dept: PRIMARY CARE CLINIC | Facility: CLINIC | Age: 53
End: 2023-05-11
Payer: MEDICARE

## 2023-05-11 NOTE — TELEPHONE ENCOUNTER
----- Message from Aylin Erazo sent at 5/11/2023  3:39 PM CDT -----  Contact: Pt [phone]  Patient is returning a phone call.  Who left a message for the patient: missed call  Does patient know what this is regarding:  unknown  Would you like a call back, or a response through your [facility] portal?:   Call back  Comments:     Please call and advise.    Thank You

## 2023-05-17 ENCOUNTER — TELEPHONE (OUTPATIENT)
Dept: NEUROSURGERY | Facility: CLINIC | Age: 53
End: 2023-05-17
Payer: MEDICARE

## 2023-05-18 ENCOUNTER — HOSPITAL ENCOUNTER (OUTPATIENT)
Facility: HOSPITAL | Age: 53
Discharge: HOME | End: 2023-05-18
Attending: NEUROLOGICAL SURGERY | Admitting: NEUROLOGICAL SURGERY
Payer: MEDICARE

## 2023-05-18 VITALS
HEIGHT: 63 IN | HEART RATE: 66 BPM | TEMPERATURE: 99 F | RESPIRATION RATE: 15 BRPM | BODY MASS INDEX: 25.69 KG/M2 | OXYGEN SATURATION: 99 % | SYSTOLIC BLOOD PRESSURE: 134 MMHG | DIASTOLIC BLOOD PRESSURE: 66 MMHG | WEIGHT: 145 LBS

## 2023-05-18 DIAGNOSIS — M53.3 SI (SACROILIAC) JOINT DYSFUNCTION: Primary | ICD-10-CM

## 2023-05-18 LAB
B-HCG UR QL: NEGATIVE
CTP QC/QA: YES

## 2023-05-18 PROCEDURE — 81025 URINE PREGNANCY TEST: CPT | Performed by: NEUROLOGICAL SURGERY

## 2023-05-18 PROCEDURE — 25500020 PHARM REV CODE 255: Performed by: NEUROLOGICAL SURGERY

## 2023-05-18 PROCEDURE — 63600175 PHARM REV CODE 636 W HCPCS: Performed by: NEUROLOGICAL SURGERY

## 2023-05-18 PROCEDURE — 27096 INJECT SACROILIAC JOINT: CPT | Mod: LT | Performed by: NEUROLOGICAL SURGERY

## 2023-05-18 PROCEDURE — 25000003 PHARM REV CODE 250: Performed by: NEUROLOGICAL SURGERY

## 2023-05-18 PROCEDURE — 27096 PR INJECTION,SACROILIAC JOINT: ICD-10-PCS | Mod: 50,,, | Performed by: NEUROLOGICAL SURGERY

## 2023-05-18 PROCEDURE — 27096 INJECT SACROILIAC JOINT: CPT | Mod: 50,,, | Performed by: NEUROLOGICAL SURGERY

## 2023-05-18 PROCEDURE — A9579 GAD-BASE MR CONTRAST NOS,1ML: HCPCS | Performed by: NEUROLOGICAL SURGERY

## 2023-05-18 RX ORDER — LIDOCAINE HYDROCHLORIDE 10 MG/ML
INJECTION, SOLUTION EPIDURAL; INFILTRATION; INTRACAUDAL; PERINEURAL
Status: DISCONTINUED | OUTPATIENT
Start: 2023-05-18 | End: 2023-05-18 | Stop reason: HOSPADM

## 2023-05-18 RX ORDER — BUPIVACAINE HYDROCHLORIDE 2.5 MG/ML
INJECTION, SOLUTION EPIDURAL; INFILTRATION; INTRACAUDAL
Status: DISCONTINUED | OUTPATIENT
Start: 2023-05-18 | End: 2023-05-18 | Stop reason: HOSPADM

## 2023-05-18 RX ORDER — METHYLPREDNISOLONE ACETATE 40 MG/ML
INJECTION, SUSPENSION INTRA-ARTICULAR; INTRALESIONAL; INTRAMUSCULAR; SOFT TISSUE
Status: DISCONTINUED | OUTPATIENT
Start: 2023-05-18 | End: 2023-05-18 | Stop reason: HOSPADM

## 2023-05-18 NOTE — DISCHARGE INSTRUCTIONS
Home Care Instructions Pain Management:    1.  DIET:    You may resume your normal diet today.    2.  BATHING:    You may shower with luke warm water.    3.  DRESSING:    You may remove your bandage today.    4.  ACTIVITY LEVEL:      You may resume your normal activities 24 hours after your procedure.    5.  MEDICATIONS:    You may resume your normal medications today.    6.  SPECIAL INSTRUCTIONS:    No heat to the injection site for 24 hours including bath or shower, heating pad, moist heat or hot tubs.    Use an ice pack to the injection site for any pain or discomfort.  Apply ice packs for 20 minute intervals as needed.    If you have received any sedatives by mouth today, you can not drive for 12 hours.    If you have received sedation through an IV, you can not drive for 24 hours.    PLEASE CALL YOUR DOCTOR FOR THE FOLLOWIN.  Redness or swelling around the injection site.  2.  Fever of 101 degrees.  3.  Drainage (pus) from the injection site.  4.  For any continuous bleeding (some dried blood over the incision is normal.)    FOR EMERGENCIES:    If any unusual problems or difficulties occur during clinic hours, call [phone] or dial [phone].    Follow up with with your physician in 2-3 weeks.

## 2023-05-18 NOTE — OP NOTE
DATE OF PROCEDURE: 05/18/23     PREOPERATIVE DIAGNOSES:  Bilateral Si jojnt dysfunction       POSTOPERATIVE DIAGNOSES:   Same     NAME OF OPERATION:  1. Bilateral intra-articular sacroiliac joint block and steroid injection under fluoroscopy        PRIMARY SURGEON: Andi Watson M.D.   This is a 52 y.o. female who has been on disability since 2011 for low back pain.  She reports worsening low back pain.  Pain is radiating towards the lateral hip area.  Pain is worse on the right side at this time but sometimes can be worse on the left side.  Pain is worse with prolonged sitting.  Pain can last several days and then improved partially.  Pain interferes with walking and any kind of physical activity.  She has not done physical therapy recently for her back pain issue.  She has had bilateral L5-S1 transforaminal epidural steroid injection at 2 occasion with partial pain relief.  No new onset of motor weakness numbness or sphincter dysfunction symptoms.  She also complains of neck pain.  She has become quite sedentary due to the chronic pain.    DESCRIPTION OF THE PROCEDURE     The patient was placed on MAC anesthesia and was prone on the Slider table.  All pressure points were carefully padded. The patient was prepped and draped   in the typical sterile fashion.   Using the lateral and outlet views, and after local anesthesia with 1% lidocaine, the 22 spinal livia needle was inserted inside the bilateral sacroiliac joints and 0.5cc of Omnipaque was injected to confirm the needle tip placement.  We then injected  3 cc of 0.25% marcaine mixed with 1 cc of 40 mg of Depomedrol  in the bilateral SI joint. The wound was dressed with a sterile dressing. The blood loss was less than 1 cc. The final count was completed and nothing was missing. There was no complication.

## 2023-05-18 NOTE — H&P
NEUROSURGICAL OUTPATIENT CONSULTATION NOTE     DATE OF SERVICE:  05/18/23     ATTENDING PHYSICIAN:  Andi Watson MD     CONSULT REQUESTED BY:  Dr Begum     REASON FOR CONSULT:  Low back pain      ASA score I  Malampatti score II     SUBJECTIVE:     HISTORY:  This is a 52 y.o. female who has been on disability since 2011 for low back pain.  She reports worsening low back pain.  Pain is radiating towards the lateral hip area.  Pain is worse on the right side at this time but sometimes can be worse on the left side.  Pain is worse with prolonged sitting.  Pain can last several days and then improved partially.  Pain interferes with walking and any kind of physical activity.  She has not done physical therapy recently for her back pain issue.  She has had bilateral L5-S1 transforaminal epidural steroid injection at 2 occasion with partial pain relief.  No new onset of motor weakness numbness or sphincter dysfunction symptoms.  She also complains of neck pain.  She has become quite sedentary due to the chronic pain.        PAST MEDICAL HISTORY:       Active Ambulatory Problems     Diagnosis Date Noted    Foraminal stenosis of cervical region 11/02/2017    Facet arthritis of lumbar region 11/02/2017    Sciatica of left side 11/02/2017    DDD (degenerative disc disease), lumbar 01/10/2019    Lumbar radiculitis 03/21/2019    Bilateral hand numbness 11/21/2019    Numbness and tingling of foot 11/21/2019    Cervical spondylosis 01/09/2020    DDD (degenerative disc disease), cervical 01/09/2020    Unspecified inflammatory spondylopathy, lumbar region 01/23/2020    Essential hypertension, benign 01/31/2022    Tinnitus of both ears 10/11/2022    Arm numbness 04/05/2023    Prediabetes 04/05/2023           Resolved Ambulatory Problems     Diagnosis Date Noted    Lumbar pain 08/31/2012    Right leg pain 08/31/2012    Weakness of right arm 01/20/2013    Cervical pain (neck) 01/20/2013    Low back pain 01/29/2013    Neck pain  2013    Cervical pain 2013           Past Medical History:   Diagnosis Date    Allergy      Angio-edema      Degenerative disc disease      Urticaria           PAST SURGICAL HISTORY:        Past Surgical History:   Procedure Laterality Date     SECTION        TONSILLECTOMY        TRANSFORAMINAL EPIDURAL INJECTION OF STEROID Bilateral 3/21/2019     Procedure: Injection,steroid,epidural,transforaminal approach---bilateral L5/S1;  Surgeon: Sridhar Albert III, MD;  Location: [facility];  Service: Pain Management;  Laterality: Bilateral;         SOCIAL HISTORY:   Social History               Socioeconomic History    Marital status:    Tobacco Use    Smoking status: Never    Smokeless tobacco: Never   Substance and Sexual Activity    Alcohol use: No    Drug use: No    Sexual activity: Yes       Partners: Male       Birth control/protection: OCP            FAMILY HISTORY:        Family History   Problem Relation Age of Onset    Asthma Mother      Heart disease Mother      Cancer Father      Breast cancer Paternal Cousin      Colon cancer Neg Hx      Ovarian cancer Neg Hx           CURRENTS MEDICATIONS:         Current Outpatient Medications on File Prior to Visit   Medication Sig Dispense Refill    amLODIPine (NORVASC) 10 MG tablet Take 1 tablet (10 mg total) by mouth once daily. 90 tablet 3    fluticasone propionate (FLONASE) 50 mcg/actuation nasal spray 1 spray (50 mcg total) by Each Nostril route 2 (two) times daily. 16 g 3    ibuprofen (ADVIL,MOTRIN) 800 MG tablet Take 1 tablet (800 mg total) by mouth 3 (three) times daily as needed for Pain. 60 tablet 2    levocetirizine (XYZAL) 5 MG tablet Take 1 tablet (5 mg total) by mouth every evening. 30 tablet 3    meloxicam (MOBIC) 7.5 MG tablet After prednisone complete Mobic 7.5 mg 1 p.o. b.i.d. for swelling no other NSAIDs can take with Tylenol 60 tablet 5    mirabegron (MYRBETRIQ) 25 mg Tb24 ER tablet Take 1 tablet (25 mg total) by mouth  once daily. 30 tablet 11    montelukast (SINGULAIR) 10 mg tablet TAKE 1 TABLET(10 MG) BY MOUTH EVERY EVENING 90 tablet 1    norethindrone (ORTHO MICRONOR) 0.35 mg tablet Take 1 tablet (0.35 mg total) by mouth once daily. 30 tablet 11    pantoprazole (PROTONIX) 40 MG tablet Take 1 tablet (40 mg total) by mouth once daily. 90 tablet 3      No current facility-administered medications on file prior to visit.         ALLERGIES:        Review of patient's allergies indicates:   Allergen Reactions    Augmentin [amoxicillin-pot clavulanate] Rash       Rash to arms, legs, shoulders and torso.  States has happened 2 times with this medication.    Iodine and iodide containing products Rash    Shellfish containing products Hives       Other reaction(s): Hives    Watermelon Other (See Comments)       Irritates esophagus    Amoxapine Rash    Papaya Rash    Penicillins Rash         REVIEW OF SYSTEMS:  Review of Systems   Constitutional:  Negative for diaphoresis, fever and weight loss.   Respiratory:  Negative for shortness of breath.    Cardiovascular:  Negative for chest pain.   Gastrointestinal:  Negative for blood in stool.   Genitourinary:  Negative for hematuria.   Endo/Heme/Allergies:  Does not bruise/bleed easily.   All other systems reviewed and are negative.     OBJECTIVE:     PHYSICAL EXAMINATION:       Vitals:     04/12/23 1008   BP: 120/74   Pulse: 73         Physical Exam:  Vitals reviewed.     Constitutional: She appears well-developed and well-nourished.      Eyes: Pupils are equal, round, and reactive to light. Conjunctivae and EOM are normal.      Cardiovascular: Normal distal pulses and no edema.      Abdominal: Soft.      Skin: Skin displays no rash on trunk and no rash on extremities. Skin displays no lesions on trunk and no lesions on extremities.      Psych/Behavior: She is alert. She is oriented to person, place, and time. She has a normal mood and affect.      Musculoskeletal:        Neck: Range of motion  is full.      Neurological:        DTRs: Tricep reflexes are 2+ on the right side and 2+ on the left side. Bicep reflexes are 2+ on the right side and 2+ on the left side. Brachioradialis reflexes are 2+ on the right side and 2+ on the left side. Patellar reflexes are 2+ on the right side and 2+ on the left side. Achilles reflexes are 2+ on the right side and 2+ on the left side.      Back Exam      Tenderness   The patient is experiencing tenderness in the sacroiliac.     Range of Motion   Extension:  normal   Flexion:  40 abnormal   Lateral bend right:  normal   Lateral bend left:  normal   Rotation right:  normal   Rotation left:  normal      Muscle Strength   Right Quadriceps:  5/5   Left Quadriceps:  5/5   Right Hamstrings:  5/5   Left Hamstrings:  5/5      Tests   Straight leg raise right: negative  Straight leg raise left: negative     Other   Toe walk: normal  Heel walk: normal                 Neurologic Exam      Mental Status   Oriented to person, place, and time.   Speech: speech is normal   Level of consciousness: alert     Cranial Nerves   Cranial nerves II through XII intact.      CN III, IV, VI   Pupils are equal, round, and reactive to light.  Extraocular motions are normal.      Motor Exam   Muscle bulk: normal  Overall muscle tone: normal     Strength   Right deltoid: 5/5  Left deltoid: 5/5  Right biceps: 5/5  Left biceps: 5/5  Right triceps: 5/5  Left triceps: 5/5  Right wrist flexion: 5/5  Left wrist flexion: 5/5  Right wrist extension: 5/5  Left wrist extension: 5/5  Right interossei: 5/5  Left interossei: 5/5  Right iliopsoas: 5/5  Left iliopsoas: 5/5  Right quadriceps: 5/5  Left quadriceps: 5/5  Right hamstrin/5  Left hamstrin/5  Right anterior tibial: 5/5  Left anterior tibial: 5/5  Right posterior tibial: 5/5  Left posterior tibial: 5/5  Right peroneal: 5/5  Left peroneal: 5/5  Right gastroc: 5/5  Left gastroc: 5/5     Sensory Exam   Light touch normal.   Pinprick normal.      Gait,  Coordination, and Reflexes      Gait  Gait: normal     Coordination   Finger to nose coordination: normal  Tandem walking coordination: normal     Reflexes   Right brachioradialis: 2+  Left brachioradialis: 2+  Right biceps: 2+  Left biceps: 2+  Right triceps: 2+  Left triceps: 2+  Right patellar: 2+  Left patellar: 2+  Right achilles: 2+  Left achilles: 2+  Right plantar: normal  Left plantar: normal  Right Escalera: absent  Left Escalera: absent  Right ankle clonus: absent  Left ankle clonus: absent     Sacroiliac joint exam shows positive Danika's sign bilaterally, positive TREASURE sign bilaterally, positive Gaenslen sign bilaterally, positive thigh thrust sign bilaterally     DIAGNOSTIC DATA:  I personally interpreted the following imaging:   Lumbar spine MRI shows mild disc space degeneration at L3-4 and L4-5, no significant stenosis.  Cervical spine MRI reviewed showing no significant stenosis or no significant degeneration.     Scoliosis parameters     Normal alignment  No significant hip pathology on x-ray        ASSESMENT:  This is a 52 y.o. female with      Problem List Items Addressed This Visit    None  Visit Diagnoses         SI (sacroiliac) joint dysfunction    -  Primary     Relevant Orders     CT Pelvis Without Contrast     Ambulatory referral/consult to Physical/Occupational Therapy     SI joint arthritis         Sacroiliitis         Relevant Orders     CT Pelvis Without Contrast     Ambulatory referral/consult to Physical/Occupational Therapy                PLAN:  Hip strengthening and flexibility exercises 3 times a week for 6 weeks for SI joint dysfunction  Bilateral SI joint block and steroid injection to diagnose and treat her SI joint dysfunction  The patient does not have lumbar radiculopathy  All questions answered                Andi Watson MD  Cell:[phone]

## 2023-05-18 NOTE — PLAN OF CARE
Upon discharge, patient denies any dizziness or weakness. Patient was able to exhibit stability upon standing and ambulating to wheelchair.

## 2023-05-19 NOTE — DISCHARGE SUMMARY
[facility] ([facility])  Neurosurgery  Discharge Summary      Patient Name: Carol Dewey  MRN: [id number]  Admission Date: 5/18/2023  Hospital Length of Stay: 0 days  Discharge Date and Time: 5/18/2023  9:40 AM  Attending Physician: No att. providers found   Discharging Provider: Andi Watson MD  Primary Care Provider: José Miguel Begum MD     HPI: This is a 52 y.o. female who has been on disability since 2011 for low back pain.  She reports worsening low back pain.  Pain is radiating towards the lateral hip area.  Pain is worse on the right side at this time but sometimes can be worse on the left side.  Pain is worse with prolonged sitting.  Pain can last several days and then improved partially.  Pain interferes with walking and any kind of physical activity.  She has not done physical therapy recently for her back pain issue.  She has had bilateral L5-S1 transforaminal epidural steroid injection at 2 occasion with partial pain relief.  No new onset of motor weakness numbness or sphincter dysfunction symptoms.  She also complains of neck pain.  She has become quite sedentary due to the chronic pain.    Procedure(s) (LRB):  1. Bilateral intra-articular sacroiliac joint block and steroid injection under fluoroscopy    Hospital Course: The surgery was uncomplicated and postoperative course uneventful.  Patient was able to be discharged after voiding.      Consults:     Significant Diagnostic Studies: N/A      Pending Diagnostic Studies:       None          Final Active Diagnoses:    Diagnosis Date Noted POA    PRINCIPAL PROBLEM:  SI (sacroiliac) joint dysfunction [M53.3] 04/25/2023 Yes      Problems Resolved During this Admission:      Discharged Condition: good    Disposition: Home or Self Care    Follow Up:    Patient Instructions:      Diet general     Medications:  Reconciled Home Medications:      Medication List        CONTINUE taking these medications      amLODIPine 10 MG tablet  Commonly  known as: NORVASC  Take 1 tablet (10 mg total) by mouth once daily.     fluticasone propionate 50 mcg/actuation nasal spray  Commonly known as: FLONASE  1 spray (50 mcg total) by Each Nostril route 2 (two) times daily.     ibuprofen 800 MG tablet  Commonly known as: ADVIL,MOTRIN  Take 1 tablet (800 mg total) by mouth 3 (three) times daily as needed for Pain.     levocetirizine 5 MG tablet  Commonly known as: XYZAL  Take 1 tablet (5 mg total) by mouth every evening.     meloxicam 7.5 MG tablet  Commonly known as: MOBIC  After prednisone complete Mobic 7.5 mg 1 p.o. b.i.d. for swelling no other NSAIDs can take with Tylenol     mirabegron 50 mg Tb24  Commonly known as: MYRBETRIQ  Take 1 tablet (50 mg total) by mouth once daily.     montelukast 10 mg tablet  Commonly known as: SINGULAIR  TAKE 1 TABLET(10 MG) BY MOUTH EVERY EVENING     nitrofurantoin (macrocrystal-monohydrate) 100 MG capsule  Commonly known as: MACROBID  Take 1 capsule (100 mg total) by mouth 2 (two) times daily.     norethindrone 0.35 mg tablet  Commonly known as: ORTHO MICRONOR  Take 1 tablet (0.35 mg total) by mouth once daily.     pantoprazole 40 MG tablet  Commonly known as: PROTONIX  Take 1 tablet (40 mg total) by mouth once daily.              Andi Watson MD  Neurosurgery  [place] - Pain Management ([facility])

## 2023-06-06 ENCOUNTER — OFFICE VISIT (OUTPATIENT)
Dept: NEUROSURGERY | Facility: CLINIC | Age: 53
End: 2023-06-06
Payer: MEDICARE

## 2023-06-06 ENCOUNTER — TELEPHONE (OUTPATIENT)
Dept: NEUROSURGERY | Facility: CLINIC | Age: 53
End: 2023-06-06
Payer: MEDICARE

## 2023-06-06 ENCOUNTER — TELEPHONE (OUTPATIENT)
Dept: NEUROSURGERY | Facility: CLINIC | Age: 53
End: 2023-06-06

## 2023-06-06 DIAGNOSIS — M53.3 SI (SACROILIAC) JOINT DYSFUNCTION: Primary | ICD-10-CM

## 2023-06-06 PROCEDURE — 1159F MED LIST DOCD IN RCRD: CPT | Mod: CPTII,95,, | Performed by: PHYSICIAN ASSISTANT

## 2023-06-06 PROCEDURE — 3044F PR MOST RECENT HEMOGLOBIN A1C LEVEL <7.0%: ICD-10-PCS | Mod: CPTII,95,, | Performed by: PHYSICIAN ASSISTANT

## 2023-06-06 PROCEDURE — 3044F HG A1C LEVEL LT 7.0%: CPT | Mod: CPTII,95,, | Performed by: PHYSICIAN ASSISTANT

## 2023-06-06 PROCEDURE — 99442 PR PHYSICIAN TELEPHONE EVALUATION 11-20 MIN: ICD-10-PCS | Mod: 95,,, | Performed by: PHYSICIAN ASSISTANT

## 2023-06-06 PROCEDURE — 99442 PR PHYSICIAN TELEPHONE EVALUATION 11-20 MIN: CPT | Mod: 95,,, | Performed by: PHYSICIAN ASSISTANT

## 2023-06-06 PROCEDURE — 1160F RVW MEDS BY RX/DR IN RCRD: CPT | Mod: CPTII,95,, | Performed by: PHYSICIAN ASSISTANT

## 2023-06-06 PROCEDURE — 1160F PR REVIEW ALL MEDS BY PRESCRIBER/CLIN PHARMACIST DOCUMENTED: ICD-10-PCS | Mod: CPTII,95,, | Performed by: PHYSICIAN ASSISTANT

## 2023-06-06 PROCEDURE — 1159F PR MEDICATION LIST DOCUMENTED IN MEDICAL RECORD: ICD-10-PCS | Mod: CPTII,95,, | Performed by: PHYSICIAN ASSISTANT

## 2023-06-06 NOTE — TELEPHONE ENCOUNTER
Please schedule fu with Dr. Watson in 3 months fo fu SI Joint PT and injection.    Thanks,  Phoebe Carroll, [facility], PA-C  Neurosurgery  [facility]  06/06/2023

## 2023-06-06 NOTE — PROGRESS NOTES
Established Patient - Audio Only Telehealth Visit     The patient location is: [place]  The chief complaint leading to consultation is: sp bl si joint dysfunction  Visit type: Virtual visit with audio only (telephone)  Total time spent with patient: 11 minutes    More than half of the time was spent counseling or coordinating care including prognosis, differential diagnosis, risks and benefits of treatment, instructions, compliance risk reductions         The reason for the audio only service rather than synchronous audio and video virtual visit was related to technical difficulties or patient preference/necessity.     Each patient to whom I provide medical services by telemedicine is:  (1) informed of the relationship between the physician and patient and the respective role of any other health care provider with respect to management of the patient; and (2) notified that they may decline to receive medical services by telemedicine and may withdraw from such care at any time. Patient verbally consented to receive this service via voice-only telephone call.       This service was not originating from a related E/M service provided within the previous 7 days nor will  to an E/M service or procedure within the next 24 hours or my soonest available appointment.  Prevailing standard of care was able to be met in this audio-only visit.        Subjective:     Patient ID:  Carol Dewey is a 52 y.o. female.    Walter    Chief Complaint: sp bl si joint injections    HPI    Carol Dewey is a 52 y.o. female who had bilateral SI joint injections on May 18, 2023.  For the 1st 6-8 hours after her injections her low back pain level went from a 7 to a 3/10.  The next day still 3/10.  Two days after the injection she started having itching all over her body.  She states at this point she is still able to do more than she did before the injections and but she still has severe pain when she lays down at night.  She has not  completed her physical therapy for SI joint therapy.      Patient denies any recent accidents or trauma, no saddle anesthesias, and no bowel or bladder incontinence.      Review of Systems:  Constitution: Negative for chills, fever, night sweats and weight loss.   Musculoskeletal: Negative for falls.   Gastrointestinal: Negative for bowel incontinence, nausea and vomiting.   Genitourinary: Negative for bladder incontinence.   Neurological: Negative for disturbances in coordination and loss of balance.      Objective:      There were no vitals filed for this visit.        Assessment:          1. SI (sacroiliac) joint dysfunction            Plan:             Message sent to her PT to schedule PT for SI joint PT  Fu with Dr. Watson in 3 months      Follow-Up:  Follow up in about 3 months (around 9/6/2023). If there are any questions prior to this, the patient was instructed to contact the office.       XIOMY Davison, CALIN  Neurosurgery  [facility]    Addendum:    06/14/2023     I reviewed everything with Dr. Watson.  She is to contact him when she has completed the PT. If pain comes back then he would repeat another si joint injection before doing the surgery     XIOMY Davison, CALIN  Neurosurgery  [facility]

## 2023-06-06 NOTE — TELEPHONE ENCOUNTER
Called patient to do audio only visit.  No answer.  Left message to return call.    XIOMY Davison, PA-C  Neurosurgery  [facility]  06/06/2023

## 2023-06-06 NOTE — TELEPHONE ENCOUNTER
Hi!    Patient did the intial eval with you but states she wasn't called for more PT sessions to be scheduled.    Can you reach out to her to get scheduled for SI joint PT?    Thanks,  Phoebe Carroll [facility], PA-C  Neurosurgery  [facility]  06/06/2023

## 2023-06-06 NOTE — TELEPHONE ENCOUNTER
Carol Mccracken is a 52 y.o. female who had bilateral SI joint injections on May 18, 2023.  For the 1st 6-8 hours after her injections her low back pain level went from a 7 to a 3/10.  The next day still 3/10.  Two days after the injection she started having itching all over her body.  She states at this point she is still able to do more than she did before the injections and but she still has severe pain when she lays down at night.  She has not completed her physical therapy for SI joint therapy.    She will do PT for SI joints and fu with you after.    Thanks,  Phoebe Carroll, [facility], PA-C  Neurosurgery  [facility]  06/06/2023

## 2023-06-06 NOTE — TELEPHONE ENCOUNTER
Returned pt's call, pt stated that she missed her audio visit with Phoebe today. I stated to pt that I will send a message to Phoebe about calling her back today at the end of the day. Pt voiced understanding

## 2023-06-21 ENCOUNTER — PROCEDURE VISIT (OUTPATIENT)
Dept: UROGYNECOLOGY | Facility: CLINIC | Age: 53
End: 2023-06-21
Payer: MEDICARE

## 2023-06-21 VITALS
SYSTOLIC BLOOD PRESSURE: 126 MMHG | BODY MASS INDEX: 25.12 KG/M2 | DIASTOLIC BLOOD PRESSURE: 80 MMHG | WEIGHT: 141.75 LBS | HEIGHT: 63 IN

## 2023-06-21 DIAGNOSIS — R39.89 BLADDER PAIN: ICD-10-CM

## 2023-06-21 LAB
BILIRUB SERPL-MCNC: NORMAL MG/DL
BLOOD URINE, POC: NORMAL
CLARITY, POC UA: CLEAR
COLOR, POC UA: NORMAL
GLUCOSE UR QL STRIP: NORMAL
KETONES UR QL STRIP: NORMAL
LEUKOCYTE ESTERASE URINE, POC: NORMAL
NITRITE, POC UA: NORMAL
PH, POC UA: 5
PROTEIN, POC: NORMAL
SPECIFIC GRAVITY, POC UA: 1.02
UROBILINOGEN, POC UA: NORMAL

## 2023-06-21 PROCEDURE — 51784 ANAL/URINARY MUSCLE STUDY: CPT | Mod: HCNC,51,S$GLB, | Performed by: OBSTETRICS & GYNECOLOGY

## 2023-06-21 PROCEDURE — 51797 INTRAABDOMINAL PRESSURE TEST: CPT | Mod: HCNC,S$GLB,, | Performed by: OBSTETRICS & GYNECOLOGY

## 2023-06-21 PROCEDURE — 81002 POCT URINE DIPSTICK WITHOUT MICROSCOPE: ICD-10-PCS | Mod: HCNC,S$GLB,, | Performed by: OBSTETRICS & GYNECOLOGY

## 2023-06-21 PROCEDURE — 51741 ELECTRO-UROFLOWMETRY FIRST: CPT | Mod: HCNC,51,S$GLB, | Performed by: OBSTETRICS & GYNECOLOGY

## 2023-06-21 PROCEDURE — 52000 CYSTOURETHROSCOPY: CPT | Mod: HCNC,59,S$GLB, | Performed by: OBSTETRICS & GYNECOLOGY

## 2023-06-21 PROCEDURE — 51728 PR COMPLEX CYSTOMETROGRAM VOIDING PRESSURE STUDIES: ICD-10-PCS | Mod: HCNC,S$GLB,, | Performed by: OBSTETRICS & GYNECOLOGY

## 2023-06-21 PROCEDURE — 51784 PR ANAL/URINARY MUSCLE STUDY: ICD-10-PCS | Mod: HCNC,51,S$GLB, | Performed by: OBSTETRICS & GYNECOLOGY

## 2023-06-21 PROCEDURE — 52000 PR CYSTOURETHROSCOPY: ICD-10-PCS | Mod: HCNC,59,S$GLB, | Performed by: OBSTETRICS & GYNECOLOGY

## 2023-06-21 PROCEDURE — 51797 PR VOIDING PRESS STUDY INTRA-ABDOMINAL VOID: ICD-10-PCS | Mod: HCNC,S$GLB,, | Performed by: OBSTETRICS & GYNECOLOGY

## 2023-06-21 PROCEDURE — 51741 PR UROFLOWMETRY, COMPLEX: ICD-10-PCS | Mod: HCNC,51,S$GLB, | Performed by: OBSTETRICS & GYNECOLOGY

## 2023-06-21 PROCEDURE — 51728 CYSTOMETROGRAM W/VP: CPT | Mod: HCNC,S$GLB,, | Performed by: OBSTETRICS & GYNECOLOGY

## 2023-06-21 PROCEDURE — 81002 URINALYSIS NONAUTO W/O SCOPE: CPT | Mod: HCNC,S$GLB,, | Performed by: OBSTETRICS & GYNECOLOGY

## 2023-06-21 RX ORDER — VIBEGRON 75 MG/1
75 TABLET, FILM COATED ORAL NIGHTLY
Qty: 30 TABLET | Refills: 11 | Status: SHIPPED | OUTPATIENT
Start: 2023-06-21 | End: 2023-08-21

## 2023-06-21 RX ORDER — METHENAMINE, SODIUM PHOSPHATE, MONOBASIC, MONOHYDRATE, PHENYL SALICYLATE, METHYLENE BLUE, AND HYOSCYAMINE SULFATE 118; 40.8; 36; 10; .12 MG/1; MG/1; MG/1; MG/1; MG/1
1 CAPSULE ORAL 4 TIMES DAILY PRN
Qty: 60 CAPSULE | Refills: 4 | Status: SHIPPED | OUTPATIENT
Start: 2023-06-21 | End: 2023-08-21

## 2023-06-21 RX ORDER — LIDOCAINE HYDROCHLORIDE 20 MG/ML
JELLY TOPICAL ONCE
Status: COMPLETED | OUTPATIENT
Start: 2023-06-21 | End: 2023-06-21

## 2023-06-21 RX ORDER — CIPROFLOXACIN 500 MG/1
500 TABLET ORAL
Status: COMPLETED | OUTPATIENT
Start: 2023-06-21 | End: 2023-06-21

## 2023-06-21 RX ADMIN — CIPROFLOXACIN 500 MG: 500 TABLET ORAL at 04:06

## 2023-06-21 RX ADMIN — LIDOCAINE HYDROCHLORIDE 5 ML: 20 JELLY TOPICAL at 04:06

## 2023-06-21 NOTE — PROCEDURES
Procedures  TITLE OF OPERATION:  Complex uroflowmetry  Complex cystometry  Electromyography with surface electrodes  Pressure voiding flow study  Urethral pressure profile       INDICATIONS:  52 y.o. Y O F  has a past medical history of Allergy, Angio-edema, Degenerative disc disease, and Urticaria.    PREOPERATIVE DIAGNOSIS:  Mixed urinary incontinence   Chronic pelvic plain     POSTOPERATIVE DIAGNOSIS:    Mixed urinary incontinence   Chronic pelvic pain     ANESTHESIA:  None.    SPECIMEN (BACTERIOLOGICAL, PATHOLOGICAL OR OTHER):  None.    PROSTHETIC DEVICE/IMPLANT:  None.    SURGEONS NARRATIVE:  A time out was performed in which the patient identity and procedure were confirmed.  Urodynamic evaluation was performed using a computerized system ([employer]  Uroflowmetry was performed on the patient in the sitting position without catheters in place.  Subsequent urodynamic testing was performed with the patient in the lithotomy position at 45 degrees. Air charged catheters were used with sterile water as the infusion medium. Vesical and abdominal (rectal) pressures were measured, and detrusor pressure was calculated. EMG activity was recorded with surface electrodes. During filling, room temperature sterile water was infused at a rate of 30 cubic centimeters per minute. The patient was asked cough after instillation of each 150 mL volume. Two Valsalva leak point pressures and two cough leak point pressures were performed with the catheters in place at 150, 300 cubic centimeters and again at maximum capacity. Valsalva leak point pressure was defined as the difference between vesical pressure at which leakage was noted (visualized at the external urethral meatus) and the baseline vesical pressure. Following urodynamic testing, a pressure flow study was performed with the patient in the sitting position. Vesical and abdominal pressures were monitored and detrusor pressures were calculated. After the  pressure flow study, the catheters were then removed. The patient tolerated the procedure well.     Urine dipstick: normal     Complex Urinary Flow Study    For uroflow, the patient voided 28 mL with a maximum flow of  7.5 mL/Sec and an average flow of 3 and with a post void residual of  5 mL.  The overall configuration of this uroflow study was : volume too small.      Cystometry:   Using calibrated electronic equipment, cystometry was done with a medium fill rate of 30 mL per minute and simultaneous measurement of intraabdominal pressure using a rectal catheter and bladder pressure using a urethral catheter. The first sensation occurred at 31 mL at a detrusor pressure of 19 cm H20 and first desire at 203 mL at a detrusor pressure of 18 cm H20 a strong desire to void occurred at 217 mL with a detrusor pressure of 16  cm H20. The patient was filled to a maximum capacity of 246 mL with a detrusor pressure of 16 cm H20.   Detrusor contraction was observed.     Voiding detrusor pressure:   The patient was instructed to void and the maximum detrusor pressure was measured. The patient voided   320 mL with a maximal detrusor flow rate of approximately 15 mL/sec.  Study was completed and performed utilizing both bladder and rectal catheters for detrusor, vesical, and abdominal pressure measurement.  The pressure flow, according to the differential between the abdominal and bladder pressure, showed a maximum detrusor pressure of 42  cm of water.  The average  flow rate was  7  mL per second.   She voided with a sustained detrusor pressure. The remaining volume of 0 mL  was measured presenting the post void residual.     Urethral Pressure Profile:      Detrusor instability ( UI)  was / was not noted  under the following condition of the test.  Leakage with a bladder volume of 44 mL and a pressure of 47 cm H2O    A stress test was performed at the following bladder bladder volumes:    150 mL with a peak pressure of  83 cm H20  and the patient did not leak   150 mL with a peak pressure of  78 cm H20 and the patient did not leak    218 mL with a calculated LLP of 24 and a Pves pressure of 71 cm H2O  218 mL with a calculated LLP of 15 and a Pves pressure of 70 cm H2O    Electromyography: Provocative maneuvers produced appropriate changes in waveforms. The EMG shows increased EMG activity with increased intraabdominal pressure. There was a decrease in the EMG activity during voiding  consistent with normal function of the pelvic floor.      These findings are consistent with Positive urodynamic stress incontinence .    Assessment:  UF   incompetent PF  well supported  Compliance: (decreased )   Max capacity  low .  DO (+).  DEJA (+).      Plan:    Reviewed anti-incontinence procedures including autologous fascial sling, mid urethral sling: Trans obturator/ retropubic/single incision/ periurethral bulking. Given the amount of bladder pain she is describing I'm not inclined to place a sling. Recommend continue pelvic floor PT, if no significant improvement periurethral bulking.  Recommend stop myrbetiq and start Gemtessa 75 mg nightly . Start uribel ok to take up to 4 times a day.   SE profile reviewed.    Takes 2-4 weeks to see if will have effect.        Title of Operation:   Cystourethroscopy.     INDICATIONS:  52 y.o. Y O F  has a past medical history of Allergy, Angio-edema, Degenerative disc disease, and Urticaria.    PREOPERATIVE DIAGNOSIS  Mixed urinary incontinence   Pelvic pain       POSTOPERATIVE DIAGNOSIS:   Mixed urinary incontinence   IC    Anesthesia:   2% Xylocaine gel.    Specimen (Bacteriological, Pathological or other):   None.     Prosthetic Device/Implant:   None.     Surgeons Narrative:     After informed consent was obtained, the patient was placed in the lithotomy position. The urethral meatus was prepped with Betadine and 10 cubic centimeters of 2% Xylocaine gel were introduced into the urethra. A flexible  cystourethroscope was introduced into the bladder. The bladder was distended with approximately 300 cubic centimeters of sterile water. A systematic survey was performed in which the bladder was surveyed using multiple sequential passes in a clockwise fashion from the bladder dome to the bladder base to the urethrovesical junction hypervascularity, focal petechia  no hunner's ulceration. The trigone and ureteral orifices were observed. The scope was then flipped back on itself, and the urethrovesical junction was viewed. A vaginal examining finger was then placed with pressure suburethrally at the urethrovesical junction as the telescope was withdrawn in order to perform positive pressure urethroscopy.  Standard maneuvers of cough, squeeze and Valsalva were performed. The telescope was then completely withdrawn.     Findings: Urethroscopy:  Normal.  Cystoscopy:   hypervascular bladder mucosa, bilateral ureteral flow was noted.      Assessment: IC     Plan:      IC  --Start Prelief to help alkinize the urine:   --Prelief Tablets : Each tablet contains 345 mg calcium glycerophosphate (65 mg of elemental calcium). The tablets also contain 0.25% magnesium  stearate as a processing aid. Two tablets are equivalent to 690 mg calcium glycerophosphate (130mg of elemental calcium).   --Prelief Powder : Each ¼ teaspoon usage of powder is comparable to two tablets. The powder dissolves rapidly in food or non-alcoholic beverages.  Tablets are recommended for taking with alcoholic beverages   --Usage:     --Tablets: 2-3 tablets, 2 times a day.    --Powder: ¼ teaspoon of powder 2 times a day. More can be used when needed  --Start the IC diet:  https://www.ichelp.org/wp-content/uploads/2015/07/food-list.pdf       The patient will follow up with Dr Murphy as scheduled.  See urodynamics note for further plan details.

## 2023-07-17 ENCOUNTER — TELEPHONE (OUTPATIENT)
Dept: PRIMARY CARE CLINIC | Facility: CLINIC | Age: 53
End: 2023-07-17
Payer: MEDICARE

## 2023-07-17 NOTE — TELEPHONE ENCOUNTER
----- Message from Ximenacandace Delaney sent at 7/17/2023  2:23 PM CDT -----  Contact: [phone]  1MEDICALADVICE     Patient is calling for Medical Advice regarding: right arm pain    How long has patient had these symptoms:months     Pharmacy name and phone#:  [id number] - [place] - [address] [facility] [address]  [address] [address]  Phone: [phone] Fax: [phone]        Would like response via vitalcliphart:  Call back     Comments:      Pt said she needs a call back.

## 2023-07-25 ENCOUNTER — TELEPHONE (OUTPATIENT)
Dept: OBSTETRICS AND GYNECOLOGY | Facility: CLINIC | Age: 53
End: 2023-07-25
Payer: MEDICARE

## 2023-07-25 NOTE — TELEPHONE ENCOUNTER
Called pt in regards to left message. No answer. Voicemail box full.   ----- Message from Amy Farmer sent at 7/25/2023 10:21 AM CDT -----  Name of Who is Calling: FLOR DURANT [[id number]]            What is the request in detail: Patient is requesting mammo order            Can the clinic reply by [facility]: rosa              What Number to Call Back if not in [facility]: [phone]

## 2023-07-26 ENCOUNTER — PATIENT MESSAGE (OUTPATIENT)
Dept: UROGYNECOLOGY | Facility: CLINIC | Age: 53
End: 2023-07-26
Payer: MEDICARE

## 2023-07-26 NOTE — TELEPHONE ENCOUNTER
Patient started taking Gemtesa 4 days ago after she completed bottle of Myrebtriq. Since then has felt light headed and weak. Took blood pressure, has been normal, was 142/84 yesterday. She has not taken it today. She took her Gemtesa last night. Recommended she not take it tonight. Re-take her BP. If symptoms do not improve or worsen, recommend she go to the ED to make sure it is not something more sinister than the Gemtesa. Patient agrees to plan.

## 2023-08-02 RX ORDER — ACETAMINOPHEN AND CODEINE PHOSPHATE 120; 12 MG/5ML; MG/5ML
1 SOLUTION ORAL WEEKLY
Qty: 28 TABLET | Refills: 1 | Status: SHIPPED | OUTPATIENT
Start: 2023-08-02 | End: 2023-09-08 | Stop reason: SDUPTHER

## 2023-08-02 NOTE — TELEPHONE ENCOUNTER
Refill Routing Note   Medication(s) are not appropriate for processing by [facility] for the following reason(s):      Medication outside of protocol    [facility] action(s):  Route Care Due:  None identified          Appointments  past 12m or future 3m with PCP    Date Provider   Last Visit   9/6/2022 Tico Ramirez MD   Next Visit   9/8/2023 Tico Ramirez MD   ED visits in past 90 days: 0        Note composed:6:12 PM 08/02/2023

## 2023-08-03 RX ORDER — ACETAMINOPHEN AND CODEINE PHOSPHATE 120; 12 MG/5ML; MG/5ML
1 SOLUTION ORAL WEEKLY
Qty: 84 TABLET | OUTPATIENT
Start: 2023-08-03

## 2023-08-03 NOTE — TELEPHONE ENCOUNTER
Refill Decision Note   Carol Dewey  is requesting a refill authorization.  Brief Assessment and Rationale for Refill:  Quick Discontinue     Medication Therapy Plan:  E-Prescribing Status: Receipt confirmed by pharmacy (8/2/2023  6:14 PM CDT)      Comments:     Note composed:10:48 AM 08/03/2023

## 2023-08-21 ENCOUNTER — PES CALL (OUTPATIENT)
Dept: ADMINISTRATIVE | Facility: CLINIC | Age: 53
End: 2023-08-21
Payer: MEDICARE

## 2023-08-21 ENCOUNTER — OFFICE VISIT (OUTPATIENT)
Dept: PRIMARY CARE CLINIC | Facility: CLINIC | Age: 53
End: 2023-08-21
Payer: MEDICARE

## 2023-08-21 VITALS
BODY MASS INDEX: 25.8 KG/M2 | HEIGHT: 63 IN | DIASTOLIC BLOOD PRESSURE: 66 MMHG | HEART RATE: 80 BPM | OXYGEN SATURATION: 98 % | WEIGHT: 145.63 LBS | RESPIRATION RATE: 16 BRPM | TEMPERATURE: 98 F | SYSTOLIC BLOOD PRESSURE: 118 MMHG

## 2023-08-21 DIAGNOSIS — M25.511 CHRONIC PAIN OF BOTH SHOULDERS: ICD-10-CM

## 2023-08-21 DIAGNOSIS — G89.29 CHRONIC PAIN OF BOTH SHOULDERS: ICD-10-CM

## 2023-08-21 DIAGNOSIS — R20.0 BILATERAL HAND NUMBNESS: ICD-10-CM

## 2023-08-21 DIAGNOSIS — M47.812 CERVICAL SPONDYLOSIS: ICD-10-CM

## 2023-08-21 DIAGNOSIS — M50.30 DDD (DEGENERATIVE DISC DISEASE), CERVICAL: ICD-10-CM

## 2023-08-21 DIAGNOSIS — M25.512 CHRONIC PAIN OF BOTH SHOULDERS: ICD-10-CM

## 2023-08-21 DIAGNOSIS — M54.16 LUMBAR RADICULITIS: Primary | ICD-10-CM

## 2023-08-21 PROCEDURE — 3074F SYST BP LT 130 MM HG: CPT | Mod: HCNC,CPTII,S$GLB, | Performed by: STUDENT IN AN ORGANIZED HEALTH CARE EDUCATION/TRAINING PROGRAM

## 2023-08-21 PROCEDURE — 3078F DIAST BP <80 MM HG: CPT | Mod: HCNC,CPTII,S$GLB, | Performed by: STUDENT IN AN ORGANIZED HEALTH CARE EDUCATION/TRAINING PROGRAM

## 2023-08-21 PROCEDURE — 1160F PR REVIEW ALL MEDS BY PRESCRIBER/CLIN PHARMACIST DOCUMENTED: ICD-10-PCS | Mod: HCNC,CPTII,S$GLB, | Performed by: STUDENT IN AN ORGANIZED HEALTH CARE EDUCATION/TRAINING PROGRAM

## 2023-08-21 PROCEDURE — 99999 PR PBB SHADOW E&M-EST. PATIENT-LVL V: ICD-10-PCS | Mod: PBBFAC,HCNC,, | Performed by: STUDENT IN AN ORGANIZED HEALTH CARE EDUCATION/TRAINING PROGRAM

## 2023-08-21 PROCEDURE — 3078F PR MOST RECENT DIASTOLIC BLOOD PRESSURE < 80 MM HG: ICD-10-PCS | Mod: HCNC,CPTII,S$GLB, | Performed by: STUDENT IN AN ORGANIZED HEALTH CARE EDUCATION/TRAINING PROGRAM

## 2023-08-21 PROCEDURE — 99999 PR PBB SHADOW E&M-EST. PATIENT-LVL V: CPT | Mod: PBBFAC,HCNC,, | Performed by: STUDENT IN AN ORGANIZED HEALTH CARE EDUCATION/TRAINING PROGRAM

## 2023-08-21 PROCEDURE — 3044F PR MOST RECENT HEMOGLOBIN A1C LEVEL <7.0%: ICD-10-PCS | Mod: HCNC,CPTII,S$GLB, | Performed by: STUDENT IN AN ORGANIZED HEALTH CARE EDUCATION/TRAINING PROGRAM

## 2023-08-21 PROCEDURE — 3044F HG A1C LEVEL LT 7.0%: CPT | Mod: HCNC,CPTII,S$GLB, | Performed by: STUDENT IN AN ORGANIZED HEALTH CARE EDUCATION/TRAINING PROGRAM

## 2023-08-21 PROCEDURE — 99214 PR OFFICE/OUTPT VISIT, EST, LEVL IV, 30-39 MIN: ICD-10-PCS | Mod: HCNC,S$GLB,, | Performed by: STUDENT IN AN ORGANIZED HEALTH CARE EDUCATION/TRAINING PROGRAM

## 2023-08-21 PROCEDURE — 1160F RVW MEDS BY RX/DR IN RCRD: CPT | Mod: HCNC,CPTII,S$GLB, | Performed by: STUDENT IN AN ORGANIZED HEALTH CARE EDUCATION/TRAINING PROGRAM

## 2023-08-21 PROCEDURE — 3074F PR MOST RECENT SYSTOLIC BLOOD PRESSURE < 130 MM HG: ICD-10-PCS | Mod: HCNC,CPTII,S$GLB, | Performed by: STUDENT IN AN ORGANIZED HEALTH CARE EDUCATION/TRAINING PROGRAM

## 2023-08-21 PROCEDURE — 3008F PR BODY MASS INDEX (BMI) DOCUMENTED: ICD-10-PCS | Mod: HCNC,CPTII,S$GLB, | Performed by: STUDENT IN AN ORGANIZED HEALTH CARE EDUCATION/TRAINING PROGRAM

## 2023-08-21 PROCEDURE — 99214 OFFICE O/P EST MOD 30 MIN: CPT | Mod: HCNC,S$GLB,, | Performed by: STUDENT IN AN ORGANIZED HEALTH CARE EDUCATION/TRAINING PROGRAM

## 2023-08-21 PROCEDURE — 3008F BODY MASS INDEX DOCD: CPT | Mod: HCNC,CPTII,S$GLB, | Performed by: STUDENT IN AN ORGANIZED HEALTH CARE EDUCATION/TRAINING PROGRAM

## 2023-08-21 PROCEDURE — 1159F PR MEDICATION LIST DOCUMENTED IN MEDICAL RECORD: ICD-10-PCS | Mod: HCNC,CPTII,S$GLB, | Performed by: STUDENT IN AN ORGANIZED HEALTH CARE EDUCATION/TRAINING PROGRAM

## 2023-08-21 PROCEDURE — 1159F MED LIST DOCD IN RCRD: CPT | Mod: HCNC,CPTII,S$GLB, | Performed by: STUDENT IN AN ORGANIZED HEALTH CARE EDUCATION/TRAINING PROGRAM

## 2023-08-21 RX ORDER — METHOCARBAMOL 500 MG/1
500 TABLET, FILM COATED ORAL NIGHTLY
Qty: 30 TABLET | Refills: 3 | Status: SHIPPED | OUTPATIENT
Start: 2023-08-21

## 2023-08-21 RX ORDER — MIRABEGRON 50 MG/1
1 TABLET, FILM COATED, EXTENDED RELEASE ORAL DAILY
COMMUNITY
Start: 2023-07-26

## 2023-08-21 NOTE — PROGRESS NOTES
Subjective:           Patient ID: Carol Dewey is a 52 y.o. female who presents today with a chief complaint of Shoulder Pain (R>L), Arm Pain (R>L), and Foot Swelling (Bilateral ).    Chief Complaint:   Shoulder Pain (R>L), Arm Pain (R>L), and Foot Swelling (Bilateral )      History of Present Illness:    52 y.o. female who presents today with a chief complaint of Shoulder Pain (R>L), Arm Pain (R>L), and Foot Swelling (Bilateral ).    States that right side is worse than left right now.  Has been worse when holding things.  Just when lifting drinks is painful.  At times the right arm will swell up.     Historically left hand numbness has been worse but not currently.     Has an rx for Motrin 800mg taking BID most days, but occasionally will not take at all.   But has also been taking the Meloxicam at night.    States that ibuprofen and Meloxicam help some.    Has used Salon pas which also helps.    MRI in March 2023, showed mild cervical spondylosis without any spinal canal stenosis or neural impingement.     Nerve conduction showed carpal tunnel bilaterally.     States has used MSK relaxant and she thinks there was a reaction, caused her dizziness and headache. Not sure what she took, appears was on Flexeril before.     States that gabapentin was not tolerated previously.     LE Swelling  States legs were swelling yesterday and felt shooting pain to hips.     Review of Systems   Constitutional:  Negative for activity change, chills, fatigue, fever and unexpected weight change.   HENT:  Positive for ear discharge. Negative for congestion, nosebleeds, sinus pressure and sneezing.    Respiratory:  Negative for cough, shortness of breath and wheezing.    Cardiovascular:  Negative for chest pain, palpitations and leg swelling.   Gastrointestinal:  Negative for abdominal distention, constipation, diarrhea and nausea.   Genitourinary:  Negative for difficulty urinating and dysuria.   Musculoskeletal:  Positive for  "arthralgias, back pain, myalgias and neck pain. Negative for gait problem.        Bilateral arm pain and shoulder pain, right worse than left.    Has pain primarily start in the right shoulder and radiate up to the shoulder.  States worse with activity.   Worse with carry things.   Feels like she gets numbness in hands (reports hx bilateral carpal tunnel) and some possibly weakness after holding things for a time.    Skin:  Negative for pallor and rash.   Neurological:  Positive for dizziness (gets vertigo if has rapid movement around her.), weakness and numbness. Negative for headaches.   Psychiatric/Behavioral:  Positive for sleep disturbance. Negative for agitation. The patient is not nervous/anxious.            Objective:        Vitals:    08/21/23 1312   BP: 118/66   BP Location: Right arm   Patient Position: Sitting   BP Method: Medium (Manual)   Pulse: 80   Resp: 16   Temp: 98 °F (36.7 °C)   TempSrc: Temporal   SpO2: 98%   Weight: 66 kg (145 lb 9.8 oz)   Height: 5' 3" (1.6 m)       Body mass index is 25.79 kg/m².      Physical Exam  Vitals reviewed.   Constitutional:       General: She is not in acute distress.     Appearance: Normal appearance. She is not ill-appearing.   HENT:      Head: Normocephalic and atraumatic.      Right Ear: External ear normal. There is no impacted cerumen.      Left Ear: External ear normal. There is no impacted cerumen.      Ears:      Comments: Right TM dry. Left TM with small bead of fluid.      Nose: No rhinorrhea.      Mouth/Throat:      Pharynx: No posterior oropharyngeal erythema.   Eyes:      General:         Right eye: No discharge.         Left eye: No discharge.      Conjunctiva/sclera: Conjunctivae normal.   Cardiovascular:      Rate and Rhythm: Normal rate and regular rhythm.      Pulses: Normal pulses.      Heart sounds: Normal heart sounds. No murmur heard.  Pulmonary:      Effort: Pulmonary effort is normal.      Breath sounds: Normal breath sounds. No wheezing. "   Musculoskeletal:         General: Tenderness present.      Right lower leg: No edema.      Left lower leg: No edema.      Comments: Tender to triceps bilaterall, and to glenohumeral joint, AC bilaterally and spine of scapula.     Has poor effort on arm exam, strength 4/5  and flex/extend bilaterally but does not appear to be trying very hard.    Lymphadenopathy:      Cervical: No cervical adenopathy.   Skin:     Capillary Refill: Capillary refill takes less than 2 seconds.      Coloration: Skin is not jaundiced or pale.      Findings: No rash.   Neurological:      General: No focal deficit present.      Mental Status: She is alert and oriented to person, place, and time.      Motor: Weakness present.   Psychiatric:         Mood and Affect: Mood normal.         Behavior: Behavior normal.             Lab Results   Component Value Date     04/05/2023    K 3.8 04/05/2023     04/05/2023    CO2 21 (L) 04/05/2023    BUN 11 04/05/2023    CREATININE 0.7 04/05/2023    ANIONGAP 11 04/05/2023     Lab Results   Component Value Date    HGBA1C 5.9 (H) 04/05/2023     Lab Results   Component Value Date    BNP 34 09/10/2021       Lab Results   Component Value Date    WBC 11.47 04/05/2023    HGB 15.3 04/05/2023    HCT 47.4 04/05/2023     04/05/2023    GRAN 7.0 04/05/2023    GRAN 60.5 04/05/2023     Lab Results   Component Value Date    CHOL 230 (H) 08/02/2022    HDL 48 08/02/2022    LDLCALC 150.4 08/02/2022    TRIG 158 (H) 08/02/2022          Current Outpatient Medications:     amLODIPine (NORVASC) 10 MG tablet, Take 1 tablet (10 mg total) by mouth once daily., Disp: 90 tablet, Rfl: 3    fluticasone propionate (FLONASE) 50 mcg/actuation nasal spray, 1 spray (50 mcg total) by Each Nostril route 2 (two) times daily., Disp: 16 g, Rfl: 3    ibuprofen (ADVIL,MOTRIN) 800 MG tablet, Take 1 tablet (800 mg total) by mouth 3 (three) times daily as needed for Pain., Disp: 60 tablet, Rfl: 2    levocetirizine (XYZAL) 5 MG  tablet, Take 1 tablet (5 mg total) by mouth every evening., Disp: 30 tablet, Rfl: 3    meloxicam (MOBIC) 7.5 MG tablet, After prednisone complete Mobic 7.5 mg 1 p.o. b.i.d. for swelling no other NSAIDs can take with Tylenol, Disp: 60 tablet, Rfl: 5    montelukast (SINGULAIR) 10 mg tablet, TAKE 1 TABLET(10 MG) BY MOUTH EVERY EVENING, Disp: 90 tablet, Rfl: 1    norethindrone (MICRONOR) 0.35 mg tablet, TAKE 1 TABLET(0.35 MG) BY MOUTH EVERY DAY, Disp: 28 tablet, Rfl: 1    pantoprazole (PROTONIX) 40 MG tablet, Take 1 tablet (40 mg total) by mouth once daily., Disp: 90 tablet, Rfl: 3    methocarbamoL (ROBAXIN) 500 MG Tab, Take 1 tablet (500 mg total) by mouth nightly., Disp: 30 tablet, Rfl: 3    MYRBETRIQ 50 mg Tb24, Take 1 tablet by mouth., Disp: , Rfl:      Outpatient Encounter Medications as of 8/21/2023   Medication Sig Dispense Refill    amLODIPine (NORVASC) 10 MG tablet Take 1 tablet (10 mg total) by mouth once daily. 90 tablet 3    fluticasone propionate (FLONASE) 50 mcg/actuation nasal spray 1 spray (50 mcg total) by Each Nostril route 2 (two) times daily. 16 g 3    ibuprofen (ADVIL,MOTRIN) 800 MG tablet Take 1 tablet (800 mg total) by mouth 3 (three) times daily as needed for Pain. 60 tablet 2    levocetirizine (XYZAL) 5 MG tablet Take 1 tablet (5 mg total) by mouth every evening. 30 tablet 3    meloxicam (MOBIC) 7.5 MG tablet After prednisone complete Mobic 7.5 mg 1 p.o. b.i.d. for swelling no other NSAIDs can take with Tylenol 60 tablet 5    montelukast (SINGULAIR) 10 mg tablet TAKE 1 TABLET(10 MG) BY MOUTH EVERY EVENING 90 tablet 1    norethindrone (MICRONOR) 0.35 mg tablet TAKE 1 TABLET(0.35 MG) BY MOUTH EVERY DAY 28 tablet 1    pantoprazole (PROTONIX) 40 MG tablet Take 1 tablet (40 mg total) by mouth once daily. 90 tablet 3    methocarbamoL (ROBAXIN) 500 MG Tab Take 1 tablet (500 mg total) by mouth nightly. 30 tablet 3    MYRBETRIQ 50 mg Tb24 Take 1 tablet by mouth.      [DISCONTINUED]  methen-m.blue-s.phos-phsal-hyo (URIBEL) 118-10-40.8-36 mg Cap Take 1 capsule by mouth 4 (four) times daily as needed (bladder pain). 60 capsule 4    [DISCONTINUED] norethindrone (ORTHO MICRONOR) 0.35 mg tablet Take 1 tablet (0.35 mg total) by mouth once daily. 30 tablet 11    [DISCONTINUED] vibegron (GEMTESA) 75 mg Tab Take 75 mg by mouth nightly. 30 tablet 11     No facility-administered encounter medications on file as of 8/21/2023.          Assessment:       1. Lumbar radiculitis    2. Chronic pain of both shoulders    3. Cervical spondylosis    4. DDD (degenerative disc disease), cervical    5. Bilateral hand numbness           Plan:       Lumbar radiculitis  -     methocarbamoL (ROBAXIN) 500 MG Tab; Take 1 tablet (500 mg total) by mouth nightly.  Dispense: 30 tablet; Refill: 3    Chronic pain of both shoulders  -     X-Ray Shoulder 2 or More views Bilateral; Future; Expected date: 08/21/2023  -     Ambulatory referral/consult to Physical/Occupational Therapy; Future; Expected date: 08/28/2023  -     methocarbamoL (ROBAXIN) 500 MG Tab; Take 1 tablet (500 mg total) by mouth nightly.  Dispense: 30 tablet; Refill: 3    Cervical spondylosis  -     X-Ray Shoulder 2 or More views Bilateral; Future; Expected date: 08/21/2023  -     Ambulatory referral/consult to Physical/Occupational Therapy; Future; Expected date: 08/28/2023    DDD (degenerative disc disease), cervical    Bilateral hand numbness  -     X-Ray Shoulder 2 or More views Bilateral; Future; Expected date: 08/21/2023  -     Ambulatory referral/consult to Physical/Occupational Therapy; Future; Expected date: 08/28/2023  -     methocarbamoL (ROBAXIN) 500 MG Tab; Take 1 tablet (500 mg total) by mouth nightly.  Dispense: 30 tablet; Refill: 3                  Chronic pain of both shoulders  -     X-Ray Shoulder 2 or More views Bilateral; Future; Expected date: 08/21/2023  -     Ambulatory referral/consult to Physical/Occupational Therapy; Future; Expected date:  08/28/2023  -     methocarbamoL (ROBAXIN) 500 MG Tab; Take 1 tablet (500 mg total) by mouth nightly.  Dispense: 30 tablet; Refill: 3   - cervical MRI reassuring.   -  bilateral upper arm and shoulder pain.  - using 2 NSAIDS, advised needs to pick 1.  Will stop Meloxicam.  - use Ibuprofen 800mg up to 3 times daily, but ideally less.  - start Robaxin at [facility] to help with pain and sleep.  - start PT to help with pain.    Lumbar radiculitis  -     methocarbamoL (ROBAXIN) 500 MG Tab; Take 1 tablet (500 mg total) by mouth nightly.  Dispense: 30 tablet; Refill: 3    Cervical spondylosis  -     X-Ray Shoulder 2 or More views Bilateral; Future; Expected date: 08/21/2023  -     Ambulatory referral/consult to Physical/Occupational Therapy; Future; Expected date: 08/28/2023     DDD (degenerative disc disease), cervical  - doing PT for lower back pain.      Bilateral hand numbness  -     X-Ray Shoulder 2 or More views Bilateral; Future; Expected date: 08/21/2023  -     Ambulatory referral/consult to Physical/Occupational Therapy; Future; Expected date: 08/28/2023  -     methocarbamoL (ROBAXIN) 500 MG Tab; Take 1 tablet (500 mg total) by mouth nightly.  Dispense: 30 tablet; Refill: 3

## 2023-08-21 NOTE — PATIENT INSTRUCTIONS
Chronic pain of both shoulders  -     X-Ray Shoulder 2 or More views Bilateral; Future; Expected date: 08/21/2023  -     Ambulatory referral/consult to Physical/Occupational Therapy; Future; Expected date: 08/28/2023  -     methocarbamoL (ROBAXIN) 500 MG Tab; Take 1 tablet (500 mg total) by mouth nightly.  Dispense: 30 tablet; Refill: 3   - cervical MRI reassuring.   -  bilateral upper arm and shoulder pain.  - using 2 NSAIDS, advised needs to pick 1.  Will stop Meloxicam.  - use Ibuprofen 800mg up to 3 times daily, but ideally less.  - start Robaxin at [facility] to help with pain and sleep.  - start PT to help with pain.    Lumbar radiculitis  -     methocarbamoL (ROBAXIN) 500 MG Tab; Take 1 tablet (500 mg total) by mouth nightly.  Dispense: 30 tablet; Refill: 3    Cervical spondylosis  -     X-Ray Shoulder 2 or More views Bilateral; Future; Expected date: 08/21/2023  -     Ambulatory referral/consult to Physical/Occupational Therapy; Future; Expected date: 08/28/2023     DDD (degenerative disc disease), cervical  - doing PT for lower back pain.      Bilateral hand numbness  -     X-Ray Shoulder 2 or More views Bilateral; Future; Expected date: 08/21/2023  -     Ambulatory referral/consult to Physical/Occupational Therapy; Future; Expected date: 08/28/2023  -     methocarbamoL (ROBAXIN) 500 MG Tab; Take 1 tablet (500 mg total) by mouth nightly.  Dispense: 30 tablet; Refill: 3

## 2023-09-08 ENCOUNTER — OFFICE VISIT (OUTPATIENT)
Dept: OBSTETRICS AND GYNECOLOGY | Facility: CLINIC | Age: 53
End: 2023-09-08
Payer: MEDICARE

## 2023-09-08 ENCOUNTER — TELEPHONE (OUTPATIENT)
Dept: PRIMARY CARE CLINIC | Facility: CLINIC | Age: 53
End: 2023-09-08
Payer: MEDICARE

## 2023-09-08 VITALS
WEIGHT: 144.19 LBS | HEART RATE: 83 BPM | BODY MASS INDEX: 25.54 KG/M2 | DIASTOLIC BLOOD PRESSURE: 66 MMHG | SYSTOLIC BLOOD PRESSURE: 116 MMHG

## 2023-09-08 DIAGNOSIS — R42 VERTIGO: Primary | ICD-10-CM

## 2023-09-08 DIAGNOSIS — Z00.00 ANNUAL PHYSICAL EXAM: Primary | ICD-10-CM

## 2023-09-08 DIAGNOSIS — Z12.31 ENCOUNTER FOR SCREENING MAMMOGRAM FOR MALIGNANT NEOPLASM OF BREAST: ICD-10-CM

## 2023-09-08 DIAGNOSIS — N91.2 AMENORRHEA, UNSPECIFIED: ICD-10-CM

## 2023-09-08 LAB
B-HCG UR QL: NEGATIVE
CTP QC/QA: YES

## 2023-09-08 PROCEDURE — 3074F SYST BP LT 130 MM HG: CPT | Mod: HCNC,CPTII,S$GLB, | Performed by: OBSTETRICS & GYNECOLOGY

## 2023-09-08 PROCEDURE — 1159F MED LIST DOCD IN RCRD: CPT | Mod: HCNC,CPTII,S$GLB, | Performed by: OBSTETRICS & GYNECOLOGY

## 2023-09-08 PROCEDURE — 1160F RVW MEDS BY RX/DR IN RCRD: CPT | Mod: HCNC,CPTII,S$GLB, | Performed by: OBSTETRICS & GYNECOLOGY

## 2023-09-08 PROCEDURE — 3074F PR MOST RECENT SYSTOLIC BLOOD PRESSURE < 130 MM HG: ICD-10-PCS | Mod: HCNC,CPTII,S$GLB, | Performed by: OBSTETRICS & GYNECOLOGY

## 2023-09-08 PROCEDURE — 81025 URINE PREGNANCY TEST: CPT | Mod: HCNC,S$GLB,, | Performed by: OBSTETRICS & GYNECOLOGY

## 2023-09-08 PROCEDURE — 3044F PR MOST RECENT HEMOGLOBIN A1C LEVEL <7.0%: ICD-10-PCS | Mod: HCNC,CPTII,S$GLB, | Performed by: OBSTETRICS & GYNECOLOGY

## 2023-09-08 PROCEDURE — 99999 PR PBB SHADOW E&M-EST. PATIENT-LVL III: CPT | Mod: PBBFAC,HCNC,, | Performed by: OBSTETRICS & GYNECOLOGY

## 2023-09-08 PROCEDURE — 3044F HG A1C LEVEL LT 7.0%: CPT | Mod: HCNC,CPTII,S$GLB, | Performed by: OBSTETRICS & GYNECOLOGY

## 2023-09-08 PROCEDURE — G0101 PR CA SCREEN;PELVIC/BREAST EXAM: ICD-10-PCS | Mod: HCNC,GZ,S$GLB, | Performed by: OBSTETRICS & GYNECOLOGY

## 2023-09-08 PROCEDURE — 3078F DIAST BP <80 MM HG: CPT | Mod: HCNC,CPTII,S$GLB, | Performed by: OBSTETRICS & GYNECOLOGY

## 2023-09-08 PROCEDURE — 3008F BODY MASS INDEX DOCD: CPT | Mod: HCNC,CPTII,S$GLB, | Performed by: OBSTETRICS & GYNECOLOGY

## 2023-09-08 PROCEDURE — 81025 POCT URINE PREGNANCY: ICD-10-PCS | Mod: HCNC,S$GLB,, | Performed by: OBSTETRICS & GYNECOLOGY

## 2023-09-08 PROCEDURE — 3078F PR MOST RECENT DIASTOLIC BLOOD PRESSURE < 80 MM HG: ICD-10-PCS | Mod: HCNC,CPTII,S$GLB, | Performed by: OBSTETRICS & GYNECOLOGY

## 2023-09-08 PROCEDURE — 1159F PR MEDICATION LIST DOCUMENTED IN MEDICAL RECORD: ICD-10-PCS | Mod: HCNC,CPTII,S$GLB, | Performed by: OBSTETRICS & GYNECOLOGY

## 2023-09-08 PROCEDURE — 3008F PR BODY MASS INDEX (BMI) DOCUMENTED: ICD-10-PCS | Mod: HCNC,CPTII,S$GLB, | Performed by: OBSTETRICS & GYNECOLOGY

## 2023-09-08 PROCEDURE — 1160F PR REVIEW ALL MEDS BY PRESCRIBER/CLIN PHARMACIST DOCUMENTED: ICD-10-PCS | Mod: HCNC,CPTII,S$GLB, | Performed by: OBSTETRICS & GYNECOLOGY

## 2023-09-08 PROCEDURE — 99999 PR PBB SHADOW E&M-EST. PATIENT-LVL III: ICD-10-PCS | Mod: PBBFAC,HCNC,, | Performed by: OBSTETRICS & GYNECOLOGY

## 2023-09-08 PROCEDURE — G0101 CA SCREEN;PELVIC/BREAST EXAM: HCPCS | Mod: HCNC,GZ,S$GLB, | Performed by: OBSTETRICS & GYNECOLOGY

## 2023-09-08 RX ORDER — ACETAMINOPHEN AND CODEINE PHOSPHATE 120; 12 MG/5ML; MG/5ML
1 SOLUTION ORAL WEEKLY
Qty: 28 TABLET | Refills: 11 | Status: SHIPPED | OUTPATIENT
Start: 2023-09-08

## 2023-09-08 NOTE — TELEPHONE ENCOUNTER
----- Message from Tico Ramirez MD sent at 9/8/2023 12:09 PM CDT -----  Bishop-    I saw Carol today for a GYN annual. As I was set to do her pelvic and breast exam she had a significant vertigo episode that did improve as the visit went on. I was unable to do her exam.    I tried to ask her if she ever saw neurology for this but couldn't get a clear answer.    Do you know if she saw neurology in the past? The way she describes it to me is it has quite a negative impact on her quality of life.    Just checking in and seeing if you have any other thoughts.    Kirk

## 2023-09-08 NOTE — PROGRESS NOTES
GYN Annual exam  2023    Chief Complaint   Patient presents with    Well Woman         HISTORY OF PRESENT ILLNESS:  Pt is a  52 y.o.  alert female who presents today for GYN annual exam. She has periods that are spacing out and becoming lighter. Last period was in May 2023 and then her next was not until 23 with dark, light blood. She has some hot flashes.  Sexually active with her  without issues. Feels safe.   She denies concerns with pelvic pain.  She has HTN well controlled on amlodipine. She has been taking micronor.   She denies breast concerns.   Her main concern today is many years of dizziness and vertigo. She is followed by her PCP for this.     Patient's last menstrual period was 2023 (exact date).    Review of patient's allergies indicates:   Allergen Reactions    Augmentin [amoxicillin-pot clavulanate] Rash     Rash to arms, legs, shoulders and torso.  States has happened 2 times with this medication.    Iodine and iodide containing products Rash    Gemtesa [vibegron] Other (See Comments)     Weakness & dizziness    Shellfish containing products Hives     Other reaction(s): Hives    Watermelon Other (See Comments)     Irritates esophagus    Amoxapine Rash    Papaya Rash    Penicillins Rash       Current Outpatient Medications on File Prior to Visit   Medication Sig Dispense Refill    amLODIPine (NORVASC) 10 MG tablet Take 1 tablet (10 mg total) by mouth once daily. 90 tablet 3    fluticasone propionate (FLONASE) 50 mcg/actuation nasal spray 1 spray (50 mcg total) by Each Nostril route 2 (two) times daily. 16 g 3    ibuprofen (ADVIL,MOTRIN) 800 MG tablet Take 1 tablet (800 mg total) by mouth 3 (three) times daily as needed for Pain. 60 tablet 2    levocetirizine (XYZAL) 5 MG tablet Take 1 tablet (5 mg total) by mouth every evening. 30 tablet 3    meloxicam (MOBIC) 7.5 MG tablet After prednisone complete Mobic 7.5 mg 1 p.o. b.i.d. for swelling no other NSAIDs can take with  Tylenol 60 tablet 5    methocarbamoL (ROBAXIN) 500 MG Tab Take 1 tablet (500 mg total) by mouth nightly. 30 tablet 3    montelukast (SINGULAIR) 10 mg tablet TAKE 1 TABLET(10 MG) BY MOUTH EVERY EVENING 90 tablet 1    MYRBETRIQ 50 mg Tb24 Take 1 tablet by mouth.      pantoprazole (PROTONIX) 40 MG tablet Take 1 tablet (40 mg total) by mouth once daily. 90 tablet 3    [DISCONTINUED] norethindrone (MICRONOR) 0.35 mg tablet TAKE 1 TABLET(0.35 MG) BY MOUTH EVERY DAY 28 tablet 1     No current facility-administered medications on file prior to visit.       Past Medical History:   Diagnosis Date    Allergy     Angio-edema     Degenerative disc disease     Urticaria     Vertigo             Past Surgical History:   Procedure Laterality Date     SECTION      EPIDURAL STEROID INJECTION Bilateral 2023    Procedure: Injection, Steroid, Epidural;  Surgeon: Andi Watson MD;  Location: [facility];  Service: Neurosurgery;  Laterality: Bilateral;  Procedure:Bilateral SI joint block and steriod injection  Length of procedure:30 minutes  LOS:0 minutes  Anesthesia:MAC  Radiology:C-arm  Bed:Regular Bed  Position:Prone    TONSILLECTOMY      TRANSFORAMINAL EPIDURAL INJECTION OF STEROID Bilateral 3/21/2019    Procedure: Injection,steroid,epidural,transforaminal approach---bilateral L5/S1;  Surgeon: Sridhar Albert III, MD;  Location: [facility];  Service: Pain Management;  Laterality: Bilateral;       Social History     Socioeconomic History    Marital status:    Tobacco Use    Smoking status: Never     Passive exposure: Never    Smokeless tobacco: Never   Substance and Sexual Activity    Alcohol use: No    Drug use: No    Sexual activity: Yes     Partners: Male     Birth control/protection: OCP                     Family History   Problem Relation Age of Onset    Asthma Mother     Heart disease Mother     Cancer Father     Breast cancer Paternal Cousin     Colon cancer Neg Hx     Ovarian cancer Neg Hx        OB  History    Para Term  AB Living   2 1 1   1     SAB IAB Ectopic Multiple Live Births   1              # Outcome Date GA Lbr Darrick/2nd Weight Sex Delivery Anes PTL Lv   2 SAB            1 Term                  Gynecological History:     Menses monthly and regular. Denies hx of abnormal paps. Last pap NILM/HPV negative on 21.     REVIEW OF SYSTEMS:  Negative except as above.       PHYSICAL EXAM  /66   Pulse 83   Wt 65.4 kg (144 lb 2.9 oz)   LMP 2023 (Exact Date)   BMI 25.54 kg/m²   GENERAL APPEARANCE:  The patient is a pleasant, normal appearing female with normal affect and in no distress.  When I had the patient lie back for the exam she got worsening vertigo and it took approximately 5 minutes for this to pass. She declined any further exam today and wanted to go home and rest.       ASSESSMENT/PLAN:  Pt is a  52 y.o.  alert female who presents today for GYN annual exam.    - Pap up to date. NILM/HPV negative on 21. Denies hx of abnormal paps.   - Contraception:  Micronor prescribed.   - Mammogram: the most current breast cancer screening guidelines were discussed with the patient; bilateral screening mammogram was discussed and offered which the patient is amenable to; order placed for imaging;     -Oligomenorrhea: Likely perimenopause. UPT negative. Reviewed absence of menses for 12 months = menopause.     - Cologard ordered by PCP. Encouraged her to do this test.   - Pt has no major risk factors that would necessitate DEXA prior to the recommended age of 65  - We discussed consistent weight bearing aerobic exercise, consistent seatbelt use, and consistent women's multivitamin use with both vitamin D and calcium supplementation (dosing discussed - pt to aim for vitamin D 600 international units daily and calcium 1200 mg daily); self-breast awareness was discussed and taught; the new Pap smear guidelines were reviewed     Vertigo: Encouraged her to f/u with PCP to see  if referral to neurology is indicated. Messaged her PCP.    Follow up in the near future to retry doing physical exam.    Pt voiced understanding of all counseling and instructions; no barriers to learning      Tico Ramirez  9/8/2023

## 2023-09-14 ENCOUNTER — HOSPITAL ENCOUNTER (OUTPATIENT)
Dept: RADIOLOGY | Facility: HOSPITAL | Age: 53
Discharge: HOME | End: 2023-09-14
Attending: OBSTETRICS & GYNECOLOGY
Payer: MEDICARE

## 2023-09-14 DIAGNOSIS — Z12.31 ENCOUNTER FOR SCREENING MAMMOGRAM FOR MALIGNANT NEOPLASM OF BREAST: ICD-10-CM

## 2023-09-14 PROCEDURE — 77067 SCR MAMMO BI INCL CAD: CPT | Mod: 26,HCNC,, | Performed by: RADIOLOGY

## 2023-09-14 PROCEDURE — 77067 SCR MAMMO BI INCL CAD: CPT | Mod: TC,HCNC

## 2023-09-14 PROCEDURE — 77063 MAMMO DIGITAL SCREENING BILAT WITH TOMO: ICD-10-PCS | Mod: 26,HCNC,, | Performed by: RADIOLOGY

## 2023-09-14 PROCEDURE — 77067 MAMMO DIGITAL SCREENING BILAT WITH TOMO: ICD-10-PCS | Mod: 26,HCNC,, | Performed by: RADIOLOGY

## 2023-09-14 PROCEDURE — 77063 BREAST TOMOSYNTHESIS BI: CPT | Mod: 26,HCNC,, | Performed by: RADIOLOGY

## 2023-09-18 ENCOUNTER — PATIENT MESSAGE (OUTPATIENT)
Dept: PRIMARY CARE CLINIC | Facility: CLINIC | Age: 53
End: 2023-09-18
Payer: MEDICARE

## 2023-09-20 ENCOUNTER — TELEPHONE (OUTPATIENT)
Dept: ENDOSCOPY | Facility: HOSPITAL | Age: 53
End: 2023-09-20
Payer: MEDICARE

## 2023-09-20 NOTE — TELEPHONE ENCOUNTER
Contacted the patient to schedule an endoscopy procedure(s):  colonoscopy. Scheduling refused- patient stated that she is going to ask her provider if she can do Cologuard instead.

## 2023-09-29 ENCOUNTER — OFFICE VISIT (OUTPATIENT)
Dept: OBSTETRICS AND GYNECOLOGY | Facility: CLINIC | Age: 53
End: 2023-09-29
Payer: MEDICARE

## 2023-09-29 VITALS
HEART RATE: 82 BPM | WEIGHT: 143.94 LBS | BODY MASS INDEX: 25.5 KG/M2 | SYSTOLIC BLOOD PRESSURE: 120 MMHG | DIASTOLIC BLOOD PRESSURE: 75 MMHG

## 2023-09-29 DIAGNOSIS — R23.2 HOT FLASHES: Primary | ICD-10-CM

## 2023-09-29 PROCEDURE — 1160F RVW MEDS BY RX/DR IN RCRD: CPT | Mod: HCNC,CPTII,S$GLB, | Performed by: OBSTETRICS & GYNECOLOGY

## 2023-09-29 PROCEDURE — 3074F PR MOST RECENT SYSTOLIC BLOOD PRESSURE < 130 MM HG: ICD-10-PCS | Mod: HCNC,CPTII,S$GLB, | Performed by: OBSTETRICS & GYNECOLOGY

## 2023-09-29 PROCEDURE — 1160F PR REVIEW ALL MEDS BY PRESCRIBER/CLIN PHARMACIST DOCUMENTED: ICD-10-PCS | Mod: HCNC,CPTII,S$GLB, | Performed by: OBSTETRICS & GYNECOLOGY

## 2023-09-29 PROCEDURE — 3008F BODY MASS INDEX DOCD: CPT | Mod: HCNC,CPTII,S$GLB, | Performed by: OBSTETRICS & GYNECOLOGY

## 2023-09-29 PROCEDURE — 1159F MED LIST DOCD IN RCRD: CPT | Mod: HCNC,CPTII,S$GLB, | Performed by: OBSTETRICS & GYNECOLOGY

## 2023-09-29 PROCEDURE — 3078F PR MOST RECENT DIASTOLIC BLOOD PRESSURE < 80 MM HG: ICD-10-PCS | Mod: HCNC,CPTII,S$GLB, | Performed by: OBSTETRICS & GYNECOLOGY

## 2023-09-29 PROCEDURE — 99999 PR PBB SHADOW E&M-EST. PATIENT-LVL III: CPT | Mod: PBBFAC,HCNC,, | Performed by: OBSTETRICS & GYNECOLOGY

## 2023-09-29 PROCEDURE — 3074F SYST BP LT 130 MM HG: CPT | Mod: HCNC,CPTII,S$GLB, | Performed by: OBSTETRICS & GYNECOLOGY

## 2023-09-29 PROCEDURE — 3044F HG A1C LEVEL LT 7.0%: CPT | Mod: HCNC,CPTII,S$GLB, | Performed by: OBSTETRICS & GYNECOLOGY

## 2023-09-29 PROCEDURE — 99213 PR OFFICE/OUTPT VISIT, EST, LEVL III, 20-29 MIN: ICD-10-PCS | Mod: HCNC,S$GLB,, | Performed by: OBSTETRICS & GYNECOLOGY

## 2023-09-29 PROCEDURE — 3044F PR MOST RECENT HEMOGLOBIN A1C LEVEL <7.0%: ICD-10-PCS | Mod: HCNC,CPTII,S$GLB, | Performed by: OBSTETRICS & GYNECOLOGY

## 2023-09-29 PROCEDURE — 3078F DIAST BP <80 MM HG: CPT | Mod: HCNC,CPTII,S$GLB, | Performed by: OBSTETRICS & GYNECOLOGY

## 2023-09-29 PROCEDURE — 99213 OFFICE O/P EST LOW 20 MIN: CPT | Mod: HCNC,S$GLB,, | Performed by: OBSTETRICS & GYNECOLOGY

## 2023-09-29 PROCEDURE — 3008F PR BODY MASS INDEX (BMI) DOCUMENTED: ICD-10-PCS | Mod: HCNC,CPTII,S$GLB, | Performed by: OBSTETRICS & GYNECOLOGY

## 2023-09-29 PROCEDURE — 1159F PR MEDICATION LIST DOCUMENTED IN MEDICAL RECORD: ICD-10-PCS | Mod: HCNC,CPTII,S$GLB, | Performed by: OBSTETRICS & GYNECOLOGY

## 2023-09-29 PROCEDURE — 99999 PR PBB SHADOW E&M-EST. PATIENT-LVL III: ICD-10-PCS | Mod: PBBFAC,HCNC,, | Performed by: OBSTETRICS & GYNECOLOGY

## 2023-09-29 RX ORDER — PAROXETINE 10 MG/1
10 TABLET, FILM COATED ORAL EVERY MORNING
Qty: 30 TABLET | Refills: 3 | Status: SHIPPED | OUTPATIENT
Start: 2023-09-29 | End: 2024-02-07

## 2023-09-30 NOTE — PROGRESS NOTES
GYN follow up  2023    Chief Complaint   Patient presents with    Gynecologic Exam    Well Woman         HISTORY OF PRESENT ILLNESS:  Pt is a  52 y.o.  alert female who presents today for GYN follow up. She recently had an annual exam. She felt dizziness during the exam so the physical exam was deferred by the patient.   She notes her hot flashes have worsened. She has tried conservative measures. She notes they are moderate to severe and mostly at night.   She has periods that are spacing out and becoming lighter. Last period was in May 2023 and then her next was not until 23 with dark, light blood. She has some hot flashes.  Sexually active with her  without issues. Feels safe.   She denies concerns with pelvic pain.  She has HTN well controlled on amlodipine. She has been taking micronor.   She denies breast concerns.   She is seeing neurology in the near future for her dizziness.     Patient's last menstrual period was 2023 (exact date).    Review of patient's allergies indicates:   Allergen Reactions    Augmentin [amoxicillin-pot clavulanate] Rash     Rash to arms, legs, shoulders and torso.  States has happened 2 times with this medication.    Iodine and iodide containing products Rash    Gemtesa [vibegron] Other (See Comments)     Weakness & dizziness    Shellfish containing products Hives     Other reaction(s): Hives    Watermelon Other (See Comments)     Irritates esophagus    Amoxapine Rash    Papaya Rash    Penicillins Rash       Current Outpatient Medications on File Prior to Visit   Medication Sig Dispense Refill    amLODIPine (NORVASC) 10 MG tablet Take 1 tablet (10 mg total) by mouth once daily. 90 tablet 3    fluticasone propionate (FLONASE) 50 mcg/actuation nasal spray 1 spray (50 mcg total) by Each Nostril route 2 (two) times daily. 16 g 3    ibuprofen (ADVIL,MOTRIN) 800 MG tablet Take 1 tablet (800 mg total) by mouth 3 (three) times daily as needed for Pain. 60 tablet  2    levocetirizine (XYZAL) 5 MG tablet Take 1 tablet (5 mg total) by mouth every evening. 30 tablet 3    meloxicam (MOBIC) 7.5 MG tablet After prednisone complete Mobic 7.5 mg 1 p.o. b.i.d. for swelling no other NSAIDs can take with Tylenol 60 tablet 5    methocarbamoL (ROBAXIN) 500 MG Tab Take 1 tablet (500 mg total) by mouth nightly. 30 tablet 3    montelukast (SINGULAIR) 10 mg tablet TAKE 1 TABLET(10 MG) BY MOUTH EVERY EVENING 90 tablet 1    MYRBETRIQ 50 mg Tb24 Take 1 tablet by mouth.      norethindrone (MICRONOR) 0.35 mg tablet Take 1 tablet (0.35 mg total) by mouth once a week. 28 tablet 11    pantoprazole (PROTONIX) 40 MG tablet Take 1 tablet (40 mg total) by mouth once daily. 90 tablet 3     No current facility-administered medications on file prior to visit.       Past Medical History:   Diagnosis Date    Allergy     Angio-edema     Degenerative disc disease     Urticaria     Vertigo             Past Surgical History:   Procedure Laterality Date     SECTION      EPIDURAL STEROID INJECTION Bilateral 2023    Procedure: Injection, Steroid, Epidural;  Surgeon: Andi Watson MD;  [facility];  Service: Neurosurgery;  Laterality: Bilateral;  Procedure:Bilateral SI joint block and steriod injection  Length of procedure:30 minutes  LOS:0 minutes  Anesthesia:MAC  Radiology:C-arm  Bed:Regular Bed  Position:Prone    TONSILLECTOMY      TRANSFORAMINAL EPIDURAL INJECTION OF STEROID Bilateral 3/21/2019    Procedure: Injection,steroid,epidural,transforaminal approach---bilateral L5/S1;  Surgeon: Sridhar Albert III, MD;  Location: [facility];  Service: Pain Management;  Laterality: Bilateral;       Social History     Socioeconomic History    Marital status:    Tobacco Use    Smoking status: Never     Passive exposure: Never    Smokeless tobacco: Never   Substance and Sexual Activity    Alcohol use: No    Drug use: No    Sexual activity: Yes     Partners: Male     Birth  control/protection: OCP                     Family History   Problem Relation Age of Onset    Asthma Mother     Heart disease Mother     Cancer Father     Breast cancer Paternal Cousin     Colon cancer Neg Hx     Ovarian cancer Neg Hx        OB History    Para Term  AB Living   2 1 1   1     SAB IAB Ectopic Multiple Live Births   1              # Outcome Date GA Lbr Darrick/2nd Weight Sex Delivery Anes PTL Lv   2 SAB            1 Term                  Gynecological History:     Menses monthly and regular. Denies hx of abnormal paps. Last pap NILM/HPV negative on 21.     REVIEW OF SYSTEMS:  Negative except as above.       PHYSICAL EXAM  /75   Pulse 82   Wt 65.3 kg (143 lb 15.4 oz)   LMP 2023 (Exact Date)   BMI 25.50 kg/m²   GENERAL APPEARANCE:  The patient is a pleasant, normal appearing female with normal affect and in no distress.  Breast exam completed supine. Normal breast exam bilaterally. Negative for masses, skin changes, tenderness. Axillary exam bilaterally did not show any enlarged lymph nodes or tenderness.  :  Vulva: Inspection of her external genitalia reveals normal mons pubis, labia minora and labia majora.  Normal appearing clitoris, urethral meatus and [place]'s glands.    Bladder:  No evidence of urethral or bladder tenderness.    Vagina:  Speculum exam reveals pink and moist vaginal mucosa.  Bartholin gland is normal to palpation.  Cervix:  Cervix is normal in appearance with no lesions.  There is no cervical motion tenderness.  Uterus:  Uterus is normal size, mobile and non-tender.  No adnexal masses are palpated.  Adnexa are non-tender to palpation  Perineum:  Perineum appears normal.  Anus:  Normal with no apparent lesions.        ASSESSMENT/PLAN:  Pt is a  52 y.o.  alert female who presents today for GYN follow up for physical exam and to discuss hot flashes.     - Pap up to date. NILM/HPV negative on 21. Denies hx of abnormal paps.   - Contraception:   Micronor   - Mammogram: birads 1 on 9/14/23    Hot flashes: Likely due to perimenopause. Discussed hormonal and non hormonal options. Discussed     Pt voiced understanding of all counseling and instructions; no barriers to learning paxil. She would like to try this. Risks and benefits reviewed. 10 mg paroxetine sent to preferred pharmacy.     F/U in 3 months to review hot flashes      Tico Ramirez  9/29/2023

## 2023-10-04 ENCOUNTER — OFFICE VISIT (OUTPATIENT)
Dept: PRIMARY CARE CLINIC | Facility: CLINIC | Age: 53
End: 2023-10-04
Payer: MEDICARE

## 2023-10-04 VITALS
SYSTOLIC BLOOD PRESSURE: 120 MMHG | RESPIRATION RATE: 16 BRPM | WEIGHT: 139.88 LBS | HEART RATE: 73 BPM | BODY MASS INDEX: 24.79 KG/M2 | TEMPERATURE: 98 F | OXYGEN SATURATION: 97 % | HEIGHT: 63 IN | DIASTOLIC BLOOD PRESSURE: 68 MMHG

## 2023-10-04 DIAGNOSIS — R42 VERTIGO: Primary | ICD-10-CM

## 2023-10-04 DIAGNOSIS — H69.93 DYSFUNCTION OF BOTH EUSTACHIAN TUBES: ICD-10-CM

## 2023-10-04 DIAGNOSIS — H93.13 TINNITUS OF BOTH EARS: ICD-10-CM

## 2023-10-04 DIAGNOSIS — R20.0 ARM NUMBNESS: ICD-10-CM

## 2023-10-04 PROCEDURE — 3074F PR MOST RECENT SYSTOLIC BLOOD PRESSURE < 130 MM HG: ICD-10-PCS | Mod: HCNC,CPTII,S$GLB, | Performed by: STUDENT IN AN ORGANIZED HEALTH CARE EDUCATION/TRAINING PROGRAM

## 2023-10-04 PROCEDURE — 1159F PR MEDICATION LIST DOCUMENTED IN MEDICAL RECORD: ICD-10-PCS | Mod: HCNC,CPTII,S$GLB, | Performed by: STUDENT IN AN ORGANIZED HEALTH CARE EDUCATION/TRAINING PROGRAM

## 2023-10-04 PROCEDURE — 99999 PR PBB SHADOW E&M-EST. PATIENT-LVL V: CPT | Mod: PBBFAC,HCNC,, | Performed by: STUDENT IN AN ORGANIZED HEALTH CARE EDUCATION/TRAINING PROGRAM

## 2023-10-04 PROCEDURE — 99999 PR PBB SHADOW E&M-EST. PATIENT-LVL V: ICD-10-PCS | Mod: PBBFAC,HCNC,, | Performed by: STUDENT IN AN ORGANIZED HEALTH CARE EDUCATION/TRAINING PROGRAM

## 2023-10-04 PROCEDURE — 99213 OFFICE O/P EST LOW 20 MIN: CPT | Mod: HCNC,S$GLB,, | Performed by: STUDENT IN AN ORGANIZED HEALTH CARE EDUCATION/TRAINING PROGRAM

## 2023-10-04 PROCEDURE — 3044F PR MOST RECENT HEMOGLOBIN A1C LEVEL <7.0%: ICD-10-PCS | Mod: HCNC,CPTII,S$GLB, | Performed by: STUDENT IN AN ORGANIZED HEALTH CARE EDUCATION/TRAINING PROGRAM

## 2023-10-04 PROCEDURE — 99213 PR OFFICE/OUTPT VISIT, EST, LEVL III, 20-29 MIN: ICD-10-PCS | Mod: HCNC,S$GLB,, | Performed by: STUDENT IN AN ORGANIZED HEALTH CARE EDUCATION/TRAINING PROGRAM

## 2023-10-04 PROCEDURE — 3008F PR BODY MASS INDEX (BMI) DOCUMENTED: ICD-10-PCS | Mod: HCNC,CPTII,S$GLB, | Performed by: STUDENT IN AN ORGANIZED HEALTH CARE EDUCATION/TRAINING PROGRAM

## 2023-10-04 PROCEDURE — 3074F SYST BP LT 130 MM HG: CPT | Mod: HCNC,CPTII,S$GLB, | Performed by: STUDENT IN AN ORGANIZED HEALTH CARE EDUCATION/TRAINING PROGRAM

## 2023-10-04 PROCEDURE — 1160F PR REVIEW ALL MEDS BY PRESCRIBER/CLIN PHARMACIST DOCUMENTED: ICD-10-PCS | Mod: HCNC,CPTII,S$GLB, | Performed by: STUDENT IN AN ORGANIZED HEALTH CARE EDUCATION/TRAINING PROGRAM

## 2023-10-04 PROCEDURE — 1160F RVW MEDS BY RX/DR IN RCRD: CPT | Mod: HCNC,CPTII,S$GLB, | Performed by: STUDENT IN AN ORGANIZED HEALTH CARE EDUCATION/TRAINING PROGRAM

## 2023-10-04 PROCEDURE — 3044F HG A1C LEVEL LT 7.0%: CPT | Mod: HCNC,CPTII,S$GLB, | Performed by: STUDENT IN AN ORGANIZED HEALTH CARE EDUCATION/TRAINING PROGRAM

## 2023-10-04 PROCEDURE — 1159F MED LIST DOCD IN RCRD: CPT | Mod: HCNC,CPTII,S$GLB, | Performed by: STUDENT IN AN ORGANIZED HEALTH CARE EDUCATION/TRAINING PROGRAM

## 2023-10-04 PROCEDURE — 3078F DIAST BP <80 MM HG: CPT | Mod: HCNC,CPTII,S$GLB, | Performed by: STUDENT IN AN ORGANIZED HEALTH CARE EDUCATION/TRAINING PROGRAM

## 2023-10-04 PROCEDURE — 3078F PR MOST RECENT DIASTOLIC BLOOD PRESSURE < 80 MM HG: ICD-10-PCS | Mod: HCNC,CPTII,S$GLB, | Performed by: STUDENT IN AN ORGANIZED HEALTH CARE EDUCATION/TRAINING PROGRAM

## 2023-10-04 PROCEDURE — 3008F BODY MASS INDEX DOCD: CPT | Mod: HCNC,CPTII,S$GLB, | Performed by: STUDENT IN AN ORGANIZED HEALTH CARE EDUCATION/TRAINING PROGRAM

## 2023-10-04 RX ORDER — LEVOCETIRIZINE DIHYDROCHLORIDE 5 MG/1
5 TABLET, FILM COATED ORAL NIGHTLY
Qty: 90 TABLET | Refills: 3 | Status: SHIPPED | OUTPATIENT
Start: 2023-10-04 | End: 2024-10-03

## 2023-10-04 NOTE — PATIENT INSTRUCTIONS
Vertigo:   - patient having weekly episodes of vertigo and dizziness.  Has some associated nausea.  No fever chills.   - physical therapy offered to do Epley maneuvers with patient if ordered by physician, order was placed.   - giving printed handout for vestibular exercises to help patient have treatment to use at home until able to see PT.    Shoulder/neck pain:   - patient is to continue use physical therapy to address her shoulder pain and neck pain.   - tolerating well at this time.

## 2023-10-04 NOTE — PROGRESS NOTES
Subjective:           Patient ID: Carol Dewey is a 52 y.o. female who presents today with a chief complaint of Follow-up (6 weeks)  .    Chief Complaint:   Follow-up (6 weeks)      History of Present Illness:    Carol Dewey is a 52 y.o. female who presents today with a chief complaint of Follow-up (6 weeks)  .    States she went to PT for her shoulder pain.    Was advised by PT that they need orders to do treatment for her vertigo.    States has been having dizziness.  Worse with laying flat.    With driving needs to look straight ahead, if cars comes into her peripheral vision, needs to stop as begins getting dizzy.    Will get nausea with s/s.    Needs to move slowly.   If careful will not get s/s for whole day, but does get episodes few times a week.    Says s/s last a few minutes, then 30-40 minutes to fully settle down.  If makes changes immediately, will only be for 10-15 min.     States that her ear is still leaking.       Review of Systems   Constitutional:  Negative for activity change, chills, fatigue, fever and unexpected weight change.   HENT:  Positive for ear discharge. Negative for congestion, nosebleeds, sinus pressure and sneezing.    Respiratory:  Negative for cough, shortness of breath and wheezing.    Cardiovascular:  Negative for chest pain, palpitations and leg swelling.   Gastrointestinal:  Negative for abdominal distention, constipation, diarrhea and nausea.   Genitourinary:  Negative for difficulty urinating and dysuria.   Musculoskeletal:  Positive for arthralgias, back pain, myalgias and neck pain. Negative for gait problem.        Bilateral arm pain and shoulder pain, right worse than left.    Has pain primarily start in the right shoulder and radiate up to the shoulder.  States worse with activity.   Worse with carry things.   Feels like she gets numbness in hands (reports hx bilateral carpal tunnel) and some possibly weakness after holding things for a time.    Skin:  Negative  "for pallor and rash.   Neurological:  Positive for dizziness (gets vertigo if has rapid movement around her.), weakness and numbness. Negative for headaches.   Psychiatric/Behavioral:  Positive for sleep disturbance. Negative for agitation. The patient is not nervous/anxious.            Objective:        Vitals:    10/04/23 1436   BP: 120/68   BP Location: Right arm   Patient Position: Sitting   BP Method: Medium (Manual)   Pulse: 73   Resp: 16   Temp: 97.9 °F (36.6 °C)   TempSrc: Temporal   SpO2: 97%   Weight: 63.5 kg (139 lb 14.1 oz)   Height: 5' 3" (1.6 m)       Body mass index is 24.78 kg/m².      Physical Exam  Vitals reviewed.   Constitutional:       General: She is not in acute distress.     Appearance: Normal appearance. She is not ill-appearing.   HENT:      Head: Normocephalic and atraumatic.      Right Ear: Tympanic membrane and external ear normal. There is no impacted cerumen.      Left Ear: External ear normal. There is no impacted cerumen.      Nose: No rhinorrhea.      Mouth/Throat:      Pharynx: No posterior oropharyngeal erythema.   Eyes:      General:         Right eye: No discharge.         Left eye: No discharge.      Conjunctiva/sclera: Conjunctivae normal.   Cardiovascular:      Rate and Rhythm: Normal rate and regular rhythm.      Pulses: Normal pulses.      Heart sounds: Normal heart sounds. No murmur heard.  Pulmonary:      Effort: Pulmonary effort is normal.      Breath sounds: Normal breath sounds. No wheezing.   Musculoskeletal:      Right lower leg: No edema.      Left lower leg: No edema.   Lymphadenopathy:      Cervical: No cervical adenopathy.   Skin:     Capillary Refill: Capillary refill takes less than 2 seconds.      Coloration: Skin is not jaundiced or pale.      Findings: No rash.   Neurological:      General: No focal deficit present.      Mental Status: She is alert and oriented to person, place, and time.      Motor: Weakness present.   Psychiatric:         Mood and Affect: " Mood normal.         Behavior: Behavior normal.             Lab Results   Component Value Date     04/05/2023    K 3.8 04/05/2023     04/05/2023    CO2 21 (L) 04/05/2023    BUN 11 04/05/2023    CREATININE 0.7 04/05/2023    ANIONGAP 11 04/05/2023     Lab Results   Component Value Date    HGBA1C 5.9 (H) 04/05/2023     Lab Results   Component Value Date    BNP 34 09/10/2021       Lab Results   Component Value Date    WBC 11.47 04/05/2023    HGB 15.3 04/05/2023    HCT 47.4 04/05/2023     04/05/2023    GRAN 7.0 04/05/2023    GRAN 60.5 04/05/2023     Lab Results   Component Value Date    CHOL 230 (H) 08/02/2022    HDL 48 08/02/2022    LDLCALC 150.4 08/02/2022    TRIG 158 (H) 08/02/2022          Current Outpatient Medications:     amLODIPine (NORVASC) 10 MG tablet, Take 1 tablet (10 mg total) by mouth once daily., Disp: 90 tablet, Rfl: 3    fluticasone propionate (FLONASE) 50 mcg/actuation nasal spray, 1 spray (50 mcg total) by Each Nostril route 2 (two) times daily., Disp: 16 g, Rfl: 3    ibuprofen (ADVIL,MOTRIN) 800 MG tablet, Take 1 tablet (800 mg total) by mouth 3 (three) times daily as needed for Pain., Disp: 60 tablet, Rfl: 2    meloxicam (MOBIC) 7.5 MG tablet, After prednisone complete Mobic 7.5 mg 1 p.o. b.i.d. for swelling no other NSAIDs can take with Tylenol, Disp: 60 tablet, Rfl: 5    methocarbamoL (ROBAXIN) 500 MG Tab, Take 1 tablet (500 mg total) by mouth nightly., Disp: 30 tablet, Rfl: 3    montelukast (SINGULAIR) 10 mg tablet, TAKE 1 TABLET(10 MG) BY MOUTH EVERY EVENING, Disp: 90 tablet, Rfl: 1    MYRBETRIQ 50 mg Tb24, Take 1 tablet by mouth., Disp: , Rfl:     norethindrone (MICRONOR) 0.35 mg tablet, Take 1 tablet (0.35 mg total) by mouth once a week., Disp: 28 tablet, Rfl: 11    pantoprazole (PROTONIX) 40 MG tablet, Take 1 tablet (40 mg total) by mouth once daily., Disp: 90 tablet, Rfl: 3    paroxetine (PAXIL) 10 MG tablet, Take 1 tablet (10 mg total) by mouth every morning., Disp: 30  tablet, Rfl: 3    levocetirizine (XYZAL) 5 MG tablet, Take 1 tablet (5 mg total) by mouth every evening., Disp: 90 tablet, Rfl: 3     Outpatient Encounter Medications as of 10/4/2023   Medication Sig Dispense Refill    amLODIPine (NORVASC) 10 MG tablet Take 1 tablet (10 mg total) by mouth once daily. 90 tablet 3    fluticasone propionate (FLONASE) 50 mcg/actuation nasal spray 1 spray (50 mcg total) by Each Nostril route 2 (two) times daily. 16 g 3    ibuprofen (ADVIL,MOTRIN) 800 MG tablet Take 1 tablet (800 mg total) by mouth 3 (three) times daily as needed for Pain. 60 tablet 2    meloxicam (MOBIC) 7.5 MG tablet After prednisone complete Mobic 7.5 mg 1 p.o. b.i.d. for swelling no other NSAIDs can take with Tylenol 60 tablet 5    methocarbamoL (ROBAXIN) 500 MG Tab Take 1 tablet (500 mg total) by mouth nightly. 30 tablet 3    montelukast (SINGULAIR) 10 mg tablet TAKE 1 TABLET(10 MG) BY MOUTH EVERY EVENING 90 tablet 1    MYRBETRIQ 50 mg Tb24 Take 1 tablet by mouth.      norethindrone (MICRONOR) 0.35 mg tablet Take 1 tablet (0.35 mg total) by mouth once a week. 28 tablet 11    pantoprazole (PROTONIX) 40 MG tablet Take 1 tablet (40 mg total) by mouth once daily. 90 tablet 3    paroxetine (PAXIL) 10 MG tablet Take 1 tablet (10 mg total) by mouth every morning. 30 tablet 3    [DISCONTINUED] levocetirizine (XYZAL) 5 MG tablet Take 1 tablet (5 mg total) by mouth every evening. 30 tablet 3    levocetirizine (XYZAL) 5 MG tablet Take 1 tablet (5 mg total) by mouth every evening. 90 tablet 3    [DISCONTINUED] norethindrone (MICRONOR) 0.35 mg tablet TAKE 1 TABLET(0.35 MG) BY MOUTH EVERY DAY 28 tablet 1     No facility-administered encounter medications on file as of 10/4/2023.          Assessment:       1. Vertigo    2. Dysfunction of both eustachian tubes    3. Tinnitus of both ears    4. Arm numbness           Plan:       Vertigo  -     Ambulatory referral/consult to Physical/Occupational Therapy; Future; Expected date:  10/11/2023    Dysfunction of both eustachian tubes  -     levocetirizine (XYZAL) 5 MG tablet; Take 1 tablet (5 mg total) by mouth every evening.  Dispense: 90 tablet; Refill: 3    Tinnitus of both ears  -     Ambulatory referral/consult to Physical/Occupational Therapy; Future; Expected date: 10/11/2023    Arm numbness  -     Ambulatory referral/consult to Physical/Occupational Therapy; Future; Expected date: 10/11/2023                 Vertigo:   - patient having weekly episodes of vertigo and dizziness.  Has some associated nausea.  No fever chills.   - physical therapy offered to do Epley maneuvers with patient if ordered by physician, order was placed.   - giving printed handout for vestibular exercises to help patient have treatment to use at home until able to see PT.    Shoulder/neck pain:   - patient is to continue use physical therapy to address her shoulder pain and neck pain.   - tolerating well at this time.

## 2023-10-18 ENCOUNTER — PATIENT MESSAGE (OUTPATIENT)
Dept: CARDIOLOGY | Facility: CLINIC | Age: 53
End: 2023-10-18
Payer: MEDICARE

## 2023-10-24 PROBLEM — G89.29 CHRONIC PAIN OF BOTH SHOULDERS: Status: ACTIVE | Noted: 2023-10-24

## 2023-10-24 PROBLEM — M25.511 CHRONIC PAIN OF BOTH SHOULDERS: Status: ACTIVE | Noted: 2023-10-24

## 2023-10-24 PROBLEM — M25.512 CHRONIC PAIN OF BOTH SHOULDERS: Status: ACTIVE | Noted: 2023-10-24

## 2023-10-29 DIAGNOSIS — J30.2 SEASONAL ALLERGIC RHINITIS, UNSPECIFIED TRIGGER: ICD-10-CM

## 2023-10-29 RX ORDER — MONTELUKAST SODIUM 10 MG/1
TABLET ORAL
Qty: 90 TABLET | Refills: 3 | Status: SHIPPED | OUTPATIENT
Start: 2023-10-29

## 2023-10-29 NOTE — TELEPHONE ENCOUNTER
No care due was identified.  [facility] Embedded Care Due Messages. Reference number: [id number].   10/29/2023 3:08:17 PM CDT

## 2023-10-29 NOTE — TELEPHONE ENCOUNTER
Refill Decision Note   Carol Zuleima  is requesting a refill authorization.  Brief Assessment and Rationale for Refill:  Approve     Medication Therapy Plan:         Comments:     Note composed:3:38 PM 10/29/2023

## 2023-12-01 ENCOUNTER — OFFICE VISIT (OUTPATIENT)
Dept: PRIMARY CARE CLINIC | Facility: CLINIC | Age: 53
End: 2023-12-01
Payer: MEDICARE

## 2023-12-01 VITALS
HEART RATE: 74 BPM | SYSTOLIC BLOOD PRESSURE: 110 MMHG | OXYGEN SATURATION: 99 % | HEIGHT: 63 IN | RESPIRATION RATE: 18 BRPM | BODY MASS INDEX: 25.01 KG/M2 | WEIGHT: 141.13 LBS | DIASTOLIC BLOOD PRESSURE: 70 MMHG

## 2023-12-01 DIAGNOSIS — M50.30 DDD (DEGENERATIVE DISC DISEASE), CERVICAL: ICD-10-CM

## 2023-12-01 DIAGNOSIS — M54.9 COSTOVERTEBRAL ANGLE TENDERNESS: ICD-10-CM

## 2023-12-01 DIAGNOSIS — R10.9 FLANK PAIN: Primary | ICD-10-CM

## 2023-12-01 PROCEDURE — 3008F BODY MASS INDEX DOCD: CPT | Mod: HCNC,CPTII,S$GLB, | Performed by: INTERNAL MEDICINE

## 2023-12-01 PROCEDURE — 99999 PR PBB SHADOW E&M-EST. PATIENT-LVL IV: CPT | Mod: PBBFAC,HCNC,, | Performed by: INTERNAL MEDICINE

## 2023-12-01 PROCEDURE — 3044F HG A1C LEVEL LT 7.0%: CPT | Mod: HCNC,CPTII,S$GLB, | Performed by: INTERNAL MEDICINE

## 2023-12-01 PROCEDURE — 99999 PR PBB SHADOW E&M-EST. PATIENT-LVL IV: ICD-10-PCS | Mod: PBBFAC,HCNC,, | Performed by: INTERNAL MEDICINE

## 2023-12-01 PROCEDURE — 3074F PR MOST RECENT SYSTOLIC BLOOD PRESSURE < 130 MM HG: ICD-10-PCS | Mod: HCNC,CPTII,S$GLB, | Performed by: INTERNAL MEDICINE

## 2023-12-01 PROCEDURE — 3044F PR MOST RECENT HEMOGLOBIN A1C LEVEL <7.0%: ICD-10-PCS | Mod: HCNC,CPTII,S$GLB, | Performed by: INTERNAL MEDICINE

## 2023-12-01 PROCEDURE — 3078F PR MOST RECENT DIASTOLIC BLOOD PRESSURE < 80 MM HG: ICD-10-PCS | Mod: HCNC,CPTII,S$GLB, | Performed by: INTERNAL MEDICINE

## 2023-12-01 PROCEDURE — 1159F PR MEDICATION LIST DOCUMENTED IN MEDICAL RECORD: ICD-10-PCS | Mod: HCNC,CPTII,S$GLB, | Performed by: INTERNAL MEDICINE

## 2023-12-01 PROCEDURE — 3078F DIAST BP <80 MM HG: CPT | Mod: HCNC,CPTII,S$GLB, | Performed by: INTERNAL MEDICINE

## 2023-12-01 PROCEDURE — 1160F RVW MEDS BY RX/DR IN RCRD: CPT | Mod: HCNC,CPTII,S$GLB, | Performed by: INTERNAL MEDICINE

## 2023-12-01 PROCEDURE — 3074F SYST BP LT 130 MM HG: CPT | Mod: HCNC,CPTII,S$GLB, | Performed by: INTERNAL MEDICINE

## 2023-12-01 PROCEDURE — 1160F PR REVIEW ALL MEDS BY PRESCRIBER/CLIN PHARMACIST DOCUMENTED: ICD-10-PCS | Mod: HCNC,CPTII,S$GLB, | Performed by: INTERNAL MEDICINE

## 2023-12-01 PROCEDURE — 1159F MED LIST DOCD IN RCRD: CPT | Mod: HCNC,CPTII,S$GLB, | Performed by: INTERNAL MEDICINE

## 2023-12-01 PROCEDURE — 3008F PR BODY MASS INDEX (BMI) DOCUMENTED: ICD-10-PCS | Mod: HCNC,CPTII,S$GLB, | Performed by: INTERNAL MEDICINE

## 2023-12-01 PROCEDURE — 99213 PR OFFICE/OUTPT VISIT, EST, LEVL III, 20-29 MIN: ICD-10-PCS | Mod: HCNC,S$GLB,, | Performed by: INTERNAL MEDICINE

## 2023-12-01 PROCEDURE — 99213 OFFICE O/P EST LOW 20 MIN: CPT | Mod: HCNC,S$GLB,, | Performed by: INTERNAL MEDICINE

## 2023-12-01 RX ORDER — TRAMADOL HYDROCHLORIDE 50 MG/1
50 TABLET ORAL EVERY 8 HOURS PRN
Qty: 20 TABLET | Refills: 0 | Status: SHIPPED | OUTPATIENT
Start: 2023-12-01 | End: 2024-02-06 | Stop reason: SDUPTHER

## 2023-12-01 RX ORDER — IBUPROFEN 800 MG/1
800 TABLET ORAL 3 TIMES DAILY PRN
Qty: 45 TABLET | Refills: 1 | Status: SHIPPED | OUTPATIENT
Start: 2023-12-01 | End: 2024-03-04 | Stop reason: SDUPTHER

## 2023-12-03 NOTE — PROGRESS NOTES
Subjective:       Patient ID: Carol Dewey is a 53 y.o. female.    Chief Complaint: Flank Pain (left)    Flank Pain      Pt visit today with c/o left flank pain left CVA tenderness she believe problem start on Saturday after she ate salty food and drink coffe and tea she then develop the david as above not sevre but persistent no dysuria hematuria no sob cp no fever chill onday she had nausea vomitting  today still with pain left flank and left CVA she also request refill medicatio for her enck david from [facility] disc [facility]  Review of Systems   Genitourinary:  Positive for flank pain.       Objective:      Physical Exam  Vitals and nursing note reviewed.   Constitutional:       General: She is not in acute distress.     Appearance: She is well-developed.   HENT:      Head: Normocephalic and atraumatic.      Right Ear: External ear normal.      Left Ear: External ear normal.      Nose: Nose normal.      Mouth/Throat:      Pharynx: No oropharyngeal exudate.   Eyes:      Extraocular Movements: Extraocular movements intact.      Conjunctiva/sclera: Conjunctivae normal.      Pupils: Pupils are equal, round, and reactive to light.   Neck:      Thyroid: No thyromegaly.   Cardiovascular:      Rate and Rhythm: Normal rate and regular rhythm.      Heart sounds: Normal heart sounds. No murmur heard.     No friction rub. No gallop.   Pulmonary:      Effort: Pulmonary effort is normal. No respiratory distress.      Breath sounds: Normal breath sounds. No wheezing or rales.   Chest:      Chest wall: No tenderness.   Abdominal:      General: Bowel sounds are normal. There is no distension.      Palpations: Abdomen is soft.      Tenderness: There is no abdominal tenderness.   Musculoskeletal:         General: Tenderness (subjective tenderness left flank left CVA no ski rash  no erythema) present. No deformity. Normal range of motion.      Cervical back: Normal range of motion and neck supple.   Lymphadenopathy:      Cervical: No cervical  adenopathy.   Skin:     General: Skin is warm and dry.      Findings: No erythema or rash.   Neurological:      Mental Status: She is alert and oriented to person, place, and time.   Psychiatric:         Mood and Affect: Mood normal.         Thought Content: Thought content normal.         Judgment: Judgment normal.         Assessment:       1. Flank pain    2. Costovertebral angle tenderness    3. DDD (degenerative disc disease), cervical        Plan:       Flank pain  -     Cancel: US Abdomen Complete; Future; Expected date: 12/08/2023  -     CBC Auto Differential; Future; Expected date: 12/01/2023  -     Comprehensive Metabolic Panel; Future; Expected date: 12/01/2023  -     Lipase; Future; Expected date: 12/01/2023  -     Urinalysis; Future; Expected date: 12/01/2023  -     US Abdomen Complete; Future; Expected date: 12/01/2023  -     traMADoL (ULTRAM) 50 mg tablet; Take 1 tablet (50 mg total) by mouth every 8 (eight) hours as needed for Pain.  Dispense: 20 tablet; Refill: 0    Costovertebral angle tenderness  -     Cancel: US Abdomen Complete; Future; Expected date: 12/08/2023  -     US Abdomen Complete; Future; Expected date: 12/01/2023  -     traMADoL (ULTRAM) 50 mg tablet; Take 1 tablet (50 mg total) by mouth every 8 (eight) hours as needed for Pain.  Dispense: 20 tablet; Refill: 0    DDD (degenerative disc disease), cervical  -     ibuprofen (ADVIL,MOTRIN) 800 MG tablet; Take 1 tablet (800 mg total) by mouth 3 (three) times daily as needed for Pain.  Dispense: 45 tablet; Refill: 1        Medication List with Changes/Refills   New Medications    TRAMADOL (ULTRAM) 50 MG TABLET    Take 1 tablet (50 mg total) by mouth every 8 (eight) hours as needed for Pain.   Current Medications    AMLODIPINE (NORVASC) 10 MG TABLET    Take 1 tablet (10 mg total) by mouth once daily.    FLUTICASONE PROPIONATE (FLONASE) 50 MCG/ACTUATION NASAL SPRAY    1 spray (50 mcg total) by Each Nostril route 2 (two) times daily.     LEVOCETIRIZINE (XYZAL) 5 MG TABLET    Take 1 tablet (5 mg total) by mouth every evening.    METHOCARBAMOL (ROBAXIN) 500 MG TAB    Take 1 tablet (500 mg total) by mouth nightly.    MONTELUKAST (SINGULAIR) 10 MG TABLET    TAKE 1 TABLET(10 MG) BY MOUTH EVERY EVENING    MYRBETRIQ 50 MG TB24    Take 1 tablet by mouth.    NORETHINDRONE (MICRONOR) 0.35 MG TABLET    Take 1 tablet (0.35 mg total) by mouth once a week.    PANTOPRAZOLE (PROTONIX) 40 MG TABLET    Take 1 tablet (40 mg total) by mouth once daily.    PAROXETINE (PAXIL) 10 MG TABLET    Take 1 tablet (10 mg total) by mouth every morning.   Changed and/or Refilled Medications    Modified Medication Previous Medication    IBUPROFEN (ADVIL,MOTRIN) 800 MG TABLET ibuprofen (ADVIL,MOTRIN) 800 MG tablet       Take 1 tablet (800 mg total) by mouth 3 (three) times daily as needed for Pain.    Take 1 tablet (800 mg total) by mouth 3 (three) times daily as needed for Pain.   Discontinued Medications    MELOXICAM (MOBIC) 7.5 MG TABLET    After prednisone complete Mobic 7.5 mg 1 p.o. b.i.d. for swelling no other NSAIDs can take with Tylenol

## 2023-12-07 DIAGNOSIS — K80.20 GALL STONES: Primary | ICD-10-CM

## 2023-12-08 ENCOUNTER — TELEPHONE (OUTPATIENT)
Dept: PRIMARY CARE CLINIC | Facility: CLINIC | Age: 53
End: 2023-12-08
Payer: MEDICARE

## 2023-12-08 NOTE — TELEPHONE ENCOUNTER
----- Message from Pearl Jama sent at 12/8/2023 11:26 AM CST -----  Contact: [phone]  Pt is returning a call she missed from the office. Per pt, she would like the results of her US done on 12/05. Please call.                  Thank you

## 2023-12-13 ENCOUNTER — OFFICE VISIT (OUTPATIENT)
Dept: NEUROLOGY | Facility: CLINIC | Age: 53
End: 2023-12-13
Payer: MEDICARE

## 2023-12-13 VITALS
SYSTOLIC BLOOD PRESSURE: 124 MMHG | WEIGHT: 143.31 LBS | BODY MASS INDEX: 25.39 KG/M2 | DIASTOLIC BLOOD PRESSURE: 84 MMHG | HEIGHT: 63 IN | HEART RATE: 76 BPM

## 2023-12-13 DIAGNOSIS — G43.809 VESTIBULAR MIGRAINE: Primary | ICD-10-CM

## 2023-12-13 DIAGNOSIS — R42 VERTIGO: ICD-10-CM

## 2023-12-13 PROCEDURE — 99999 PR PBB SHADOW E&M-EST. PATIENT-LVL III: CPT | Mod: PBBFAC,HCNC,, | Performed by: STUDENT IN AN ORGANIZED HEALTH CARE EDUCATION/TRAINING PROGRAM

## 2023-12-13 PROCEDURE — 99203 OFFICE O/P NEW LOW 30 MIN: CPT | Mod: S$PBB,HCNC,, | Performed by: STUDENT IN AN ORGANIZED HEALTH CARE EDUCATION/TRAINING PROGRAM

## 2023-12-13 RX ORDER — PROCHLORPERAZINE MALEATE 10 MG
10 TABLET ORAL 3 TIMES DAILY
Qty: 20 TABLET | Refills: 3 | Status: SHIPPED | OUTPATIENT
Start: 2023-12-13

## 2023-12-13 RX ORDER — PROPRANOLOL HYDROCHLORIDE 20 MG/1
20 TABLET ORAL EVERY MORNING
Qty: 30 TABLET | Refills: 11 | Status: SHIPPED | OUTPATIENT
Start: 2023-12-13 | End: 2024-12-12

## 2023-12-13 NOTE — PROGRESS NOTES
Patient ID: [id number]  Referring Physician: José Miguel Begum MD    Chief Complaint/Reason for Consult: vertigo     Subjective:     HPI:  Carol Dewey is a 53 y.o. RH female with HTN, GERD, and kidney stones. she is presenting today as a new patient for evaluation of vertigo and headaches.    Headache history  Age at onset: 2009  Course over time: stable  Location: right temporal  Character: throbbing  Intensity: 10 on a scale from 1 to 10  Frequency: every day.   Duration: a few hours  Timing: do not seem to be related to any time of the day   Mild/moderate/severe. Work attendance or other daily activities are affected by the headaches.  Aura: not preceded by an aura  Associated symptoms: N/V, Photosensitivity, and tearing from the eye   Associated neurologic symptoms:  vertigo  Precipitating factors: Bright lights and movement  Aggravating factors: Bending over  Home treatment: Resting in a dark room and Advil with little improvement.   ER visits: Yes    Reports ringing and pulsation in both ears (L>R), she also reports clear drainage out of the left ear.    Review of Systems:  Review of Systems   HENT:  Positive for ear discharge and tinnitus.    Eyes:  Positive for photophobia. Negative for blurred vision and double vision.   Gastrointestinal:  Positive for nausea and vomiting.   Musculoskeletal:  Negative for falls.   Neurological:  Positive for dizziness and headaches. Negative for tingling, sensory change and weakness.   Psychiatric/Behavioral:  Negative for depression. The patient is not nervous/anxious.    All other systems reviewed and are negative.     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    Essential hypertension, benign 01/31/2022    Tinnitus of both ears 10/11/2022    Arm numbness 04/05/2023    Prediabetes 04/05/2023    Sacroiliitis 04/25/2023    SI (sacroiliac) joint dysfunction 04/25/2023    Chronic pain of both shoulders 10/24/2023     Resolved Ambulatory Problems     Diagnosis Date Noted    Lumbar pain 08/31/2012    Right leg pain 08/31/2012    Weakness of right arm 01/20/2013    Cervical pain (neck) 01/20/2013    Low back pain 01/29/2013    Neck pain 01/29/2013    Cervical pain 02/08/2013     Past Medical History:   Diagnosis Date    Allergy     Angio-edema     Degenerative disc disease     Urticaria     Vertigo      Allergies:  Review of patient's allergies indicates:   Allergen Reactions    Augmentin [amoxicillin-pot clavulanate] Rash     Rash to arms, legs, shoulders and torso.  States has happened 2 times with this medication.    Iodine and iodide containing products Rash    Gemtesa [vibegron] Other (See Comments)     Weakness & dizziness    Shellfish containing products Hives     Other reaction(s): Hives    Watermelon Other (See Comments)     Irritates esophagus    Amoxapine Rash    Papaya Rash    Penicillins Rash       Pertinent Family History:  migraine headaches in brother.    Pertinent Social History:  Social History     Socioeconomic History    Marital status:    Tobacco Use    Smoking status: Never     Passive exposure: Never    Smokeless tobacco: Never   Substance and Sexual Activity    Alcohol use: No    Drug use: No    Sexual activity: Yes     Partners: Male     Birth control/protection: OCP         Medications:  Current Outpatient Medications   Medication Instructions    amLODIPine (NORVASC) 10 mg, Oral, Daily    fluticasone propionate (FLONASE) 50 mcg, Each Nostril, 2 times daily     ibuprofen (ADVIL,MOTRIN) 800 mg, Oral, 3 times daily PRN    levocetirizine (XYZAL) 5 mg, Oral, Nightly    methocarbamoL (ROBAXIN) 500 mg, Oral, Nightly    montelukast (SINGULAIR) 10 mg tablet TAKE 1 TABLET(10 MG) BY MOUTH EVERY EVENING    MYRBETRIQ 50 mg Tb24 1 tablet, Oral    norethindrone (MICRONOR) 0.35 mg, Oral, Weekly    pantoprazole (PROTONIX) 40 mg, Oral, Daily    paroxetine (PAXIL) 10 mg, Oral, Every morning    traMADoL (ULTRAM) 50 mg, Oral, Every 8 hours PRN      Objective:     Vitals:    12/13/23 1514   BP: 124/84   Pulse: 76        General:  Well-appearing, well-nourished, NAD, cooperative  MSK: no TTP on occipital, cervical paraspinal muscles or traps    Neurologic Exam:   Awake, alert and oriented x3  Speech spontaneous and fluent, intact comprehension.   Adequate fund of knowledge, vocabulary.    CN II - CN XII:  PERRLA. EOM intact. No Nystagmus. No ophthalmoplegia.   Facial sensation is normal to light touch.   Facial expression is full and symmetric.   Hearing is intact bilaterally.   Palate elevates symmetrically.   SCM and Trapezius full strength bilaterally.   Tongue is midline.     Motor:  Normal bulk and tone in all four limbs.   There are no atrophy or fasciculations. No tremor.     Shoulder  Abd Shoulder Add Elbow   Flex Elbow  Ext Wrist   Flex Wrist  Ext Finger  Flex Finger  Ext Finger  Abd Finger   Add IO Opposition   Right 5 5 5 5       5    Left 5 5 5 5       5       Hip  Flex Hip  Ext Thigh   Abd Thigh  Add Knee  Flex Knee  Ext Plantar  Flex Dorsiflex   Right 5 5   5 5 5 5   Left 5 5   5 5 5 5     Sensory:  Light touch: normal tactile sense throughout  Proprioception: proprioceptive sense present on RUE and on LUE    DTRs:   Biceps Brachioradialis Triceps Snow Patellar Ankle Plantar   Right 2+ 2+  - 2+ 2+    Left 2+ 2+  - 2+ 2+      Coordination:  Finger to nose is normal bilaterally.  Normal fine finger movements and rapid alternating movements.    Gait:  Normal casual and tandem  gait.    Pertinent lab results  Lab Results   Component Value Date    FOLATE 11.40 04/01/2021    SNHZIEEA48 >1500 (H) 04/01/2021    THIAMINEBLOO 67 04/01/2021    TNJCHNBH94FZ 83 04/01/2021     Lab Results   Component Value Date    DSY19FIBF Negative 04/01/2021    RPR Non-reactive 04/01/2021    HEPBSAG Negative 04/01/2021     Lab Results   Component Value Date    TSH 0.711 04/05/2023    FREET4 0.85 04/01/2021    WBC 8.07 12/01/2023    LYMPH 2.9 12/01/2023    LYMPH 35.9 12/01/2023    RBC 5.37 12/01/2023    HGB 15.5 12/01/2023    HCT 46.7 12/01/2023    MCV 87 12/01/2023     12/01/2023     12/01/2023    K 3.9 12/01/2023    CO2 22 (L) 12/01/2023    BUN 9 12/01/2023    CREATININE 0.8 12/01/2023    CALCIUM 10.1 12/01/2023    AST 20 12/01/2023    ALT 30 12/01/2023       Pertinent imaging results    *Images personally reviewed and interpreted:    3/29/2023  MRI Cervical Spine w/wo contrast:  Mild cervical spondylosis without significant spinal canal stenosis or neural impingement.     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  Follow-up images with gadolinium can be obtained to confirm lack of any associated abnormal enhancement.     Otherwise normal MRI of brain without contrast.    Other pertinent studies  None    Assessment:   Carol Dewey is a 53 y.o. right-handed female with HTN, GERD, and kidney stones who presents for evaluation of migraines with vestibular features. Daily frequency of headaches warrants a preventative option. She is not a good candidate for Topamax due to history of kidney stones. We discussed trial of a beta blocker such as propranolol, she doesn't have history of heart block or asthma. We will also try Compazine for rescue. She would benefit from window tint  since light is a trigger and regular sunglasses are not helpful.     1. Vestibular migraine    2. Vertigo      Plan:     - propranoloL (INDERAL) 20 MG tablet; Take 1 tablet (20 mg total) by mouth every morning.  Dispense: 30 tablet; Refill: 11  - prochlorperazine (COMPAZINE) 10 MG tablet; Take 1 tablet (10 mg total) by mouth 3 (three) times daily.  Dispense: 20 tablet; Refill: 3  - will provide forms for window tint to [facility]  - Follow up: [facility]V in 3-6 months to reassess      Time spent on this encounter: 37 minutes. This includes face to face time (obtaining history, documenting clinical information in the EMR, physical exam, discussing the plan with patient) and non-face to face time (such as preparing to see the patient (ie. Chart review, reviewing and interpreting previous labs and imaging), further EMR documentation, ordering tests, independently interpreting results and communicating results to the patient/family/caregiver, or care coordinator).     Disclaimer: This note was partly generated using dictation software which may occasionally result in transcription errors that are missed on review.      Earlene Bennett MD    [facility]  12/13/2023

## 2023-12-18 ENCOUNTER — OFFICE VISIT (OUTPATIENT)
Dept: SURGERY | Facility: CLINIC | Age: 53
End: 2023-12-18
Payer: MEDICARE

## 2023-12-18 ENCOUNTER — TELEPHONE (OUTPATIENT)
Dept: GASTROENTEROLOGY | Facility: CLINIC | Age: 53
End: 2023-12-18
Payer: MEDICARE

## 2023-12-18 VITALS
HEART RATE: 77 BPM | BODY MASS INDEX: 25.35 KG/M2 | SYSTOLIC BLOOD PRESSURE: 117 MMHG | WEIGHT: 143.06 LBS | DIASTOLIC BLOOD PRESSURE: 76 MMHG

## 2023-12-18 DIAGNOSIS — K80.20 GALL STONES: ICD-10-CM

## 2023-12-18 DIAGNOSIS — R10.9 LEFT FLANK PAIN: Primary | ICD-10-CM

## 2023-12-18 PROCEDURE — 99999 PR PBB SHADOW E&M-EST. PATIENT-LVL III: ICD-10-PCS | Mod: PBBFAC,HCNC,, | Performed by: SURGERY

## 2023-12-18 PROCEDURE — 99204 OFFICE O/P NEW MOD 45 MIN: CPT | Mod: HCNC,S$GLB,, | Performed by: SURGERY

## 2023-12-18 PROCEDURE — 3008F BODY MASS INDEX DOCD: CPT | Mod: HCNC,CPTII,S$GLB, | Performed by: SURGERY

## 2023-12-18 PROCEDURE — 1160F RVW MEDS BY RX/DR IN RCRD: CPT | Mod: HCNC,CPTII,S$GLB, | Performed by: SURGERY

## 2023-12-18 PROCEDURE — 99204 PR OFFICE/OUTPT VISIT, NEW, LEVL IV, 45-59 MIN: ICD-10-PCS | Mod: HCNC,S$GLB,, | Performed by: SURGERY

## 2023-12-18 PROCEDURE — 3074F SYST BP LT 130 MM HG: CPT | Mod: HCNC,CPTII,S$GLB, | Performed by: SURGERY

## 2023-12-18 PROCEDURE — 3074F PR MOST RECENT SYSTOLIC BLOOD PRESSURE < 130 MM HG: ICD-10-PCS | Mod: HCNC,CPTII,S$GLB, | Performed by: SURGERY

## 2023-12-18 PROCEDURE — 1159F PR MEDICATION LIST DOCUMENTED IN MEDICAL RECORD: ICD-10-PCS | Mod: HCNC,CPTII,S$GLB, | Performed by: SURGERY

## 2023-12-18 PROCEDURE — 3008F PR BODY MASS INDEX (BMI) DOCUMENTED: ICD-10-PCS | Mod: HCNC,CPTII,S$GLB, | Performed by: SURGERY

## 2023-12-18 PROCEDURE — 3078F PR MOST RECENT DIASTOLIC BLOOD PRESSURE < 80 MM HG: ICD-10-PCS | Mod: HCNC,CPTII,S$GLB, | Performed by: SURGERY

## 2023-12-18 PROCEDURE — 3078F DIAST BP <80 MM HG: CPT | Mod: HCNC,CPTII,S$GLB, | Performed by: SURGERY

## 2023-12-18 PROCEDURE — 99999 PR PBB SHADOW E&M-EST. PATIENT-LVL III: CPT | Mod: PBBFAC,HCNC,, | Performed by: SURGERY

## 2023-12-18 PROCEDURE — 3044F HG A1C LEVEL LT 7.0%: CPT | Mod: HCNC,CPTII,S$GLB, | Performed by: SURGERY

## 2023-12-18 PROCEDURE — 1159F MED LIST DOCD IN RCRD: CPT | Mod: HCNC,CPTII,S$GLB, | Performed by: SURGERY

## 2023-12-18 PROCEDURE — 1160F PR REVIEW ALL MEDS BY PRESCRIBER/CLIN PHARMACIST DOCUMENTED: ICD-10-PCS | Mod: HCNC,CPTII,S$GLB, | Performed by: SURGERY

## 2023-12-18 PROCEDURE — 3044F PR MOST RECENT HEMOGLOBIN A1C LEVEL <7.0%: ICD-10-PCS | Mod: HCNC,CPTII,S$GLB, | Performed by: SURGERY

## 2023-12-18 RX ORDER — SODIUM PICOSULFATE, MAGNESIUM OXIDE, AND ANHYDROUS CITRIC ACID 12; 3.5; 1 G/175ML; G/175ML; MG/175ML
LIQUID ORAL
Status: CANCELLED | OUTPATIENT
Start: 2023-12-18

## 2023-12-18 RX ORDER — SODIUM PICOSULFATE, MAGNESIUM OXIDE, AND ANHYDROUS CITRIC ACID 10; 3.5; 12 MG/160ML; G/160ML; G/160ML
LIQUID ORAL
Qty: 320 ML | Refills: 0 | Status: SHIPPED | OUTPATIENT
Start: 2023-12-18 | End: 2024-03-04

## 2023-12-18 NOTE — TELEPHONE ENCOUNTER
Patient is scheduled for colonoscopy and EGD on January 25.informed patient that she is to be on a clear liquid diet the day before her procedure is done. Will order a bowel prep to her preferred pharmacy. Patient verbalized that she understood.

## 2023-12-19 NOTE — PROGRESS NOTES
History & Physical    SUBJECTIVE:     History of Present Illness:  Patient is a 53 y.o. female presents with  Complaints of mostly left flank  pain.    States it comes and goes mostly radiates to her back and left side.    Occasionally has epigastric discomfort has not noticed that it was associated with meals.     She had a recent gallbladder ultrasound which showed cholelithiasis and adenomyomatosis.    Discussed with her that with the  pain she was having gallbladder may not be the she.    Discussed with her getting a scope for further evaluation.    Chief Complaint   Patient presents with    Gall Bladder Problem       Review of patient's allergies indicates:   Allergen Reactions    Augmentin [amoxicillin-pot clavulanate] Rash     Rash to arms, legs, shoulders and torso.  States has happened 2 times with this medication.    Iodine and iodide containing products Rash    Gemtesa [vibegron] Other (See Comments)     Weakness & dizziness    Shellfish containing products Hives     Other reaction(s): Hives    Watermelon Other (See Comments)     Irritates esophagus    Amoxapine Rash    Papaya Rash    Penicillins Rash       Current Outpatient Medications   Medication Sig Dispense Refill    amLODIPine (NORVASC) 10 MG tablet Take 1 tablet (10 mg total) by mouth once daily. 90 tablet 3    fluticasone propionate (FLONASE) 50 mcg/actuation nasal spray 1 spray (50 mcg total) by Each Nostril route 2 (two) times daily. 16 g 3    ibuprofen (ADVIL,MOTRIN) 800 MG tablet Take 1 tablet (800 mg total) by mouth 3 (three) times daily as needed for Pain. 45 tablet 1    levocetirizine (XYZAL) 5 MG tablet Take 1 tablet (5 mg total) by mouth every evening. 90 tablet 3    methocarbamoL (ROBAXIN) 500 MG Tab Take 1 tablet (500 mg total) by mouth nightly. 30 tablet 3    montelukast (SINGULAIR) 10 mg tablet TAKE 1 TABLET(10 MG) BY MOUTH EVERY EVENING 90 tablet 3    MYRBETRIQ 50 mg Tb24 Take 1 tablet by mouth.      norethindrone (MICRONOR) 0.35 mg  tablet Take 1 tablet (0.35 mg total) by mouth once a week. 28 tablet 11    pantoprazole (PROTONIX) 40 MG tablet Take 1 tablet (40 mg total) by mouth once daily. 90 tablet 3    paroxetine (PAXIL) 10 MG tablet Take 1 tablet (10 mg total) by mouth every morning. 30 tablet 3    prochlorperazine (COMPAZINE) 10 MG tablet Take 1 tablet (10 mg total) by mouth 3 (three) times daily. 20 tablet 3    propranoloL (INDERAL) 20 MG tablet Take 1 tablet (20 mg total) by mouth every morning. 30 tablet 11    traMADoL (ULTRAM) 50 mg tablet Take 1 tablet (50 mg total) by mouth every 8 (eight) hours as needed for Pain. 20 tablet 0    sod picosulf-mag ox-citric ac (CLENPIQ) 10 mg-3.5 gram- 12 gram/160 mL Soln Take As Directed. 320 mL 0     No current facility-administered medications for this visit.       Past Medical History:   Diagnosis Date    Allergy     Angio-edema     Degenerative disc disease     Urticaria     Vertigo      Past Surgical History:   Procedure Laterality Date     SECTION      EPIDURAL STEROID INJECTION Bilateral 2023    Procedure: Injection, Steroid, Epidural;  Surgeon: Andi Watson MD;  Location: [facility];  Service: Neurosurgery;  Laterality: Bilateral;  Procedure:Bilateral SI joint block and steriod injection  Length of procedure:30 minutes  LOS:0 minutes  Anesthesia:MAC  Radiology:C-arm  Bed:Regular Bed  Position:Prone    TONSILLECTOMY      TRANSFORAMINAL EPIDURAL INJECTION OF STEROID Bilateral 3/21/2019    Procedure: Injection,steroid,epidural,transforaminal approach---bilateral L5/S1;  Surgeon: Sridhar Albert III, MD;  Location: [facility] OR;  Service: Pain Management;  Laterality: Bilateral;     Family History   Problem Relation Age of Onset    Asthma Mother     Heart disease Mother     Cancer Father     Breast cancer Paternal Cousin     Colon cancer Neg Hx     Ovarian cancer Neg Hx      Social History     Tobacco Use    Smoking status: Never     Passive exposure: Never    Smokeless  tobacco: Never   Substance Use Topics    Alcohol use: No    Drug use: No        Review of Systems:  Review of Systems   Constitutional:  Negative for appetite change, fatigue, fever and unexpected weight change.   HENT:  Negative for sore throat and trouble swallowing.    Eyes: Negative.    Respiratory:  Negative for cough, shortness of breath and wheezing.    Cardiovascular:  Negative for chest pain and leg swelling.   Gastrointestinal:  Positive for abdominal pain (mostly left flank). Negative for abdominal distention, blood in stool, constipation, diarrhea, nausea and vomiting.   Endocrine: Negative.    Genitourinary: Negative.    Musculoskeletal:  Negative for back pain.   Skin: Negative.  Negative for rash.   Allergic/Immunologic: Negative.    Neurological: Negative.    Hematological: Negative.    Psychiatric/Behavioral:  Negative for confusion.      OBJECTIVE:     Vital Signs (Most Recent)  Pulse: 77 (12/18/23 1342)  BP: 117/76 (12/18/23 1342)     64.9 kg (143 lb 1.3 oz)     Physical Exam:  Physical Exam  Vitals and nursing note reviewed.   Constitutional:       Appearance: She is well-developed.   HENT:      Head: Normocephalic and atraumatic.   Cardiovascular:      Rate and Rhythm: Normal rate.      Heart sounds: Normal heart sounds.   Pulmonary:      Effort: Pulmonary effort is normal.   Abdominal:      General: Bowel sounds are normal. There is no distension (left flank).      Palpations: Abdomen is soft.      Tenderness: There is abdominal tenderness.   Musculoskeletal:         General: Normal range of motion.      Cervical back: Normal range of motion.   Skin:     General: Skin is warm and dry.      Capillary Refill: Capillary refill takes less than 2 seconds.   Neurological:      Mental Status: She is alert and oriented to person, place, and time.   Psychiatric:         Behavior: Behavior normal.     Laboratory  CBC: Reviewed  CMP: Reviewed    Diagnostic Results:  US:  Reviewed  cholelithiasis    ASSESSMENT/PLAN:      53-year-old female with cholelithiasis, left flank pain    PLAN:Plan      Recommend patient have an EGD and colonoscopy   Come back after to discuss need for surgical intervention.

## 2024-01-04 ENCOUNTER — PATIENT OUTREACH (OUTPATIENT)
Dept: ADMINISTRATIVE | Facility: HOSPITAL | Age: 54
End: 2024-01-04
Payer: MEDICARE

## 2024-01-04 NOTE — PROGRESS NOTES
Health Maintenance Due   Topic Date Due    Colorectal Cancer Screening  Never done    Shingles Vaccine (1 of 2) Never done    Lipid Panel  2023    Influenza Vaccine (1) Never done    COVID-19 Vaccine ( season) 2023     Population Health Chart Review & Patient Outreach Details      Further Action Needed If Patient Returns Outreach:      MA Gap Report  reviewed, patient is scheduled for an EGD and colonoscopy with Dr. Roblero on 2024.       Updates Requested / Reviewed:     [x]  Care Everywhere    []     []  External Sources ([facility], [facility], DIS, etc.)    [] [facility]   [] [facility]   [] Other:    [x]  Care Team Updated   []  Removed  or Duplicate Orders   [x]  Immunization Reconciliation Completed / Queried    [x] [place]   [] [place]   [] [place]   [] [place]      Health Maintenance Topics Addressed and Outreach Outcomes / Actions Taken:             Breast Cancer Screening []  Mammogram Order Placed    []  Mammogram Screening Scheduled    []  External Records Requested & Care Team Updated if Applicable    []  External Records Uploaded & Care Team Updated if Applicable    []  Pt Declined Scheduling Mammogram    []  Pt Will Schedule with External Provider / Order Routed & Care Team Updated if Applicable              Cervical Cancer Screening []  Pap Smear Scheduled in Primary Care or OBGYN    []  External Records Requested & Care Team Updated if Applicable       []  External Records Uploaded, Care Team Updated, & History Updated if Applicable    []  Patient Declined Scheduling Pap Smear    []  Patient Will Schedule with External Provider & Care Team Updated if Applicable                  Colorectal Cancer Screening [x]  Colonoscopy Case Request / Referral / Home Test Order Placed    []  External Records Requested & Care Team Updated if Applicable    []  External Records Uploaded, Care Team Updated, & History Updated if Applicable    []  Patient Declined Completing  Colon Cancer Screening    []  Patient Will Schedule with External Provider & Care Team Updated if Applicable    []  Fit Kit Mailed (add the SmartPhrase under additional notes)    []  Reminded Patient to Complete Home Test                Diabetic Eye Exam []  Eye Exam Screening Order Placed    []  Eye Camera Scheduled or Optometry/Ophthalmology Referral Placed    []  External Records Requested & Care Team Updated if Applicable    []  External Records Uploaded, Care Team Updated, & History Updated if Applicable    []  Patient Declined Scheduling Eye Exam    []  Patient Will Schedule with External Provider & Care Team Updated if Applicable             Blood Pressure Control []  Primary Care Follow Up Visit Scheduled     []  Remote Blood Pressure Reading Captured    []  Patient Declined Remote Reading or Scheduling Appt - Escalated to PCP    []  Patient Will Call Back or Send Portal Message with Reading                 HbA1c & Other Labs []  Overdue Lab(s) Ordered    []  Overdue Lab(s) Scheduled    []  External Records Uploaded & Care Team Updated if Applicable    []  Primary Care Follow Up Visit Scheduled     []  Reminded Patient to Complete A1c Home Test    []  Patient Declined Scheduling Labs or Will Call Back to Schedule    []  Patient Will Schedule with External Provider / Order Routed, & Care Team Updated if Applicable           Primary Care Appointment []  Primary Care Appt Scheduled    []  Patient Declined Scheduling or Will Call Back to Schedule    []  Pt Established with External Provider, Updated Care Team, & Informed Pt to Notify Payor if Applicable           Medication Adherence /    Statin Use []  Primary Care Appointment Scheduled    []  Patient Reminded to  Prescription    []  Patient Declined, Provider Notified if Needed    []  Sent Provider Message to Review to Evaluate Pt for Statin, Add Exclusion Dx Codes, Document   Exclusion in Problem List, Change Statin Intensity Level to Moderate or High  Intensity if Applicable                Osteoporosis Screening []  Dexa Order Placed    []  Dexa Appointment Scheduled    []  External Records Requested & Care Team Updated    []  External Records Uploaded, Care Team Updated, & History Updated if Applicable    []  Patient Declined Scheduling Dexa or Will Call Back to Schedule    []  Patient Will Schedule with External Provider / Order Routed & Care Team Updated if Applicable       Additional Notes:

## 2024-01-11 DIAGNOSIS — Z00.00 ENCOUNTER FOR MEDICARE ANNUAL WELLNESS EXAM: ICD-10-CM

## 2024-01-23 ENCOUNTER — TELEPHONE (OUTPATIENT)
Dept: PRIMARY CARE CLINIC | Facility: CLINIC | Age: 54
End: 2024-01-23
Payer: MEDICARE

## 2024-01-23 NOTE — TELEPHONE ENCOUNTER
----- Message from Makenna Moss sent at 1/23/2024  4:12 PM CST -----  Type:  Sooner Apoointment Request    Caller is requesting a sooner appointment.  Caller declined first available appointment listed below.  Caller will not accept being placed on the waitlist and is requesting a message be sent to doctor.  Name of Caller: Cornell  When is the first available appointment?fEB 19  Symptoms: INFECTION IN EAR CAUSING DRAINAGE  Would the patient rather a call back or a response via [facility]? CALL  Best Call Back Number: [phone]  Additional Information: NEEDS APPT ASAP

## 2024-02-05 ENCOUNTER — TELEPHONE (OUTPATIENT)
Dept: PRIMARY CARE CLINIC | Facility: CLINIC | Age: 54
End: 2024-02-05
Payer: MEDICARE

## 2024-02-05 DIAGNOSIS — H93.8X3 EAR FULLNESS, BILATERAL: ICD-10-CM

## 2024-02-05 DIAGNOSIS — H92.13 EAR DRAINAGE, BILATERAL: Primary | ICD-10-CM

## 2024-02-05 NOTE — TELEPHONE ENCOUNTER
----- Message from Myriam Blas sent at 2/5/2024 12:59 PM CST -----  Contact: [phone] Patient  Patient would like to get a referral.  Referral to what specialty:  Audiology  Does the patient want the referral with a specific physician:  No  [facility]  Reason (be specific):  L ear issues  Does the patient already have the specialty clinic appointment scheduled:  No  If yes, what date is the appointment scheduled:     Is the insurance listed in Epic correct? (this is important for a referral):  yes  Advised patient that once provider approves this either a nurse or  will return their call?: yes  Would the patient like a call back, or a response through their [facility] portal?:   Call Back Please. Thank you  Comments:

## 2024-02-05 NOTE — TELEPHONE ENCOUNTER
Pt stated that she has had some drainage from left ear, pt stated that a provider told her when she was getting a wellness exam  that her ear is cloudy and that she couldn't pass hearing screening due to her ears being irrigated.pt stated that the provider told her to get in with a audiologist.

## 2024-02-05 NOTE — TELEPHONE ENCOUNTER
----- Message from Aylin Erazo sent at 2/5/2024  2:20 PM CST -----  Contact: Pt [phone]  Patient is returning a phone call.  Who left a message for the patient: Juvenal Mendosa MA  Does patient know what this is regarding:  yes  Would you like a call back, or a response through your [facility] portal?:   Call back  Comments:     Please call and advise.    Thank You

## 2024-02-06 ENCOUNTER — OFFICE VISIT (OUTPATIENT)
Dept: PRIMARY CARE CLINIC | Facility: CLINIC | Age: 54
End: 2024-02-06
Payer: MEDICARE

## 2024-02-06 VITALS
DIASTOLIC BLOOD PRESSURE: 80 MMHG | WEIGHT: 139.75 LBS | SYSTOLIC BLOOD PRESSURE: 132 MMHG | HEART RATE: 80 BPM | BODY MASS INDEX: 24.76 KG/M2 | OXYGEN SATURATION: 99 % | HEIGHT: 63 IN | RESPIRATION RATE: 18 BRPM

## 2024-02-06 DIAGNOSIS — R20.2 NUMBNESS AND TINGLING OF FOOT: ICD-10-CM

## 2024-02-06 DIAGNOSIS — R20.0 BILATERAL HAND NUMBNESS: ICD-10-CM

## 2024-02-06 DIAGNOSIS — I10 ESSENTIAL HYPERTENSION, BENIGN: ICD-10-CM

## 2024-02-06 DIAGNOSIS — G89.29 CHRONIC RIGHT SHOULDER PAIN: ICD-10-CM

## 2024-02-06 DIAGNOSIS — M46.1 SACROILIITIS, NOT ELSEWHERE CLASSIFIED: ICD-10-CM

## 2024-02-06 DIAGNOSIS — M51.36 DDD (DEGENERATIVE DISC DISEASE), LUMBAR: ICD-10-CM

## 2024-02-06 DIAGNOSIS — M50.30 DDD (DEGENERATIVE DISC DISEASE), CERVICAL: ICD-10-CM

## 2024-02-06 DIAGNOSIS — R20.0 NUMBNESS AND TINGLING OF FOOT: ICD-10-CM

## 2024-02-06 DIAGNOSIS — M54.9 COSTOVERTEBRAL ANGLE TENDERNESS: ICD-10-CM

## 2024-02-06 DIAGNOSIS — M25.552 LEFT HIP PAIN: Primary | ICD-10-CM

## 2024-02-06 DIAGNOSIS — R10.9 FLANK PAIN: ICD-10-CM

## 2024-02-06 DIAGNOSIS — H93.13 TINNITUS OF BOTH EARS: ICD-10-CM

## 2024-02-06 DIAGNOSIS — R73.03 PREDIABETES: ICD-10-CM

## 2024-02-06 DIAGNOSIS — M25.511 CHRONIC RIGHT SHOULDER PAIN: ICD-10-CM

## 2024-02-06 DIAGNOSIS — M53.3 SI (SACROILIAC) JOINT DYSFUNCTION: ICD-10-CM

## 2024-02-06 PROCEDURE — 3079F DIAST BP 80-89 MM HG: CPT | Mod: HCNC,CPTII,S$GLB, | Performed by: FAMILY MEDICINE

## 2024-02-06 PROCEDURE — 99214 OFFICE O/P EST MOD 30 MIN: CPT | Mod: HCNC,S$GLB,, | Performed by: FAMILY MEDICINE

## 2024-02-06 PROCEDURE — 1159F MED LIST DOCD IN RCRD: CPT | Mod: HCNC,CPTII,S$GLB, | Performed by: FAMILY MEDICINE

## 2024-02-06 PROCEDURE — 99999 PR PBB SHADOW E&M-EST. PATIENT-LVL V: CPT | Mod: PBBFAC,HCNC,, | Performed by: FAMILY MEDICINE

## 2024-02-06 PROCEDURE — 3008F BODY MASS INDEX DOCD: CPT | Mod: HCNC,CPTII,S$GLB, | Performed by: FAMILY MEDICINE

## 2024-02-06 PROCEDURE — 3075F SYST BP GE 130 - 139MM HG: CPT | Mod: HCNC,CPTII,S$GLB, | Performed by: FAMILY MEDICINE

## 2024-02-06 RX ORDER — MELOXICAM 7.5 MG/1
7.5 TABLET ORAL 2 TIMES DAILY
Qty: 60 TABLET | Refills: 5 | Status: SHIPPED | OUTPATIENT
Start: 2024-02-06 | End: 2024-03-04

## 2024-02-06 RX ORDER — MELOXICAM 7.5 MG/1
7.5 TABLET ORAL DAILY
Qty: 60 TABLET | Refills: 5 | Status: SHIPPED | OUTPATIENT
Start: 2024-02-06 | End: 2024-02-06

## 2024-02-06 RX ORDER — TRAMADOL HYDROCHLORIDE 50 MG/1
50 TABLET ORAL EVERY 8 HOURS PRN
Qty: 20 TABLET | Refills: 0 | Status: SHIPPED | OUTPATIENT
Start: 2024-02-06 | End: 2024-03-04

## 2024-02-06 NOTE — PATIENT INSTRUCTIONS
Right shoulder pain   X-ray showed mild arthritis and rotator cuff tendinitis   Left hip pain   Needs x-ray of the left hip   Ultram 1 p.o. q.6 hours p.r.n. pain   Mobic 7.5 mg 1 p.o. b.i.d. for joint pain shoulder and hip   Continue Robaxin 500 mg 1 p.o. q.6 hours p.r.n. muscle spasm   Moist heat/theragesic or Tiger balm/range of motion exercise for shoulder and hip   See orthopedist for possible injection of the right shoulder and left hip   Could consider Lidoderm patch   See Dr. Thibodeaux for to see in 3 months

## 2024-02-06 NOTE — PROGRESS NOTES
Subjective:       Patient ID: Carol Dewey is a 53 y.o. female.    Chief Complaint: Shoulder Pain and Hip Pain    HPI:  53-year-old white female in for right shoulder pain left hip pain---  Right shoulder pain has been hurting for awhile--had xray--did 3 mo therapy --no relief --Ultram/ibuprofen/Robaxin  History of left hip pain--x years on and off last Sat --squatting and had trouble getting up due to pain in left hip  X-ray of the shoulder showed mild arthritis and rotator cuff tendinitis  ROS:  Skin: no psoriasis, eczema, skin cancer  HEENT: No headache, ocular pain, blurred vision, diplopia, epistaxis, hoarseness change in voice, thyroid trouble--history tinnitus  Lung: No pneumonia, asthma, Tb, wheezing, SOB, no smoking  Heart: No chest pain, ankle edema, palpitations, MI, timoteo murmur, +hypertension,no  hyperlipidemia no stent bypass arrhythmia  Abdomen: + nausea, no  vomiting, diarrhea, constipation, ulcers, hepatitis, gallbladder disease, melena, hematochezia, hematemesis history cholelithiasis  : no UTI, renal disease, stones-on Myrbetriq for bladder  MS: no fractures, O/A, lupus, rheumatoid, gout--history of right shoulder pain left hip pain numbness of the hands and feet cervical DDD and cervical spondylosis  Neuro: No dizziness, LOC, seizures   + pre diabetes, no anemia, no anxiety, no depression    one child work unemployed lives with  and son 19   Objective:   Physical Exam:  General: Well nourished, well developed, no acute distress  Skin: No lesions  HEENT: Eyes PERRLA, EOM intact, nose patent, throat non-erythematous ears TM clear   NECK: Supple, no bruits, No JVD, no nodes  Lungs: Clear, no rales, rhonchi, wheezing  Heart: Regular rate and rhythm, no murmurs, gallops, or rubs  Abdomen: flat, bowel sounds positive, no tenderness, or organomegaly  MS: tenderness right shoulder with rotation --good hand grasp good opposition thumb index thumb 5th digit good flexion extension  forearms good abduction abduction of the shoulder but pain with raising arm to shoulder level and bringing arm overhead is painful  Tenderness left hip with palpation in the left sacroiliac area pain with abduction abduction of the hip pain with squatting FPC down hard to arise due to pain in the hip area  Neuro: Alert, CN intact, oriented X 3  Extremities: No cyanosis, clubbing, or edema         Assessment:       1. Left hip pain    2. Chronic right shoulder pain    3. SI (sacroiliac) joint dysfunction    4. Prediabetes    5. Essential hypertension, benign    6. Tinnitus of both ears    7. DDD (degenerative disc disease), cervical    8. Numbness and tingling of foot    9. Bilateral hand numbness    10. DDD (degenerative disc disease), lumbar    11. Flank pain    12. Costovertebral angle tenderness    13. Sacroiliitis, not elsewhere classified        Plan:       Left hip pain  -     X-Ray Hip 2 or 3 views Left (with Pelvis when performed); Future; Expected date: 02/06/2024  -     Ambulatory referral/consult to Orthopedics; Future; Expected date: 02/13/2024    Chronic right shoulder pain  -     X-Ray Hip 2 or 3 views Left (with Pelvis when performed); Future; Expected date: 02/06/2024  -     Ambulatory referral/consult to Orthopedics; Future; Expected date: 02/13/2024    SI (sacroiliac) joint dysfunction    Prediabetes    Essential hypertension, benign    Tinnitus of both ears    DDD (degenerative disc disease), cervical    Numbness and tingling of foot    Bilateral hand numbness    DDD (degenerative disc disease), lumbar    Flank pain  -     traMADoL (ULTRAM) 50 mg tablet; Take 1 tablet (50 mg total) by mouth every 8 (eight) hours as needed for Pain.  Dispense: 20 tablet; Refill: 0    Costovertebral angle tenderness  -     traMADoL (ULTRAM) 50 mg tablet; Take 1 tablet (50 mg total) by mouth every 8 (eight) hours as needed for Pain.  Dispense: 20 tablet; Refill: 0    Sacroiliitis, not elsewhere  classified    Other orders  -     meloxicam (MOBIC) 7.5 MG tablet; Take 1 tablet (7.5 mg total) by mouth once daily.  Dispense: 60 tablet; Refill: 5        Right shoulder pain--patient has taken Ultram/ibuprofen/Robaxin--did physical therapy no relief--to orthopedist for ??shoulder injection--x-ray of the shoulder showed mild arthritis with rotator cuff tendinitis  Left sacroiliac pain and left hip pain--xray left hip --tx ultram Mobic and robaxin --Moist heat theragesic range of motion exercise to shoulder and hip--could consider Lidoderm patch  History of hypertension patient on amlodipine 10 mg  History of GERD on Protonix  Lab done Dec no lab needed   See DR Valdez in 3 mo right  shoulder see orthopedist check shoulder ??inject and left hip ?? Inject

## 2024-02-07 RX ORDER — PAROXETINE 10 MG/1
10 TABLET, FILM COATED ORAL EVERY MORNING
Qty: 90 TABLET | Refills: 2 | Status: SHIPPED | OUTPATIENT
Start: 2024-02-07 | End: 2024-03-04

## 2024-02-07 NOTE — TELEPHONE ENCOUNTER
Refill Decision Note   Carol Zuleima  is requesting a refill authorization.  Brief Assessment and Rationale for Refill:  Approve     Medication Therapy Plan:        Comments:     Note composed:4:51 PM 02/07/2024

## 2024-02-08 ENCOUNTER — PATIENT MESSAGE (OUTPATIENT)
Dept: OBSTETRICS AND GYNECOLOGY | Facility: CLINIC | Age: 54
End: 2024-02-08
Payer: MEDICARE

## 2024-02-12 ENCOUNTER — PATIENT MESSAGE (OUTPATIENT)
Dept: NEUROLOGY | Facility: CLINIC | Age: 54
End: 2024-02-12
Payer: MEDICARE

## 2024-02-19 ENCOUNTER — TELEPHONE (OUTPATIENT)
Dept: PRIMARY CARE CLINIC | Facility: CLINIC | Age: 54
End: 2024-02-19
Payer: MEDICARE

## 2024-02-19 ENCOUNTER — TELEPHONE (OUTPATIENT)
Dept: SPORTS MEDICINE | Facility: CLINIC | Age: 54
End: 2024-02-19
Payer: MEDICARE

## 2024-02-19 NOTE — TELEPHONE ENCOUNTER
----- Message from Brittney Darnell MA sent at 2/19/2024 11:17 AM CST -----  Regarding: FW: Acupuncture Requested    ----- Message -----  From: Ame Leone  Sent: 2/19/2024  11:00 AM CST  To: Kel Rowell Staff  Subject: Acupuncture Requested                            Good Morning,     Ms. Dewey called in requesting an acupuncture appt for her back and shoulder.  If you think this is an appropriate treatment for Ms. Dewey could you please put in a referral using order PRO1 for acupuncture?  Also, Medicare covers up to 12 acupuncture visits in 90 days for chronic low back pain. Chronic low back pain is defined as: Lasting 12 weeks or longer   Can you please update the diagnosis on the referral to also reflect M54.5 Chronic Low Back Pain?    Thanks,     Ame Leone

## 2024-02-19 NOTE — TELEPHONE ENCOUNTER
Returned call, left VM advising 3/6 is soonest available at [facility]. Advised pt to call back if interested in being r/s to different location.    Carie Mckeon MS, OTC  Clinical Assistant to Dr. Janiya Redd      ----- Message from Pooja Huynh sent at 2/19/2024 10:49 AM CST -----  Contact: [phone]  Pt states she is having terrible pain in there right hip and he cannot wait until 3/6/2024 to be seen. She states she needs to have an injection in her shoulder urgently. Please call pt. Thanks

## 2024-02-29 ENCOUNTER — TELEPHONE (OUTPATIENT)
Dept: PRIMARY CARE CLINIC | Facility: CLINIC | Age: 54
End: 2024-02-29
Payer: MEDICARE

## 2024-02-29 ENCOUNTER — TELEPHONE (OUTPATIENT)
Dept: SPORTS MEDICINE | Facility: CLINIC | Age: 54
End: 2024-02-29
Payer: MEDICARE

## 2024-02-29 ENCOUNTER — TELEPHONE (OUTPATIENT)
Dept: SURGERY | Facility: CLINIC | Age: 54
End: 2024-02-29
Payer: MEDICARE

## 2024-02-29 NOTE — TELEPHONE ENCOUNTER
----- Message from Hayley Gibson sent at 2/29/2024  9:23 AM CST -----  Regarding: advise plan of care  Contact: [phone]  Carol Dewey calling regarding Patient Advice (message) for stated she is still vomiting and having so much pain. she had the EGD and Colonoscopy.  Pt want to know what's her plan of Care?  Please advise. Pt stated she need her surgery for gall stones today

## 2024-02-29 NOTE — TELEPHONE ENCOUNTER
Spoke with patient. Discussed with her the current symptoms. Instructed she should make a follow up appointment with the provider to discuss the results and possible plans for intervention if needed. Appointment scheduled. No further issues discussed.

## 2024-02-29 NOTE — TELEPHONE ENCOUNTER
Called and left voicemail for patient to contact the office in regards to her appointment with Dr. Redd on 3/6/24

## 2024-03-01 ENCOUNTER — TELEPHONE (OUTPATIENT)
Dept: SPORTS MEDICINE | Facility: CLINIC | Age: 54
End: 2024-03-01
Payer: MEDICARE

## 2024-03-04 ENCOUNTER — OFFICE VISIT (OUTPATIENT)
Dept: SURGERY | Facility: CLINIC | Age: 54
End: 2024-03-04
Payer: MEDICARE

## 2024-03-04 ENCOUNTER — OFFICE VISIT (OUTPATIENT)
Dept: PRIMARY CARE CLINIC | Facility: CLINIC | Age: 54
End: 2024-03-04
Payer: MEDICARE

## 2024-03-04 VITALS
BODY MASS INDEX: 25.04 KG/M2 | HEART RATE: 80 BPM | DIASTOLIC BLOOD PRESSURE: 64 MMHG | SYSTOLIC BLOOD PRESSURE: 108 MMHG | HEIGHT: 63 IN | SYSTOLIC BLOOD PRESSURE: 108 MMHG | WEIGHT: 141.13 LBS | HEART RATE: 80 BPM | OXYGEN SATURATION: 98 % | WEIGHT: 141.31 LBS | TEMPERATURE: 98 F | DIASTOLIC BLOOD PRESSURE: 64 MMHG | RESPIRATION RATE: 16 BRPM | BODY MASS INDEX: 24.99 KG/M2

## 2024-03-04 DIAGNOSIS — I70.0 AORTIC ATHEROSCLEROSIS: ICD-10-CM

## 2024-03-04 DIAGNOSIS — Z00.00 ANNUAL PHYSICAL EXAM: Primary | ICD-10-CM

## 2024-03-04 DIAGNOSIS — M50.30 DDD (DEGENERATIVE DISC DISEASE), CERVICAL: ICD-10-CM

## 2024-03-04 DIAGNOSIS — K80.20 CALCULUS OF GALLBLADDER WITHOUT CHOLECYSTITIS WITHOUT OBSTRUCTION: ICD-10-CM

## 2024-03-04 DIAGNOSIS — G89.29 CHRONIC RIGHT SHOULDER PAIN: ICD-10-CM

## 2024-03-04 DIAGNOSIS — K21.9 GASTROESOPHAGEAL REFLUX DISEASE, UNSPECIFIED WHETHER ESOPHAGITIS PRESENT: ICD-10-CM

## 2024-03-04 DIAGNOSIS — N20.0 LEFT NEPHROLITHIASIS: ICD-10-CM

## 2024-03-04 DIAGNOSIS — M25.511 CHRONIC RIGHT SHOULDER PAIN: ICD-10-CM

## 2024-03-04 DIAGNOSIS — M25.552 LEFT HIP PAIN: ICD-10-CM

## 2024-03-04 DIAGNOSIS — K80.20 CALCULUS OF GALLBLADDER WITHOUT CHOLECYSTITIS WITHOUT OBSTRUCTION: Primary | ICD-10-CM

## 2024-03-04 DIAGNOSIS — M51.36 DDD (DEGENERATIVE DISC DISEASE), LUMBAR: ICD-10-CM

## 2024-03-04 PROCEDURE — 1160F RVW MEDS BY RX/DR IN RCRD: CPT | Mod: HCNC,CPTII,S$GLB, | Performed by: SURGERY

## 2024-03-04 PROCEDURE — 99214 OFFICE O/P EST MOD 30 MIN: CPT | Mod: HCNC,S$GLB,, | Performed by: SURGERY

## 2024-03-04 PROCEDURE — 1159F MED LIST DOCD IN RCRD: CPT | Mod: HCNC,CPTII,S$GLB, | Performed by: STUDENT IN AN ORGANIZED HEALTH CARE EDUCATION/TRAINING PROGRAM

## 2024-03-04 PROCEDURE — 1160F RVW MEDS BY RX/DR IN RCRD: CPT | Mod: HCNC,CPTII,S$GLB, | Performed by: STUDENT IN AN ORGANIZED HEALTH CARE EDUCATION/TRAINING PROGRAM

## 2024-03-04 PROCEDURE — 99999 PR PBB SHADOW E&M-EST. PATIENT-LVL IV: CPT | Mod: PBBFAC,HCNC,, | Performed by: STUDENT IN AN ORGANIZED HEALTH CARE EDUCATION/TRAINING PROGRAM

## 2024-03-04 PROCEDURE — 99999 PR PBB SHADOW E&M-EST. PATIENT-LVL V: CPT | Mod: PBBFAC,HCNC,, | Performed by: SURGERY

## 2024-03-04 PROCEDURE — 3008F BODY MASS INDEX DOCD: CPT | Mod: HCNC,CPTII,S$GLB, | Performed by: STUDENT IN AN ORGANIZED HEALTH CARE EDUCATION/TRAINING PROGRAM

## 2024-03-04 PROCEDURE — 99396 PREV VISIT EST AGE 40-64: CPT | Mod: HCNC,S$GLB,, | Performed by: STUDENT IN AN ORGANIZED HEALTH CARE EDUCATION/TRAINING PROGRAM

## 2024-03-04 PROCEDURE — 1159F MED LIST DOCD IN RCRD: CPT | Mod: HCNC,CPTII,S$GLB, | Performed by: SURGERY

## 2024-03-04 PROCEDURE — 3078F DIAST BP <80 MM HG: CPT | Mod: HCNC,CPTII,S$GLB, | Performed by: SURGERY

## 2024-03-04 PROCEDURE — 3074F SYST BP LT 130 MM HG: CPT | Mod: HCNC,CPTII,S$GLB, | Performed by: SURGERY

## 2024-03-04 PROCEDURE — 3008F BODY MASS INDEX DOCD: CPT | Mod: HCNC,CPTII,S$GLB, | Performed by: SURGERY

## 2024-03-04 PROCEDURE — 3074F SYST BP LT 130 MM HG: CPT | Mod: HCNC,CPTII,S$GLB, | Performed by: STUDENT IN AN ORGANIZED HEALTH CARE EDUCATION/TRAINING PROGRAM

## 2024-03-04 PROCEDURE — 3078F DIAST BP <80 MM HG: CPT | Mod: HCNC,CPTII,S$GLB, | Performed by: STUDENT IN AN ORGANIZED HEALTH CARE EDUCATION/TRAINING PROGRAM

## 2024-03-04 RX ORDER — OMEPRAZOLE 40 MG/1
40 CAPSULE, DELAYED RELEASE ORAL DAILY
Qty: 90 CAPSULE | Refills: 3 | Status: SHIPPED | OUTPATIENT
Start: 2024-03-04 | End: 2025-03-04

## 2024-03-04 RX ORDER — IBUPROFEN 800 MG/1
800 TABLET ORAL 3 TIMES DAILY PRN
Qty: 90 TABLET | Refills: 1 | Status: SHIPPED | OUTPATIENT
Start: 2024-03-04

## 2024-03-04 NOTE — PATIENT INSTRUCTIONS
Annual physical exam   - annual appointment.  Patient having some right upper quadrant pain after eating, significant right shoulder pain that will be addressed by Orthopedics on Wednesday.    Chronic right shoulder pain   - on exam patient has decreased range of motion on right side to shoulder and upper arm.  Is tender to palpation over the biceps, deltoid and along the glenohumeral joint.   - taking ibuprofen 800 mg up to 3 times a day.  Advised can not take meloxicam while taking this NSAID.  Needs to pick 1 or the other.  Discontinued meloxicam in chart.   - keep appointment with orthopedic on Wednesday, they may advise further therapy, injection or other intervention.    Left hip pain   - patient has chronic left hip pain.  X-rays were reassuring.  Can discuss with Orthopedic at their appointment Wednesday.    Calculus of gallbladder without cholecystitis without obstruction   - ultrasound showed 4 mm stone in gallbladder.  Is having postprandial right upper quadrant pain.  Has appointment with surgery today.  States at after failed conservative therapy is open to surgery.    Left nephrolithiasis   - patient found to have 4 mm stone in left kidney on ultrasound late last year.  Currently voiding well.  Continue to monitor.    DDD (degenerative disc disease), lumbar   - chronic back pain and neck pain.  Continue to monitor.  Taking vitamin-D and calcium supplements.    DDD (degenerative disc disease), cervical  -     ibuprofen (ADVIL,MOTRIN) 800 MG tablet; Take 1 tablet (800 mg total) by mouth 3 (three) times daily as needed for Pain.  Dispense: 90 tablet; Refill: 1    Gastroesophageal reflux disease, unspecified whether esophagitis present  -     omeprazole (PRILOSEC) 40 MG capsule; Take 1 capsule (40 mg total) by mouth once daily.  Dispense: 90 capsule; Refill: 3  - patient takes vitamin-D and calcium supplements while using PPI chronically.  Had tried to wean off previously and states that GERD flares  significantly when not on this medication.  Was advised by her insurance should not be taking chronically, but advised patient that a risk of osteoporosis is preferred to risk of esophageal cancer and will continue at this time.

## 2024-03-04 NOTE — PROGRESS NOTES
Subjective:           Patient ID: Carol Dewey   Age:  53 y.o.  Sex: female     Chief Complaint:   Low-back Pain, Neck Pain, and Shoulder Pain (Bilateral )      History of Present Illness:    Carol Dewey is a 53 y.o. female who presents today with a chief complaint of Low-back Pain, Neck Pain, and Shoulder Pain (Bilateral )  .    52yo female presenting to review images and with report of right shoulder pain.  States did therapy for a while but that was not helping.     Has been quite painful and would like to get an injection in the shoulder then if possible.  Also interested in Acupunture to help address the shoulder pain.     Has a appt with Gen Surgery to day w/ Dr. Gillespie for gall stone.  States last week was having nausea and vomiting.  Some RUQ pain.    US in Dec 2023 showed small non-obstructing 4mm stone and a left sided kidney stone that is also 4mm.    Right shoulder pain with abduction and very limited ROM.    Pain along superior margin on right scapula.       Review of Systems   Constitutional:  Negative for activity change, chills, fatigue, fever and unexpected weight change.   HENT:  Negative for congestion, nosebleeds, sinus pressure and sneezing.    Respiratory:  Negative for cough, shortness of breath and wheezing.    Cardiovascular:  Negative for chest pain, palpitations and leg swelling.   Gastrointestinal:  Negative for abdominal distention, constipation, diarrhea and nausea.   Genitourinary:  Negative for difficulty urinating and dysuria.   Musculoskeletal:  Positive for arthralgias, back pain, myalgias and neck pain. Negative for gait problem.        Bilateral arm pain and shoulder pain, right worse than left.    Has pain primarily start in the right shoulder and radiate up to the shoulder.  States worse with activity.   Worse with carry things.   Feels like she gets numbness in hands (reports hx bilateral carpal tunnel) and some possibly weakness after holding things for a time.   "  Skin:  Negative for pallor and rash.   Neurological:  Negative for headaches.   Psychiatric/Behavioral:  Positive for sleep disturbance. Negative for agitation. The patient is not nervous/anxious.            Objective:        Vitals:    03/04/24 0952   BP: 108/64   BP Location: Right arm   Patient Position: Sitting   BP Method: Medium (Manual)   Pulse: 80   Resp: 16   Temp: 97.7 °F (36.5 °C)   TempSrc: Temporal   SpO2: 98%   Weight: 64.1 kg (141 lb 5 oz)   Height: 5' 3" (1.6 m)       Body mass index is 25.03 kg/m².      Physical Exam  Vitals reviewed.   Constitutional:       General: She is not in acute distress.     Appearance: She is obese. She is not ill-appearing.   HENT:      Head: Normocephalic and atraumatic.      Right Ear: Tympanic membrane and external ear normal. There is no impacted cerumen.      Left Ear: External ear normal. There is no impacted cerumen.      Nose: No rhinorrhea.      Mouth/Throat:      Pharynx: No posterior oropharyngeal erythema.   Eyes:      General:         Right eye: No discharge.         Left eye: No discharge.      Conjunctiva/sclera: Conjunctivae normal.   Cardiovascular:      Rate and Rhythm: Normal rate and regular rhythm.      Pulses: Normal pulses.      Heart sounds: Normal heart sounds. No murmur heard.  Pulmonary:      Effort: Pulmonary effort is normal.      Breath sounds: Normal breath sounds. No wheezing.   Abdominal:      Tenderness: There is no right CVA tenderness or left CVA tenderness.   Musculoskeletal:         General: Tenderness present.      Right lower leg: No edema.      Left lower leg: No edema.      Comments: Patient has decreased range of motion in right arm.  Is not able to abduct her arm past 15°, not able to forward flex at shoulder past 90°.  Is tender to palpation to bicep, tricep and right shoulder area.   Lymphadenopathy:      Cervical: No cervical adenopathy.   Skin:     Capillary Refill: Capillary refill takes less than 2 seconds.      " Coloration: Skin is not jaundiced or pale.      Findings: No rash.   Neurological:      General: No focal deficit present.      Mental Status: She is alert and oriented to person, place, and time.      Motor: Weakness present.   Psychiatric:         Mood and Affect: Mood normal.         Behavior: Behavior normal.           Past Medical History:   Diagnosis Date    Allergy     Angio-edema     Degenerative disc disease     Urticaria     Vertigo        Lab Results   Component Value Date     12/01/2023    K 3.9 12/01/2023     12/01/2023    CO2 22 (L) 12/01/2023    BUN 9 12/01/2023    CREATININE 0.8 12/01/2023    ANIONGAP 10 12/01/2023     Lab Results   Component Value Date    HGBA1C 5.9 (H) 04/05/2023     Lab Results   Component Value Date    BNP 34 09/10/2021       Lab Results   Component Value Date    WBC 8.07 12/01/2023    HGB 15.5 12/01/2023    HCT 46.7 12/01/2023     12/01/2023    GRAN 4.4 12/01/2023    GRAN 54.4 12/01/2023     Lab Results   Component Value Date    CHOL 230 (H) 08/02/2022    HDL 48 08/02/2022    LDLCALC 150.4 08/02/2022    TRIG 158 (H) 08/02/2022        Outpatient Encounter Medications as of 3/4/2024   Medication Sig Dispense Refill    amLODIPine (NORVASC) 10 MG tablet Take 1 tablet (10 mg total) by mouth once daily. 90 tablet 3    fluticasone propionate (FLONASE) 50 mcg/actuation nasal spray 1 spray (50 mcg total) by Each Nostril route 2 (two) times daily. 16 g 3    levocetirizine (XYZAL) 5 MG tablet Take 1 tablet (5 mg total) by mouth every evening. 90 tablet 3    methocarbamoL (ROBAXIN) 500 MG Tab Take 1 tablet (500 mg total) by mouth nightly. 30 tablet 3    montelukast (SINGULAIR) 10 mg tablet TAKE 1 TABLET(10 MG) BY MOUTH EVERY EVENING 90 tablet 3    MYRBETRIQ 50 mg Tb24 Take 1 tablet by mouth once daily.      norethindrone (MICRONOR) 0.35 mg tablet Take 1 tablet (0.35 mg total) by mouth once a week. 28 tablet 11    prochlorperazine (COMPAZINE) 10 MG tablet Take 1 tablet (10  mg total) by mouth 3 (three) times daily. 20 tablet 3    propranoloL (INDERAL) 20 MG tablet Take 1 tablet (20 mg total) by mouth every morning. 30 tablet 11    [DISCONTINUED] ibuprofen (ADVIL,MOTRIN) 800 MG tablet Take 1 tablet (800 mg total) by mouth 3 (three) times daily as needed for Pain. 45 tablet 1    [DISCONTINUED] meloxicam (MOBIC) 7.5 MG tablet Take 1 tablet (7.5 mg total) by mouth 2 (two) times a day. 60 tablet 5    [DISCONTINUED] pantoprazole (PROTONIX) 40 MG tablet Take 1 tablet (40 mg total) by mouth once daily. 90 tablet 3    ibuprofen (ADVIL,MOTRIN) 800 MG tablet Take 1 tablet (800 mg total) by mouth 3 (three) times daily as needed for Pain. 90 tablet 1    omeprazole (PRILOSEC) 40 MG capsule Take 1 capsule (40 mg total) by mouth once daily. 90 capsule 3    [DISCONTINUED] paroxetine (PAXIL) 10 MG tablet TAKE 1 TABLET(10 MG) BY MOUTH EVERY MORNING 90 tablet 2    [DISCONTINUED] sod picosulf-mag ox-citric ac (CLENPIQ) 10 mg-3.5 gram- 12 gram/160 mL Soln Take As Directed. 320 mL 0    [DISCONTINUED] traMADoL (ULTRAM) 50 mg tablet Take 1 tablet (50 mg total) by mouth every 8 (eight) hours as needed for Pain. 20 tablet 0     Facility-Administered Encounter Medications as of 3/4/2024   Medication Dose Route Frequency Provider Last Rate Last Admin    0.9%  NaCl infusion   Intravenous Continuous Manuel Roblero MD   New Bag at 01/25/24 0903          Assessment:       1. Annual physical exam    2. Chronic right shoulder pain    3. Left hip pain    4. Calculus of gallbladder without cholecystitis without obstruction    5. Left nephrolithiasis    6. DDD (degenerative disc disease), lumbar    7. DDD (degenerative disc disease), cervical    8. Gastroesophageal reflux disease, unspecified whether esophagitis present    9. Aortic atherosclerosis           Plan:           Annual physical exam   - annual appointment.  Patient having some right upper quadrant pain after eating, significant right shoulder pain  that will be addressed by Orthopedics on Wednesday.    Chronic right shoulder pain   - on exam patient has decreased range of motion on right side to shoulder and upper arm.  Is tender to palpation over the biceps, deltoid and along the glenohumeral joint.   - taking ibuprofen 800 mg up to 3 times a day.  Advised can not take meloxicam while taking this NSAID.  Needs to pick 1 or the other.  Discontinued meloxicam in chart.   - keep appointment with orthopedic on Wednesday, they may advise further therapy, injection or other intervention.    Left hip pain   - patient has chronic left hip pain.  X-rays were reassuring.  Can discuss with Orthopedic at their appointment Wednesday.    Calculus of gallbladder without cholecystitis without obstruction   - ultrasound showed 4 mm stone in gallbladder.  Is having postprandial right upper quadrant pain.  Has appointment with surgery today.  States at after failed conservative therapy is open to surgery.    Left nephrolithiasis   - patient found to have 4 mm stone in left kidney on ultrasound late last year.  Currently voiding well.  Continue to monitor.    DDD (degenerative disc disease), lumbar   - chronic back pain and neck pain.  Continue to monitor.  Taking vitamin-D and calcium supplements.    DDD (degenerative disc disease), cervical  -     ibuprofen (ADVIL,MOTRIN) 800 MG tablet; Take 1 tablet (800 mg total) by mouth 3 (three) times daily as needed for Pain.  Dispense: 90 tablet; Refill: 1    Gastroesophageal reflux disease, unspecified whether esophagitis present  -     omeprazole (PRILOSEC) 40 MG capsule; Take 1 capsule (40 mg total) by mouth once daily.  Dispense: 90 capsule; Refill: 3  - patient takes vitamin-D and calcium supplements while using PPI chronically.  Had tried to wean off previously and states that GERD flares significantly when not on this medication.  Was advised by her insurance should not be taking chronically, but advised patient that a risk of  osteoporosis is preferred to risk of esophageal cancer and will continue at this time.    Aortic Atherosclerosis:   - control pressure and cholesterol.

## 2024-03-06 ENCOUNTER — OFFICE VISIT (OUTPATIENT)
Dept: ORTHOPEDICS | Facility: CLINIC | Age: 54
End: 2024-03-06
Payer: MEDICARE

## 2024-03-06 VITALS
WEIGHT: 142.19 LBS | DIASTOLIC BLOOD PRESSURE: 73 MMHG | BODY MASS INDEX: 25.19 KG/M2 | SYSTOLIC BLOOD PRESSURE: 105 MMHG

## 2024-03-06 DIAGNOSIS — M25.552 LEFT HIP PAIN: ICD-10-CM

## 2024-03-06 DIAGNOSIS — G89.29 CHRONIC RIGHT SHOULDER PAIN: Primary | ICD-10-CM

## 2024-03-06 DIAGNOSIS — M25.611 DECREASED ROM OF RIGHT SHOULDER: ICD-10-CM

## 2024-03-06 DIAGNOSIS — M25.511 CHRONIC RIGHT SHOULDER PAIN: Primary | ICD-10-CM

## 2024-03-06 DIAGNOSIS — M75.01 ADHESIVE CAPSULITIS OF RIGHT SHOULDER: ICD-10-CM

## 2024-03-06 PROCEDURE — 3074F SYST BP LT 130 MM HG: CPT | Mod: HCNC,CPTII,S$GLB, | Performed by: STUDENT IN AN ORGANIZED HEALTH CARE EDUCATION/TRAINING PROGRAM

## 2024-03-06 PROCEDURE — 99204 OFFICE O/P NEW MOD 45 MIN: CPT | Mod: 25,HCNC,S$GLB, | Performed by: STUDENT IN AN ORGANIZED HEALTH CARE EDUCATION/TRAINING PROGRAM

## 2024-03-06 PROCEDURE — 1159F MED LIST DOCD IN RCRD: CPT | Mod: HCNC,CPTII,S$GLB, | Performed by: STUDENT IN AN ORGANIZED HEALTH CARE EDUCATION/TRAINING PROGRAM

## 2024-03-06 PROCEDURE — 99999 PR PBB SHADOW E&M-EST. PATIENT-LVL III: CPT | Mod: PBBFAC,HCNC,, | Performed by: STUDENT IN AN ORGANIZED HEALTH CARE EDUCATION/TRAINING PROGRAM

## 2024-03-06 PROCEDURE — 1160F RVW MEDS BY RX/DR IN RCRD: CPT | Mod: HCNC,CPTII,S$GLB, | Performed by: STUDENT IN AN ORGANIZED HEALTH CARE EDUCATION/TRAINING PROGRAM

## 2024-03-06 PROCEDURE — 20611 DRAIN/INJ JOINT/BURSA W/US: CPT | Mod: HCNC,RT,S$GLB, | Performed by: STUDENT IN AN ORGANIZED HEALTH CARE EDUCATION/TRAINING PROGRAM

## 2024-03-06 PROCEDURE — 3078F DIAST BP <80 MM HG: CPT | Mod: HCNC,CPTII,S$GLB, | Performed by: STUDENT IN AN ORGANIZED HEALTH CARE EDUCATION/TRAINING PROGRAM

## 2024-03-06 PROCEDURE — 3008F BODY MASS INDEX DOCD: CPT | Mod: HCNC,CPTII,S$GLB, | Performed by: STUDENT IN AN ORGANIZED HEALTH CARE EDUCATION/TRAINING PROGRAM

## 2024-03-06 PROCEDURE — 1125F AMNT PAIN NOTED PAIN PRSNT: CPT | Mod: HCNC,CPTII,S$GLB, | Performed by: STUDENT IN AN ORGANIZED HEALTH CARE EDUCATION/TRAINING PROGRAM

## 2024-03-06 RX ORDER — TRIAMCINOLONE ACETONIDE 40 MG/ML
40 INJECTION, SUSPENSION INTRA-ARTICULAR; INTRAMUSCULAR
Status: DISCONTINUED | OUTPATIENT
Start: 2024-03-06 | End: 2024-03-06 | Stop reason: HOSPADM

## 2024-03-06 RX ADMIN — TRIAMCINOLONE ACETONIDE 40 MG: 40 INJECTION, SUSPENSION INTRA-ARTICULAR; INTRAMUSCULAR at 10:03

## 2024-03-06 NOTE — PROCEDURES
Large Joint Aspiration/Injection: R glenohumeral    Date/Time: 3/6/2024 10:30 AM    Performed by: Janiya Redd MD  Authorized by: Janiya Redd MD    Consent Done?:  Yes (Verbal)  Indications:  Arthritis and pain  Site marked: the procedure site was marked    Timeout: prior to procedure the correct patient, procedure, and site was verified    Prep: patient was prepped and draped in usual sterile fashion      Local anesthesia used?: Yes    Anesthesia:  Local infiltration  Local anesthetic:  Co-phenylcaine spray    Details:  Needle Size:  22 G  Ultrasonic Guidance for needle placement?: Yes (Ultrasound guidance used to avoid neurovascular injury.)    Images are saved and documented.  Approach:  Posterior  Location:  Shoulder  Site:  R glenohumeral  Medications:  40 mg triamcinolone acetonide 40 mg/mL  Medications comment:  Ropivacaine 0.2% 2mL  Patient tolerance:  Patient tolerated the procedure well with no immediate complications     TECHNIQUE: Real time ultrasound examination of the right glenohumeral joint(s) was performed with SonoSite Edge 2, C1-5 MHz probe(s). Ultrasound guidance was used for needle localization. Images were saved and stored for documentation.  Dynamic visualization of the needle was continuous throughout the procedures and maintained in good position.

## 2024-03-06 NOTE — PROGRESS NOTES
CC: right shoulder pain    53 y.o. Female presents today for evaluation of her right shoulder pain. Pt reports gradual onset of shoulder pain about 2 yrs ago. Pt reports pain is 8/10 today. Pt localizes pain from right-sided neck, superior shoulder, and lateral upper arm. Pt reports popping in the shoulder. Pt reports intermittent numbness/tingling into her right hand. Pt reports prev hx of cervical spondylosis.     Hand dominance: Right     SYMPTOMS:   Pain Score: 8/10  Pain location: right-sided neck, superior shoulder, and lateral upper arm  Time of onset: 2 yrs  Trauma, injury: gradual onset    Nocturnal pain: yes  Weakness: yes  Clicking, catching: popping  Neck problems: prev hx of cervical spondylosis; right sided neck pain    INTERVENTIONS:   Medications tried: meloxicam, methocarbamol (some relief); tramadol (allergic)  Physical therapy: fPT a few months ago (no relief)  Injections: none    RELEVANT HISTORY:   Imaging to date: 23  Previous significant shoulder/arm injuries: none  Previous shoulder surgeries: none    Occupation: on disability    REVIEW OF SYSTEMS:   Constitution: Patient denies fever or chills.  Eyes: Patient denies eye pain or vision changes.  HEENT: Patient denies ear pain, sore throat, or nasal discharge.  CVS: Patient denies chest pain.  Lungs: Patient denies shortness of breath or cough.  Abdomen: Patient denies any stomach pain, nausea, vomiting, or diarrhea  Skin: Patient denies skin rash or itching.    Musculoskeletal: Patient denies recent injuries or trauma.  Neuro: Patient denies any numbness or tingling in lower extremities.  Psych: Patient denies any current anxiety or nervousness.    PAST MEDICAL HISTORY:   Past Medical History:   Diagnosis Date    Allergy     Angio-edema     Degenerative disc disease     Urticaria     Vertigo        PAST SURGICAL HISTORY:  Past Surgical History:   Procedure Laterality Date     SECTION      COLONOSCOPY  2024    COLONOSCOPY  N/A 1/25/2024    Procedure: COLONOSCOPY;  Surgeon: Manuel Roblero MD;  Location: [facility] ENDO;  Service: Endoscopy;  Laterality: N/A;    EGD, WITH CLOSED BIOPSY  01/25/2024    EPIDURAL STEROID INJECTION Bilateral 05/18/2023    Procedure: Injection, Steroid, Epidural;  Surgeon: Andi Watson MD;  Location: [facility] MGT;  Service: Neurosurgery;  Laterality: Bilateral;  Procedure:Bilateral SI joint block and steriod injection  Length of procedure:30 minutes  LOS:0 minutes  Anesthesia:MAC  Radiology:C-arm  Bed:Regular Bed  Position:Prone    ESOPHAGOGASTRODUODENOSCOPY N/A 1/25/2024    Procedure: EGD (ESOPHAGOGASTRODUODENOSCOPY);  Surgeon: Manuel Roblero MD;  Location: [facility] ENDO;  Service: Endoscopy;  Laterality: N/A;    TONSILLECTOMY      TRANSFORAMINAL EPIDURAL INJECTION OF STEROID Bilateral 03/21/2019    Procedure: Injection,steroid,epidural,transforaminal approach---bilateral L5/S1;  Surgeon: Sridhar Albert III, MD;  Location: [facility] OR;  Service: Pain Management;  Laterality: Bilateral;       FAMILY HISTORY:  Family History   Problem Relation Age of Onset    Asthma Mother     Heart disease Mother     Cancer Father     Breast cancer Paternal Cousin     Colon cancer Neg Hx     Ovarian cancer Neg Hx        SOCIAL HISTORY:  Social History     Socioeconomic History    Marital status:    Tobacco Use    Smoking status: Never     Passive exposure: Never    Smokeless tobacco: Never   Substance and Sexual Activity    Alcohol use: No    Drug use: No    Sexual activity: Yes     Partners: Male     Birth control/protection: OCP       MEDICATIONS:     Current Outpatient Medications:     amLODIPine (NORVASC) 10 MG tablet, Take 1 tablet (10 mg total) by mouth once daily., Disp: 90 tablet, Rfl: 3    fluticasone propionate (FLONASE) 50 mcg/actuation nasal spray, 1 spray (50 mcg total) by Each Nostril route 2 (two) times daily., Disp: 16 g, Rfl: 3    ibuprofen (ADVIL,MOTRIN) 800 MG tablet, Take 1  tablet (800 mg total) by mouth 3 (three) times daily as needed for Pain., Disp: 90 tablet, Rfl: 1    levocetirizine (XYZAL) 5 MG tablet, Take 1 tablet (5 mg total) by mouth every evening., Disp: 90 tablet, Rfl: 3    methocarbamoL (ROBAXIN) 500 MG Tab, Take 1 tablet (500 mg total) by mouth nightly., Disp: 30 tablet, Rfl: 3    montelukast (SINGULAIR) 10 mg tablet, TAKE 1 TABLET(10 MG) BY MOUTH EVERY EVENING, Disp: 90 tablet, Rfl: 3    MYRBETRIQ 50 mg Tb24, Take 1 tablet by mouth once daily., Disp: , Rfl:     norethindrone (MICRONOR) 0.35 mg tablet, Take 1 tablet (0.35 mg total) by mouth once a week., Disp: 28 tablet, Rfl: 11    omeprazole (PRILOSEC) 40 MG capsule, Take 1 capsule (40 mg total) by mouth once daily., Disp: 90 capsule, Rfl: 3    prochlorperazine (COMPAZINE) 10 MG tablet, Take 1 tablet (10 mg total) by mouth 3 (three) times daily., Disp: 20 tablet, Rfl: 3    propranoloL (INDERAL) 20 MG tablet, Take 1 tablet (20 mg total) by mouth every morning., Disp: 30 tablet, Rfl: 11  No current facility-administered medications for this visit.    Facility-Administered Medications Ordered in Other Visits:     0.9%  NaCl infusion, , Intravenous, Continuous, Manuel Roblero MD, New Bag at 01/25/24 0903    ALLERGIES:   Review of patient's allergies indicates:   Allergen Reactions    Augmentin [amoxicillin-pot clavulanate] Rash     Rash to arms, legs, shoulders and torso.  States has happened 2 times with this medication.    Iodine and iodide containing products Rash    Gemtesa [vibegron] Other (See Comments)     Weakness & dizziness    Shellfish containing products Hives     Other reaction(s): Hives    Tramadol Itching    Watermelon Other (See Comments)     Irritates esophagus    Amoxapine Rash    Papaya Rash    Penicillins Rash      X-Ray Shoulder 2 or More views Bilateral 08/21/2023    Narrative  EXAMINATION:  XR SHOULDER COMPLETE 2 OR MORE VIEWS BILATERAL    CLINICAL HISTORY:  . Pain in right  shoulder    COMPARISON:  None.    TECHNIQUE:  Multiple views of the right and left shoulder.    FINDINGS:  Left shoulder: No bone, joint, or soft tissue abnormality.    Right shoulder: Soft tissue calcifications adjacent to the greater tuberosity compatible with calcific rotator cuff tendinosis.  No significant joint space narrowing.  Mild AC osteoarthritis.    Impression  Calcific rotator cuff tendinosis of the right shoulder    Unremarkable left shoulder      Electronically signed by: Jn Nur Jr  Date:    08/21/2023  Time:    14:54    PHYSICAL EXAMINATION:  /73   Pulse (P) 82   Wt 64.5 kg (142 lb 3.2 oz)   BMI 25.19 kg/m²   Vitals signs and nursing note have been reviewed.    General: In no acute distress, well developed, well nourished, no diaphoresis  Eyes: EOM full and smooth, no eye redness or discharge  HEENT: normocephalic and atraumatic, neck supple, trachea midline, no nasal discharge  Cardiovascular: no LE edema  Lungs: respirations non-labored, no conversational dyspnea   Neuro: AAOx3, CN2-12 grossly intact  Skin: No rashes, warm and dry  Psychiatric: cooperative, pleasant, mood and affect appropriate for age    Right Shoulder:  INSPECTION / PALPATION:  -Deformity   -Ecchymosis   -Atrophy   -Sulcus sign   -No TTP over anatomy including clavicle, scapular spine, ACJ, acromion, supraspinatus, infraspinatus, bicipital groove     ROM:    Flex to 120° vs 180° contra   Abduct to 60° vs 150° contra   Int rot to waistline vs T7 contra   Ext rot to 50° vs 60° contra  -Scapular dyskinesis     STRENGTH:    Scaption 4+/5   Flexion 4+/5   Ext rot 5/5   Int rot 5/5   Sup/Pro 5/5    OTHER:   +Painful arc   -Drop arm   -Int lag  -Ext lag  +Neer's   +Banks-Jamari   +Lander active compression   -Empty Can  +Full Can  -Speed's   -Yergason's  -Apprehension    NECK:   Grossly FROM & painless in neck flex, ext, B/L torsion, B/L lat flexion   -Spurling B/L    Hands NVI B/L      ASSESSMENT:      ICD-10-CM  ICD-9-CM   1. Chronic right shoulder pain  M25.511 719.41    G89.29 338.29   2. Decreased ROM of right shoulder  M25.611 719.51   3. Adhesive capsulitis of right shoulder  M75.01 726.0   4. Left hip pain  M25.552 719.45         PLAN:  Follow-up based on patient history, physical exam findings, and imaging I believe patient has adhesive capsulitis of the right shoulder likely secondary to some mild glenohumeral arthritic changes and rotator cuff tendinitis.  We will start with a glenohumeral joint corticosteroid injection.  Follow up in 6 weeks.  Pending improvement at 6 weeks, may move for with subacromial bursa injection.    Risks and benefits were discussed with patient prior to receiving injection.  Depending on injection type, risks include the possibility of infection, pain, disruptions in blood pressure and blood sugar, and cosmetic deformity at site of injection.    Future planning includes - consider subacromial CSI    All questions were answered to the best of my ability and all concerns were addressed at this time.    Follow up in 6 week(s) for above, or sooner if needed.      This note is dictated using the M*Modal Fluency Direct word recognition program. There are word recognition mistakes that are occasionally missed on review.

## 2024-03-07 ENCOUNTER — TELEPHONE (OUTPATIENT)
Dept: PRIMARY CARE CLINIC | Facility: CLINIC | Age: 54
End: 2024-03-07
Payer: MEDICARE

## 2024-03-07 DIAGNOSIS — M54.59 OTHER LOW BACK PAIN: ICD-10-CM

## 2024-03-07 DIAGNOSIS — M54.50 CHRONIC MIDLINE LOW BACK PAIN, UNSPECIFIED WHETHER SCIATICA PRESENT: Primary | ICD-10-CM

## 2024-03-07 DIAGNOSIS — G89.29 CHRONIC MIDLINE LOW BACK PAIN, UNSPECIFIED WHETHER SCIATICA PRESENT: Primary | ICD-10-CM

## 2024-03-07 DIAGNOSIS — M50.30 DDD (DEGENERATIVE DISC DISEASE), CERVICAL: ICD-10-CM

## 2024-03-07 DIAGNOSIS — M53.3 SI (SACROILIAC) JOINT DYSFUNCTION: ICD-10-CM

## 2024-03-07 NOTE — TELEPHONE ENCOUNTER
Called patient and informed patient that the order was placed for acupuncture. Patient verbalized understand.

## 2024-03-07 NOTE — TELEPHONE ENCOUNTER
----- Message from Juju Keenan sent at 3/7/2024 11:11 AM CST -----  Contact: [phone]  Patient is returning a phone call.  Who left a message for the patient: salomón   Does patient know what this is regarding:   appt   Would you like a call back, or a response through your [facility] portal?:    call back   Comments:

## 2024-03-08 ENCOUNTER — TELEPHONE (OUTPATIENT)
Dept: PRIMARY CARE CLINIC | Facility: CLINIC | Age: 54
End: 2024-03-08
Payer: MEDICARE

## 2024-03-08 RX ORDER — CLINDAMYCIN PHOSPHATE 900 MG/50ML
900 INJECTION, SOLUTION INTRAVENOUS
Status: CANCELLED | OUTPATIENT
Start: 2024-03-08

## 2024-03-08 RX ORDER — SODIUM CHLORIDE 9 MG/ML
INJECTION, SOLUTION INTRAVENOUS CONTINUOUS
Status: CANCELLED | OUTPATIENT
Start: 2024-03-08

## 2024-03-08 RX ORDER — ENOXAPARIN SODIUM 100 MG/ML
40 INJECTION SUBCUTANEOUS
Status: CANCELLED | OUTPATIENT
Start: 2024-03-08

## 2024-03-08 NOTE — ADDENDUM NOTE
Addended by: SYLVIA ANDRADE on: 3/8/2024 11:10 AM     Modules accepted: Orders     Problem: Dysphagia (Adult) Goal: *Acute Goals and Plan of Care (Insert Text) Description Speech Pathology Re-evaluation 12/18/2019 1. Patient will verbalize understanding of aspiration risk, dysphagia and diet recommendation within 7 days Re-Evaluation 12/11/19 1. Patient will tolerate pureed diet, nectar thickened liquids without overt s/s aspiration within 7 days. MET Re-evaluation 12/3/2019 1. Patient will tolerate least restrictive diet without adverse effects within 7 days. Discontinue Re-evaluation 11/26/2019 1. Patient will tolerate regular/thin liquid diet with no overt s/s aspiration within 7 days. Discontinue Re-evaluation 11/20/2019 1. Patient will tolerate mechanical soft/thin liquid diet with no overt s/s aspiration within 7 days. MET Initiated 10/28/19 1. Patient will tolerate regular diet/thin liquids without overt s/s of aspiration within 7 days MET 2. Patient will participate in [facility] for further objective assessment of swallow physiology within 7 days (once medically stable from Impella/cardiac sx standpoint) NOT MET; discontinue Outcome: Progressing Towards Goal 
  
SPEECH LANGUAGE PATHOLOGY DYSPHAGIA TREATMENT: WEEKLY REASSESSMENT Patient: Natasha Flanagan (77 y.o. male) Date: 12/18/2019 Diagnosis: Acute decompensated heart failure ([facility] [address] [I50.9] <principal problem not specified> Procedure(s) (LRB): LEFT BRACHIAL THROMBECTOMY (Left) 23 Days Post-Op Precautions: swallow, Fall, Sternal(LVAD ) ASSESSMENT: 
Patient with significantly improved mental status, now alert, appropriate, and oriented x4. Trials were limited this date as patient had just finished breakfast and patient drowsy, however patient with no difficulty with solids today. RN also reported diet tolerance of regular/nectar liquid diet. Patient's progression toward goals since last assessment: Good.  Patient's cognitive status has improved and he is tolerating regular/nectar liquid diet. Will follow for continued education regarding compensatory strategies, diet recommendations, dysphagia, and aspiration risk given cognitive status has improved. PLAN: 
Goals have been updated based on progression since last assessment. Patient continues to benefit from skilled intervention to address the above impairments. Continue to follow the patient 1 time a week to address goals. Recommendations and Planned Interventions: 
-Regular / Nectar thick liquid diet.  
-Upright 90 degrees, No straws, Single sips, and Small bites. -SLP to follow for education Discharge Recommendations: Inpatient Rehab SUBJECTIVE:  
Patient stated \"I remember you.  Ox4. OBJECTIVE:  
Cognitive and Communication Status: 
Neurologic State: Alert, Appropriate for age Orientation Level: Oriented X4 Cognition: Follows commands Perception: Appears intact Perseveration: No perseveration noted Safety/Judgement: Decreased insight into deficits, Decreased awareness of need for safety, Decreased awareness of need for assistance Dysphagia Treatment: P.O. Trials: 
Patient Position: upright in bed Vocal quality prior to P.O.: No impairment Consistency Presented: Solid How Presented: Self-fed/presented Bolus Acceptance: No impairment Bolus Formation/Control: No impairment Propulsion: No impairment Oral Residue: None Initiation of Swallow: Delayed (# of seconds) Laryngeal Elevation: Decreased Aspiration Signs/Symptoms: None Pharyngeal Phase Characteristics: No impairment, issues, or problems Pain: 
Pain Scale 1: Numeric (0 - 10) Pain Intensity 1: 0 After treatment patient left in no apparent distress:  
Patient left in no apparent distress in bed, Call bell within reach, and Nursing notified COMMUNICATION/EDUCATION:  
The patients plan of care including recommendations, planned interventions, and recommended diet changes were discussed with: Registered Nurse. Armin Valle, SLP Time Calculation: 10 mins

## 2024-03-08 NOTE — PROGRESS NOTES
History & Physical    SUBJECTIVE:     History of Present Illness:  Patient is a 53 y.o. female presents with recent episodes of right upper quadrant pain some associated with eating.    She had an ultrasound which showed gallbladder adenomyomatosis as well as a small 6 mm gallstone.    Discussed that this is likely cause of her recent right upper quadrant and nausea symptoms.    Discussed options as far as medication versus surgical intervention with laparoscopic cholecystectomy.  Chief Complaint   Patient presents with    Gall Bladder Problem       Review of patient's allergies indicates:   Allergen Reactions    Augmentin [amoxicillin-pot clavulanate] Rash     Rash to arms, legs, shoulders and torso.  States has happened 2 times with this medication.    Iodine and iodide containing products Rash    Gemtesa [vibegron] Other (See Comments)     Weakness & dizziness    Shellfish containing products Hives     Other reaction(s): Hives    Tramadol Itching    Watermelon Other (See Comments)     Irritates esophagus    Amoxapine Rash    Papaya Rash    Penicillins Rash       Current Outpatient Medications   Medication Sig Dispense Refill    amLODIPine (NORVASC) 10 MG tablet Take 1 tablet (10 mg total) by mouth once daily. 90 tablet 3    fluticasone propionate (FLONASE) 50 mcg/actuation nasal spray 1 spray (50 mcg total) by Each Nostril route 2 (two) times daily. 16 g 3    ibuprofen (ADVIL,MOTRIN) 800 MG tablet Take 1 tablet (800 mg total) by mouth 3 (three) times daily as needed for Pain. 90 tablet 1    levocetirizine (XYZAL) 5 MG tablet Take 1 tablet (5 mg total) by mouth every evening. 90 tablet 3    methocarbamoL (ROBAXIN) 500 MG Tab Take 1 tablet (500 mg total) by mouth nightly. 30 tablet 3    montelukast (SINGULAIR) 10 mg tablet TAKE 1 TABLET(10 MG) BY MOUTH EVERY EVENING 90 tablet 3    MYRBETRIQ 50 mg Tb24 Take 1 tablet by mouth once daily.      norethindrone (MICRONOR) 0.35 mg tablet Take 1 tablet (0.35 mg total) by  mouth once a week. 28 tablet 11    omeprazole (PRILOSEC) 40 MG capsule Take 1 capsule (40 mg total) by mouth once daily. 90 capsule 3    prochlorperazine (COMPAZINE) 10 MG tablet Take 1 tablet (10 mg total) by mouth 3 (three) times daily. 20 tablet 3    propranoloL (INDERAL) 20 MG tablet Take 1 tablet (20 mg total) by mouth every morning. 30 tablet 11     No current facility-administered medications for this visit.     Facility-Administered Medications Ordered in Other Visits   Medication Dose Route Frequency Provider Last Rate Last Admin    0.9%  NaCl infusion   Intravenous Continuous Manuel Roblero MD   New Bag at 24 0903       Past Medical History:   Diagnosis Date    Allergy     Angio-edema     Degenerative disc disease     Urticaria     Vertigo      Past Surgical History:   Procedure Laterality Date     SECTION      COLONOSCOPY  2024    COLONOSCOPY N/A 2024    Procedure: COLONOSCOPY;  Surgeon: Manuel Roblero MD;  Location: [facility];  Service: Endoscopy;  Laterality: N/A;    EGD, WITH CLOSED BIOPSY  2024    EPIDURAL STEROID INJECTION Bilateral 2023    Procedure: Injection, Steroid, Epidural;  Surgeon: Andi Watson MD;  [facility];  Service: Neurosurgery;  Laterality: Bilateral;  Procedure:Bilateral SI joint block and steriod injection  Length of procedure:30 minutes  LOS:0 minutes  Anesthesia:MAC  Radiology:C-arm  Bed:Regular Bed  Position:Prone    ESOPHAGOGASTRODUODENOSCOPY N/A 2024    Procedure: EGD (ESOPHAGOGASTRODUODENOSCOPY);  Surgeon: Manuel Roblero MD;  Location: [facility];  Service: Endoscopy;  Laterality: N/A;    TONSILLECTOMY      TRANSFORAMINAL EPIDURAL INJECTION OF STEROID Bilateral 2019    Procedure: Injection,steroid,epidural,transforaminal approach---bilateral L5/S1;  Surgeon: Sridhar Albert III, MD;  Location: [facility] OR;  Service: Pain Management;  Laterality: Bilateral;     Family History    Problem Relation Age of Onset    Asthma Mother     Heart disease Mother     Cancer Father     Breast cancer Paternal Cousin     Colon cancer Neg Hx     Ovarian cancer Neg Hx      Social History     Tobacco Use    Smoking status: Never     Passive exposure: Never    Smokeless tobacco: Never   Substance Use Topics    Alcohol use: No    Drug use: No        Review of Systems:  Review of Systems   Constitutional:  Negative for appetite change, fatigue, fever and unexpected weight change.   HENT:  Negative for sore throat and trouble swallowing.    Eyes: Negative.    Respiratory:  Negative for cough, shortness of breath and wheezing.    Cardiovascular:  Negative for chest pain and leg swelling.   Gastrointestinal:  Positive for abdominal pain. Negative for abdominal distention, blood in stool, constipation, diarrhea, nausea and vomiting.   Endocrine: Negative.    Genitourinary: Negative.    Musculoskeletal:  Negative for back pain.   Skin: Negative.  Negative for rash.   Allergic/Immunologic: Negative.    Neurological: Negative.    Hematological: Negative.    Psychiatric/Behavioral:  Negative for confusion.      OBJECTIVE:     Vital Signs (Most Recent)  Pulse: 80 (03/04/24 1057)  BP: 108/64 (03/04/24 1057)     64 kg (141 lb 1.5 oz)     Physical Exam:  Physical Exam  Vitals and nursing note reviewed.   Constitutional:       Appearance: She is well-developed.   HENT:      Head: Normocephalic and atraumatic.   Cardiovascular:      Rate and Rhythm: Normal rate.      Heart sounds: Normal heart sounds.   Pulmonary:      Effort: Pulmonary effort is normal.   Abdominal:      General: Bowel sounds are normal. There is no distension.      Palpations: Abdomen is soft.   Musculoskeletal:         General: Normal range of motion.      Cervical back: Normal range of motion.   Skin:     General: Skin is warm and dry.      Capillary Refill: Capillary refill takes less than 2 seconds.   Neurological:      Mental Status: She is alert and  oriented to person, place, and time.   Psychiatric:         Behavior: Behavior normal.     Laboratory  CBC: Reviewed  CMP: Reviewed    Diagnostic Results:  US: Reviewed  Gallstones, adenomyomatosis    ASSESSMENT/PLAN:     53-year-old female with symptomatic cholelithiasis, adenomyomatosis    PLAN:Plan      I described the nature of the patient's pathology and the spectrum of disease presentation ranging from mild discomfort to acute cholecystitis or gallstone/necrotizing pancreatitis. Non-operative therapy was discussed, but the patient would require a strict non-fat diet and still this would not guarantee resolution of symptoms. I think surgery is in the patient's best interest given the severity of symptoms, and she is agreeable. I described the risks of the surgery including but not limited to bleeding, infection, wound complications, injury to local structures including the common bile duct, bile leak, persistent abd pain, and potential need for further interventions. The patient demonstrated understanding of these risks and asked appropriate questions. A consent form was signed today, and the patient will be booked for surgery as laparoscopic cholecystectomy, possible open, possible intraoperative cholangiogram.

## 2024-04-09 ENCOUNTER — OFFICE VISIT (OUTPATIENT)
Dept: SURGERY | Facility: CLINIC | Age: 54
End: 2024-04-09
Payer: MEDICARE

## 2024-04-09 VITALS
SYSTOLIC BLOOD PRESSURE: 122 MMHG | HEART RATE: 74 BPM | DIASTOLIC BLOOD PRESSURE: 77 MMHG | WEIGHT: 137.38 LBS | BODY MASS INDEX: 24.33 KG/M2

## 2024-04-09 DIAGNOSIS — Z98.890 POST-OPERATIVE STATE: Primary | ICD-10-CM

## 2024-04-09 PROCEDURE — 1160F RVW MEDS BY RX/DR IN RCRD: CPT | Mod: CPTII,S$GLB,, | Performed by: SURGERY

## 2024-04-09 PROCEDURE — 99999 PR PBB SHADOW E&M-EST. PATIENT-LVL III: CPT | Mod: PBBFAC,,, | Performed by: SURGERY

## 2024-04-09 PROCEDURE — 99024 POSTOP FOLLOW-UP VISIT: CPT | Mod: S$GLB,,, | Performed by: SURGERY

## 2024-04-09 PROCEDURE — 3074F SYST BP LT 130 MM HG: CPT | Mod: CPTII,S$GLB,, | Performed by: SURGERY

## 2024-04-09 PROCEDURE — 1159F MED LIST DOCD IN RCRD: CPT | Mod: CPTII,S$GLB,, | Performed by: SURGERY

## 2024-04-09 PROCEDURE — 3078F DIAST BP <80 MM HG: CPT | Mod: CPTII,S$GLB,, | Performed by: SURGERY

## 2024-04-12 ENCOUNTER — OFFICE VISIT (OUTPATIENT)
Dept: OTOLARYNGOLOGY | Facility: CLINIC | Age: 54
End: 2024-04-12
Payer: MEDICARE

## 2024-04-12 VITALS
SYSTOLIC BLOOD PRESSURE: 112 MMHG | HEIGHT: 63 IN | HEART RATE: 97 BPM | DIASTOLIC BLOOD PRESSURE: 76 MMHG | WEIGHT: 138.44 LBS | BODY MASS INDEX: 24.53 KG/M2

## 2024-04-12 DIAGNOSIS — M26.629 TMJPDS (TEMPOROMANDIBULAR JOINT PAIN DYSFUNCTION SYNDROME): ICD-10-CM

## 2024-04-12 DIAGNOSIS — H91.90 HEARING LOSS, UNSPECIFIED HEARING LOSS TYPE, UNSPECIFIED LATERALITY: ICD-10-CM

## 2024-04-12 DIAGNOSIS — H73.12 CHRONIC MYRINGITIS, LEFT: Primary | ICD-10-CM

## 2024-04-12 DIAGNOSIS — H93.8X3 EAR FULLNESS, BILATERAL: ICD-10-CM

## 2024-04-12 DIAGNOSIS — H92.13 EAR DRAINAGE, BILATERAL: ICD-10-CM

## 2024-04-12 PROCEDURE — 3008F BODY MASS INDEX DOCD: CPT | Mod: CPTII,S$GLB,, | Performed by: OTOLARYNGOLOGY

## 2024-04-12 PROCEDURE — 3078F DIAST BP <80 MM HG: CPT | Mod: CPTII,S$GLB,, | Performed by: OTOLARYNGOLOGY

## 2024-04-12 PROCEDURE — 1160F RVW MEDS BY RX/DR IN RCRD: CPT | Mod: CPTII,S$GLB,, | Performed by: OTOLARYNGOLOGY

## 2024-04-12 PROCEDURE — 3074F SYST BP LT 130 MM HG: CPT | Mod: CPTII,S$GLB,, | Performed by: OTOLARYNGOLOGY

## 2024-04-12 PROCEDURE — 99999 PR PBB SHADOW E&M-EST. PATIENT-LVL IV: CPT | Mod: PBBFAC,,, | Performed by: OTOLARYNGOLOGY

## 2024-04-12 PROCEDURE — 99214 OFFICE O/P EST MOD 30 MIN: CPT | Mod: S$GLB,,, | Performed by: OTOLARYNGOLOGY

## 2024-04-12 PROCEDURE — 1159F MED LIST DOCD IN RCRD: CPT | Mod: CPTII,S$GLB,, | Performed by: OTOLARYNGOLOGY

## 2024-04-12 RX ORDER — OFLOXACIN 3 MG/ML
SOLUTION/ DROPS OPHTHALMIC
Qty: 5 ML | Refills: 0 | Status: SHIPPED | OUTPATIENT
Start: 2024-04-12

## 2024-04-12 RX ORDER — PREDNISOLONE ACETATE 10 MG/ML
SUSPENSION/ DROPS OPHTHALMIC
Qty: 5 ML | Refills: 0 | Status: SHIPPED | OUTPATIENT
Start: 2024-04-12

## 2024-04-12 NOTE — PROGRESS NOTES
Carol Dewey is a 53 y.o. female patient.   Two weeks status post laparoscopic cholecystectomy   Patient doing okay.    Pain well controlled.    Tolerating diet.    No nausea or vomiting.    Incisions healing well.      No diagnosis found.  Past Medical History:   Diagnosis Date    Allergy     Angio-edema     Degenerative disc disease     HTN (hypertension)     Urticaria     Vertigo      Past Surgical History Pertinent Negatives:   Procedure Date Noted    SINUS SURGERY 08/12/2022    TYMPANOSTOMY TUBE PLACEMENT 08/12/2022     Scheduled Meds:  Continuous Infusions:  PRN Meds:    Review of patient's allergies indicates:   Allergen Reactions    Augmentin [amoxicillin-pot clavulanate] Rash     Rash to arms, legs, shoulders and torso.  States has happened 2 times with this medication.    Iodine and iodide containing products Rash    Gemtesa [vibegron] Other (See Comments)     Weakness & dizziness    Shellfish containing products Hives     Other reaction(s): Hives    Tramadol Itching    Watermelon Other (See Comments)     Irritates esophagus    Amoxapine Rash    Papaya Rash    Penicillins Rash     There are no hospital problems to display for this patient.    Blood pressure 122/77, pulse 74, weight 62.3 kg (137 lb 5.6 oz), last menstrual period 11/08/2023.    Subjective:   Diet: Adequate intake.  Patient reports no nausea.    Activity level: Returning to normal.    Pain control: Well controlled.    Objective:  Vital signs (most recent): Blood pressure 122/77, pulse 74, weight 62.3 kg (137 lb 5.6 oz), last menstrual period 11/08/2023.  General appearance: Comfortable.    Lungs:  Normal effort.    Heart: Normal rate.    Abdomen: Abdomen is soft.    Bowel sounds:  Bowel sounds are normal.    Tenderness: There is no abdominal tenderness tenderness.    Wound:  Clean.    Extremities: There is normal range of motion.    Neurological: The patient is alert.    Assessment & Plan  Status post laparoscopic cholecystectomy   Return  to clinic jose Gillespie MD  4/12/2024

## 2024-04-12 NOTE — PROGRESS NOTES
[facility]    Subjective:      Patient: Carol Dewey Patient PCP: José Miguel Begum MD         :  1970     Sex:  female      MRN:  [id number]          Date of Visit: 2024      Chief Complaint: Ear Drainage (Right and left)      Patient ID: Carol Dewey is a 53 y.o. female     Patient is a  lifelong NON-smoker with a past medical history of degenerative disc disease previously seen by amanda Murphy for tinnitus ear fullness and positional vertigo.  Audiologic testing, Eustachian tube care and Flonase, and referral to physical therapy provided (this was in .  Patient is seen today  referred to me by Dr. José Miguel Begum in consultation for bilateral ear drainage.  No audiogram.  No PT notes in  for vertigo.    Went to a free hearing screening was told she had no hearing in her left ear.  Also told her ear was all white.  Photos taken but unable to be printed.  Hearing testing completed but no copy of audiogram for review.  Has some fullness in both ears.  No prior ear tubes or ear trauma.        Labs:  WBC   Date Value Ref Range Status   2023 8.07 3.90 - 12.70 K/uL Final     Hemoglobin   Date Value Ref Range Status   2023 15.5 12.0 - 16.0 g/dL Final     Platelets   Date Value Ref Range Status   2023 286 150 - 450 K/uL Final     Creatinine   Date Value Ref Range Status   2023 0.8 0.5 - 1.4 mg/dL Final     TSH   Date Value Ref Range Status   2023 0.711 0.400 - 4.000 uIU/mL Final     Hemoglobin A1C   Date Value Ref Range Status   2023 5.9 (H) 4.0 - 5.6 % Final     Comment:     ADA Screening Guidelines:  5.7-6.4%  Consistent with prediabetes  >or=6.5%  Consistent with diabetes    High levels of fetal hemoglobin interfere with the HbA1C  assay. Heterozygous hemoglobin variants (HbS, HgC, etc)do  not significantly interfere with this assay.   However, presence of multiple variants may affect accuracy.         Past Medical History  She has a  past medical history of Allergy, Angio-edema, Degenerative disc disease, HTN (hypertension), Urticaria, and Vertigo.    Family / Surgical / Social History  Her family history includes Asthma in her mother; Breast cancer in her paternal cousin; Cancer in her father; Heart disease in her mother.    Past Surgical History:   Procedure Laterality Date     SECTION      CHOLECYSTECTOMY  2024    COLONOSCOPY  2024    COLONOSCOPY N/A 2024    Procedure: COLONOSCOPY;  Surgeon: Manuel Roblero MD;  Location: [facility];  Service: Endoscopy;  Laterality: N/A;    EGD, WITH CLOSED BIOPSY  2024    EPIDURAL STEROID INJECTION Bilateral 2023    Procedure: Injection, Steroid, Epidural;  Surgeon: Andi Watson MD;  Location: [facility];  Service: Neurosurgery;  Laterality: Bilateral;  Procedure:Bilateral SI joint block and steriod injection  Length of procedure:30 minutes  LOS:0 minutes  Anesthesia:MAC  Radiology:C-arm  Bed:Regular Bed  Position:Prone    ESOPHAGOGASTRODUODENOSCOPY N/A 2024    Procedure: EGD (ESOPHAGOGASTRODUODENOSCOPY);  Surgeon: Manuel Roblero MD;  Location: [facility];  Service: Endoscopy;  Laterality: N/A;    LAPAROSCOPIC CHOLECYSTECTOMY N/A 3/27/2024    Procedure: CHOLECYSTECTOMY, LAPAROSCOPIC;  Surgeon: Giovanni Gillespie MD;  Location: [facility];  Service: General;  Laterality: N/A;    TONSILLECTOMY      TRANSFORAMINAL EPIDURAL INJECTION OF STEROID Bilateral 2019    Procedure: Injection,steroid,epidural,transforaminal approach---bilateral L5/S1;  Surgeon: Sridhar Albert III, MD;  Location: [facility];  Service: Pain Management;  Laterality: Bilateral;       Social History     Tobacco Use    Smoking status: Never     Passive exposure: Never    Smokeless tobacco: Never   Substance and Sexual Activity    Alcohol use: No    Drug use: No    Sexual activity: Yes     Partners: Male     Birth control/protection: OCP       Medications  She  "has a current medication list which includes the following prescription(s): amlodipine, fluticasone propionate, ibuprofen, levocetirizine, methocarbamol, montelukast, myrbetriq, norethindrone, omeprazole, prochlorperazine, and propranolol, and the following Facility-Administered Medications: sodium chloride 0.9%.      Allergies  Review of patient's allergies indicates:   Allergen Reactions    Augmentin [amoxicillin-pot clavulanate] Rash     Rash to arms, legs, shoulders and torso.  States has happened 2 times with this medication.    Iodine and iodide containing products Rash    Gemtesa [vibegron] Other (See Comments)     Weakness & dizziness    Shellfish containing products Hives     Other reaction(s): Hives    Tramadol Itching    Watermelon Other (See Comments)     Irritates esophagus    Amoxapine Rash    Norco [hydrocodone-acetaminophen] Rash    Papaya Rash    Penicillins Rash       All medications, allergies, and past history have been reviewed.    Objective:      Vitals:      4/1/2024    12:25 PM 4/9/2024    11:42 AM 4/12/2024     3:41 PM   Vitals - 1 value per visit   SYSTOLIC  122 112   DIASTOLIC  77 76   Pulse  74 97   Weight (lb)  137.35 138.45   Weight (kg)  62.3 62.8   Height   5' 3" (1.6 m)   BMI (Calculated)   24.5   Pain Score Seven Three Four       Body surface area is 1.67 meters squared.    Physical Exam:    GENERAL  APPEARANCE -  alert, appears stated age, and cooperative  BARRIER(S) TO COMMUNICATION -  none VOICE - appropriate for age and gender    INTEGUMENTARY  no suspicious head and neck lesions    HEENT  HEAD: Normocephalic, without obvious abnormality, atraumatic  FACE: INSPECTION - Symmetric, no signs of trauma, no suspicious lesion(s)      STRENGTH - facial symmetry intact     PALPATION -  No masses crepitance and pop on the right side with some asymmetric movement.  Minimal tenderness of the TMJ bilaterally    SALIVARY GLANDS - non-tender with no appreciable mass    NECK/THYROID: normal " atraumatic, no neck masses, normal thyroid, no jvd    EYES  Normal occular alignment and mobility with no visible nystagmus at rest    EARS/NOSE/MOUTH/THROAT  EARS  PINNAE AND EXTERNAL EARS - no suspicious lesion OTOSCOPIC EXAM (surgical microscopy was used for visualization/instrumentation): EAR EXAM - right ear dry perhaps some slight saprophytic fungus on some anterior medial ear canal wax but nothing on the eardrums and no edema or drainage.  Tympanic membrane intact without middle ear effusion.  Left ear on the other hand has medial pus and thin mucoid drainage evacuated with 5 suction to reveal spotty patchy myringitis but no appreciable middle ear effusion or perforation.  HEARING - subjectively absent on the left    NOSE AND SINUSES  EXTERNAL NOSE - Grossly normal for age/sex  SEPTUM - normal/no obstruction on anterior exam without decongestion TURBINATES - within normal limits MUCOSA - within normal limits     MOUTH AND THROAT   ORAL CAVITY, LIPS, TEETH, GUMS & TONGUE - moist, no suspicious lesions  OROPHARYNX /TONSILS/PHARYNGEAL WALLS/HYPOPHARYNX - no erythema or exudates  NASOPHARYNX - limited mirror exam - unable to visualize due to anatomy/gag  LARYNX -  - limited mirror exam - unable to visualize due to anatomy/gag      CHEST AND LUNG   INSPECTION & AUSCULTATION - normal effort, no stridor    CARDIOVASCULAR  AUSCULTATION & PERIPHERAL VASCULAR - regular rate and rhythm.    NEUROLOGIC  MENTAL STATUS - alert, interactive CRANIAL NERVES - normal    LYMPHATIC  HEAD AND NECK - non-palpable; SUPRACLAVICULAR - deferred      Procedure(s):  None          Assessment:      Problem List Items Addressed This Visit    None  Visit Diagnoses       Chronic myringitis, left    -  Primary    Ear drainage, bilateral        Ear fullness, bilateral        Hearing loss, unspecified hearing loss type, unspecified laterality        TMJPDS (temporomandibular joint pain dysfunction syndrome)                     Plan:       eyedrops to the left ear (2 kinds to be used together) as prescribed twice daily for a week no more than 2.  Be sure to use them for a full week after resolution of drainage.      Routine ear care to begin on the right side as outlined.      Audiologic testing in 2 weeks (to be scheduled with the audiologist at [facility] or [facility]) call [facility] if not notified and scheduled in a timely fashion.      Follow up here in [place] in 2-3 weeks to review the hearing test and reassess the ear after the drops.  Keep water out of the ears especially on that left side until seen.        Voice recognition software was used in the creation of this note/communication and any sound-alike errors which may have occurred from its use should be taken in context when interpreting.  If such errors prevent a clear understanding of the note/communication, please contact the office for clarification.          Voice recognition software was used in the creation of this note/communication and any sound-alike errors which may have occurred from its use should be taken in context when interpreting.  If such errors prevent a clear understanding of the note/communication, please contact the office for clarification.

## 2024-04-12 NOTE — PATIENT INSTRUCTIONS
eyedrops to the left ear (2 kinds to be used together) as prescribed twice daily for a week no more than 2.  Be sure to use them for a full week after resolution of drainage.      Routine ear care to begin on the right side as outlined.      Audiologic testing in 2 weeks (to be scheduled with the audiologist at [facility] or [facility]) call [facility] if not notified and scheduled in a timely fashion.      Follow up here in [place] in 2-3 weeks to review the hearing test and reassess the ear after the drops.  Keep water out of the ears especially on that left side until seen.        Voice recognition software was used in the creation of this note/communication and any sound-alike errors which may have occurred from its use should be taken in context when interpreting.  If such errors prevent a clear understanding of the note/communication, please contact the office for clarification.    DRY NOSE CARE    Use lots of saline throughout the day to keep the nose moist.  Consider using saline gel for longer-term moisturizing.  Aquaphor or Vaseline should be applied to the nostrils at night when needed for crusting/more severe dryness. Antibiotic ointment can be used up to a week for tender dryness/crusting.  Follow-up for further evaluation treatment if symptoms of are not resolved.      TMJ Syndrome / Sprain    This is a condition with chronic or recurrent pain in the joint of the jaw (in front of the ear). Just like all other joints in the body (knees, hips, etc.) the jaw joint can be sprained or chronically injured over time. The jaw joint capsule can wear out just like other joints in the body.    The pain may cause limited motion of the jaw, a locking or catching sensation, clicking, popping, or grinding sounds from the joint with movement. It is a very common cause of headache, earache or neck pain. Other symptoms include ringing in the ear, and pressure/fullness of the ear.    The nerve that gives  sensation to the jaw joint also gives sensation to the ear and part of the face. This is why pain in the face or ear can be coming from the jaw joint. It is sometimes caused by inflammation in the joint, injury or wear-and-tear of the cartilage in the joint, involuntary grinding of the teeth or poorly fitting dentures. Emotional stress and tension are often a factor. Most cases resolve completely within a few months with proper treatment.    Home Care:  1) Rest the jaw by avoiding crunchy or hard foods to chew. Do not eat hard or sticky candies. Soft foods and liquids are easier on the jaw. Protect your jaw while yawning.  2) Hot compress (small towel soaked in hot water or heating pad) applied to the jaw may give relief by reducing muscle spasm. Some people get relief with cold packs, so try both and see which one works best for you.  3) You may use ibuprofen (Motrin, Advil) to control pain, unless another medicine was prescribed.   If you weight between 130 and 150 lb, you may take 600 mg of Ibuprofen every 6 hours as needed for a few weeks, then less frequently following this. If you weight > 150 lb, you may take 800 mg every 6 hours for the same time period. Extended use of Ibuprofen is typically OK, but not every 6 hours (usually one or two times per day.)   [ NOTE : If you have chronic liver or kidney disease or ever had a stomach ulcer or GI bleeding, talk with your doctor before using these medicines.]  4) If you suspect emotional stress is related to your condition.  a) Try to identify the sources of stress in your life. It may not be obvious! These may include:  -- Daily hassles of life that pile up (traffic jams, missed appointments, car troubles)  -- Major life changes, both good (new baby, job promotion) and bad (loss of job, loss of loved one)  -- Overload: feeling that you have too many responsibilities and can't take care of everything at once  -- Helplessness: feeling like your problems are more  "than you can solve  b) When possible, do something about the source of your stress: avoid hassles, limit the amount of change that is happening in your life at one time and take a break when you feel overloaded.  c) Unfortunately, many stressful situations cannot be avoided. Therefore, it is necessary to learn HOW TO MANAGE STRESS better. There are many proven methods that work and will reduce your anxiety. These include simple things like exercise, good nutrition and adequate rest. Also, there are certain techniques that are helpful: relaxation and breathing exercises, visualization, biofeedback, meditation or simply taking some time-out to clear your mind. For more information about this, consult your doctor or go to a local bookstore and review the many books and tapes available on this subject.    Mouthguards for Grinding:  Some TMJ issues are worsened by nightly teeth grinding. This can create muscle tension and strain on the jaw joint. Most mouthguards protect the teeth and do NOT reduce the clenching. If the goal is to reduce clenching, I recommend trying an over the counter nightguard called "SmartGuard."  This can be purchased over-the-counter and is available through vendors such as [employer] and [employer].   This mouthguard fits only on the front teeth. As a result, it prevents your molars from contacting, preventing a forceful clench. This should be worn for brief period of time (weeks or months) as it can alter the bite with prolonged use. Most people will notice improvement during this time.    "

## 2024-04-24 ENCOUNTER — OFFICE VISIT (OUTPATIENT)
Dept: ORTHOPEDICS | Facility: CLINIC | Age: 54
End: 2024-04-24
Payer: MEDICARE

## 2024-04-24 VITALS
HEART RATE: 79 BPM | SYSTOLIC BLOOD PRESSURE: 117 MMHG | DIASTOLIC BLOOD PRESSURE: 79 MMHG | BODY MASS INDEX: 24.58 KG/M2 | WEIGHT: 138.75 LBS

## 2024-04-24 DIAGNOSIS — R20.2 PARESTHESIA OF RIGHT UPPER EXTREMITY: ICD-10-CM

## 2024-04-24 DIAGNOSIS — M25.611 DECREASED ROM OF RIGHT SHOULDER: ICD-10-CM

## 2024-04-24 DIAGNOSIS — G89.29 CHRONIC RIGHT SHOULDER PAIN: Primary | ICD-10-CM

## 2024-04-24 DIAGNOSIS — M25.511 CHRONIC RIGHT SHOULDER PAIN: Primary | ICD-10-CM

## 2024-04-24 PROCEDURE — 1159F MED LIST DOCD IN RCRD: CPT | Mod: CPTII,S$GLB,, | Performed by: STUDENT IN AN ORGANIZED HEALTH CARE EDUCATION/TRAINING PROGRAM

## 2024-04-24 PROCEDURE — 3008F BODY MASS INDEX DOCD: CPT | Mod: CPTII,S$GLB,, | Performed by: STUDENT IN AN ORGANIZED HEALTH CARE EDUCATION/TRAINING PROGRAM

## 2024-04-24 PROCEDURE — 3078F DIAST BP <80 MM HG: CPT | Mod: CPTII,S$GLB,, | Performed by: STUDENT IN AN ORGANIZED HEALTH CARE EDUCATION/TRAINING PROGRAM

## 2024-04-24 PROCEDURE — 99999 PR PBB SHADOW E&M-EST. PATIENT-LVL III: CPT | Mod: PBBFAC,,, | Performed by: STUDENT IN AN ORGANIZED HEALTH CARE EDUCATION/TRAINING PROGRAM

## 2024-04-24 PROCEDURE — 1160F RVW MEDS BY RX/DR IN RCRD: CPT | Mod: CPTII,S$GLB,, | Performed by: STUDENT IN AN ORGANIZED HEALTH CARE EDUCATION/TRAINING PROGRAM

## 2024-04-24 PROCEDURE — 99214 OFFICE O/P EST MOD 30 MIN: CPT | Mod: S$GLB,,, | Performed by: STUDENT IN AN ORGANIZED HEALTH CARE EDUCATION/TRAINING PROGRAM

## 2024-04-24 PROCEDURE — 3074F SYST BP LT 130 MM HG: CPT | Mod: CPTII,S$GLB,, | Performed by: STUDENT IN AN ORGANIZED HEALTH CARE EDUCATION/TRAINING PROGRAM

## 2024-04-24 NOTE — PROGRESS NOTES
CC: right shoulder pain    53 y.o. Female presents today for follow up evaluation of her right shoulder pain following CSI. Pt reports pain remains unchanged. Pt reports pain is 6-7/10 with activity. Pt localizes pain to posterior and superior shoulder with referred pain down lateral upper arm. Pt reports pain with holding items. Pt reports clicking in the shoulder. Pt reports numbness down right arm intermittently, especially when she lays down.     Attempted treatments: R GH CSI (no relief); ibuprofen, salonpas (some relief)  Pain score: 6-7/10 with activity  History of trauma/injury: none since last visit  Affecting ADLs: yes     REVIEW OF SYSTEMS:   Constitution: Patient denies fever or chills.  Eyes: Patient denies eye pain or vision changes.  HEENT: Patient denies ear pain, sore throat, or nasal discharge.  CVS: Patient denies chest pain.  Lungs: Patient denies shortness of breath or cough.  Skin: Patient denies skin rash or itching.    Musculoskeletal: Patient denies recent falls. See HPI.  Psych: Patient denies any current anxiety or nervousness.    PAST MEDICAL HISTORY:   Past Medical History:   Diagnosis Date    Allergy     Angio-edema     Degenerative disc disease     HTN (hypertension)     Urticaria     Vertigo        MEDICATIONS:     Current Outpatient Medications:     amLODIPine (NORVASC) 10 MG tablet, Take 1 tablet (10 mg total) by mouth once daily., Disp: 90 tablet, Rfl: 3    fluticasone propionate (FLONASE) 50 mcg/actuation nasal spray, 1 spray (50 mcg total) by Each Nostril route 2 (two) times daily., Disp: 16 g, Rfl: 3    ibuprofen (ADVIL,MOTRIN) 800 MG tablet, Take 1 tablet (800 mg total) by mouth 3 (three) times daily as needed for Pain., Disp: 90 tablet, Rfl: 1    levocetirizine (XYZAL) 5 MG tablet, Take 1 tablet (5 mg total) by mouth every evening., Disp: 90 tablet, Rfl: 3    methocarbamoL (ROBAXIN) 500 MG Tab, Take 1 tablet (500 mg total) by mouth nightly., Disp: 30 tablet, Rfl: 3     montelukast (SINGULAIR) 10 mg tablet, TAKE 1 TABLET(10 MG) BY MOUTH EVERY EVENING, Disp: 90 tablet, Rfl: 3    MYRBETRIQ 50 mg Tb24, Take 1 tablet by mouth once daily., Disp: , Rfl:     norethindrone (MICRONOR) 0.35 mg tablet, Take 1 tablet (0.35 mg total) by mouth once a week., Disp: 28 tablet, Rfl: 11    ofloxacin (OCUFLOX) 0.3 % ophthalmic solution, 4 drops to LEFT EAR BID for one week (NOT FOR OPHTHALMIC USE), Disp: 5 mL, Rfl: 0    omeprazole (PRILOSEC) 40 MG capsule, Take 1 capsule (40 mg total) by mouth once daily., Disp: 90 capsule, Rfl: 3    prednisoLONE acetate (PRED FORTE) 1 % DrpS, 4 drops to LEFT EAR BID for a week (NOT FOR OPHTHALMIC USE), Disp: 5 mL, Rfl: 0    prochlorperazine (COMPAZINE) 10 MG tablet, Take 1 tablet (10 mg total) by mouth 3 (three) times daily., Disp: 20 tablet, Rfl: 3    propranoloL (INDERAL) 20 MG tablet, Take 1 tablet (20 mg total) by mouth every morning., Disp: 30 tablet, Rfl: 11  No current facility-administered medications for this visit.    Facility-Administered Medications Ordered in Other Visits:     0.9%  NaCl infusion, , Intravenous, Continuous, Manuel Roblero MD, New Bag at 01/25/24 0903    ALLERGIES:   Review of patient's allergies indicates:   Allergen Reactions    Augmentin [amoxicillin-pot clavulanate] Rash     Rash to arms, legs, shoulders and torso.  States has happened 2 times with this medication.    Iodine and iodide containing products Rash    Gemtesa [vibegron] Other (See Comments)     Weakness & dizziness    Shellfish containing products Hives     Other reaction(s): Hives    Tramadol Itching    Watermelon Other (See Comments)     Irritates esophagus    Amoxapine Rash    Norco [hydrocodone-acetaminophen] Rash    Papaya Rash    Penicillins Rash        PHYSICAL EXAMINATION:  /79   Pulse 79   Wt 62.9 kg (138 lb 12.5 oz)   LMP 11/08/2023 (Exact Date)   BMI 24.58 kg/m²   Vitals signs and nursing note have been reviewed.    General: In no acute  distress, well developed, well nourished, no diaphoresis  Eyes: EOM full and smooth, no eye redness or discharge  HENT: normocephalic and atraumatic, neck supple, trachea midline, no nasal discharge  Cardiovascular: no LE edema  Lungs: respirations non-labored, no conversational dyspnea   Neuro: AAOx3, CN2-12 grossly intact  Skin: No rashes, warm and dry  Psychiatric: cooperative, pleasant, mood and affect appropriate for age  ROM:    Flex to 120° vs 180° contra   Abduct to 60° vs 150° contra   Int rot to waistline vs T7 contra   Ext rot to 50° vs 60° contra  -Scapular dyskinesis      STRENGTH:    Scaption 4+/5   Flexion 4+/5   Ext rot 5/5   Int rot 5/5   Sup/Pro 5/5     OTHER:   +Painful arc   -Drop arm   -Int lag  -Ext lag  +Neer's   +Banks-Jamari   +Nicholas active compression   -Empty Can  +Full Can  -Speed's   -Yergason's  -Apprehension     NECK:   Grossly FROM & painless in neck flex, ext, B/L torsion, B/L lat flexion   -Spurling B/L    Hands NVI B/L    ASSESSMENT:      ICD-10-CM ICD-9-CM   1. Chronic right shoulder pain  M25.511 719.41    G89.29 338.29   2. Decreased ROM of right shoulder  M25.611 719.51   3. Paresthesia of right upper extremity  R20.2 782.0         PLAN:  Considering no improvement following glenohumeral joint injection and progression of severe right shoulder pain and paresthesias of the right arm, we will move forward with cervical neck x-rays and right shoulder MRI for further evaluation.    All questions were answered to the best of my ability and all concerns were addressed at this time.    Follow up for above, or sooner if needed.      This note is dictated using the M*Modal Fluency Direct word recognition program. There are word recognition mistakes that are occasionally missed on review.

## 2024-04-29 ENCOUNTER — TELEPHONE (OUTPATIENT)
Dept: SPORTS MEDICINE | Facility: CLINIC | Age: 54
End: 2024-04-29
Payer: MEDICARE

## 2024-04-29 NOTE — TELEPHONE ENCOUNTER
Returned call, pt elected to r/s to next available on 5/6.    Carie Mckeon MS, OTC  Clinical Assistant to Dr. Janiya Redd      ----- Message from Maryann Quintanilla sent at 4/29/2024  1:05 PM CDT -----  Contact: [phone]  [email or web address]     Patient is calling for Medical Advice regarding:needs to reschedule her appt for Friday     How long has patient had these symptoms:    Pharmacy name and phone#:    Would like response via AlertMet:  no     Comments:  Pt states she can not make the appt on Friday she is asking for Thursday

## 2024-05-02 NOTE — TELEPHONE ENCOUNTER
Refill Routing Note   Medication(s) are not appropriate for processing by [facility] for the following reason(s):        No active prescription written by provider    [facility] action(s):  Defer               Appointments  past 12m or future 3m with PCP    Date Provider   Last Visit   4/12/2023 Cristal Murphy,    Next Visit   Visit date not found Cristal Murphy,    ED visits in past 90 days: 0        Note composed:6:34 PM 05/02/2024

## 2024-05-06 ENCOUNTER — OFFICE VISIT (OUTPATIENT)
Dept: SPORTS MEDICINE | Facility: CLINIC | Age: 54
End: 2024-05-06
Payer: MEDICARE

## 2024-05-06 VITALS — SYSTOLIC BLOOD PRESSURE: 118 MMHG | DIASTOLIC BLOOD PRESSURE: 77 MMHG | HEART RATE: 74 BPM

## 2024-05-06 DIAGNOSIS — M75.51 SUBACROMIAL BURSITIS OF RIGHT SHOULDER JOINT: ICD-10-CM

## 2024-05-06 DIAGNOSIS — S43.431A SUPERIOR LABRUM ANTERIOR-TO-POSTERIOR (SLAP) TEAR OF RIGHT SHOULDER: ICD-10-CM

## 2024-05-06 DIAGNOSIS — S46.811A PARTIAL TEAR OF RIGHT SUBSCAPULARIS TENDON, INITIAL ENCOUNTER: ICD-10-CM

## 2024-05-06 DIAGNOSIS — G89.29 CHRONIC RIGHT SHOULDER PAIN: Primary | ICD-10-CM

## 2024-05-06 DIAGNOSIS — M19.011 OSTEOARTHRITIS OF GLENOHUMERAL JOINT, RIGHT: ICD-10-CM

## 2024-05-06 DIAGNOSIS — M25.511 CHRONIC RIGHT SHOULDER PAIN: Primary | ICD-10-CM

## 2024-05-06 DIAGNOSIS — M75.31 CALCIFIC TENDINITIS OF RIGHT SHOULDER: ICD-10-CM

## 2024-05-06 PROCEDURE — 1125F AMNT PAIN NOTED PAIN PRSNT: CPT | Mod: HCNC,CPTII,S$GLB, | Performed by: STUDENT IN AN ORGANIZED HEALTH CARE EDUCATION/TRAINING PROGRAM

## 2024-05-06 PROCEDURE — 3074F SYST BP LT 130 MM HG: CPT | Mod: HCNC,CPTII,S$GLB, | Performed by: STUDENT IN AN ORGANIZED HEALTH CARE EDUCATION/TRAINING PROGRAM

## 2024-05-06 PROCEDURE — 99214 OFFICE O/P EST MOD 30 MIN: CPT | Mod: HCNC,S$GLB,, | Performed by: STUDENT IN AN ORGANIZED HEALTH CARE EDUCATION/TRAINING PROGRAM

## 2024-05-06 PROCEDURE — 1159F MED LIST DOCD IN RCRD: CPT | Mod: HCNC,CPTII,S$GLB, | Performed by: STUDENT IN AN ORGANIZED HEALTH CARE EDUCATION/TRAINING PROGRAM

## 2024-05-06 PROCEDURE — 99999 PR PBB SHADOW E&M-EST. PATIENT-LVL III: CPT | Mod: PBBFAC,HCNC,, | Performed by: STUDENT IN AN ORGANIZED HEALTH CARE EDUCATION/TRAINING PROGRAM

## 2024-05-06 PROCEDURE — 3078F DIAST BP <80 MM HG: CPT | Mod: HCNC,CPTII,S$GLB, | Performed by: STUDENT IN AN ORGANIZED HEALTH CARE EDUCATION/TRAINING PROGRAM

## 2024-05-06 PROCEDURE — 1160F RVW MEDS BY RX/DR IN RCRD: CPT | Mod: HCNC,CPTII,S$GLB, | Performed by: STUDENT IN AN ORGANIZED HEALTH CARE EDUCATION/TRAINING PROGRAM

## 2024-05-06 NOTE — PROGRESS NOTES
CC: right shoulder pain    53 y.o. Female presents today for follow up evaluation of her right shoulder pain and to review XR / MRI results.     Attempted treatments: salonpas, ibuprofen  Pain score: 8/10  History of trauma/injury: none since last visit  Affecting ADLs: yes     REVIEW OF SYSTEMS:   Constitution: Patient denies fever or chills.  Eyes: Patient denies eye pain or vision changes.  HEENT: Patient denies ear pain, sore throat, or nasal discharge.  CVS: Patient denies chest pain.  Lungs: Patient denies shortness of breath or cough.  Skin: Patient denies skin rash or itching.    Musculoskeletal: Patient denies recent falls. See HPI.  Psych: Patient denies any current anxiety or nervousness.    PAST MEDICAL HISTORY:   Past Medical History:   Diagnosis Date    Allergy     Angio-edema     Degenerative disc disease     HTN (hypertension)     Urticaria     Vertigo        MEDICATIONS:     Current Outpatient Medications:     amLODIPine (NORVASC) 10 MG tablet, Take 1 tablet (10 mg total) by mouth once daily., Disp: 90 tablet, Rfl: 3    fluticasone propionate (FLONASE) 50 mcg/actuation nasal spray, 1 spray (50 mcg total) by Each Nostril route 2 (two) times daily., Disp: 16 g, Rfl: 3    ibuprofen (ADVIL,MOTRIN) 800 MG tablet, Take 1 tablet (800 mg total) by mouth 3 (three) times daily as needed for Pain., Disp: 90 tablet, Rfl: 1    levocetirizine (XYZAL) 5 MG tablet, Take 1 tablet (5 mg total) by mouth every evening., Disp: 90 tablet, Rfl: 3    methocarbamoL (ROBAXIN) 500 MG Tab, Take 1 tablet (500 mg total) by mouth nightly., Disp: 30 tablet, Rfl: 3    montelukast (SINGULAIR) 10 mg tablet, TAKE 1 TABLET(10 MG) BY MOUTH EVERY EVENING, Disp: 90 tablet, Rfl: 3    MYRBETRIQ 50 mg Tb24, Take 1 tablet by mouth once daily., Disp: , Rfl:     norethindrone (MICRONOR) 0.35 mg tablet, Take 1 tablet (0.35 mg total) by mouth once a week., Disp: 28 tablet, Rfl: 11    ofloxacin (OCUFLOX) 0.3 % ophthalmic solution, 4 drops to LEFT  EAR BID for one week (NOT FOR OPHTHALMIC USE), Disp: 5 mL, Rfl: 0    omeprazole (PRILOSEC) 40 MG capsule, Take 1 capsule (40 mg total) by mouth once daily., Disp: 90 capsule, Rfl: 3    prednisoLONE acetate (PRED FORTE) 1 % DrpS, 4 drops to LEFT EAR BID for a week (NOT FOR OPHTHALMIC USE), Disp: 5 mL, Rfl: 0    prochlorperazine (COMPAZINE) 10 MG tablet, Take 1 tablet (10 mg total) by mouth 3 (three) times daily., Disp: 20 tablet, Rfl: 3    propranoloL (INDERAL) 20 MG tablet, Take 1 tablet (20 mg total) by mouth every morning., Disp: 30 tablet, Rfl: 11  No current facility-administered medications for this visit.    Facility-Administered Medications Ordered in Other Visits:     0.9%  NaCl infusion, , Intravenous, Continuous, Manuel Roblero MD, New Bag at 01/25/24 0903    ALLERGIES:   Review of patient's allergies indicates:   Allergen Reactions    Augmentin [amoxicillin-pot clavulanate] Rash     Rash to arms, legs, shoulders and torso.  States has happened 2 times with this medication.    Iodine and iodide containing products Rash    Gemtesa [vibegron] Other (See Comments)     Weakness & dizziness    Shellfish containing products Hives     Other reaction(s): Hives    Tramadol Itching    Watermelon Other (See Comments)     Irritates esophagus    Amoxapine Rash    Norco [hydrocodone-acetaminophen] Rash    Papaya Rash    Penicillins Rash        MRI Shoulder Without Contrast Right  Narrative: EXAMINATION:  MRI SHOULDER WITHOUT CONTRAST RIGHT    CLINICAL HISTORY:  Shoulder pain, rotator cuff disorder suspected, xray done;    TECHNIQUE:  Routine MRI evaluation of the right shoulder performed without contrast.    COMPARISON:  Radiographs 08/21/2023.    FINDINGS:  ROTATOR CUFF: Thickening and increased signal intensity of the supraspinatus and infraspinatus conjoined tendon with slight bursal surface irregularity and superimposed low signal intensity foci corresponding with calcification identified on previous  radiograph.  Subscapularis tendinosis with partial-thickness interstitial tearing of the superior footprint fibers.  Teres minor tendon is intact.  Muscle bulk is preserved.    LABRUM: Abnormal morphology and signal intensity of the superior labrum extending from anterior to posterior.  Remaining labral segments demonstrate preserved morphology and signal intensity.  No paralabral cyst.    BICEPS: Thickening and increased signal intensity of the intra-articular biceps tendon.    BONES: No fractures.  No avascular necrosis.  No infiltrative process.    AC JOINT: Mild AC joint arthrosis.  Flat morphology of the lateral acromion.    CARTILAGE: Questionable mild partial-thickness chondral fissuring at the medial humeral head and posterior glenoid.    MISCELLANEOUS: Mild rotator interval synovitis.  Thickening and increased signal intensity of the humeral attachment of the posterior band of the inferior glenohumeral ligament.  Small joint effusion. Trace signal hyperintensity within the subacromial/subdeltoid bursa at the level of the conjoined tendon.  Impression: 1. Calcific tendinitis of the conjoined supraspinatus and infraspinatus tendon with mild associated bursal surface fraying and mild overlying subacromial/subdeltoid bursitis.  2. Subscapularis tendinosis with partial-thickness interstitial tearing of the superior fibers.  3. SLAP tear.  No paralabral cyst.  4. Biceps tendinosis.  5. Questionable mild glenohumeral partial-thickness chondral fissuring.  No subchondral marrow edema.  6. Mild AC joint arthrosis.  7. Small joint effusion with synovitis.    Electronically signed by: Vargas Musa MD  Date:    05/01/2024  Time:    07:47  X-Ray Cervical Spine 5 View With Flex And Ext  Narrative: EXAMINATION:  XR CERVICAL SPINE 5 VIEW WITH FLEX AND EXT    CLINICAL HISTORY:  Paresthesia of skin    TECHNIQUE:  Five views of the cervical spine plus flexion and extension views were performed.    COMPARISON:  Radiographs  04/29/2021.  MRI 03/29/2023.    FINDINGS:  Cervical spine alignment demonstrates mild levocurvature and straightening of the normal lordosis.  No dynamic instability.  Atlantodens interval is maintained.  Odontoid process and C1 lateral masses are intact.  Vertebral body heights are well maintained without evidence for acute fracture.  Osseous mineralization is preserved.  No suspicious lytic or blastic lesions.  Minimal degenerative disc space narrowing at C6-C7.  Remaining intervertebral disc heights are well maintained.  Facet and uncovertebral joints appear within normal limits.  Prevertebral soft tissues are normal.  Upper lungs are clear.  Impression: Minimal cervical spondylosis.    Electronically signed by: Vargas Musa MD  Date:    05/01/2024  Time:    07:38      PHYSICAL EXAMINATION:  /77   Pulse 74   LMP 11/08/2023 (Exact Date)   Vitals signs and nursing note have been reviewed.    General: In no acute distress, well developed, well nourished, no diaphoresis  Eyes: EOM full and smooth, no eye redness or discharge  HENT: normocephalic and atraumatic, neck supple, trachea midline, no nasal discharge  Cardiovascular: no LE edema  Lungs: respirations non-labored, no conversational dyspnea   Neuro: AAOx3, CN2-12 grossly intact  Skin: No rashes, warm and dry  Psychiatric: cooperative, pleasant, mood and affect appropriate for age    ASSESSMENT:      ICD-10-CM ICD-9-CM   1. Chronic right shoulder pain  M25.511 719.41    G89.29 338.29   2. Superior labrum anterior-to-posterior (SLAP) tear of right shoulder  S43.431A 840.7   3. Subacromial bursitis of right shoulder joint  M75.51 726.19   4. Osteoarthritis of glenohumeral joint, right  M19.011 715.91   5. Calcific tendinitis of right shoulder  M75.31 726.11   6. Partial tear of right subscapularis tendon, initial encounter  S46.811A 840.5         PLAN:  MRI images and read were discussed with patient at today's visit.  Patient with multiple pathologies  that could be contributing to her pain.  Given the lack of response to intra-articular CSI as well as ibuprofen 800 mg, we will move forward with the surgical consultation.  Patient also states she gets relief with holding her arm in would feel more comfortable in a sling, thus we will get her fitted for a sling at today's visit.  It was explained to patient to try not to wear the sling all the time as we do not want her to get stiff and lose range of motion.    All questions were answered to the best of my ability and all concerns were addressed at this time.    Follow up for above, or sooner if needed.      This note is dictated using the M*Modal Fluency Direct word recognition program. There are word recognition mistakes that are occasionally missed on review.

## 2024-05-10 ENCOUNTER — OFFICE VISIT (OUTPATIENT)
Dept: OTOLARYNGOLOGY | Facility: CLINIC | Age: 54
End: 2024-05-10
Payer: MEDICARE

## 2024-05-10 VITALS
HEIGHT: 63 IN | BODY MASS INDEX: 24.88 KG/M2 | HEART RATE: 80 BPM | SYSTOLIC BLOOD PRESSURE: 108 MMHG | DIASTOLIC BLOOD PRESSURE: 83 MMHG | WEIGHT: 140.44 LBS

## 2024-05-10 DIAGNOSIS — H73.12 CHRONIC MYRINGITIS, LEFT: Primary | ICD-10-CM

## 2024-05-10 PROCEDURE — 3008F BODY MASS INDEX DOCD: CPT | Mod: HCNC,CPTII,S$GLB, | Performed by: OTOLARYNGOLOGY

## 2024-05-10 PROCEDURE — 1159F MED LIST DOCD IN RCRD: CPT | Mod: HCNC,CPTII,S$GLB, | Performed by: OTOLARYNGOLOGY

## 2024-05-10 PROCEDURE — 3074F SYST BP LT 130 MM HG: CPT | Mod: HCNC,CPTII,S$GLB, | Performed by: OTOLARYNGOLOGY

## 2024-05-10 PROCEDURE — 3079F DIAST BP 80-89 MM HG: CPT | Mod: HCNC,CPTII,S$GLB, | Performed by: OTOLARYNGOLOGY

## 2024-05-10 PROCEDURE — 99999 PR PBB SHADOW E&M-EST. PATIENT-LVL IV: CPT | Mod: PBBFAC,HCNC,, | Performed by: OTOLARYNGOLOGY

## 2024-05-10 PROCEDURE — 99214 OFFICE O/P EST MOD 30 MIN: CPT | Mod: HCNC,S$GLB,, | Performed by: OTOLARYNGOLOGY

## 2024-05-10 RX ORDER — FLUOCINOLONE ACETONIDE 0.11 MG/ML
OIL AURICULAR (OTIC)
Qty: 20 ML | Refills: 0 | Status: SHIPPED | OUTPATIENT
Start: 2024-05-10

## 2024-05-10 NOTE — PATIENT INSTRUCTIONS
Use the fluocinolone oil prescribed today to the left ear twice daily for 1 maybe 2 weeks until all itching or any drainage have resolved.  If itching or drainage persists contact the office for alternative therapy.      Once itching and drainage have completely stopped follow the quit routine ear care instructions provided again today.  This involves regular use of nightly mineral oil, keeping the ears dry, and using vinegar/alcohol in the ears if absolutely necessary as instructed.      Hearing testing must be completed.      Follow up in one-month ideally with hearing testing prior to follow-up.  Return sooner with any concerns.      Voice recognition software was used in the creation of this note/communication and any sound-alike errors which may have occurred from its use should be taken in context when interpreting.  If such errors prevent a clear understanding of the note/communication, please contact the office for clarification.      DERMOTIC/FLUOCINOLONE OIL    Use prescribed fluocinolone/derm otic oil to both ears smearing around the outer ear scaling areas as well as down in the ear canal twice daily for a week no more than 2. At this point follow a routine ear care as outlined.  If not improved with this dose of steroid a higher concentration steroid may prove necessary. If medication is not well covered by insurance consider using good Rx which often bring surprise way down.      ROUTINE EAR CARE    Keep the ears dry in general.  Water and soap dry the ears increases scaling by stripping away the natural oils of the ears.    A twisted single ply of facial tissue can be used to wick out moisture on the rare occasion when water gets stuck in the ear; or the water can be displaced with concentrated alcohol like OTC SwimEar or a few drops of 72-95% isopropyl alcohol to fill the ear canal.  Regular use will cause excess drying of the ears.    The ear should be kept moist in general with mineral oil.  Three to four drops into the ear canal 2 to 3 times a week or even every night.    If the ears need to be irrigated use either a 50 50 mixture of white vinegar and distilled water or 50 50 mixture of alcohol and white vinegar.    Painful draining ears that do not resolve with conservative care could represent infection and may need microscopic office debridement/clearing of the wax, and topical antibiotic drops with or without steroids may need to be prescribed.

## 2024-05-10 NOTE — PROGRESS NOTES
[facility]    Subjective:      Patient: Carol Dewey Patient PCP: José Miguel Begum MD         :  1970     Sex:  female      MRN:  [id number]          Date of Visit: 05/10/2024      Chief Complaint: Follow-up (3 wk f/u)      Patient ID: Carol Dewey is a 53 y.o. female     05/10/2024 FOLLOW-UP VISIT patient seen today for weeks since our last visit for hearing loss with findings of some wax and mycotic otitis externa/saprophytic fungus on the right side and some granular myringitis and mucopus on the left side.  Treated with steroid fluoroquinolone drops on the left to be followed by routine ear care bilaterally with audiologic testing and follow-up.    No audiogram.  Never contacted.      Feels like the drainage on the left side resolved with the combination eye drops.  Within a week perhaps it started feeling like it was draining a bit.  She has continued to use Q-tips throughout but has made great efforts including by ear plugs to keep water out of the ears in the bath.  Restarted the drops with improvement in drainage.  No pain.  No fever.     2024 INITIAL PATIENT CONSULTATION Patient is a  lifelong NON-smoker with a past medical history of degenerative disc disease previously seen by amanda Murphy for tinnitus ear fullness and positional vertigo.  Audiologic testing, Eustachian tube care and Flonase, and referral to physical therapy provided (this was in .  Patient is seen today  referred to me by Dr. José Miguel Begum in consultation for bilateral ear drainage.  No audiogram.  No PT notes in  for vertigo.    Went to a free hearing screening was told she had no hearing in her left ear.  Also told her ear was all white.  Photos taken but unable to be printed.  Hearing testing completed but no copy of audiogram for review.  Has some fullness in both ears.  No prior ear tubes or ear trauma.        Labs:  WBC   Date Value Ref Range Status   2023 8.07 3.90 -  12.70 K/uL Final     Hemoglobin   Date Value Ref Range Status   2023 15.5 12.0 - 16.0 g/dL Final     Platelets   Date Value Ref Range Status   2023 286 150 - 450 K/uL Final     Creatinine   Date Value Ref Range Status   2023 0.8 0.5 - 1.4 mg/dL Final     TSH   Date Value Ref Range Status   2023 0.711 0.400 - 4.000 uIU/mL Final     Hemoglobin A1C   Date Value Ref Range Status   2023 5.9 (H) 4.0 - 5.6 % Final     Comment:     ADA Screening Guidelines:  5.7-6.4%  Consistent with prediabetes  >or=6.5%  Consistent with diabetes    High levels of fetal hemoglobin interfere with the HbA1C  assay. Heterozygous hemoglobin variants (HbS, HgC, etc)do  not significantly interfere with this assay.   However, presence of multiple variants may affect accuracy.         Past Medical History  She has a past medical history of Allergy, Angio-edema, Degenerative disc disease, HTN (hypertension), Urticaria, and Vertigo.    Family / Surgical / Social History  Her family history includes Asthma in her mother; Breast cancer in her paternal cousin; Cancer in her father; Heart disease in her mother.    Past Surgical History:   Procedure Laterality Date     SECTION      CHOLECYSTECTOMY  2024    COLONOSCOPY  2024    COLONOSCOPY N/A 2024    Procedure: COLONOSCOPY;  Surgeon: Manuel Roblero MD;  Location: [facility];  Service: Endoscopy;  Laterality: N/A;    EGD, WITH CLOSED BIOPSY  2024    EPIDURAL STEROID INJECTION Bilateral 2023    Procedure: Injection, Steroid, Epidural;  Surgeon: Andi Watson MD;  Location: [facility];  Service: Neurosurgery;  Laterality: Bilateral;  Procedure:Bilateral SI joint block and steriod injection  Length of procedure:30 minutes  LOS:0 minutes  Anesthesia:MAC  Radiology:C-arm  Bed:Regular Bed  Position:Prone    ESOPHAGOGASTRODUODENOSCOPY N/A 2024    Procedure: EGD (ESOPHAGOGASTRODUODENOSCOPY);  Surgeon: Manuel Roblero  MD DREW;  Location: [facility];  Service: Endoscopy;  Laterality: N/A;    LAPAROSCOPIC CHOLECYSTECTOMY N/A 3/27/2024    Procedure: CHOLECYSTECTOMY, LAPAROSCOPIC;  Surgeon: Giovanni Gillespie MD;  Location: [facility];  Service: General;  Laterality: N/A;    TONSILLECTOMY      TRANSFORAMINAL EPIDURAL INJECTION OF STEROID Bilateral 03/21/2019    Procedure: Injection,steroid,epidural,transforaminal approach---bilateral L5/S1;  Surgeon: Sridhar Albert III, MD;  Location: [facility];  Service: Pain Management;  Laterality: Bilateral;       Social History     Tobacco Use    Smoking status: Never     Passive exposure: Never    Smokeless tobacco: Never   Substance and Sexual Activity    Alcohol use: No    Drug use: No    Sexual activity: Yes     Partners: Male     Birth control/protection: OCP       Medications  She has a current medication list which includes the following prescription(s): amlodipine, fluticasone propionate, ibuprofen, levocetirizine, methocarbamol, montelukast, myrbetriq, norethindrone, ofloxacin, omeprazole, prednisolone acetate, prochlorperazine, and propranolol, and the following Facility-Administered Medications: sodium chloride 0.9%.      Allergies  Review of patient's allergies indicates:   Allergen Reactions    Augmentin [amoxicillin-pot clavulanate] Rash     Rash to arms, legs, shoulders and torso.  States has happened 2 times with this medication.    Iodine and iodide containing products Rash    Gemtesa [vibegron] Other (See Comments)     Weakness & dizziness    Shellfish containing products Hives     Other reaction(s): Hives    Tramadol Itching    Watermelon Other (See Comments)     Irritates esophagus    Amoxapine Rash    Norco [hydrocodone-acetaminophen] Rash    Papaya Rash    Penicillins Rash       All medications, allergies, and past history have been reviewed.    Objective:      Vitals:      4/24/2024    11:38 AM 5/6/2024     1:36 PM 5/10/2024    10:51 AM   Vitals - 1 value per visit  "  SYSTOLIC 117 118 108   DIASTOLIC 79 77 83   Pulse 79 74 80   Weight (lb) 138.78  140.43   Weight (kg) 62.95  63.7   Height   5' 3" (1.6 m)   BMI (Calculated)   24.9   Pain Score Six Eight Zero       Body surface area is 1.68 meters squared.    Physical Exam:    GENERAL  APPEARANCE -  alert, appears stated age, and cooperative  BARRIER(S) TO COMMUNICATION -  none VOICE - appropriate for age and gender    INTEGUMENTARY  no suspicious head and neck lesions    HEENT  HEAD: Normocephalic, without obvious abnormality, atraumatic  FACE: INSPECTION - Symmetric, no signs of trauma, no suspicious lesion(s)      STRENGTH - facial symmetry intact     PALPATION -  No masses crepitance and pop on the right side with some asymmetric movement.  Minimal tenderness of the TMJ bilaterally    SALIVARY GLANDS - non-tender with no appreciable mass    NECK/THYROID: normal atraumatic, no neck masses, normal thyroid, no jvd    EYES  Normal occular alignment and mobility with no visible nystagmus at rest    EARS/NOSE/MOUTH/THROAT  EARS  PINNAE AND EXTERNAL EARS - no suspicious lesion OTOSCOPIC EXAM (surgical microscopy was used for visualization/instrumentation): EAR EXAM - right ear healthy and normal.  No saprophytic fungus or wax.  Left ear with some pink scarring affecting the posterior superior medial canal annulus and tympanic membrane without evidence of cholesteatoma or distinct granulation.  Some scarring possibly adhesion but no appreciable perforation or effusion.  No fungus or active drainage.  HEARING - subjectively absent on the left    NOSE AND SINUSES  EXTERNAL NOSE - Grossly normal for age/sex  SEPTUM - normal/no obstruction on anterior exam without decongestion TURBINATES - within normal limits MUCOSA - within normal limits     MOUTH AND THROAT   ORAL CAVITY, LIPS, TEETH, GUMS & TONGUE - moist, no suspicious lesions  OROPHARYNX /TONSILS/PHARYNGEAL WALLS/HYPOPHARYNX - no erythema or exudates  NASOPHARYNX - limited mirror " exam - unable to visualize due to anatomy/gag  LARYNX -  - limited mirror exam - unable to visualize due to anatomy/gag      CHEST AND LUNG   INSPECTION & AUSCULTATION - normal effort, no stridor    CARDIOVASCULAR  AUSCULTATION & PERIPHERAL VASCULAR - regular rate and rhythm.    NEUROLOGIC  MENTAL STATUS - alert, interactive CRANIAL NERVES - normal    LYMPHATIC  HEAD AND NECK - non-palpable; SUPRACLAVICULAR - deferred      Procedure(s):  None          Assessment:      Problem List Items Addressed This Visit    None  Visit Diagnoses       Chronic myringitis, left    -  Primary                   Plan:      Use the fluocinolone oil prescribed today to the left ear twice daily for 1 maybe 2 weeks until all itching or any drainage have resolved.  If itching or drainage persists contact the office for alternative therapy.      Once itching and drainage have completely stopped follow the quit routine ear care instructions provided again today.  This involves regular use of nightly mineral oil, keeping the ears dry, and using vinegar/alcohol in the ears if absolutely necessary as instructed.      Hearing testing must be completed.      Follow up in one-month ideally with hearing testing prior to follow-up.  Return sooner with any concerns.      Voice recognition software was used in the creation of this note/communication and any sound-alike errors which may have occurred from its use should be taken in context when interpreting.  If such errors prevent a clear understanding of the note/communication, please contact the office for clarification.      Need to see hearing testing and response to medium potency steroids as prescribed (fluocinolone).  May need CT scanning.  May need referral to neuro otology for laser resurfacing of the eardrum versus some in office cauterization (chemical).    Voice recognition software was used in the creation of this note/communication and any sound-alike errors which may have occurred from  its use should be taken in context when interpreting.  If such errors prevent a clear understanding of the note/communication, please contact the office for clarification.

## 2024-05-15 ENCOUNTER — OFFICE VISIT (OUTPATIENT)
Dept: ORTHOPEDICS | Facility: CLINIC | Age: 54
End: 2024-05-15
Payer: MEDICARE

## 2024-05-15 VITALS
HEART RATE: 72 BPM | WEIGHT: 140 LBS | HEIGHT: 63 IN | SYSTOLIC BLOOD PRESSURE: 125 MMHG | BODY MASS INDEX: 24.8 KG/M2 | DIASTOLIC BLOOD PRESSURE: 85 MMHG

## 2024-05-15 DIAGNOSIS — M25.511 CHRONIC RIGHT SHOULDER PAIN: ICD-10-CM

## 2024-05-15 DIAGNOSIS — G89.29 CHRONIC RIGHT SHOULDER PAIN: ICD-10-CM

## 2024-05-15 DIAGNOSIS — S43.431A SUPERIOR LABRUM ANTERIOR-TO-POSTERIOR (SLAP) TEAR OF RIGHT SHOULDER: ICD-10-CM

## 2024-05-15 DIAGNOSIS — S46.811A PARTIAL TEAR OF RIGHT SUBSCAPULARIS TENDON, INITIAL ENCOUNTER: ICD-10-CM

## 2024-05-15 DIAGNOSIS — M75.01 ADHESIVE CAPSULITIS OF RIGHT SHOULDER: Primary | ICD-10-CM

## 2024-05-15 PROCEDURE — 1159F MED LIST DOCD IN RCRD: CPT | Mod: HCNC,CPTII,S$GLB,

## 2024-05-15 PROCEDURE — 99999 PR PBB SHADOW E&M-EST. PATIENT-LVL V: CPT | Mod: PBBFAC,HCNC,,

## 2024-05-15 PROCEDURE — 99214 OFFICE O/P EST MOD 30 MIN: CPT | Mod: HCNC,25,S$GLB,

## 2024-05-15 PROCEDURE — 3074F SYST BP LT 130 MM HG: CPT | Mod: HCNC,CPTII,S$GLB,

## 2024-05-15 PROCEDURE — 1160F RVW MEDS BY RX/DR IN RCRD: CPT | Mod: HCNC,CPTII,S$GLB,

## 2024-05-15 PROCEDURE — 3008F BODY MASS INDEX DOCD: CPT | Mod: HCNC,CPTII,S$GLB,

## 2024-05-15 PROCEDURE — 3079F DIAST BP 80-89 MM HG: CPT | Mod: HCNC,CPTII,S$GLB,

## 2024-05-15 PROCEDURE — 20610 DRAIN/INJ JOINT/BURSA W/O US: CPT | Mod: HCNC,RT,S$GLB,

## 2024-05-15 RX ORDER — TRIAMCINOLONE ACETONIDE 40 MG/ML
INJECTION, SUSPENSION INTRA-ARTICULAR; INTRAMUSCULAR
COMMUNITY
Start: 2024-03-06 | End: 2024-05-15

## 2024-05-15 RX ORDER — TRIAMCINOLONE ACETONIDE 40 MG/ML
40 INJECTION, SUSPENSION INTRA-ARTICULAR; INTRAMUSCULAR
Status: COMPLETED | OUTPATIENT
Start: 2024-05-15 | End: 2024-05-15

## 2024-05-15 RX ORDER — TRIAMCINOLONE ACETONIDE 40 MG/ML
40 INJECTION, SUSPENSION INTRA-ARTICULAR; INTRAMUSCULAR
Status: DISCONTINUED | OUTPATIENT
Start: 2024-05-15 | End: 2024-05-15 | Stop reason: HOSPADM

## 2024-05-15 RX ADMIN — TRIAMCINOLONE ACETONIDE 40 MG: 40 INJECTION, SUSPENSION INTRA-ARTICULAR; INTRAMUSCULAR at 08:05

## 2024-05-15 NOTE — PROGRESS NOTES
Patient ID: Carol Dewey is a 53 y.o. female    Pain of the Right Shoulder      History of Present Illness:    Carol Dewey presents to clinic for right shoulder pain. Patient denies known PUNEET. The pain started 3 years ago and is becoming progressively worse. Did work in [place], lots of overhead lifting. Pain is located over (points to) anterior, posterior, and lateral shoulder . She reports that the pain is a 7 /10 sharp pain toda. The pain is affecting ADLs and limiting desired level of activity. Denies numbness, tingling, radiation and inability to bear weight.Pain is 10 /10 at its worst. She has tried right glenohumeral CSI, anti-inflammatories and salonpas with no improvement. She also did 3 months of PT with no improvement, states pain worsened. She has been wearing a sling when pain get to be too much.     Occupation:     Ambulating: unassisted  Diabetic: no  Smoking: no  Hx of DVT/PE: no    PAST MEDICAL HISTORY:   Past Medical History:   Diagnosis Date    Allergy     Angio-edema     Degenerative disc disease     HTN (hypertension)     Urticaria     Vertigo      PAST SURGICAL HISTORY:   Past Surgical History:   Procedure Laterality Date     SECTION      CHOLECYSTECTOMY  2024    COLONOSCOPY  2024    COLONOSCOPY N/A 2024    Procedure: COLONOSCOPY;  Surgeon: Manuel Roblero MD;  Location: [facility];  Service: Endoscopy;  Laterality: N/A;    EGD, WITH CLOSED BIOPSY  2024    EPIDURAL STEROID INJECTION Bilateral 2023    Procedure: Injection, Steroid, Epidural;  Surgeon: Andi Watson MD;  Location: [facility];  Service: Neurosurgery;  Laterality: Bilateral;  Procedure:Bilateral SI joint block and steriod injection  Length of procedure:30 minutes  LOS:0 minutes  Anesthesia:MAC  Radiology:C-arm  Bed:Regular Bed  Position:Prone    ESOPHAGOGASTRODUODENOSCOPY N/A 2024    Procedure: EGD (ESOPHAGOGASTRODUODENOSCOPY);  Surgeon: Osbaldo  Manuel RUSSELL MD;  Location: [facility];  Service: Endoscopy;  Laterality: N/A;    LAPAROSCOPIC CHOLECYSTECTOMY N/A 3/27/2024    Procedure: CHOLECYSTECTOMY, LAPAROSCOPIC;  Surgeon: Giovanni Gillespie MD;  Location: [facility];  Service: General;  Laterality: N/A;    TONSILLECTOMY      TRANSFORAMINAL EPIDURAL INJECTION OF STEROID Bilateral 03/21/2019    Procedure: Injection,steroid,epidural,transforaminal approach---bilateral L5/S1;  Surgeon: Sridhar Albert III, MD;  Location: [facility];  Service: Pain Management;  Laterality: Bilateral;     FAMILY HISTORY:   Family History   Problem Relation Name Age of Onset    Asthma Mother      Heart disease Mother      Cancer Father      Breast cancer Paternal Cousin      Colon cancer Neg Hx      Ovarian cancer Neg Hx       SOCIAL HISTORY:   Social History     Occupational History    Not on file   Tobacco Use    Smoking status: Never     Passive exposure: Never    Smokeless tobacco: Never   Substance and Sexual Activity    Alcohol use: No    Drug use: No    Sexual activity: Yes     Partners: Male     Birth control/protection: OCP        MEDICATIONS:   Current Outpatient Medications:     amLODIPine (NORVASC) 10 MG tablet, Take 1 tablet (10 mg total) by mouth once daily., Disp: 90 tablet, Rfl: 3    fluocinolone acetonide oiL 0.01 % Drop, 3-4 drops to the affected ear(s) twice daily no more than a week at a time as needed for itching and scaling, Disp: 20 mL, Rfl: 0    fluticasone propionate (FLONASE) 50 mcg/actuation nasal spray, 1 spray (50 mcg total) by Each Nostril route 2 (two) times daily., Disp: 16 g, Rfl: 3    ibuprofen (ADVIL,MOTRIN) 800 MG tablet, Take 1 tablet (800 mg total) by mouth 3 (three) times daily as needed for Pain., Disp: 90 tablet, Rfl: 1    levocetirizine (XYZAL) 5 MG tablet, Take 1 tablet (5 mg total) by mouth every evening., Disp: 90 tablet, Rfl: 3    methocarbamoL (ROBAXIN) 500 MG Tab, Take 1 tablet (500 mg total) by mouth nightly., Disp: 30  tablet, Rfl: 3    montelukast (SINGULAIR) 10 mg tablet, TAKE 1 TABLET(10 MG) BY MOUTH EVERY EVENING, Disp: 90 tablet, Rfl: 3    MYRBETRIQ 50 mg Tb24, Take 1 tablet by mouth once daily., Disp: , Rfl:     norethindrone (MICRONOR) 0.35 mg tablet, Take 1 tablet (0.35 mg total) by mouth once a week., Disp: 28 tablet, Rfl: 11    ofloxacin (OCUFLOX) 0.3 % ophthalmic solution, 4 drops to LEFT EAR BID for one week (NOT FOR OPHTHALMIC USE), Disp: 5 mL, Rfl: 0    omeprazole (PRILOSEC) 40 MG capsule, Take 1 capsule (40 mg total) by mouth once daily., Disp: 90 capsule, Rfl: 3    prednisoLONE acetate (PRED FORTE) 1 % DrpS, 4 drops to LEFT EAR BID for a week (NOT FOR OPHTHALMIC USE), Disp: 5 mL, Rfl: 0    prochlorperazine (COMPAZINE) 10 MG tablet, Take 1 tablet (10 mg total) by mouth 3 (three) times daily., Disp: 20 tablet, Rfl: 3    propranoloL (INDERAL) 20 MG tablet, Take 1 tablet (20 mg total) by mouth every morning., Disp: 30 tablet, Rfl: 11  No current facility-administered medications for this visit.    Facility-Administered Medications Ordered in Other Visits:     0.9%  NaCl infusion, , Intravenous, Continuous, Manuel Roblero MD, New Bag at 01/25/24 0903  ALLERGIES:   Review of patient's allergies indicates:   Allergen Reactions    Augmentin [amoxicillin-pot clavulanate] Rash     Rash to arms, legs, shoulders and torso.  States has happened 2 times with this medication.    Iodine and iodide containing products Rash    Gemtesa [vibegron] Other (See Comments)     Weakness & dizziness    Shellfish containing products Hives     Other reaction(s): Hives    Tramadol Itching    Watermelon Other (See Comments)     Irritates esophagus    Amoxapine Rash    Norco [hydrocodone-acetaminophen] Rash    Papaya Rash    Penicillins Rash         Physical Exam     Vitals:    05/15/24 0811   BP: 125/85   Pulse: 72     Alert and oriented to person, place and time. No acute distress. Well-groomed, not ill appearing. Pupils round and  reactive, normal respiratory effort, no audible wheezing.     Shoulder / Upper Extremity Exam    There is global tenderness to palpation. No erythema, warmth, or induration. FF passive 120, active 80. Abduction 80. Unable to tolerate remainder or exam secondary to pain.    Imaging:     Right shoulder X-rays ordered/reviewed by me showing no evidence of fracture or dislocation. There is no obvious malalignment. No evidence of masses, lesions or foreign bodies. Calcific tendinitis off humerus.    MRI right shoulder Reviewed with the following findings:   1. Calcific tendinitis of the conjoined supraspinatus and infraspinatus tendon with mild associated bursal surface fraying and mild overlying subacromial/subdeltoid bursitis.  2. Subscapularis tendinosis with partial-thickness interstitial tearing of the superior fibers.  3. SLAP tear.  No paralabral cyst.  4. Biceps tendinosis.  5. Questionable mild glenohumeral partial-thickness chondral fissuring.  No subchondral marrow edema.  6. Mild AC joint arthrosis.  7. Small joint effusion with synovitis.    Assessment & Plan    Adhesive capsulitis of right shoulder  -     Ambulatory referral/consult to Physical/Occupational Therapy; Future; Expected date: 05/22/2024  -     triamcinolone acetonide injection 40 mg  -     Large Joint Aspiration/Injection: R subacromial bursa    Chronic right shoulder pain  -     Ambulatory referral/consult to Orthopedics    Superior labrum anterior-to-posterior (SLAP) tear of right shoulder  -     Ambulatory referral/consult to Orthopedics    Partial tear of right subscapularis tendon, initial encounter  -     Ambulatory referral/consult to Orthopedics  -     Ambulatory referral/consult to Physical/Occupational Therapy; Future; Expected date: 05/22/2024       I made the decision to obtain old records of the patient including previous notes and imaging. New imaging was ordered today of the extremity or extremities evaluated. I independently  reviewed and interpreted the radiographs and/or MRIs/CT scan today as well as prior imaging.    We discussed at length different treatment options including conservative vs surgical management. These include anti-inflammatories, acetaminophen, rest, ice, heat, formal physical therapy including strengthening and stretching exercises, home exercise programs, injections, dry needling, and finally surgical intervention.      Patient here today for follow up of chronic right shoulder pain. She seems to be clinically developing more adhesive capsulitis type symptoms today. We discussed removing the sling and working on range of motion. She is requesting injection today.  Right subacromial injection given, post injection instructions reviewed  Formal PT referral placed, instructed she will likely not get full range of motion however I would like to see this improved prior to any surgical intervention  D/C sling  Possibly consider Tenex procedure pending efficacy of injection formal physical therapy    Follow up: Dr. Grover to discuss surgery    X-rays next visit: none    All questions were answered and patient is agreeable to the above plan.

## 2024-05-15 NOTE — PROCEDURES
Large Joint Aspiration/Injection: R subacromial bursa    Date/Time: 5/15/2024 8:00 AM    Performed by: Mona Erickson PA-C  Authorized by: Mona Erickson PA-C    Consent Done?:  Yes (Verbal)  Indications:  Pain  Site marked: the procedure site was marked    Timeout: prior to procedure the correct patient, procedure, and site was verified    Prep: patient was prepped and draped in usual sterile fashion    Local anesthesia used?: No    Local anesthetic:  Topical anesthetic and bupivacaine 0.25% without epinephrine  Anesthetic total (ml):  4      Details:  Needle Size:  22 G  Ultrasonic Guidance for needle placement?: No    Approach:  Posterior  Location:  Shoulder  Site:  R subacromial bursa  Medications:  40 mg triamcinolone acetonide 40 mg/mL  Patient tolerance:  Patient tolerated the procedure well with no immediate complications

## 2024-05-17 ENCOUNTER — CLINICAL SUPPORT (OUTPATIENT)
Dept: AUDIOLOGY | Facility: CLINIC | Age: 54
End: 2024-05-17
Payer: MEDICARE

## 2024-05-17 DIAGNOSIS — H93.12 TINNITUS, LEFT EAR: ICD-10-CM

## 2024-05-17 DIAGNOSIS — H73.12 CHRONIC MYRINGITIS, LEFT: ICD-10-CM

## 2024-05-17 DIAGNOSIS — H90.12 CONDUCTIVE HEARING LOSS OF LEFT EAR WITH UNRESTRICTED HEARING OF RIGHT EAR: Primary | ICD-10-CM

## 2024-05-17 PROCEDURE — 92567 TYMPANOMETRY: CPT | Mod: HCNC,S$GLB,,

## 2024-05-17 PROCEDURE — 92557 COMPREHENSIVE HEARING TEST: CPT | Mod: HCNC,S$GLB,,

## 2024-05-17 NOTE — PROGRESS NOTES
Carol Dewey was seen today in the clinic for an audiologic evaluation.  Patient's main complaint was left sided aural pressure with otalgia on that side that radiates behind her ear.  Mrs. Dewey reported drainage on her left side that has no odor. She experiences bothersome tinnitus in her left ear. Mrs. Dewey mentioned brief episodes of vertigo as well as a history of migraines.     Tympanometry revealed Type A in the right ear and Type Ad in the left ear.     Audiogram results revealed normal hearing in the right ear and a mild conductive hearing loss with a moderately-severe hearing loss at 8000 Hz in the left ear.      Speech reception thresholds were noted at 15 dB in the right ear and 25 dB in the left ear.    Speech discrimination scores were 100% in the right ear and 100% in the left ear.    Recommendations:  Otologic evaluation  Repeat audiogram following medical intervention or at ENT follow up  Hearing protection when in noise

## 2024-05-25 DIAGNOSIS — I10 ESSENTIAL HYPERTENSION: ICD-10-CM

## 2024-05-25 NOTE — TELEPHONE ENCOUNTER
No care due was identified.  [facility] Embedded Care Due Messages. Reference number: [id number].   5/25/2024 3:14:17 PM CDT   Vaccine Information Statement(s) was given today. This has been reviewed, questions answered, and verbal consent given by Patient for injection(s) and administration of Tetanus/Diphtheria/Pertussis (Tdap).  Patient tolerated without incident. See immunization grid for documentation.

## 2024-05-26 RX ORDER — AMLODIPINE BESYLATE 10 MG/1
10 TABLET ORAL
Qty: 90 TABLET | Refills: 3 | Status: SHIPPED | OUTPATIENT
Start: 2024-05-26

## 2024-05-26 NOTE — TELEPHONE ENCOUNTER
Carol Dewey  is requesting a refill authorization.  Brief Assessment and Rationale for Refill:  Approve     Medication Therapy Plan:         Comments:     Note composed:9:03 AM 05/26/2024

## 2024-06-21 ENCOUNTER — TELEPHONE (OUTPATIENT)
Dept: OTOLARYNGOLOGY | Facility: CLINIC | Age: 54
End: 2024-06-21

## 2024-06-21 ENCOUNTER — OFFICE VISIT (OUTPATIENT)
Dept: OTOLARYNGOLOGY | Facility: CLINIC | Age: 54
End: 2024-06-21
Payer: MEDICAID

## 2024-06-21 VITALS
HEART RATE: 70 BPM | SYSTOLIC BLOOD PRESSURE: 126 MMHG | BODY MASS INDEX: 24.88 KG/M2 | DIASTOLIC BLOOD PRESSURE: 83 MMHG | WEIGHT: 140.44 LBS

## 2024-06-21 DIAGNOSIS — H73.12 CHRONIC MYRINGITIS, LEFT: Primary | ICD-10-CM

## 2024-06-21 DIAGNOSIS — H92.12 OTORRHEA, LEFT: ICD-10-CM

## 2024-06-21 PROCEDURE — 99213 OFFICE O/P EST LOW 20 MIN: CPT | Mod: PBBFAC,PN | Performed by: OTOLARYNGOLOGY

## 2024-06-21 PROCEDURE — 87070 CULTURE OTHR SPECIMN AEROBIC: CPT | Mod: HCNC | Performed by: OTOLARYNGOLOGY

## 2024-06-21 PROCEDURE — 87075 CULTR BACTERIA EXCEPT BLOOD: CPT | Mod: HCNC | Performed by: OTOLARYNGOLOGY

## 2024-06-21 PROCEDURE — 99999 PR PBB SHADOW E&M-EST. PATIENT-LVL III: CPT | Mod: PBBFAC,,, | Performed by: OTOLARYNGOLOGY

## 2024-06-21 NOTE — TELEPHONE ENCOUNTER
Patient would like to go over hearing test results once you get a chance.  [facility]    AMEE Floyd

## 2024-06-21 NOTE — PATIENT INSTRUCTIONS
Contact the office next week if not notified of the culture results.  We plan to move forward with boric acid hydrocortisone powder application to the left ear as discussed with the family members or video scope allowing visualization of the ear to determine if the powder has been properly applied like a sugar cookie as discussed.      The culture will determine which powder compounding formula to use which should be available through the compounding pharmacy ([facility]) within the next 2 weeks.      Plans for follow up in 8 weeks as scheduled today sooner with any concerns such as ongoing drainage, pain, worsening hearing or any other concern.          DRY EAR PRECAUTIONS    Water should be kept out of the ears to prevent secondary infection.  If water gets trapped in the ear twisted tissue can be used a wick out the ear.  Cotton balls or cotton balls with Vaseline in the shower may help prevent water from getting in the ears.        Voice recognition software was used in the creation of this note/communication and any sound-alike errors which may have occurred from its use should be taken in context when interpreting.  If such errors prevent a clear understanding of the note/communication, please contact the office for clarification.

## 2024-06-21 NOTE — PROGRESS NOTES
[facility]    Subjective:      Patient: Carol Dweey Patient PCP: José Miguel Begum MD         :  1970     Sex:  female      MRN:  [id number]          Date of Visit: 2024      Chief Complaint: Ear Drainage (Patient is returning for a follow up on ear drainage.  Patient states everything is about the same with no new signs or symptoms.)      Patient ID: Carol Dewey is a 53 y.o. female     2024 6 week follow-up visit on chronic left-sided mucopurulent myringitis with secondary saprophytic fungal growth improved on the left side no active fungus or purulence but still some scarring and pink change of the tympanic membrane without formal granulation.  Additional fluocinolone for a week then routine ear care recommended as well as audiogram prior to follow up to determine if further evaluation including possible neuro otology consultation and imaging are appropriate.      Audiogram completed 2024 a week after our visit shows some normal to borderline right-sided thresholds with generally symmetric pattern of hearing loss in the left side with an overlying 5 to 20 dB asymmetry other than the highest frequencies at 6-8000 hertz with her has a larger asymmetry.  The left ear exhibits more mobility rather than less compared to the right.  Audiologic testing to speech is preserved with a 15 dB right and 25 dB left SRT with 100% discrimination scores bilaterally.        05/10/2024 FOLLOW-UP VISIT patient seen today for weeks since our last visit for hearing loss with findings of some wax and mycotic otitis externa/saprophytic fungus on the right side and some granular myringitis and mucopus on the left side.  Treated with steroid fluoroquinolone drops on the left to be followed by routine ear care bilaterally with audiologic testing and follow-up.    No audiogram.  Never contacted.      Feels like the drainage on the left side resolved with the combination eye drops.  Within a week  perhaps it started feeling like it was draining a bit.  She has continued to use Q-tips throughout but has made great efforts including by ear plugs to keep water out of the ears in the bath.  Restarted the drops with improvement in drainage.  No pain.  No fever.     04/12/2024 INITIAL PATIENT CONSULTATION Patient is a  lifelong NON-smoker with a past medical history of degenerative disc disease previously seen by amanda Murphy for tinnitus ear fullness and positional vertigo.  Audiologic testing, Eustachian tube care and Flonase, and referral to physical therapy provided (this was in 2022.  Patient is seen today  referred to me by Dr. José Miguel Begum in consultation for bilateral ear drainage.  No audiogram.  No PT notes in 2022 for vertigo.    Went to a free hearing screening was told she had no hearing in her left ear.  Also told her ear was all white.  Photos taken but unable to be printed.  Hearing testing completed but no copy of audiogram for review.  Has some fullness in both ears.  No prior ear tubes or ear trauma.        Labs:  WBC   Date Value Ref Range Status   12/01/2023 8.07 3.90 - 12.70 K/uL Final     Hemoglobin   Date Value Ref Range Status   12/01/2023 15.5 12.0 - 16.0 g/dL Final     Platelets   Date Value Ref Range Status   12/01/2023 286 150 - 450 K/uL Final     Creatinine   Date Value Ref Range Status   12/01/2023 0.8 0.5 - 1.4 mg/dL Final     TSH   Date Value Ref Range Status   04/05/2023 0.711 0.400 - 4.000 uIU/mL Final     Hemoglobin A1C   Date Value Ref Range Status   04/05/2023 5.9 (H) 4.0 - 5.6 % Final     Comment:     ADA Screening Guidelines:  5.7-6.4%  Consistent with prediabetes  >or=6.5%  Consistent with diabetes    High levels of fetal hemoglobin interfere with the HbA1C  assay. Heterozygous hemoglobin variants (HbS, HgC, etc)do  not significantly interfere with this assay.   However, presence of multiple variants may affect accuracy.         Past Medical History  She  has a past medical history of Allergy, Angio-edema, Degenerative disc disease, HTN (hypertension), Urticaria, and Vertigo.    Family / Surgical / Social History  Her family history includes Asthma in her mother; Breast cancer in her paternal cousin; Cancer in her father; Heart disease in her mother.    Past Surgical History:   Procedure Laterality Date     SECTION      CHOLECYSTECTOMY  2024    COLONOSCOPY  2024    COLONOSCOPY N/A 2024    Procedure: COLONOSCOPY;  Surgeon: Manuel Roblero MD;  Location: [facility];  Service: Endoscopy;  Laterality: N/A;    EGD, WITH CLOSED BIOPSY  2024    EPIDURAL STEROID INJECTION Bilateral 2023    Procedure: Injection, Steroid, Epidural;  Surgeon: Andi Watson MD;  Location: [facility];  Service: Neurosurgery;  Laterality: Bilateral;  Procedure:Bilateral SI joint block and steriod injection  Length of procedure:30 minutes  LOS:0 minutes  Anesthesia:MAC  Radiology:C-arm  Bed:Regular Bed  Position:Prone    ESOPHAGOGASTRODUODENOSCOPY N/A 2024    Procedure: EGD (ESOPHAGOGASTRODUODENOSCOPY);  Surgeon: Manuel Roblero MD;  Location: [facility];  Service: Endoscopy;  Laterality: N/A;    LAPAROSCOPIC CHOLECYSTECTOMY N/A 3/27/2024    Procedure: CHOLECYSTECTOMY, LAPAROSCOPIC;  Surgeon: Giovanni Gillespie MD;  Location: [facility];  Service: General;  Laterality: N/A;    TONSILLECTOMY      TRANSFORAMINAL EPIDURAL INJECTION OF STEROID Bilateral 2019    Procedure: Injection,steroid,epidural,transforaminal approach---bilateral L5/S1;  Surgeon: Sridhar Albert III, MD;  Location: [facility];  Service: Pain Management;  Laterality: Bilateral;       Social History     Tobacco Use    Smoking status: Never     Passive exposure: Never    Smokeless tobacco: Never   Substance and Sexual Activity    Alcohol use: No    Drug use: No    Sexual activity: Yes     Partners: Male     Birth control/protection: OCP  "      Medications  She has a current medication list which includes the following prescription(s): amlodipine, fluocinolone acetonide oil, fluticasone propionate, ibuprofen, levocetirizine, methocarbamol, montelukast, myrbetriq, norethindrone, ofloxacin, omeprazole, prednisolone acetate, prochlorperazine, and propranolol, and the following Facility-Administered Medications: 0.9% nacl.      Allergies  Review of patient's allergies indicates:   Allergen Reactions    Augmentin [amoxicillin-pot clavulanate] Rash     Rash to arms, legs, shoulders and torso.  States has happened 2 times with this medication.    Iodine and iodide containing products Rash    Gemtesa [vibegron] Other (See Comments)     Weakness & dizziness    Shellfish containing products Hives     Other reaction(s): Hives    Tramadol Itching    Watermelon Other (See Comments)     Irritates esophagus    Amoxapine Rash    Norco [hydrocodone-acetaminophen] Rash    Papaya Rash    Penicillins Rash       All medications, allergies, and past history have been reviewed.    Objective:      Vitals:      5/10/2024    10:51 AM 5/15/2024     8:11 AM 6/21/2024     9:34 AM   Vitals - 1 value per visit   SYSTOLIC 108 125 126   DIASTOLIC 83 85 83   Pulse 80 72 70   Weight (lb) 140.43 139.99 140.43   Weight (kg) 63.7 63.5 63.7   Height 5' 3" (1.6 m) 5' 3" (1.6 m)    BMI (Calculated) 24.9 24.8    Pain Score Zero Seven Three       Body surface area is 1.68 meters squared.    Physical Exam:    GENERAL  APPEARANCE -  alert, appears stated age, and cooperative  BARRIER(S) TO COMMUNICATION -  none VOICE - appropriate for age and gender    INTEGUMENTARY  no suspicious head and neck lesions    HEENT  HEAD: Normocephalic, without obvious abnormality, atraumatic  FACE: INSPECTION - Symmetric, no signs of trauma, no suspicious lesion(s)      STRENGTH - facial symmetry intact     PALPATION -  No masses crepitance and pop on the right side with some asymmetric movement.  Minimal " tenderness of the TMJ bilaterally    SALIVARY GLANDS - non-tender with no appreciable mass    NECK/THYROID: normal atraumatic, no neck masses, normal thyroid, no jvd    EYES  Normal occular alignment and mobility with no visible nystagmus at rest    EARS/NOSE/MOUTH/THROAT  EARS  PINNAE AND EXTERNAL EARS - no suspicious lesion OTOSCOPIC EXAM (surgical microscopy was used for visualization/instrumentation): EAR EXAM - right ear healthy and normal.  No saprophytic fungus or wax.  Left ear still exhibits multiple pink spots on the superior half of the tympanic membrane with some muco purulence on the tympanic membrane but no active drainage otherwise.  Attic mildly retracted but no cholesteatoma no appreciable middle ear effusion.  Ear canal healthy no crusting no fungus.  Culture of the TM performed.  Boric acid applied to the tympanic membrane (powder) under microscopy.  HEARING - subjectively decreased on the left compared to right    NOSE AND SINUSES  EXTERNAL NOSE - Grossly normal for age/sex  SEPTUM - normal/no obstruction on anterior exam without decongestion TURBINATES - within normal limits MUCOSA - within normal limits     MOUTH AND THROAT   ORAL CAVITY, LIPS, TEETH, GUMS & TONGUE - moist, no suspicious lesions  OROPHARYNX /TONSILS/PHARYNGEAL WALLS/HYPOPHARYNX - no erythema or exudates  NASOPHARYNX - limited mirror exam - unable to visualize due to anatomy/gag  LARYNX -  - limited mirror exam - unable to visualize due to anatomy/gag      CHEST AND LUNG   INSPECTION & AUSCULTATION - normal effort, no stridor    CARDIOVASCULAR  AUSCULTATION & PERIPHERAL VASCULAR - regular rate and rhythm.    NEUROLOGIC  MENTAL STATUS - alert, interactive CRANIAL NERVES - normal    LYMPHATIC  HEAD AND NECK - non-palpable; SUPRACLAVICULAR - deferred      Procedure(s):  None          Assessment:      Problem List Items Addressed This Visit    None  Visit Diagnoses       Chronic myringitis, left    -  Primary                      Plan:      Culture obtained.  Boric acid applied.  We will plan to use boric acid hydrocortisone powders going forward unless culture indicates a particular pathogens such as Pseudomonas or fungus in which case other antibacterials or antifungals maybe needed.  We will contact the patient next week with results and plans moving forward in terms of the powder.  Otherwise plan for routine follow up in 2 months sooner with any pain drainage or other concerns.              Voice recognition software was used in the creation of this note/communication and any sound-alike errors which may have occurred from its use should be taken in context when interpreting.  If such errors prevent a clear understanding of the note/communication, please contact the office for clarification.

## 2024-06-26 ENCOUNTER — OFFICE VISIT (OUTPATIENT)
Dept: ORTHOPEDICS | Facility: CLINIC | Age: 54
End: 2024-06-26
Payer: MEDICARE

## 2024-06-26 VITALS
DIASTOLIC BLOOD PRESSURE: 77 MMHG | BODY MASS INDEX: 24.88 KG/M2 | SYSTOLIC BLOOD PRESSURE: 113 MMHG | HEART RATE: 80 BPM | HEIGHT: 63 IN | WEIGHT: 140.44 LBS

## 2024-06-26 DIAGNOSIS — G89.29 CHRONIC RIGHT SHOULDER PAIN: Primary | ICD-10-CM

## 2024-06-26 DIAGNOSIS — S46.811A PARTIAL TEAR OF RIGHT SUBSCAPULARIS TENDON, INITIAL ENCOUNTER: ICD-10-CM

## 2024-06-26 DIAGNOSIS — M25.511 CHRONIC RIGHT SHOULDER PAIN: Primary | ICD-10-CM

## 2024-06-26 DIAGNOSIS — M75.101 ROTATOR CUFF TEAR, RIGHT: ICD-10-CM

## 2024-06-26 PROCEDURE — 3074F SYST BP LT 130 MM HG: CPT | Mod: HCNC,CPTII,S$GLB, | Performed by: ORTHOPAEDIC SURGERY

## 2024-06-26 PROCEDURE — 99999 PR PBB SHADOW E&M-EST. PATIENT-LVL III: CPT | Mod: PBBFAC,HCNC,, | Performed by: ORTHOPAEDIC SURGERY

## 2024-06-26 PROCEDURE — 99215 OFFICE O/P EST HI 40 MIN: CPT | Mod: HCNC,S$GLB,, | Performed by: ORTHOPAEDIC SURGERY

## 2024-06-26 PROCEDURE — 3078F DIAST BP <80 MM HG: CPT | Mod: HCNC,CPTII,S$GLB, | Performed by: ORTHOPAEDIC SURGERY

## 2024-06-26 PROCEDURE — 3008F BODY MASS INDEX DOCD: CPT | Mod: HCNC,CPTII,S$GLB, | Performed by: ORTHOPAEDIC SURGERY

## 2024-06-26 RX ORDER — CEFAZOLIN SODIUM 2 G/50ML
2 SOLUTION INTRAVENOUS
OUTPATIENT
Start: 2024-06-26

## 2024-06-26 RX ORDER — PAROXETINE 10 MG/1
10 TABLET, FILM COATED ORAL
COMMUNITY
Start: 2024-05-17

## 2024-06-26 RX ORDER — MUPIROCIN 20 MG/G
OINTMENT TOPICAL
OUTPATIENT
Start: 2024-06-26

## 2024-06-26 NOTE — PROGRESS NOTES
Patient ID: Carol Dewey is a 53 y.o. female    Chief Complaint:   Chief Complaint   Patient presents with    Right Shoulder - Pain       History of Present Illness:    Pleasant 53-year-old female who is here for evaluation of chronic right shoulder pain.  She reports 3 year history of right shoulder pain which she has not improving with conservative treatment.  She has had multiple rounds of physical therapy as well as injections to the right shoulder.  She was seen sports Medicine as well for this and continues to have pain as well as nighttime symptoms and dysfunction of the right shoulder.  She has previous MRI which shows calcific tendinitis, slap tear and some mild DJD of the glenohumeral joint.  Here to discuss surgical options.    PAST MEDICAL HISTORY:   Past Medical History:   Diagnosis Date    Allergy     Angio-edema     Degenerative disc disease     HTN (hypertension)     Urticaria     Vertigo      PAST SURGICAL HISTORY:   Past Surgical History:   Procedure Laterality Date     SECTION      CHOLECYSTECTOMY  2024    COLONOSCOPY  2024    COLONOSCOPY N/A 2024    Procedure: COLONOSCOPY;  Surgeon: Manuel Roblero MD;  Location: [facility];  Service: Endoscopy;  Laterality: N/A;    EGD, WITH CLOSED BIOPSY  2024    EPIDURAL STEROID INJECTION Bilateral 2023    Procedure: Injection, Steroid, Epidural;  Surgeon: Andi Watson MD;  Location: [facility];  Service: Neurosurgery;  Laterality: Bilateral;  Procedure:Bilateral SI joint block and steriod injection  Length of procedure:30 minutes  LOS:0 minutes  Anesthesia:MAC  Radiology:C-arm  Bed:Regular Bed  Position:Prone    ESOPHAGOGASTRODUODENOSCOPY N/A 2024    Procedure: EGD (ESOPHAGOGASTRODUODENOSCOPY);  Surgeon: Manuel Roblero MD;  Location: [facility];  Service: Endoscopy;  Laterality: N/A;    LAPAROSCOPIC CHOLECYSTECTOMY N/A 3/27/2024    Procedure: CHOLECYSTECTOMY, LAPAROSCOPIC;  Surgeon:  Giovanni Gillespie MD;  Location: [facility] OR;  Service: General;  Laterality: N/A;    TONSILLECTOMY      TRANSFORAMINAL EPIDURAL INJECTION OF STEROID Bilateral 03/21/2019    Procedure: Injection,steroid,epidural,transforaminal approach---bilateral L5/S1;  Surgeon: Sridhar Albert III, MD;  Location: [facility] OR;  Service: Pain Management;  Laterality: Bilateral;     FAMILY HISTORY:   Family History   Problem Relation Name Age of Onset    Asthma Mother      Heart disease Mother      Cancer Father      Breast cancer Paternal Cousin      Colon cancer Neg Hx      Ovarian cancer Neg Hx       SOCIAL HISTORY:   Social History     Occupational History    Not on file   Tobacco Use    Smoking status: Never     Passive exposure: Never    Smokeless tobacco: Never   Substance and Sexual Activity    Alcohol use: No    Drug use: No    Sexual activity: Yes     Partners: Male     Birth control/protection: OCP        MEDICATIONS:   Current Outpatient Medications:     amLODIPine (NORVASC) 10 MG tablet, TAKE ONE TABLET BY MOUTH ONCE DAILY, Disp: 90 tablet, Rfl: 3    fluocinolone acetonide oiL 0.01 % Drop, 3-4 drops to the affected ear(s) twice daily no more than a week at a time as needed for itching and scaling, Disp: 20 mL, Rfl: 0    fluticasone propionate (FLONASE) 50 mcg/actuation nasal spray, 1 spray (50 mcg total) by Each Nostril route 2 (two) times daily., Disp: 16 g, Rfl: 3    ibuprofen (ADVIL,MOTRIN) 800 MG tablet, Take 1 tablet (800 mg total) by mouth 3 (three) times daily as needed for Pain., Disp: 90 tablet, Rfl: 1    levocetirizine (XYZAL) 5 MG tablet, Take 1 tablet (5 mg total) by mouth every evening., Disp: 90 tablet, Rfl: 3    methocarbamoL (ROBAXIN) 500 MG Tab, Take 1 tablet (500 mg total) by mouth nightly., Disp: 30 tablet, Rfl: 3    montelukast (SINGULAIR) 10 mg tablet, TAKE 1 TABLET(10 MG) BY MOUTH EVERY EVENING, Disp: 90 tablet, Rfl: 3    MYRBETRIQ 50 mg Tb24, Take 1 tablet by mouth once daily., Disp: , Rfl:      norethindrone (MICRONOR) 0.35 mg tablet, Take 1 tablet (0.35 mg total) by mouth once a week., Disp: 28 tablet, Rfl: 11    ofloxacin (OCUFLOX) 0.3 % ophthalmic solution, 4 drops to LEFT EAR BID for one week (NOT FOR OPHTHALMIC USE), Disp: 5 mL, Rfl: 0    omeprazole (PRILOSEC) 40 MG capsule, Take 1 capsule (40 mg total) by mouth once daily., Disp: 90 capsule, Rfl: 3    paroxetine (PAXIL) 10 MG tablet, Take 10 mg by mouth., Disp: , Rfl:     prednisoLONE acetate (PRED FORTE) 1 % DrpS, 4 drops to LEFT EAR BID for a week (NOT FOR OPHTHALMIC USE), Disp: 5 mL, Rfl: 0    prochlorperazine (COMPAZINE) 10 MG tablet, Take 1 tablet (10 mg total) by mouth 3 (three) times daily., Disp: 20 tablet, Rfl: 3    propranoloL (INDERAL) 20 MG tablet, Take 1 tablet (20 mg total) by mouth every morning., Disp: 30 tablet, Rfl: 11  No current facility-administered medications for this visit.    Facility-Administered Medications Ordered in Other Visits:     0.9%  NaCl infusion, , Intravenous, Continuous, Manuel Roblero MD, New Bag at 01/25/24 0903  ALLERGIES:   Review of patient's allergies indicates:   Allergen Reactions    Augmentin [amoxicillin-pot clavulanate] Rash     Rash to arms, legs, shoulders and torso.  States has happened 2 times with this medication.    Iodine and iodide containing products Rash    Gemtesa [vibegron] Other (See Comments)     Weakness & dizziness    Shellfish containing products Hives     Other reaction(s): Hives    Tramadol Itching    Watermelon Other (See Comments)     Irritates esophagus    Amoxapine Rash    Norco [hydrocodone-acetaminophen] Rash    Papaya Rash    Penicillins Rash         Physical Exam     Vitals:    06/26/24 0821   BP: 113/77   Pulse: 80     Alert and oriented to person, place and time. No acute distress. Well-groomed, not ill appearing. Pupils round and reactive, normal respiratory effort, no audible wheezing.     On exam she has pain with provocative testing of the rotator cuff  in forward flexion and abduction.  She is mild tenderness over the greater tuberosity and supraspinatus.  Mild pain over the biceps tendon.  Positive Speed's and Yergason's.  Positive Neer impingement.  Passive range of motion 150° and forward flexion and 130° and abduction.        Imaging:       X-Ray: I have reviewed all pertinent results/findings and my personal findings are:  Normal right shoulder radiographs.  MRI: I have reviewed all pertinent results/findings and my personal findings are:  Calcific tendinitis of the supraspinatus with undersurface fraying and possible high-grade underlying tear of the supraspinatus.  Degenerative appearing SLAP tear.  Glenoid DJD      Assessment & Plan    Chronic right shoulder pain  -     Vital signs; Standing  -     Cleanse with Chlorhexidine (CHG); Standing  -     Diet NPO; Standing  -     Place sequential compression device; Standing  -     Chlorohexidine Gluconate Bath; Standing  -     Case Request Operating Room: ARTHROSCOPY, SHOULDER  -     Full code; Standing  -     Place in Outpatient; Standing  -     POCT urine pregnancy; Standing    Partial tear of right subscapularis tendon, initial encounter  -     Vital signs; Standing  -     Cleanse with Chlorhexidine (CHG); Standing  -     Diet NPO; Standing  -     Place sequential compression device; Standing  -     Chlorohexidine Gluconate Bath; Standing  -     Case Request Operating Room: ARTHROSCOPY, SHOULDER  -     Full code; Standing  -     Place in Outpatient; Standing  -     POCT urine pregnancy; Standing    Rotator cuff tear, right    Other orders  -     IP VTE LOW RISK PATIENT; Standing  -     mupirocin 2 % ointment  -     cefazolin (ANCEF) 2 gram in dextrose 5% 50 mL IVPB (premix)         Carol Dewey is a 53 y.o. female who has radiographic and clinical evidence of right shoulder calcific rotator cuff tendinitis.     At this point the patient has failed extensive non-operative and conservative treatment with  physician guided home exercise program, injections, PT, activity modification and NSAIDs/OTC medications. We discussed multiple options including non-operative and operative treatments. Non-operatively we discussed injections, HEP and formal PT.  Operatively we discussed arthroscopic debridement of the rotator cuff with possible repair, biceps tenodesis, subacromial decompression. We discussed the pros and cons of surgery in detail as well as the risks and benefits of surgery, time required for recovery, need for physical therapy post-operatively and expectations long term. We discussed specifically the risks of damage to surrounding neurovascular structures, arthrofibrosis and stiffness, need for revision surgery or manipulation as well as chronic pain and dysfunction. Despite the risks of surgery, they do elect to proceed to improve function and pain.     _________________________________________________________________      Carol Dewey will be scheduled for the following procedure:     right Shoulder Arthroscopy with    Rotator cuff debridement versus repair  Subacromial decompression with acromioplasty  Biceps tenodesis  Possible distal clavicle excision      Post-Op Medications to be prescribed:   Percocet 5/325mg Take 1-2 tablets every 4-6 hours PRN pain #28  Zofran 4mg oral disintegrating tablets every 8 hours PRN nausea/vomiting   Motrin 600 mg TID PRN   Flexeril 10mg BID PRN muscle spasms    Patient will need post-operative formal physical therapy to improve function, pain, and range of motion.     Medical Clearance: No  Hx of DVT,PE, anesthetic complications: No      Additional notes/concerns:  None

## 2024-06-28 ENCOUNTER — TELEPHONE (OUTPATIENT)
Dept: OTOLARYNGOLOGY | Facility: CLINIC | Age: 54
End: 2024-06-28
Payer: MEDICARE

## 2024-06-28 NOTE — TELEPHONE ENCOUNTER
----- Message from Clare Verdin sent at 6/28/2024 12:56 PM CDT -----  Regarding: Results  Contact: [phone]  Calling to speak with provider regarding recent lab and imaging results and plan of care going forward. Please call and discuss.    [phone]

## 2024-07-01 ENCOUNTER — TELEPHONE (OUTPATIENT)
Dept: OTOLARYNGOLOGY | Facility: CLINIC | Age: 54
End: 2024-07-01
Payer: MEDICARE

## 2024-07-01 NOTE — TELEPHONE ENCOUNTER
----- Message from [employer] sent at 7/1/2024  1:06 PM CDT -----  Regarding: Results  Contact: pt  [phone]  Pt is calling in ref to results from her schwab test. Pt says results show up abnormal in the portal. Trying to find out next step before her scheduled 7/15 shoulder surgery. Patient Requesting Call Back @  [phone]

## 2024-07-03 DIAGNOSIS — S46.811A PARTIAL TEAR OF RIGHT SUBSCAPULARIS TENDON, INITIAL ENCOUNTER: ICD-10-CM

## 2024-07-03 DIAGNOSIS — G89.29 CHRONIC RIGHT SHOULDER PAIN: Primary | ICD-10-CM

## 2024-07-03 DIAGNOSIS — M25.511 CHRONIC RIGHT SHOULDER PAIN: Primary | ICD-10-CM

## 2024-07-03 RX ORDER — BORIC ACID
POWDER (GRAM) MISCELLANEOUS
Qty: 1 EACH | Refills: 1 | Status: SHIPPED | OUTPATIENT
Start: 2024-07-03

## 2024-07-05 ENCOUNTER — TELEPHONE (OUTPATIENT)
Dept: OTOLARYNGOLOGY | Facility: CLINIC | Age: 54
End: 2024-07-05
Payer: MEDICARE

## 2024-07-05 NOTE — TELEPHONE ENCOUNTER
Spoke w/Colette from [facility]. W/rep, clarified provider's prescription note for boric-acid/hydrocortisone w/use of provider notes/messages.  Acknowledged; did also inform that provider out for better-half of two weeks. No further ENT questions/concerns at this time.

## 2024-07-05 NOTE — TELEPHONE ENCOUNTER
Compound prescription faxed out to [facility] w/demographic sheet, allergy list, screenshot of [id number] msg, culture results w/note of affected ear, as well as copy of prescription note sent via EPIC.

## 2024-07-05 NOTE — TELEPHONE ENCOUNTER
----- Message from Tigist Krueger sent at 7/5/2024  8:34 AM CDT -----  Regarding: rx issue  PATIENT CALL    Colette from [facility] called regarding boric acid powder + hydrocortisone script.   - need a specific value for dispense (number of puffs/which ear/ how often)  - plz clarify whether the insufflator or otic puffer is necessary  - 0.02 gm/12 g puffer was called for, but they only have the 0.3 gram/12g puffer in stock    Please call her at [phone] to clarify all of the above.   negative detailed exam

## 2024-07-09 NOTE — TELEPHONE ENCOUNTER
"Patient asking how frequently to use the boric acid/hydrocortisone powder to the ear? The instructions only state: Keep the ear slightly powdered like a sugar cookie".  Use an otoscope as discussed to be sure.  If filled up with powder use over-the-counter rubbing alcohol to dissolve and restart. We asked Mr. Karl PA-C as Dr. Keene is out of the office until 7/17/24. Mr. Karl PA-C stated that pt can use it twice a day until Dr. Keene returns to advise on his recommendation. Thanks, Dee  "

## 2024-07-10 NOTE — TELEPHONE ENCOUNTER
Connor Keene MD  [facility] (3:30 PM)       Typically once a day to every other day to maintains a lightly dusted appearance like a sugar cookie.  Using twice daily may be excessive depending on how much is used per application. Every other day may be too little.

## 2024-07-16 ENCOUNTER — NURSE TRIAGE (OUTPATIENT)
Dept: ADMINISTRATIVE | Facility: CLINIC | Age: 54
End: 2024-07-16
Payer: MEDICARE

## 2024-07-16 NOTE — TELEPHONE ENCOUNTER
Pt's  calling in, wife states pain medicine makes her feel like she is drowning when she lays down to go to sleep. Medication is oxycodone.  states pain medication is not working. Discharge paperwork says take 1-2 pills q 4-6 hours.  is not currently with pt. Offered to call pt and  states he left a message with surgeon's office and will wait for them to call back. Did not want me to call pt for triage. Advised to call back with further concerns.   Reason for Disposition   [1] Caller is not with the adult (patient) AND [2] probable NON-URGENT symptoms    Protocols used: Information Only Call-A-

## 2024-07-17 ENCOUNTER — PATIENT MESSAGE (OUTPATIENT)
Dept: ORTHOPEDICS | Facility: CLINIC | Age: 54
End: 2024-07-17
Payer: MEDICARE

## 2024-07-17 ENCOUNTER — TELEPHONE (OUTPATIENT)
Dept: ORTHOPEDICS | Facility: CLINIC | Age: 54
End: 2024-07-17
Payer: MEDICARE

## 2024-07-17 NOTE — TELEPHONE ENCOUNTER
Spoke with patient via phone.  Stated it is okay to take 2 of the Percocets.  Also stated sometimes the nerve block can cause shortness of breath which should resolve in the next few days.  Recommended sleeping upright, patient has been sleeping in recliner.  Also let her know she was able to get out of the sling to do exercises, those were sent via the portal.  Patient verbalized understanding and will call with any further questions or concerns.

## 2024-07-18 RX ORDER — MIRABEGRON 50 MG/1
1 TABLET, FILM COATED, EXTENDED RELEASE ORAL
Qty: 90 TABLET | Refills: 0 | Status: SHIPPED | OUTPATIENT
Start: 2024-07-18

## 2024-07-23 ENCOUNTER — TELEPHONE (OUTPATIENT)
Dept: ORTHOPEDICS | Facility: CLINIC | Age: 54
End: 2024-07-23
Payer: MEDICARE

## 2024-07-23 DIAGNOSIS — G89.18 ACUTE POST-OPERATIVE PAIN: Primary | ICD-10-CM

## 2024-07-23 RX ORDER — ACETAMINOPHEN AND CODEINE PHOSPHATE 300; 30 MG/1; MG/1
1 TABLET ORAL EVERY 4 HOURS PRN
Qty: 28 TABLET | Refills: 0 | Status: SHIPPED | OUTPATIENT
Start: 2024-07-23 | End: 2024-08-02

## 2024-07-23 NOTE — TELEPHONE ENCOUNTER
"----- Message from Yesylasha Guzman sent at 7/23/2024  9:03 AM CDT -----  Regarding: pt advice  Contact: [phone]  .Name Of Caller: Self     Contact Preference?: [phone]     What is the nature of the call?: pt currently taking Oxycodone after surgery for pain  but needing to change the medication to something due to meds has pt with rashes, pls call      Additional Notes:  "Thank you for all that you do for our patients"  "

## 2024-07-23 NOTE — TELEPHONE ENCOUNTER
Spoke with patient. Pt advise to take inflammatories and muscle relaxer as well to help decrease narcotic usage. Pt verbalized understanding.

## 2024-07-31 ENCOUNTER — OFFICE VISIT (OUTPATIENT)
Dept: ORTHOPEDICS | Facility: CLINIC | Age: 54
End: 2024-07-31
Payer: MEDICARE

## 2024-07-31 VITALS
BODY MASS INDEX: 25.01 KG/M2 | DIASTOLIC BLOOD PRESSURE: 80 MMHG | SYSTOLIC BLOOD PRESSURE: 128 MMHG | WEIGHT: 141.13 LBS | HEART RATE: 70 BPM | HEIGHT: 63 IN

## 2024-07-31 DIAGNOSIS — M25.511 CHRONIC RIGHT SHOULDER PAIN: ICD-10-CM

## 2024-07-31 DIAGNOSIS — G89.18 ACUTE POST-OPERATIVE PAIN: Primary | ICD-10-CM

## 2024-07-31 DIAGNOSIS — G89.29 CHRONIC RIGHT SHOULDER PAIN: ICD-10-CM

## 2024-07-31 PROCEDURE — 1159F MED LIST DOCD IN RCRD: CPT | Mod: HCNC,CPTII,S$GLB,

## 2024-07-31 PROCEDURE — 99024 POSTOP FOLLOW-UP VISIT: CPT | Mod: HCNC,S$GLB,,

## 2024-07-31 PROCEDURE — 3074F SYST BP LT 130 MM HG: CPT | Mod: HCNC,CPTII,S$GLB,

## 2024-07-31 PROCEDURE — 3079F DIAST BP 80-89 MM HG: CPT | Mod: HCNC,CPTII,S$GLB,

## 2024-07-31 PROCEDURE — 99999 PR PBB SHADOW E&M-EST. PATIENT-LVL IV: CPT | Mod: PBBFAC,HCNC,,

## 2024-07-31 NOTE — PROGRESS NOTES
Post-op Note    HPI    Carol Dewey is here 2 weeks s/p the following procedure:     7/15/2024  Arthroscopic right shoulder rotator cuff repair  Arthroscopic right shoulder subacromial decompression with acromioplasty  Arthroscopic right shoulder biceps tenotomy  Extensive arthroscopic debridement of the shoulder including debridement of the labrum, bursa and cartilage    Overall doing well. Pain controlled on current regimen. Not in PT. Denies any chest pain or shortness of breathe. Denies any drainage from the incision. Denies any fevers, chills or paresthesias. Notes some warmth to shoulder but has been icing. No erythema or drainage.      Physical Exam:     Patient is alert and oriented no acute distress.   Assistive Device: sling    Right shoulder: sutures removed. Incision(s) are well healed.  There is no evidence of dehiscence.  There is no induration erythema or signs of infection.  Appropriate soft tissue swelling.  Compartments are soft and compressible.  Warm well-perfused extremity. Skin warm to b/l shoulders, no significant heat on right.     Assessment    Carol Dewey is 2 weeks Post-op     Plan:    Overall doing as expected.  We discussed expectations of surgery and postoperative course.     Pain: Continued postoperative pain regimen -- tylenol codeine prn  PT/OT: Continue/Initiate physical therapy (weight bearing status: NWB), cont sling full time unless doing exercises or in PT. Educated patient on HEP and handout provided.     In 24 hours, you may shower without covering your incision.  Do not submerge your incision for another 2 weeks.  If steri strips applied, they can get wet, remove in 48-72 hours if not falling off on their own. Do not submerge incision for another 2 weeks. You may start to do a scar massage with vitamin-E oil/cocoa butter to your incisions. Please inform the clinic if you experience any bleeding or discharge, warmth, or redness.        Follow-up: 4 weeks   X-rays next  visit: none

## 2024-08-15 ENCOUNTER — OFFICE VISIT (OUTPATIENT)
Dept: PRIMARY CARE CLINIC | Facility: CLINIC | Age: 54
End: 2024-08-15
Payer: MEDICARE

## 2024-08-15 VITALS
OXYGEN SATURATION: 98 % | HEART RATE: 87 BPM | WEIGHT: 139.31 LBS | RESPIRATION RATE: 18 BRPM | HEIGHT: 63 IN | SYSTOLIC BLOOD PRESSURE: 126 MMHG | BODY MASS INDEX: 24.68 KG/M2 | DIASTOLIC BLOOD PRESSURE: 80 MMHG

## 2024-08-15 DIAGNOSIS — M54.9 COSTOVERTEBRAL ANGLE TENDERNESS: Primary | ICD-10-CM

## 2024-08-15 DIAGNOSIS — Z79.899 ENCOUNTER FOR LONG-TERM CURRENT USE OF MEDICATION: ICD-10-CM

## 2024-08-15 DIAGNOSIS — Z13.6 ENCOUNTER FOR LIPID SCREENING FOR CARDIOVASCULAR DISEASE: ICD-10-CM

## 2024-08-15 DIAGNOSIS — Z13.220 ENCOUNTER FOR LIPID SCREENING FOR CARDIOVASCULAR DISEASE: ICD-10-CM

## 2024-08-15 DIAGNOSIS — Z13.1 DIABETES MELLITUS SCREENING: ICD-10-CM

## 2024-08-15 DIAGNOSIS — G89.29 CHRONIC MIDLINE LOW BACK PAIN, UNSPECIFIED WHETHER SCIATICA PRESENT: ICD-10-CM

## 2024-08-15 DIAGNOSIS — M54.50 CHRONIC MIDLINE LOW BACK PAIN, UNSPECIFIED WHETHER SCIATICA PRESENT: ICD-10-CM

## 2024-08-15 LAB
BILIRUB SERPL-MCNC: NEGATIVE MG/DL
BLOOD URINE, POC: NEGATIVE
CLARITY, POC UA: CLEAR
COLOR, POC UA: YELLOW
GLUCOSE UR QL STRIP: NORMAL
KETONES UR QL STRIP: NEGATIVE
LEUKOCYTE ESTERASE URINE, POC: NEGATIVE
NITRITE, POC UA: NEGATIVE
PH, POC UA: 5
PROTEIN, POC: NEGATIVE
SPECIFIC GRAVITY, POC UA: 1.3
UROBILINOGEN, POC UA: NORMAL

## 2024-08-15 PROCEDURE — 3079F DIAST BP 80-89 MM HG: CPT | Mod: HCNC,CPTII,S$GLB, | Performed by: INTERNAL MEDICINE

## 2024-08-15 PROCEDURE — 3044F HG A1C LEVEL LT 7.0%: CPT | Mod: HCNC,CPTII,S$GLB, | Performed by: INTERNAL MEDICINE

## 2024-08-15 PROCEDURE — 99214 OFFICE O/P EST MOD 30 MIN: CPT | Mod: HCNC,S$GLB,, | Performed by: INTERNAL MEDICINE

## 2024-08-15 PROCEDURE — 3074F SYST BP LT 130 MM HG: CPT | Mod: HCNC,CPTII,S$GLB, | Performed by: INTERNAL MEDICINE

## 2024-08-15 PROCEDURE — 1159F MED LIST DOCD IN RCRD: CPT | Mod: HCNC,CPTII,S$GLB, | Performed by: INTERNAL MEDICINE

## 2024-08-15 PROCEDURE — 1160F RVW MEDS BY RX/DR IN RCRD: CPT | Mod: HCNC,CPTII,S$GLB, | Performed by: INTERNAL MEDICINE

## 2024-08-15 PROCEDURE — 81002 URINALYSIS NONAUTO W/O SCOPE: CPT | Mod: HCNC,S$GLB,, | Performed by: INTERNAL MEDICINE

## 2024-08-15 PROCEDURE — 3008F BODY MASS INDEX DOCD: CPT | Mod: HCNC,CPTII,S$GLB, | Performed by: INTERNAL MEDICINE

## 2024-08-15 PROCEDURE — 99999 PR PBB SHADOW E&M-EST. PATIENT-LVL IV: CPT | Mod: PBBFAC,HCNC,, | Performed by: INTERNAL MEDICINE

## 2024-08-15 RX ORDER — GABAPENTIN 300 MG/1
300 CAPSULE ORAL 2 TIMES DAILY
Qty: 60 CAPSULE | Refills: 11 | Status: SHIPPED | OUTPATIENT
Start: 2024-08-15 | End: 2025-08-15

## 2024-08-15 RX ORDER — PREDNISONE 20 MG/1
20 TABLET ORAL 2 TIMES DAILY
Qty: 10 TABLET | Refills: 0 | Status: SHIPPED | OUTPATIENT
Start: 2024-08-15 | End: 2024-08-20

## 2024-08-19 ENCOUNTER — TELEPHONE (OUTPATIENT)
Dept: PRIMARY CARE CLINIC | Facility: CLINIC | Age: 54
End: 2024-08-19
Payer: MEDICARE

## 2024-08-19 NOTE — TELEPHONE ENCOUNTER
----- Message from More Chew sent at 8/19/2024  2:14 PM CDT -----  Contact: [phone]  Name of test: results    Date of test:  08/17/24    Ordering provider: Dr Trinidad    Where was the test performed[place]    Comments:

## 2024-08-20 ENCOUNTER — TELEPHONE (OUTPATIENT)
Dept: PRIMARY CARE CLINIC | Facility: CLINIC | Age: 54
End: 2024-08-20
Payer: MEDICARE

## 2024-08-20 DIAGNOSIS — M54.9 COSTOVERTEBRAL ANGLE TENDERNESS: Primary | ICD-10-CM

## 2024-08-20 DIAGNOSIS — R10.13 EPIGASTRIC PAIN: ICD-10-CM

## 2024-08-20 NOTE — TELEPHONE ENCOUNTER
Called and informed patient that abd ultrasound was ordered. She will be called to schedule.  Can you please schedule??

## 2024-08-28 ENCOUNTER — OFFICE VISIT (OUTPATIENT)
Dept: ORTHOPEDICS | Facility: CLINIC | Age: 54
End: 2024-08-28
Payer: MEDICARE

## 2024-08-28 VITALS
WEIGHT: 139.56 LBS | HEART RATE: 77 BPM | DIASTOLIC BLOOD PRESSURE: 83 MMHG | RESPIRATION RATE: 16 BRPM | HEIGHT: 63 IN | SYSTOLIC BLOOD PRESSURE: 127 MMHG | BODY MASS INDEX: 24.73 KG/M2

## 2024-08-28 DIAGNOSIS — M75.121 NONTRAUMATIC COMPLETE TEAR OF RIGHT ROTATOR CUFF: Primary | ICD-10-CM

## 2024-08-28 DIAGNOSIS — M75.01 ADHESIVE CAPSULITIS OF RIGHT SHOULDER: ICD-10-CM

## 2024-08-28 PROCEDURE — 3044F HG A1C LEVEL LT 7.0%: CPT | Mod: HCNC,CPTII,S$GLB, | Performed by: ORTHOPAEDIC SURGERY

## 2024-08-28 PROCEDURE — 3074F SYST BP LT 130 MM HG: CPT | Mod: HCNC,CPTII,S$GLB, | Performed by: ORTHOPAEDIC SURGERY

## 2024-08-28 PROCEDURE — 3079F DIAST BP 80-89 MM HG: CPT | Mod: HCNC,CPTII,S$GLB, | Performed by: ORTHOPAEDIC SURGERY

## 2024-08-28 PROCEDURE — 1159F MED LIST DOCD IN RCRD: CPT | Mod: HCNC,CPTII,S$GLB, | Performed by: ORTHOPAEDIC SURGERY

## 2024-08-28 PROCEDURE — 99999 PR PBB SHADOW E&M-EST. PATIENT-LVL III: CPT | Mod: PBBFAC,HCNC,, | Performed by: ORTHOPAEDIC SURGERY

## 2024-08-28 PROCEDURE — 99024 POSTOP FOLLOW-UP VISIT: CPT | Mod: HCNC,S$GLB,, | Performed by: ORTHOPAEDIC SURGERY

## 2024-08-28 RX ORDER — DEXTROMETHORPHAN HYDROBROMIDE, GUAIFENESIN 5; 100 MG/5ML; MG/5ML
650 LIQUID ORAL 2 TIMES DAILY
Qty: 60 TABLET | Refills: 0 | Status: SHIPPED | OUTPATIENT
Start: 2024-08-28

## 2024-08-28 NOTE — PROGRESS NOTES
Post-op Note    HPI    Carol Dewey is here 6 weeks s/p the following procedure:     7/15/2024  Arthroscopic right shoulder rotator cuff repair  Arthroscopic right shoulder subacromial decompression with acromioplasty  Arthroscopic right shoulder biceps tenotomy  Extensive arthroscopic debridement of the shoulder including debridement of the labrum, bursa and cartilage    Overall doing well. Pain controlled on current regimen.  Currently in physical therapy.  Still dealing with stiffness as expected pain 3/10.    Physical Exam:     Patient is alert and oriented no acute distress.   Assistive Device:  None    Right shoulder:  Portal sites well healed.  There is no evidence of dehiscence.  There is no induration erythema or signs of infection.  Appropriate soft tissue swelling.  Compartments are soft and compressible.  Warm well-perfused extremity.  External rotation at the side 10° on the right.  Passive forward flexion 60°    Assessment    Carol Dewey is 6 weeks Post-op     Plan:    Overall doing as expected.  We discussed expectations of surgery and postoperative course.     Tylenol ibuprofen for pain   Aggressive home stretching program and physical therapy  High-risk for need for manipulation secondary to intraoperative findings of adhesive capsulitis requiring capsulotomy      Follow-up: 6 weeks   X-rays next visit: none

## 2024-08-30 ENCOUNTER — OFFICE VISIT (OUTPATIENT)
Dept: OTOLARYNGOLOGY | Facility: CLINIC | Age: 54
End: 2024-08-30
Payer: MEDICARE

## 2024-08-30 VITALS
HEIGHT: 63 IN | DIASTOLIC BLOOD PRESSURE: 78 MMHG | WEIGHT: 119.81 LBS | SYSTOLIC BLOOD PRESSURE: 121 MMHG | HEART RATE: 70 BPM | BODY MASS INDEX: 21.23 KG/M2

## 2024-08-30 DIAGNOSIS — H73.12 CHRONIC MYRINGITIS, LEFT: Primary | ICD-10-CM

## 2024-08-30 DIAGNOSIS — H69.93 DYSFUNCTION OF BOTH EUSTACHIAN TUBES: ICD-10-CM

## 2024-08-30 DIAGNOSIS — H92.12 OTORRHEA, LEFT: ICD-10-CM

## 2024-08-30 DIAGNOSIS — M26.629 TMJPDS (TEMPOROMANDIBULAR JOINT PAIN DYSFUNCTION SYNDROME): ICD-10-CM

## 2024-08-30 DIAGNOSIS — H93.8X3 EAR FULLNESS, BILATERAL: ICD-10-CM

## 2024-08-30 DIAGNOSIS — H90.12 CONDUCTIVE HEARING LOSS OF LEFT EAR WITH UNRESTRICTED HEARING OF RIGHT EAR: ICD-10-CM

## 2024-08-30 DIAGNOSIS — H92.13 EAR DRAINAGE, BILATERAL: ICD-10-CM

## 2024-08-30 PROCEDURE — 99999 PR PBB SHADOW E&M-EST. PATIENT-LVL IV: CPT | Mod: PBBFAC,HCNC,, | Performed by: OTOLARYNGOLOGY

## 2024-08-30 NOTE — PATIENT INSTRUCTIONS
Use the boric acid hydrocortisone powder just enough to keep the ear slightly dusted over the eardrum like a sugar cookie as discussed.  This may only be a few times of the week but should be done regularly so that it never gets a chance to be come inflamed and drain again.  If powder builds up it can be dissolve with alcohol though this maybe painful if there is significant inflammation as discussed.      Completed hearing test prior to follow up in 3 months.      Return sooner if anything is worsening or there are any other concerns.

## 2024-08-30 NOTE — Clinical Note
Needs repeat audiogram in 2-3 months with bone and ear conduction.  She does not seem to get contacted through scheduling and had to call you last time.  Sorry for the inconvenience by central scheduling.  MI ordering these things wrong?  This really seems to happen universally at [place]. I order hearing test the same in all 3 locations.

## 2024-08-30 NOTE — PROGRESS NOTES
[facility]    Subjective:      Patient: Carol Dewey Patient PCP: Robe Trinidad MD         :  1970     Sex:  female      MRN:  [id number]          Date of Visit: 2024      Chief Complaint: Follow-up (2 months)      Patient ID: Carol Dewey is a 53 y.o. female     Scheduled 2 month follow up poor chronic left-sided otorrhea associated with myringitis with primary versus secondary fungal superficial infection.  Cultures obtained at our last visit grew only cutibacterium acnes and no growth on the aerobic culture.  No fungal culture sent.  Treated with a cortisone powder for acidification desiccation and anti-inflammatory treatment only in the absence of any identified pathogen needing topical antimicrobial therapy.  Using the powder regularly for quite a time using an otoscope with video.  Has a not used it in a few weeks.  Did use pretty regularly every day to twice a day for a month or so.  No drainage.  Hearing still diminished left compared to right.  No updated audiogram since May.      2024 6 week follow-up visit on chronic left-sided mucopurulent myringitis with secondary saprophytic fungal growth improved on the left side no active fungus or purulence but still some scarring and pink change of the tympanic membrane without formal granulation.  Additional fluocinolone for a week then routine ear care recommended as well as audiogram prior to follow up to determine if further evaluation including possible neuro otology consultation and imaging are appropriate.      Audiogram completed 2024 a week after our visit shows some normal to borderline right-sided thresholds with generally symmetric pattern of hearing loss in the left side with an overlying 5 to 20 dB asymmetry other than the highest frequencies at 6-8000 hertz with her has a larger asymmetry.  The left ear exhibits more mobility rather than less compared to the right.  Audiologic testing to speech is preserved with  a 15 dB right and 25 dB left SRT with 100% discrimination scores bilaterally.        05/10/2024 FOLLOW-UP VISIT patient seen today for weeks since our last visit for hearing loss with findings of some wax and mycotic otitis externa/saprophytic fungus on the right side and some granular myringitis and mucopus on the left side.  Treated with steroid fluoroquinolone drops on the left to be followed by routine ear care bilaterally with audiologic testing and follow-up.    No audiogram.  Never contacted.      Feels like the drainage on the left side resolved with the combination eye drops.  Within a week perhaps it started feeling like it was draining a bit.  She has continued to use Q-tips throughout but has made great efforts including by ear plugs to keep water out of the ears in the bath.  Restarted the drops with improvement in drainage.  No pain.  No fever.     04/12/2024 INITIAL PATIENT CONSULTATION Patient is a  lifelong NON-smoker with a past medical history of degenerative disc disease previously seen by amanda Murphy for tinnitus ear fullness and positional vertigo.  Audiologic testing, Eustachian tube care and Flonase, and referral to physical therapy provided (this was in 2022.  Patient is seen today  referred to me by Dr. José Miguel Begum in consultation for bilateral ear drainage.  No audiogram.  No PT notes in 2022 for vertigo.    Went to a free hearing screening was told she had no hearing in her left ear.  Also told her ear was all white.  Photos taken but unable to be printed.  Hearing testing completed but no copy of audiogram for review.  Has some fullness in both ears.  No prior ear tubes or ear trauma.        Labs:  WBC   Date Value Ref Range Status   12/01/2023 8.07 3.90 - 12.70 K/uL Final     Hemoglobin   Date Value Ref Range Status   12/01/2023 15.5 12.0 - 16.0 g/dL Final     Platelets   Date Value Ref Range Status   12/01/2023 286 150 - 450 K/uL Final     Creatinine   Date  Value Ref Range Status   2023 0.8 0.5 - 1.4 mg/dL Final     TSH   Date Value Ref Range Status   2023 0.711 0.400 - 4.000 uIU/mL Final     Hemoglobin A1C   Date Value Ref Range Status   2024 6.2 (H) 4.0 - 5.6 % Final     Comment:     ADA Screening Guidelines:  5.7-6.4%  Consistent with prediabetes  >or=6.5%  Consistent with diabetes    High levels of fetal hemoglobin interfere with the HbA1C  assay. Heterozygous hemoglobin variants (HbS, HgC, etc)do  not significantly interfere with this assay.   However, presence of multiple variants may affect accuracy.         Past Medical History  She has a past medical history of Allergy, Angio-edema, Degenerative disc disease, HTN (hypertension), Urticaria, and Vertigo.    Family / Surgical / Social History  Her family history includes Asthma in her mother; Breast cancer in her paternal cousin; Cancer in her father; Heart disease in her mother.    Past Surgical History:   Procedure Laterality Date    ARTHROSCOPIC REPAIR OF ROTATOR CUFF OF SHOULDER Right 7/15/2024    Procedure: REPAIR, ROTATOR CUFF, ARTHROSCOPIC;  Surgeon: Dre Grover MD;  Location: [facility] OR;  Service: Orthopedics;  Laterality: Right;    ARTHROSCOPIC TENOTOMY OF BICEPS TENDON Right 7/15/2024    Procedure: TENOTOMY, BICEPS, ARTHROSCOPIC;  Surgeon: Dre Grover MD;  Location: [facility] OR;  Service: Orthopedics;  Laterality: Right;    ARTHROSCOPY, SHOULDER (Right) Right 07/15/2024     SECTION      CHOLECYSTECTOMY  2024    COLONOSCOPY  2024    COLONOSCOPY N/A 2024    Procedure: COLONOSCOPY;  Surgeon: Manuel Roblero MD;  Location: [facility];  Service: Endoscopy;  Laterality: N/A;    DECOMPRESSION OF SUBACROMIAL SPACE Right 7/15/2024    Procedure: DECOMPRESSION, SUBACROMIAL SPACE;  Surgeon: Dre Grover MD;  Location: [facility] OR;  Service: Orthopedics;  Laterality: Right;    DECOMPRESSION, SUBACROMIAL SPACE (Right) Right 07/15/2024    EGD, WITH  CLOSED BIOPSY  01/25/2024    EPIDURAL STEROID INJECTION Bilateral 05/18/2023    Procedure: Injection, Steroid, Epidural;  Surgeon: Andi Watson MD;  Location: [facility];  Service: Neurosurgery;  Laterality: Bilateral;  Procedure:Bilateral SI joint block and steriod injection  Length of procedure:30 minutes  LOS:0 minutes  Anesthesia:MAC  Radiology:C-arm  Bed:Regular Bed  Position:Prone    ESOPHAGOGASTRODUODENOSCOPY N/A 01/25/2024    Procedure: EGD (ESOPHAGOGASTRODUODENOSCOPY);  Surgeon: Manuel Roblero MD;  Location: [facility];  Service: Endoscopy;  Laterality: N/A;    LAPAROSCOPIC CHOLECYSTECTOMY N/A 03/27/2024    Procedure: CHOLECYSTECTOMY, LAPAROSCOPIC;  Surgeon: Giovanni Gillespie MD;  Location: [facility] OR;  Service: General;  Laterality: N/A;    REPAIR, ROTATOR CUFF, ARTHROSCOPIC (Right) Right 07/15/2024    SHOULDER ARTHROSCOPY Right 7/15/2024    Procedure: ARTHROSCOPY, SHOULDER;  Surgeon: Dre Grover MD;  Location: [facility] OR;  Service: Orthopedics;  Laterality: Right;  right shoulder scope, DCE, RCR with SAD, biceps tenodesis, conmed video, beanbag, epi first 2 bags, IODINE allergy (clear)    TENOTOMY, BICEPS, ARTHROSCOPIC (Right) Right 07/15/2024    TONSILLECTOMY      TRANSFORAMINAL EPIDURAL INJECTION OF STEROID Bilateral 03/21/2019    Procedure: Injection,steroid,epidural,transforaminal approach---bilateral L5/S1;  Surgeon: Sridhar Albert III, MD;  Location: [facility];  Service: Pain Management;  Laterality: Bilateral;       Social History     Tobacco Use    Smoking status: Never     Passive exposure: Never    Smokeless tobacco: Never   Substance and Sexual Activity    Alcohol use: No    Drug use: No    Sexual activity: Not Currently     Partners: Male     Birth control/protection: OCP       Medications  She has a current medication list which includes the following prescription(s): acetaminophen, amlodipine, atorvastatin, boric acid (bulk), fluocinolone acetonide oil,  "fluticasone propionate, gabapentin, levocetirizine, montelukast, myrbetriq, norethindrone, ofloxacin, omeprazole, ondansetron, paroxetine, prednisolone acetate, prochlorperazine, and propranolol, and the following Facility-Administered Medications: 0.9% nacl.      Allergies  Review of patient's allergies indicates:   Allergen Reactions    Augmentin [amoxicillin-pot clavulanate] Rash     Rash to arms, legs, shoulders and torso.  States has happened 2 times with this medication.    Iodine and iodide containing products Rash    Gemtesa [vibegron] Other (See Comments)     Weakness & dizziness    Shellfish containing products Hives     Other reaction(s): Hives    Tramadol Itching    Watermelon Other (See Comments)     Irritates esophagus    Amoxapine Rash    Norco [hydrocodone-acetaminophen] Rash    Papaya Rash    Penicillins Rash       All medications, allergies, and past history have been reviewed.    Objective:      Vitals:      8/15/2024     1:53 PM 8/28/2024     2:29 PM 8/30/2024     9:58 AM   Vitals - 1 value per visit   SYSTOLIC 126 127 121   DIASTOLIC 80 83 78   Pulse 87 77 70   Resp 18 16    SPO2 98 %     Weight (lb) 139.33 139.55 119.82   Weight (kg) 63.2 63.3 54.35   Height 5' 3" (1.6 m) 5' 3" (1.6 m) 5' 3" (1.6 m)   BMI (Calculated) 24.7 24.7 21.2   Pain Score Five Three Zero       Body surface area is 1.55 meters squared.    Physical Exam:    GENERAL  APPEARANCE -  alert, appears stated age, and cooperative  BARRIER(S) TO COMMUNICATION -  none VOICE - appropriate for age and gender    INTEGUMENTARY  no suspicious head and neck lesions    HEENT  HEAD: Normocephalic, without obvious abnormality, atraumatic  FACE: INSPECTION - Symmetric, no signs of trauma, no suspicious lesion(s)      STRENGTH - facial symmetry intact     EYES  Normal occular alignment and mobility with no visible nystagmus at rest    EARS/NOSE/MOUTH/THROAT  EARS  PINNAE AND EXTERNAL EARS - no suspicious lesion OTOSCOPIC EXAM (surgical " microscopy was used for visualization/instrumentation): EAR EXAM - right ear healthy and normal.  Left ear dramatically improve.  There is a monomeric change anteriorly and some general pink scarring superiorly without gross edema granulation or drainage.  The posterior superior tympanic membrane still has a few tiny spots of inflammatory change but not dramatic and there is a slight area of granular irritation of the canal posteriorly lateral to the annulus.    HEARING - subjectively decreased on the left compared to right    NOSE AND SINUSES  EXTERNAL NOSE - Grossly normal for age/sex  MUCOSA - within normal limits     CHEST AND LUNG   INSPECTION & AUSCULTATION - normal effort, no stridor    NEUROLOGIC  MENTAL STATUS - alert, interactive CRANIAL NERVES - normal      Procedure(s):  None          Assessment:      Problem List Items Addressed This Visit    None  Visit Diagnoses       Chronic myringitis, left    -  Primary    Otorrhea, left        Ear drainage, bilateral        Ear fullness, bilateral        Hearing loss, unspecified hearing loss type, unspecified laterality        TMJPDS (temporomandibular joint pain dysfunction syndrome)        Dysfunction of both eustachian tubes                           Plan:      I think we have achieved reasonably good control of this chronic inflammatory myringitis with the boric acid hydrocortisone powder.  She has not use it in the least a week and symptoms have not really flared up though there is still some focal area of inflammation.  Encouraged to use the powder regularly even if that is only a few times of the week just to keep things slightly dusted as previously discussed and again informed today that the eardrum and medial ear canals look like a sugar cookie after application.  Itch never be allowed to get wet and soupy as it has in the past.  Patient discouraged from using alcohol as she has in the past as it is quite painful expectedly because of the inflammatory  change but no evidence of perforation.  Encouraged to have an audiogram prior to follow up in 3 months.              Voice recognition software was used in the creation of this note/communication and any sound-alike errors which may have occurred from its use should be taken in context when interpreting.  If such errors prevent a clear understanding of the note/communication, please contact the office for clarification.

## 2024-09-04 ENCOUNTER — OFFICE VISIT (OUTPATIENT)
Dept: PRIMARY CARE CLINIC | Facility: CLINIC | Age: 54
End: 2024-09-04
Payer: MEDICARE

## 2024-09-04 VITALS
HEART RATE: 88 BPM | DIASTOLIC BLOOD PRESSURE: 70 MMHG | WEIGHT: 138.44 LBS | TEMPERATURE: 99 F | RESPIRATION RATE: 16 BRPM | BODY MASS INDEX: 24.53 KG/M2 | HEIGHT: 63 IN | OXYGEN SATURATION: 97 % | SYSTOLIC BLOOD PRESSURE: 120 MMHG

## 2024-09-04 DIAGNOSIS — M77.02 MEDIAL EPICONDYLITIS OF ELBOW, LEFT: Primary | ICD-10-CM

## 2024-09-04 DIAGNOSIS — R73.03 PREDIABETES: ICD-10-CM

## 2024-09-04 DIAGNOSIS — K76.9 LIVER LESION: ICD-10-CM

## 2024-09-04 PROCEDURE — 1160F RVW MEDS BY RX/DR IN RCRD: CPT | Mod: HCNC,CPTII,S$GLB, | Performed by: STUDENT IN AN ORGANIZED HEALTH CARE EDUCATION/TRAINING PROGRAM

## 2024-09-04 PROCEDURE — 99214 OFFICE O/P EST MOD 30 MIN: CPT | Mod: HCNC,S$GLB,, | Performed by: STUDENT IN AN ORGANIZED HEALTH CARE EDUCATION/TRAINING PROGRAM

## 2024-09-04 PROCEDURE — 99999 PR PBB SHADOW E&M-EST. PATIENT-LVL IV: CPT | Mod: PBBFAC,HCNC,, | Performed by: STUDENT IN AN ORGANIZED HEALTH CARE EDUCATION/TRAINING PROGRAM

## 2024-09-04 PROCEDURE — 3008F BODY MASS INDEX DOCD: CPT | Mod: HCNC,CPTII,S$GLB, | Performed by: STUDENT IN AN ORGANIZED HEALTH CARE EDUCATION/TRAINING PROGRAM

## 2024-09-04 PROCEDURE — 3044F HG A1C LEVEL LT 7.0%: CPT | Mod: HCNC,CPTII,S$GLB, | Performed by: STUDENT IN AN ORGANIZED HEALTH CARE EDUCATION/TRAINING PROGRAM

## 2024-09-04 PROCEDURE — 3074F SYST BP LT 130 MM HG: CPT | Mod: HCNC,CPTII,S$GLB, | Performed by: STUDENT IN AN ORGANIZED HEALTH CARE EDUCATION/TRAINING PROGRAM

## 2024-09-04 PROCEDURE — 1159F MED LIST DOCD IN RCRD: CPT | Mod: HCNC,CPTII,S$GLB, | Performed by: STUDENT IN AN ORGANIZED HEALTH CARE EDUCATION/TRAINING PROGRAM

## 2024-09-04 PROCEDURE — 3078F DIAST BP <80 MM HG: CPT | Mod: HCNC,CPTII,S$GLB, | Performed by: STUDENT IN AN ORGANIZED HEALTH CARE EDUCATION/TRAINING PROGRAM

## 2024-09-04 RX ORDER — CELECOXIB 100 MG/1
100 CAPSULE ORAL 2 TIMES DAILY
Qty: 60 CAPSULE | Refills: 1 | Status: SHIPPED | OUTPATIENT
Start: 2024-09-04

## 2024-09-04 NOTE — PATIENT INSTRUCTIONS
Medial epicondylitis of elbow, left  -     celecoxib (CELEBREX) 100 MG capsule; Take 1 capsule (100 mg total) by mouth 2 (two) times daily.  Dispense: 60 capsule; Refill: 1   - patient having left elbow pain, medial aspect, radiating down the forearm.  Pain is worse with pronation supination of the wrist.  Pain radiates down forearm.  Does have intact  strength.  Good pulses and cap refill.   - does get some abdominal discomfort, unclear if related to her NSAID use or other pathology.  Recommended cutting back from ibuprofen 800 mg and instead trying Celebrex 100 twice a day as seems to be less irritating to the GI tract.  Make sure to take with food.   - advised physical therapy, though already in PT for shoulder.  Thus giving a printout of some stretches she can use to do at home or discuss with her therapist.    Liver lesion   - patient had ultrasound which showed 2 liver lesions, 1 of mild concern.  Was recommended to get follow up MRI which has been ordered but not scheduled yet.   - does get some abdominal discomfort, unclear if related to her NSAID use or other pathology.  Recommended cutting back from ibuprofen 800 mg and instead trying Celebrex 100 twice a day as seems to be less irritating to the GI tract.  Make sure to take with food.    Prediabetes   - has seen increasing A1cs over time.  From 5.8-5.9, and most recently 6.2%.  Recommend watching diet to reduce risk of developing diabetes over time.

## 2024-09-04 NOTE — PROGRESS NOTES
"Subjective:           Patient ID: Carol Dewey   Age:  53 y.o.  Sex: female     Chief Complaint:   Follow-up (Labs & U/S Abdomen), Numbness (Left arm with tingling), and Low-back Pain      History of Present Illness:    Carol Dewey is a 53 y.o. female who presents today with a chief complaint of Follow-up (Labs & U/S Abdomen), Numbness (Left arm with tingling), and Low-back Pain  .    53-year-old female presenting today to discuss previous labs, ultrasound abdomen, additionally having left arm numbness and tingling after having recent surgery to right shoulder.  Also complaining of low back pain.    States she had a surgery on the right side on July 15th.   Had a cholecystectomy on 3/27/24.    Had labs and US abdomen last week.    A1c at 6.2%, from presvious 5.9.  So progressive Pre-DM.    Total Cholesterol 260, with LDL of 172.  Is taking Atorvastatin 20mg daily.     The 10-year ASCVD risk score (Gela NIEVES, et al., 2019) is: 2.9%    Values used to calculate the score:      Age: 53 years      Sex: Female      Is Non- : No      Diabetic: No      Tobacco smoker: No      Systolic Blood Pressure: 120 mmHg      Is BP treated: Yes      HDL Cholesterol: 45 mg/dL      Total Cholesterol: 260 mg/dL    Has been having lower back pain.    US read states:   " Impression:  Fatty infiltration of liver with two 1 cm lesions liver, indeterminate.  MRI or CT could further evaluate."  Has MRI ordered from PCP.    States a medication she takes causes abd pain with radiations down the left leg.   Is taking Gabapentin 300mg BID.    States needs to raise left leg at bed to control pain.    States if she stops taking the pain pills, the pain will stop.     At this time has been having progressive issues with the left shoulder and arm.    Has Ibuprofen 800mg and has used at times.  May be affecting her GI tract.       Review of Systems   Constitutional:  Negative for fatigue and unexpected weight change. " "  HENT:  Negative for congestion, postnasal drip and rhinorrhea.    Eyes:  Negative for visual disturbance.   Respiratory:  Negative for cough and shortness of breath.    Cardiovascular:  Negative for chest pain and palpitations.   Gastrointestinal:  Negative for abdominal pain, constipation, diarrhea, nausea and vomiting.   Genitourinary:  Negative for difficulty urinating, flank pain and frequency.   Musculoskeletal:  Positive for back pain and myalgias (left medial elbow pain). Negative for arthralgias.   Neurological:  Negative for headaches.   Psychiatric/Behavioral:  Negative for dysphoric mood and sleep disturbance.            Objective:        Vitals:    09/04/24 1336   BP: 120/70   BP Location: Right arm   Patient Position: Sitting   BP Method: Medium (Manual)   Pulse: 88   Resp: 16   Temp: 98.9 °F (37.2 °C)   TempSrc: Oral   SpO2: 97%   Weight: 62.8 kg (138 lb 7.2 oz)   Height: 5' 3" (1.6 m)       Body mass index is 24.53 kg/m².      Physical Exam  Vitals and nursing note reviewed.   Constitutional:       General: She is not in acute distress.     Appearance: She is well-developed.   HENT:      Head: Normocephalic and atraumatic.      Right Ear: External ear normal.      Left Ear: External ear normal.      Nose: Nose normal.   Eyes:      Extraocular Movements: Extraocular movements intact.      Conjunctiva/sclera: Conjunctivae normal.      Pupils: Pupils are equal, round, and reactive to light.   Neck:      Thyroid: No thyromegaly.   Cardiovascular:      Rate and Rhythm: Normal rate and regular rhythm.      Heart sounds: Normal heart sounds. No murmur heard.     No friction rub. No gallop.   Pulmonary:      Effort: Pulmonary effort is normal. No respiratory distress.      Breath sounds: Normal breath sounds. No wheezing.   Abdominal:      General: Bowel sounds are normal. There is no distension.      Palpations: Abdomen is soft.      Tenderness: There is no abdominal tenderness.   Musculoskeletal:         " "General: Tenderness (to medial aspect of left elbow, radiating down forearm.  Worse w/ pronation and supination.) present. No deformity. Normal range of motion.      Cervical back: Normal range of motion and neck supple.   Lymphadenopathy:      Cervical: No cervical adenopathy.   Skin:     General: Skin is warm and dry.      Findings: No erythema or rash.   Neurological:      Mental Status: She is alert and oriented to person, place, and time.   Psychiatric:         Mood and Affect: Mood normal.         Thought Content: Thought content normal.         Judgment: Judgment normal.           Past Medical History:   Diagnosis Date    Allergy     Angio-edema     Degenerative disc disease     HTN (hypertension)     Urticaria     Vertigo        Lab Results   Component Value Date     12/01/2023    K 3.9 12/01/2023     12/01/2023    CO2 22 (L) 12/01/2023    BUN 9 12/01/2023    CREATININE 0.8 12/01/2023    ANIONGAP 10 12/01/2023     Lab Results   Component Value Date    HGBA1C 6.2 (H) 08/17/2024     Lab Results   Component Value Date    BNP 34 09/10/2021       Lab Results   Component Value Date    WBC 8.07 12/01/2023    HGB 15.5 12/01/2023    HCT 46.7 12/01/2023     12/01/2023    GRAN 4.4 12/01/2023    GRAN 54.4 12/01/2023     Lab Results   Component Value Date    CHOL 260 (H) 08/17/2024    HDL 45 08/17/2024    LDLCALC 172.6 (H) 08/17/2024    TRIG 212 (H) 08/17/2024        Outpatient Encounter Medications as of 9/4/2024   Medication Sig Dispense Refill    acetaminophen (TYLENOL) 650 MG TbSR Take 1 tablet (650 mg total) by mouth 2 (two) times a day. 60 tablet 0    amLODIPine (NORVASC) 10 MG tablet TAKE ONE TABLET BY MOUTH ONCE DAILY 90 tablet 3    atorvastatin (LIPITOR) 20 MG tablet Take 1 tablet (20 mg total) by mouth once daily. 90 tablet 3    boric acid, bulk, Powd Boric acid hydrocortisone powder to [employer] with insufflator.  Keep the ear slightly powdered like a sugar cookie".  Use an otoscope " as discussed to be sure.  If filled up with powder use over-the-counter rubbing alcohol to dissolve and restart. 1 each 1    fluocinolone acetonide oiL 0.01 % Drop 3-4 drops to the affected ear(s) twice daily no more than a week at a time as needed for itching and scaling 20 mL 0    fluticasone propionate (FLONASE) 50 mcg/actuation nasal spray 1 spray (50 mcg total) by Each Nostril route 2 (two) times daily. 16 g 3    gabapentin (NEURONTIN) 300 MG capsule Take 1 capsule (300 mg total) by mouth 2 (two) times daily. 60 capsule 11    levocetirizine (XYZAL) 5 MG tablet Take 1 tablet (5 mg total) by mouth every evening. 90 tablet 3    montelukast (SINGULAIR) 10 mg tablet TAKE 1 TABLET(10 MG) BY MOUTH EVERY EVENING 90 tablet 3    MYRBETRIQ 50 mg Tb24 TAKE 1 TABLET BY MOUTH EVERY DAY 90 tablet 0    norethindrone (MICRONOR) 0.35 mg tablet Take 1 tablet (0.35 mg total) by mouth once a week. 28 tablet 11    ofloxacin (OCUFLOX) 0.3 % ophthalmic solution 4 drops to LEFT EAR BID for one week (NOT FOR OPHTHALMIC USE) 5 mL 0    omeprazole (PRILOSEC) 40 MG capsule Take 1 capsule (40 mg total) by mouth once daily. 90 capsule 3    ondansetron (ZOFRAN-ODT) 4 MG TbDL Take 1 tablet (4 mg total) by mouth every 8 (eight) hours as needed (nausea/vomiting). 30 tablet 0    paroxetine (PAXIL) 10 MG tablet Take 10 mg by mouth once daily.      prochlorperazine (COMPAZINE) 10 MG tablet Take 1 tablet (10 mg total) by mouth 3 (three) times daily. 20 tablet 3    propranoloL (INDERAL) 20 MG tablet Take 1 tablet (20 mg total) by mouth every morning. 30 tablet 11    celecoxib (CELEBREX) 100 MG capsule Take 1 capsule (100 mg total) by mouth 2 (two) times daily. 60 capsule 1    [] predniSONE (DELTASONE) 20 MG tablet Take 1 tablet (20 mg total) by mouth 2 (two) times daily. for 5 days 10 tablet 0    [DISCONTINUED] prednisoLONE acetate (PRED FORTE) 1 % DrpS 4 drops to LEFT EAR BID for a week (NOT FOR OPHTHALMIC USE) 5 mL 0     Facility-Administered  Encounter Medications as of 9/4/2024   Medication Dose Route Frequency Provider Last Rate Last Admin    0.9%  NaCl infusion   Intravenous Continuous Manuel Roblero MD   New Bag at 07/15/24 1217          Assessment:       1. Prediabetes    2. Medial epicondylitis of elbow, left    3. Liver lesion           Plan:             Medial epicondylitis of elbow, left  -     celecoxib (CELEBREX) 100 MG capsule; Take 1 capsule (100 mg total) by mouth 2 (two) times daily.  Dispense: 60 capsule; Refill: 1   - patient having left elbow pain, medial aspect, radiating down the forearm.  Pain is worse with pronation supination of the wrist.  Pain radiates down forearm.  Does have intact  strength.  Good pulses and cap refill.   - does get some abdominal discomfort, unclear if related to her NSAID use or other pathology.  Recommended cutting back from ibuprofen 800 mg and instead trying Celebrex 100 twice a day as seems to be less irritating to the GI tract.  Make sure to take with food.   - advised physical therapy, though already in PT for shoulder.  Thus giving a printout of some stretches she can use to do at home or discuss with her therapist.    Liver lesion   - patient had ultrasound which showed 2 liver lesions, 1 of mild concern.  Was recommended to get follow up MRI which has been ordered but not scheduled yet.   - does get some abdominal discomfort, unclear if related to her NSAID use or other pathology.  Recommended cutting back from ibuprofen 800 mg and instead trying Celebrex 100 twice a day as seems to be less irritating to the GI tract.  Make sure to take with food.    Prediabetes   - has seen increasing A1cs over time.  From 5.8-5.9, and most recently 6.2%.  Recommend watching diet to reduce risk of developing diabetes over time.

## 2024-09-19 ENCOUNTER — PATIENT MESSAGE (OUTPATIENT)
Dept: PRIMARY CARE CLINIC | Facility: CLINIC | Age: 54
End: 2024-09-19
Payer: MEDICARE

## 2024-10-23 ENCOUNTER — OFFICE VISIT (OUTPATIENT)
Dept: ORTHOPEDICS | Facility: CLINIC | Age: 54
End: 2024-10-23
Payer: MEDICARE

## 2024-10-23 VITALS
HEART RATE: 78 BPM | WEIGHT: 138.25 LBS | BODY MASS INDEX: 24.49 KG/M2 | DIASTOLIC BLOOD PRESSURE: 79 MMHG | SYSTOLIC BLOOD PRESSURE: 119 MMHG

## 2024-10-23 DIAGNOSIS — M75.01 ADHESIVE CAPSULITIS OF RIGHT SHOULDER: ICD-10-CM

## 2024-10-23 DIAGNOSIS — M75.121 NONTRAUMATIC COMPLETE TEAR OF RIGHT ROTATOR CUFF: Primary | ICD-10-CM

## 2024-10-23 DIAGNOSIS — G56.02 LEFT CARPAL TUNNEL SYNDROME: ICD-10-CM

## 2024-10-23 PROCEDURE — 99999 PR PBB SHADOW E&M-EST. PATIENT-LVL III: CPT | Mod: PBBFAC,HCNC,, | Performed by: ORTHOPAEDIC SURGERY

## 2024-10-23 RX ORDER — TRIAMCINOLONE ACETONIDE 40 MG/ML
20 INJECTION, SUSPENSION INTRA-ARTICULAR; INTRAMUSCULAR
Status: DISCONTINUED | OUTPATIENT
Start: 2024-10-23 | End: 2024-10-23 | Stop reason: HOSPADM

## 2024-10-23 RX ADMIN — TRIAMCINOLONE ACETONIDE 20 MG: 40 INJECTION, SUSPENSION INTRA-ARTICULAR; INTRAMUSCULAR at 01:10

## 2024-10-23 NOTE — PROGRESS NOTES
Post-op Note    HPI    Carol Dewey is here three-month s/p the following procedure:     7/15/2024  Arthroscopic right shoulder rotator cuff repair  Arthroscopic right shoulder subacromial decompression with acromioplasty  Arthroscopic right shoulder biceps tenotomy  Extensive arthroscopic debridement of the shoulder including debridement of the labrum, bursa and cartilage    Overall doing well. Pain controlled on current regimen.  Pain about 2/10 in her right shoulder.  Does state that her pain and numbness is better in the right shoulder.  She is mainly here complaining of her left wrist pain and numbness.  She reports nighttime symptoms consistent with carpal tunnel.  She has had an EMG in the past which showed mild-to-moderate carpal tunnel on the left.    Physical Exam:     Patient is alert and oriented no acute distress.   Assistive Device:  None    Right shoulder:  Portal sites well healed There is no evidence of dehiscence.  There is no induration erythema or signs of infection.  Appropriate soft tissue swelling.  Compartments are soft and compressible.  Warm well-perfused extremity.  Forward flexion 160°.  Abduction 150°.  Internal rotation to body.    Diminished sensation in the median nerve distribution of the left hand.  Positive Tinel's.  Positive Phalen's and Durkan's.    Assessment    Carol Dewey is three months Post-op right shoulder arthroscopy, left carpal tunnel syndrome    Plan:    Overall doing as expected.  We discussed expectations of surgery and postoperative course.     Pain: Continued postoperative pain regimen -- tylenol and ibuprofen  PT/OT: Continue/Initiate physical therapy (weight bearing status:  5 lb weight restriction), no restrictions in regards to range of motion.  Continue active assisted motion and strengthening    Discussed left carpal tunnel options including steroid injections, surgery including carpal tunnel release as well as nighttime splints.  She would like to try  a left carpal tunnel injection today.  This was well tolerated       Follow-up:  If no improvement

## 2024-10-23 NOTE — PROCEDURES
Carpal Tunnel    Date/Time: 10/23/2024 1:45 PM    Performed by: Dre Grover MD  Authorized by: Dre Grover MD    Consent Done?:  Yes (Verbal)  Indications:  Pain  Site marked: the procedure site was marked    Timeout: prior to procedure the correct patient, procedure, and site was verified    Prep: patient was prepped and draped in usual sterile fashion      Local anesthesia used?: Yes    Anesthesia:  Local infiltration  Local anesthetic:  Lidocaine 1% without epinephrine and bupivacaine 0.25% without epinephrine  Anesthetic total (ml):  1    Location:  Wrist (L carpal tunnel)  Ultrasonic Guidance for Needle Placement?: No    Needle size:  25 G  Approach:  Volar  Medications:  20 mg triamcinolone acetonide 40 mg/mL  Patient tolerance:  Patient tolerated the procedure well with no immediate complications

## 2024-11-08 DIAGNOSIS — J30.2 SEASONAL ALLERGIC RHINITIS, UNSPECIFIED TRIGGER: ICD-10-CM

## 2024-11-08 NOTE — TELEPHONE ENCOUNTER
Care Due:                  Date            Visit Type   Department     Provider  --------------------------------------------------------------------------------                                EP -                              PRIMARY      [facility]  Last Visit: 09-      CARE ([facility])   PRIMARY CARE   José Miguel Begum                               -                              PRIMARY      [facility]  Next Visit: 11-      CARE ([facility])   PRIMARY CARE   Robe Trinidad                                                            Last  Test          Frequency    Reason                     Performed    Due Date  --------------------------------------------------------------------------------    CBC.........  12 months..  celecoxib................  12- 11-    CMP.........  12 months..  atorvastatin, celecoxib..  12- 11-    [facility] Embedded Care Due Messages. Reference number: [id number].   11/08/2024 5:15:44 PM CST

## 2024-11-09 RX ORDER — MONTELUKAST SODIUM 10 MG/1
TABLET ORAL
Qty: 90 TABLET | Refills: 3 | Status: SHIPPED | OUTPATIENT
Start: 2024-11-09

## 2024-11-09 NOTE — TELEPHONE ENCOUNTER
Refill Routing Note   Medication(s) are not appropriate for processing by [facility] for the following reason(s):        No active prescription written by provider    [facility] action(s):  Defer   Requires labs : Yes - CBC & CMP due 11/25/24            Appointments  past 12m or future 3m with PCP    Date Provider   Last Visit   8/15/2024 Robe Trinidad MD   Next Visit   11/18/2024 Robe Trinidad MD   ED visits in past 90 days: 0        Note composed:7:58 PM 11/08/2024

## 2024-11-10 DIAGNOSIS — M77.02 MEDIAL EPICONDYLITIS OF ELBOW, LEFT: ICD-10-CM

## 2024-11-10 NOTE — TELEPHONE ENCOUNTER
No care due was identified.  [facility] Embedded Care Due Messages. Reference number: [id number].   11/10/2024 11:16:54 AM CST

## 2024-11-11 RX ORDER — CELECOXIB 100 MG/1
100 CAPSULE ORAL 2 TIMES DAILY
Qty: 60 CAPSULE | Refills: 1 | Status: SHIPPED | OUTPATIENT
Start: 2024-11-11

## 2024-11-14 ENCOUNTER — CLINICAL SUPPORT (OUTPATIENT)
Dept: AUDIOLOGY | Facility: CLINIC | Age: 54
End: 2024-11-14
Payer: MEDICARE

## 2024-11-14 DIAGNOSIS — H90.12 CONDUCTIVE HEARING LOSS OF LEFT EAR WITH UNRESTRICTED HEARING OF RIGHT EAR: Primary | ICD-10-CM

## 2024-11-14 DIAGNOSIS — H93.12 TINNITUS, LEFT EAR: ICD-10-CM

## 2024-11-14 DIAGNOSIS — H69.93 DYSFUNCTION OF BOTH EUSTACHIAN TUBES: ICD-10-CM

## 2024-11-14 DIAGNOSIS — H93.8X3 EAR FULLNESS, BILATERAL: ICD-10-CM

## 2024-11-14 PROCEDURE — 92567 TYMPANOMETRY: CPT | Mod: HCNC,S$GLB,,

## 2024-11-14 PROCEDURE — 92557 COMPREHENSIVE HEARING TEST: CPT | Mod: HCNC,S$GLB,,

## 2024-11-14 NOTE — PROGRESS NOTES
Carol Dewey was seen today in the clinic for an audiologic evaluation. Patient's main complaint was left sided hearing loss. Mrs. Dewey reported that her left ear has been draining. If it doesn't drain, there is a build up of left sided aural pressure and otalgia. She also experiences bothersome left sided tinnitus. Mrs. Dewey denied any other otologic symptoms.     Tympanometry revealed Type A in the right ear and Type A in the left ear.     Audiogram results revealed normal hearing in the right ear and slight sloping to mild sloping to moderately-severe conductive hearing loss in the left ear.      Speech reception thresholds were noted at 15 dB in the right ear and 25 dB in the left ear.    Speech discrimination scores were 96% in the right ear and 92% in the left ear.    Recommendations:  Otologic evaluation  Hearing aid consultation for the left ear following medical clearance  Annual audiogram, or sooner if any changes in hearing are noted  Hearing protection when in noise

## 2024-11-14 NOTE — Clinical Note
Hi Dr. Keene,   I saw Mrs. Dewey today for her hearing evaluation, please review the results and let me know if you have any questions!  Best, Aylin

## 2024-11-18 ENCOUNTER — OFFICE VISIT (OUTPATIENT)
Dept: PRIMARY CARE CLINIC | Facility: CLINIC | Age: 54
End: 2024-11-18
Payer: MEDICARE

## 2024-11-18 VITALS
WEIGHT: 136 LBS | HEIGHT: 63 IN | RESPIRATION RATE: 17 BRPM | SYSTOLIC BLOOD PRESSURE: 112 MMHG | BODY MASS INDEX: 24.1 KG/M2 | DIASTOLIC BLOOD PRESSURE: 70 MMHG | OXYGEN SATURATION: 98 % | HEART RATE: 76 BPM

## 2024-11-18 DIAGNOSIS — H65.05 RECURRENT ACUTE SEROUS OTITIS MEDIA OF LEFT EAR: ICD-10-CM

## 2024-11-18 DIAGNOSIS — I10 ESSENTIAL HYPERTENSION, BENIGN: ICD-10-CM

## 2024-11-18 DIAGNOSIS — R73.03 PREDIABETES: ICD-10-CM

## 2024-11-18 DIAGNOSIS — G56.02 CARPAL TUNNEL SYNDROME OF LEFT WRIST: ICD-10-CM

## 2024-11-18 DIAGNOSIS — Z12.31 ENCOUNTER FOR SCREENING MAMMOGRAM FOR BREAST CANCER: Primary | ICD-10-CM

## 2024-11-18 DIAGNOSIS — G89.29 CHRONIC RIGHT SHOULDER PAIN: ICD-10-CM

## 2024-11-18 DIAGNOSIS — E78.2 MIXED HYPERLIPIDEMIA: ICD-10-CM

## 2024-11-18 DIAGNOSIS — M25.511 CHRONIC RIGHT SHOULDER PAIN: ICD-10-CM

## 2024-11-18 PROCEDURE — 3044F HG A1C LEVEL LT 7.0%: CPT | Mod: HCNC,CPTII,S$GLB, | Performed by: INTERNAL MEDICINE

## 2024-11-18 PROCEDURE — 1159F MED LIST DOCD IN RCRD: CPT | Mod: HCNC,CPTII,S$GLB, | Performed by: INTERNAL MEDICINE

## 2024-11-18 PROCEDURE — 1160F RVW MEDS BY RX/DR IN RCRD: CPT | Mod: HCNC,CPTII,S$GLB, | Performed by: INTERNAL MEDICINE

## 2024-11-18 PROCEDURE — 99214 OFFICE O/P EST MOD 30 MIN: CPT | Mod: HCNC,S$GLB,, | Performed by: INTERNAL MEDICINE

## 2024-11-18 PROCEDURE — 3074F SYST BP LT 130 MM HG: CPT | Mod: HCNC,CPTII,S$GLB, | Performed by: INTERNAL MEDICINE

## 2024-11-18 PROCEDURE — 3008F BODY MASS INDEX DOCD: CPT | Mod: HCNC,CPTII,S$GLB, | Performed by: INTERNAL MEDICINE

## 2024-11-18 PROCEDURE — 99999 PR PBB SHADOW E&M-EST. PATIENT-LVL V: CPT | Mod: PBBFAC,HCNC,, | Performed by: INTERNAL MEDICINE

## 2024-11-18 PROCEDURE — 3078F DIAST BP <80 MM HG: CPT | Mod: HCNC,CPTII,S$GLB, | Performed by: INTERNAL MEDICINE

## 2024-11-18 RX ORDER — PREDNISONE 20 MG/1
20 TABLET ORAL 2 TIMES DAILY
Qty: 10 TABLET | Refills: 0 | Status: SHIPPED | OUTPATIENT
Start: 2024-11-18 | End: 2024-11-23

## 2024-11-18 RX ORDER — LEVOFLOXACIN 500 MG/1
500 TABLET, FILM COATED ORAL DAILY
Qty: 10 TABLET | Refills: 0 | Status: SHIPPED | OUTPATIENT
Start: 2024-11-18 | End: 2024-11-20

## 2024-11-20 ENCOUNTER — TELEPHONE (OUTPATIENT)
Dept: PRIMARY CARE CLINIC | Facility: CLINIC | Age: 54
End: 2024-11-20
Payer: MEDICARE
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
## 2024-11-20 NOTE — TELEPHONE ENCOUNTER
Called patient and she informed me that she is having an allergic reaction to the antibiotic that you prescribed.  It is Levaquin. She has rashes all over her.  She stopped taking it. Please advise.

## 2024-11-20 NOTE — TELEPHONE ENCOUNTER
----- Message from Jyoti sent at 11/20/2024 11:12 AM CST -----  Contact: Patient [phone]  .[email or web address]     Patient is calling for Medical Advice regarding:Returning call - had allergic reaction    How long has patient had these symptoms:    Pharmacy name and phone#:    Patient wants a call back or thru [facility]:Please call    Comments:Patient [phone]    Please advise patient replies from provider may take up to 48 hours.

## 2024-11-21 NOTE — TELEPHONE ENCOUNTER
Pt went to ER teated for allergic reaction to antibiotic can you call pt see how is she doing if still itching a lot let me know can add hydroxyzine and get steroid shot in clinic can you text me

## 2024-11-30 NOTE — TELEPHONE ENCOUNTER
Refill Routing Note   Medication(s) are not appropriate for processing by [facility] for the following reason(s):        No active prescription written by provider    [facility] action(s):  Defer               Appointments  past 12m or future 3m with PCP    Date Provider   Last Visit   9/29/2023 Tico Ramirez MD   Next Visit   Visit date not found Tico Ramirez MD   ED visits in past 90 days: 1        Note composed:11:33 PM 11/29/2024

## 2024-12-01 RX ORDER — PAROXETINE 10 MG/1
10 TABLET, FILM COATED ORAL EVERY MORNING
Qty: 60 TABLET | Refills: 0 | Status: SHIPPED | OUTPATIENT
Start: 2024-12-01

## 2024-12-02 ENCOUNTER — TELEPHONE (OUTPATIENT)
Dept: OBSTETRICS AND GYNECOLOGY | Facility: CLINIC | Age: 54
End: 2024-12-02
Payer: MEDICARE

## 2024-12-02 NOTE — TELEPHONE ENCOUNTER
Patient scheduled for annual exam with Dr. Reyna. PT VU  ----- Message from Tico Ramirez MD sent at 12/1/2024  9:16 PM CST -----  Please get her set up for annual exam if she wants further refills of paxil

## 2024-12-06 ENCOUNTER — OFFICE VISIT (OUTPATIENT)
Dept: OTOLARYNGOLOGY | Facility: CLINIC | Age: 54
End: 2024-12-06
Payer: MEDICARE

## 2024-12-06 VITALS
HEART RATE: 70 BPM | SYSTOLIC BLOOD PRESSURE: 116 MMHG | DIASTOLIC BLOOD PRESSURE: 77 MMHG | BODY MASS INDEX: 24.68 KG/M2 | HEIGHT: 63 IN | WEIGHT: 139.31 LBS

## 2024-12-06 DIAGNOSIS — H90.12 CONDUCTIVE HEARING LOSS OF LEFT EAR WITH UNRESTRICTED HEARING OF RIGHT EAR: ICD-10-CM

## 2024-12-06 DIAGNOSIS — H73.12 CHRONIC MYRINGITIS, LEFT: Primary | ICD-10-CM

## 2024-12-06 DIAGNOSIS — H61.22 CERUMEN DEBRIS ON TYMPANIC MEMBRANE OF LEFT EAR: ICD-10-CM

## 2024-12-06 DIAGNOSIS — H92.12 OTORRHEA, LEFT: ICD-10-CM

## 2024-12-06 PROCEDURE — 99999 PR PBB SHADOW E&M-EST. PATIENT-LVL IV: CPT | Mod: PBBFAC,,, | Performed by: OTOLARYNGOLOGY

## 2024-12-06 RX ORDER — CLOBETASOL PROPIONATE 0.5 MG/ML
SOLUTION TOPICAL
Qty: 25 ML | Refills: 0 | Status: SHIPPED | OUTPATIENT
Start: 2024-12-06

## 2024-12-06 NOTE — PROGRESS NOTES
[facility]    Subjective:      Patient: Carol Dewey Patient PCP: Robe Trinidad MD         :  1970     Sex:  female      MRN:  [id number]          Date of Visit: 2024      Chief Complaint: Follow-up (3 month)      Patient ID: Carol Dewey is a 54 y.o. female     2024 established patient patient last seen over 3 months ago with recurrent left-sided otorrhea associated with chronic myringitis and secondary superficial fungal infection treated with debridement and topical boric acid hydrocortisone powders.  She returns today for scheduled three-month follow-up was doing well.  Using boric acid hydrocortisone powders perhaps a few times a week.  She got an ear infection (drainage) and was treated with 2 different oral antibiotics 1 of which resulted in an allergic reaction.  No eardrops.  Feels better since completing antibiotic no active drainage.  Hearing subjectively stable.       2024 Scheduled 2 month follow up poor chronic left-sided otorrhea associated with myringitis with primary versus secondary fungal superficial infection.  Cultures obtained at our last visit grew only cutibacterium acnes and no growth on the aerobic culture.  No fungal culture sent.  Treated with a cortisone powder for acidification desiccation and anti-inflammatory treatment only in the absence of any identified pathogen needing topical antimicrobial therapy.  Using the powder regularly for quite a time using an otoscope with video.  Has a not used it in a few weeks.  Did use pretty regularly every day to twice a day for a month or so.  No drainage.  Hearing still diminished left compared to right.  No updated audiogram since May.      2024 6 week follow-up visit on chronic left-sided mucopurulent myringitis with secondary saprophytic fungal growth improved on the left side no active fungus or purulence but still some scarring and pink change of the tympanic membrane without formal granulation.   Additional fluocinolone for a week then routine ear care recommended as well as audiogram prior to follow up to determine if further evaluation including possible neuro otology consultation and imaging are appropriate.      Audiogram completed 05/17/2024 a week after our visit shows some normal to borderline right-sided thresholds with generally symmetric pattern of hearing loss in the left side with an overlying 5 to 20 dB asymmetry other than the highest frequencies at 6-8000 hertz with her has a larger asymmetry.  The left ear exhibits more mobility rather than less compared to the right.  Audiologic testing to speech is preserved with a 15 dB right and 25 dB left SRT with 100% discrimination scores bilaterally.        05/10/2024 FOLLOW-UP VISIT patient seen today for weeks since our last visit for hearing loss with findings of some wax and mycotic otitis externa/saprophytic fungus on the right side and some granular myringitis and mucopus on the left side.  Treated with steroid fluoroquinolone drops on the left to be followed by routine ear care bilaterally with audiologic testing and follow-up.    No audiogram.  Never contacted.      Feels like the drainage on the left side resolved with the combination eye drops.  Within a week perhaps it started feeling like it was draining a bit.  She has continued to use Q-tips throughout but has made great efforts including by ear plugs to keep water out of the ears in the bath.  Restarted the drops with improvement in drainage.  No pain.  No fever.     04/12/2024 INITIAL PATIENT CONSULTATION Patient is a  lifelong NON-smoker with a past medical history of degenerative disc disease previously seen by amanda Murphy for tinnitus ear fullness and positional vertigo.  Audiologic testing, Eustachian tube care and Flonase, and referral to physical therapy provided (this was in 2022.  Patient is seen today  referred to me by Dr. José Miguel Begum in consultation  for bilateral ear drainage.  No audiogram.  No PT notes in 2022 for vertigo.    Went to a free hearing screening was told she had no hearing in her left ear.  Also told her ear was all white.  Photos taken but unable to be printed.  Hearing testing completed but no copy of audiogram for review.  Has some fullness in both ears.  No prior ear tubes or ear trauma.        Labs:  WBC   Date Value Ref Range Status   11/20/2024 11.15 3.90 - 12.70 K/uL Final     Hemoglobin   Date Value Ref Range Status   11/20/2024 15.5 12.0 - 16.0 g/dL Final     Platelets   Date Value Ref Range Status   11/20/2024 337 150 - 450 K/uL Final     Creatinine   Date Value Ref Range Status   12/05/2024 0.8 0.5 - 1.4 mg/dL Final     TSH   Date Value Ref Range Status   04/05/2023 0.711 0.400 - 4.000 uIU/mL Final     Hemoglobin A1C   Date Value Ref Range Status   12/05/2024 6.6 (H) 4.0 - 5.6 % Final     Comment:     ADA Screening Guidelines:  5.7-6.4%  Consistent with prediabetes  >or=6.5%  Consistent with diabetes    High levels of fetal hemoglobin interfere with the HbA1C  assay. Heterozygous hemoglobin variants (HbS, HgC, etc)do  not significantly interfere with this assay.   However, presence of multiple variants may affect accuracy.         Past Medical History  She has a past medical history of Allergy, Angio-edema, Degenerative disc disease, HTN (hypertension), Urticaria, and Vertigo.    Family / Surgical / Social History  Her family history includes Asthma in her mother; Breast cancer in her paternal cousin; Cancer in her father; Heart disease in her mother.    Past Surgical History:   Procedure Laterality Date    ARTHROSCOPIC REPAIR OF ROTATOR CUFF OF SHOULDER Right 7/15/2024    Procedure: REPAIR, ROTATOR CUFF, ARTHROSCOPIC;  Surgeon: Dre Grover MD;  Location: [facility] [place] [facility];  Service: Orthopedics;  Laterality: Right;    ARTHROSCOPIC TENOTOMY OF BICEPS TENDON Right 7/15/2024    Procedure: TENOTOMY, BICEPS, ARTHROSCOPIC;  Surgeon:  Dre Grover MD;  Location: [facility]  Service: Orthopedics;  Laterality: Right;    ARTHROSCOPY, SHOULDER (Right) Right 07/15/2024     SECTION      CHOLECYSTECTOMY  2024    COLONOSCOPY  2024    COLONOSCOPY N/A 2024    Procedure: COLONOSCOPY;  Surgeon: Manuel Roblero MD;  Location: [facility];  Service: Endoscopy;  Laterality: N/A;    DECOMPRESSION OF SUBACROMIAL SPACE Right 7/15/2024    Procedure: DECOMPRESSION, SUBACROMIAL SPACE;  Surgeon: Dre Grover MD;  Location: [facility]  Service: Orthopedics;  Laterality: Right;    DECOMPRESSION, SUBACROMIAL SPACE (Right) Right 07/15/2024    EGD, WITH CLOSED BIOPSY  2024    EPIDURAL STEROID INJECTION Bilateral 2023    Procedure: Injection, Steroid, Epidural;  Surgeon: Andi Watson MD;  Location: [facility];  Service: Neurosurgery;  Laterality: Bilateral;  Procedure:Bilateral SI joint block and steriod injection  Length of procedure:30 minutes  LOS:0 minutes  Anesthesia:MAC  Radiology:C-arm  Bed:Regular Bed  Position:Prone    ESOPHAGOGASTRODUODENOSCOPY N/A 2024    Procedure: EGD (ESOPHAGOGASTRODUODENOSCOPY);  Surgeon: Manuel Roblero MD;  Location: [facility];  Service: Endoscopy;  Laterality: N/A;    LAPAROSCOPIC CHOLECYSTECTOMY N/A 2024    Procedure: CHOLECYSTECTOMY, LAPAROSCOPIC;  Surgeon: Giovanni Gillespie MD;  Location: [facility]  Service: General;  Laterality: N/A;    REPAIR, ROTATOR CUFF, ARTHROSCOPIC (Right) Right 07/15/2024    SHOULDER ARTHROSCOPY Right 7/15/2024    Procedure: ARTHROSCOPY, SHOULDER;  Surgeon: Dre Grover MD;  Location: [facility]  Service: Orthopedics;  Laterality: Right;  right shoulder scope, DCE, RCR with SAD, biceps tenodesis, conmed video, beanbag, epi first 2 bags, IODINE allergy (clear)    TENOTOMY, BICEPS, ARTHROSCOPIC (Right) Right 07/15/2024    TONSILLECTOMY      TRANSFORAMINAL EPIDURAL INJECTION OF STEROID Bilateral 2019    Procedure:  "Injection,steroid,epidural,transforaminal approach---bilateral L5/S1;  Surgeon: Sridhar Albert III, MD;  Location: [facility];  Service: Pain Management;  Laterality: Bilateral;       Social History     Tobacco Use    Smoking status: Never     Passive exposure: Never    Smokeless tobacco: Never   Substance and Sexual Activity    Alcohol use: No    Drug use: No    Sexual activity: Not Currently     Partners: Male     Birth control/protection: OCP       Medications  She has a current medication list which includes the following prescription(s): acetaminophen, amlodipine, atorvastatin, boric acid (bulk), celecoxib, diphenhydramine, fluocinolone acetonide oil, fluticasone propionate, gabapentin, levocetirizine, montelukast, myrbetriq, norethindrone, ofloxacin, omeprazole, ondansetron, paroxetine, prochlorperazine, and propranolol, and the following Facility-Administered Medications: 0.9% nacl.      Allergies  Review of patient's allergies indicates:   Allergen Reactions    Augmentin [amoxicillin-pot clavulanate] Rash     Rash to arms, legs, shoulders and torso.  States has happened 2 times with this medication.    Iodine and iodide containing products Rash    Gemtesa [vibegron] Other (See Comments)     Weakness & dizziness    Shellfish containing products Hives     Other reaction(s): Hives    Tramadol Itching    Watermelon Other (See Comments)     Irritates esophagus    Amoxapine Rash    Levofloxacin Rash    Norco [hydrocodone-acetaminophen] Rash    Papaya Rash    Penicillins Rash       All medications, allergies, and past history have been reviewed.    Objective:      Vitals:      11/18/2024     3:39 PM 11/20/2024     1:08 AM 12/6/2024    11:17 AM   Vitals - 1 value per visit   SYSTOLIC 112 175 116   DIASTOLIC 70 93 77   Pulse 76 124 70   Temp  99.2 °F (37.3 °C)    Resp 17 28    SPO2 98 % 98 %    Weight (lb) 136.02 145.5 139.33   Weight (kg) 61.7 66 63.2   Height 5' 3" (1.6 m) 5' 3" (1.6 m) 5' 3" (1.6 m)   BMI " (Calculated) 24.1 25.8 24.7   Pain Score Zero  Zero       Body surface area is 1.68 meters squared.    Physical Exam:    GENERAL  APPEARANCE -  alert, appears stated age, and cooperative  BARRIER(S) TO COMMUNICATION -  none VOICE - appropriate for age and gender    INTEGUMENTARY  no suspicious head and neck lesions    HEENT  HEAD: Normocephalic, without obvious abnormality, atraumatic  FACE: INSPECTION - Symmetric, no signs of trauma, no suspicious lesion(s)      STRENGTH - facial symmetry intact     EYES  Normal occular alignment and mobility with no visible nystagmus at rest    EARS/NOSE/MOUTH/THROAT  EARS  PINNAE AND EXTERNAL EARS - no suspicious lesion OTOSCOPIC EXAM (surgical microscopy was used for visualization/instrumentation): EAR EXAM - right ear healthy and normal.  Left ear with granular changes throughout the posterior to superior aspect of the tympanic membrane, some puffy edema and granular change at the annulus and medial canal posteriorly.  Some impacted debris consistent with some squamous and dried powders cleared off the anterior aspect of the tympanic membrane.  No appreciable middle ear effusion.  There is a web-like scar extending from the posterior annulus just anterior to the inferior tympanic membrane and inserting at the tympanic membrane and annulus anterior inferiorly.  There was some trapping of mucus behind this but no appreciable perforation.  HEARING - subjectively decreased on the left compared to right    NOSE AND SINUSES  EXTERNAL NOSE - Grossly normal for age/sex  MUCOSA - within normal limits     CHEST AND LUNG   INSPECTION & AUSCULTATION - normal effort, no stridor    NEUROLOGIC  MENTAL STATUS - alert, interactive CRANIAL NERVES - normal      Procedure(s):  Cerumen removal performed.  See procedure note.          Assessment:      Problem List Items Addressed This Visit    None  Visit Diagnoses       Chronic myringitis, left    -  Primary    Otorrhea, left        Conductive  hearing loss of left ear with unrestricted hearing of right ear                             Plan:      It is not clear how affective the powders really were.  Patient needs closer monitoring to be sure that whatever topical measure is adequate.  Right now there is not only recurrence of granular change of the tympanic membrane and drainage but there is this scar band implying progressive inflammatory tympanic change.  I will upgrade the anti-inflammatory to clobetasol twice daily for 2 weeks and see her back into weeks.  I have had some benefit with tacrolimus in his situation as well we will have to consider this.  Patient may ultimately require tympanoplasty with Neuro otology.              Voice recognition software was used in the creation of this note/communication and any sound-alike errors which may have occurred from its use should be taken in context when interpreting.  If such errors prevent a clear understanding of the note/communication, please contact the office for clarification.

## 2024-12-06 NOTE — PATIENT INSTRUCTIONS
Keep the left ear absolutely dry other than today's prescribed steroid drops.  Use these drops twice daily right up to and including the morning of our follow up visit in 2 weeks.  Contact the office if there are any issues with getting the drops or using them or any worsening symptoms.

## 2024-12-06 NOTE — PROCEDURES
EAR DEBRIDEMENT / CERUMEN DISIMPACTION, 43464     Indication: Excessive cerumen on TM limiting exam    Side: Left    Findings: See note for details.     Procedure: Ear canal(s) cleared completely using suction with microscopy.  Wax was not hydrolyzed with peroxide.  Topical medication was not applied.    Complication: none

## 2024-12-09 ENCOUNTER — PATIENT MESSAGE (OUTPATIENT)
Dept: PRIMARY CARE CLINIC | Facility: CLINIC | Age: 54
End: 2024-12-09
Payer: MEDICARE

## 2024-12-11 ENCOUNTER — HOSPITAL ENCOUNTER (OUTPATIENT)
Dept: RADIOLOGY | Facility: HOSPITAL | Age: 54
Discharge: HOME | End: 2024-12-11
Attending: INTERNAL MEDICINE
Payer: MEDICARE

## 2024-12-11 DIAGNOSIS — Z12.31 ENCOUNTER FOR SCREENING MAMMOGRAM FOR BREAST CANCER: ICD-10-CM

## 2024-12-11 PROCEDURE — 77063 BREAST TOMOSYNTHESIS BI: CPT | Mod: 26,,, | Performed by: RADIOLOGY

## 2024-12-11 PROCEDURE — 77067 SCR MAMMO BI INCL CAD: CPT | Mod: TC

## 2024-12-11 PROCEDURE — 77067 SCR MAMMO BI INCL CAD: CPT | Mod: 26,,, | Performed by: RADIOLOGY

## 2024-12-16 ENCOUNTER — OFFICE VISIT (OUTPATIENT)
Dept: OBSTETRICS AND GYNECOLOGY | Facility: CLINIC | Age: 54
End: 2024-12-16
Payer: MEDICARE

## 2024-12-16 VITALS
BODY MASS INDEX: 24.02 KG/M2 | DIASTOLIC BLOOD PRESSURE: 71 MMHG | SYSTOLIC BLOOD PRESSURE: 107 MMHG | HEIGHT: 63 IN | WEIGHT: 135.56 LBS

## 2024-12-16 DIAGNOSIS — Z01.419 WELL WOMAN EXAM WITH ROUTINE GYNECOLOGICAL EXAM: Primary | ICD-10-CM

## 2024-12-16 DIAGNOSIS — Z12.4 SCREENING FOR CERVICAL CANCER: ICD-10-CM

## 2024-12-16 DIAGNOSIS — N93.9 ABNORMAL UTERINE BLEEDING (AUB): ICD-10-CM

## 2024-12-16 PROCEDURE — 99999 PR PBB SHADOW E&M-EST. PATIENT-LVL III: CPT | Mod: PBBFAC,,, | Performed by: OBSTETRICS & GYNECOLOGY

## 2024-12-16 PROCEDURE — 1160F RVW MEDS BY RX/DR IN RCRD: CPT | Mod: CPTII,S$GLB,, | Performed by: OBSTETRICS & GYNECOLOGY

## 2024-12-16 PROCEDURE — 1159F MED LIST DOCD IN RCRD: CPT | Mod: CPTII,S$GLB,, | Performed by: OBSTETRICS & GYNECOLOGY

## 2024-12-16 PROCEDURE — 87624 HPV HI-RISK TYP POOLED RSLT: CPT | Performed by: OBSTETRICS & GYNECOLOGY

## 2024-12-16 PROCEDURE — 3074F SYST BP LT 130 MM HG: CPT | Mod: CPTII,S$GLB,, | Performed by: OBSTETRICS & GYNECOLOGY

## 2024-12-16 PROCEDURE — 3078F DIAST BP <80 MM HG: CPT | Mod: CPTII,S$GLB,, | Performed by: OBSTETRICS & GYNECOLOGY

## 2024-12-16 PROCEDURE — 88175 CYTOPATH C/V AUTO FLUID REDO: CPT | Performed by: OBSTETRICS & GYNECOLOGY

## 2024-12-16 PROCEDURE — G0101 CA SCREEN;PELVIC/BREAST EXAM: HCPCS | Mod: GZ,S$GLB,, | Performed by: OBSTETRICS & GYNECOLOGY

## 2024-12-16 PROCEDURE — 3044F HG A1C LEVEL LT 7.0%: CPT | Mod: CPTII,S$GLB,, | Performed by: OBSTETRICS & GYNECOLOGY

## 2024-12-16 PROCEDURE — 3008F BODY MASS INDEX DOCD: CPT | Mod: CPTII,S$GLB,, | Performed by: OBSTETRICS & GYNECOLOGY

## 2024-12-16 NOTE — PROGRESS NOTES
"Subjective     Patient ID: Carol Dewey is a 54 y.o. female.    Chief Complaint:  Well Woman      History of Present Illness  HPI  54 y.o.  presents for annual exam.  Overall doing well. She is still taking POPs, very worried about getting pregnant at this age.  Last full cycle around July, but will occ have light spotting.  +HF, improved with paxil.  No other complaints today. Not currently sexually active, no pain/bleeding in the past.  No bowel or urinary complaints.  Last pap  wnl.  Up to date on MMG and colonoscopy.     GYN & OB History  Patient's last menstrual period was 2024 (approximate).   Date of Last Pap: 2021    OB History    Para Term  AB Living   2 1 1   1     SAB IAB Ectopic Multiple Live Births   1              # Outcome Date GA Lbr Darrick/2nd Weight Sex Type Anes PTL Lv   2 SAB            1 Term                Review of Systems  Review of Systems   Constitutional:  Negative for chills and fever.   Respiratory:  Negative for shortness of breath.    Cardiovascular:  Negative for chest pain.   Gastrointestinal:  Negative for abdominal pain, constipation and diarrhea.   Genitourinary:  Positive for hot flashes (improved on paxil). Negative for pelvic pain and vaginal discharge.   Musculoskeletal:  Negative for myalgias.   Neurological:  Positive for headaches (occ).          Objective   Physical Exam    /71   Ht 5' 3" (1.6 m)   Wt 61.5 kg (135 lb 9.3 oz)   LMP 2024 (Approximate)   BMI 24.02 kg/m²     Gen: NAD  Resp: Normal respiratory effort  Breast: Symmetric, nontender.  No masses.  No skin changes.  No nipple discharge.   Abd: soft, NT  Pelvic: Normal-appearing external female genitalia.  No CMT.  Uterus small, mobile, nontender.  No adnexal masses or tenderness.    Ext: normal ROM  Psych: appropriate affect  Neuro: grossly intact         Assessment and Plan     Carol was seen today for well woman.    Diagnoses and all orders for this " visit:    Well woman exam with routine gynecological exam    Abnormal uterine bleeding (AUB)  -     US Pelvis Comp with Transvag NON-OB (xpd; Future  -     FOLLICLE STIMULATING HORMONE; Future  -     ESTRADIOL; Future    Screening for cervical cancer  -     Liquid-Based Pap Smear, Screening  -     HPV High Risk Genotypes, PCR          Plan:  Routine annual exam with pap smear and breast exam today.  Declined STD screening.  Up to date on MMG and colonoscopy.   Discussed age and low likelihood of pregnancy.  Will draw hormones to confirm if menopausal.  If not, patient would like to continue POPs for one more year.    Will also get pelvic US due to irregular spotting - likely normal symptoms of perimenopause.    Doing well on Paxil, states she does not need refills at this time, will let me know.    Counseling done, precautions given, all questions answered.  RTC 1 year for annual, or prn.

## 2024-12-20 ENCOUNTER — OFFICE VISIT (OUTPATIENT)
Dept: OTOLARYNGOLOGY | Facility: CLINIC | Age: 54
End: 2024-12-20
Payer: MEDICARE

## 2024-12-20 VITALS
HEART RATE: 65 BPM | HEIGHT: 63 IN | SYSTOLIC BLOOD PRESSURE: 119 MMHG | WEIGHT: 139 LBS | BODY MASS INDEX: 24.63 KG/M2 | DIASTOLIC BLOOD PRESSURE: 81 MMHG

## 2024-12-20 DIAGNOSIS — H90.12 CONDUCTIVE HEARING LOSS OF LEFT EAR WITH UNRESTRICTED HEARING OF RIGHT EAR: ICD-10-CM

## 2024-12-20 DIAGNOSIS — H73.12 CHRONIC MYRINGITIS, LEFT: Primary | ICD-10-CM

## 2024-12-20 LAB
FINAL PATHOLOGIC DIAGNOSIS: NORMAL
Lab: NORMAL

## 2024-12-20 PROCEDURE — 99999 PR PBB SHADOW E&M-EST. PATIENT-LVL III: CPT | Mod: PBBFAC,,, | Performed by: OTOLARYNGOLOGY

## 2024-12-20 RX ORDER — CLOBETASOL PROPIONATE 0.5 MG/ML
SOLUTION TOPICAL
Qty: 25 ML | Refills: 0 | Status: SHIPPED | OUTPATIENT
Start: 2024-12-20

## 2024-12-20 NOTE — PROGRESS NOTES
[facility]    Subjective:      Patient: Carol Dewey Patient PCP: Robe Trinidad MD         :  1970     Sex:  female      MRN:  [id number]          Date of Visit: 2024      Chief Complaint: Wound Check      Patient ID: Carol Dewey is a 54 y.o. female     2024 2 week follow-up visit for recurrent/chronic left-sided myringitis treated with boric acid hydrocortisone powders.  Findings of some scar band formation which seem to be new with some slight trapping.  Changed to clobetasol drops.  Some expected discomfort with the initiation of these drops but this is become less unless painful and of much shorter duration in the last week.  Hearing feels subjectively stable.  There has been no drainage or other concerning symptoms.      2024 established patient patient last seen over 3 months ago with recurrent left-sided otorrhea associated with chronic myringitis and secondary superficial fungal infection treated with debridement and topical boric acid hydrocortisone powders.  She returns today for scheduled three-month follow-up was doing well.  Using boric acid hydrocortisone powders perhaps a few times a week.  She got an ear infection (drainage) and was treated with 2 different oral antibiotics 1 of which resulted in an allergic reaction.  No eardrops.  Feels better since completing antibiotic no active drainage.  Hearing subjectively stable.       2024 Scheduled 2 month follow up poor chronic left-sided otorrhea associated with myringitis with primary versus secondary fungal superficial infection.  Cultures obtained at our last visit grew only cutibacterium acnes and no growth on the aerobic culture.  No fungal culture sent.  Treated with a cortisone powder for acidification desiccation and anti-inflammatory treatment only in the absence of any identified pathogen needing topical antimicrobial therapy.  Using the powder regularly for quite a time using an otoscope with  video.  Has a not used it in a few weeks.  Did use pretty regularly every day to twice a day for a month or so.  No drainage.  Hearing still diminished left compared to right.  No updated audiogram since May.      06/21/2024 6 week follow-up visit on chronic left-sided mucopurulent myringitis with secondary saprophytic fungal growth improved on the left side no active fungus or purulence but still some scarring and pink change of the tympanic membrane without formal granulation.  Additional fluocinolone for a week then routine ear care recommended as well as audiogram prior to follow up to determine if further evaluation including possible neuro otology consultation and imaging are appropriate.      Audiogram completed 05/17/2024 a week after our visit shows some normal to borderline right-sided thresholds with generally symmetric pattern of hearing loss in the left side with an overlying 5 to 20 dB asymmetry other than the highest frequencies at 6-8000 hertz with her has a larger asymmetry.  The left ear exhibits more mobility rather than less compared to the right.  Audiologic testing to speech is preserved with a 15 dB right and 25 dB left SRT with 100% discrimination scores bilaterally.        05/10/2024 FOLLOW-UP VISIT patient seen today for weeks since our last visit for hearing loss with findings of some wax and mycotic otitis externa/saprophytic fungus on the right side and some granular myringitis and mucopus on the left side.  Treated with steroid fluoroquinolone drops on the left to be followed by routine ear care bilaterally with audiologic testing and follow-up.    No audiogram.  Never contacted.      Feels like the drainage on the left side resolved with the combination eye drops.  Within a week perhaps it started feeling like it was draining a bit.  She has continued to use Q-tips throughout but has made great efforts including by ear plugs to keep water out of the ears in the bath.  Restarted the  drops with improvement in drainage.  No pain.  No fever.     04/12/2024 INITIAL PATIENT CONSULTATION Patient is a  lifelong NON-smoker with a past medical history of degenerative disc disease previously seen by amanda Murphy for tinnitus ear fullness and positional vertigo.  Audiologic testing, Eustachian tube care and Flonase, and referral to physical therapy provided (this was in 2022.  Patient is seen today  referred to me by Dr. José Miguel Begum in consultation for bilateral ear drainage.  No audiogram.  No PT notes in 2022 for vertigo.    Went to a free hearing screening was told she had no hearing in her left ear.  Also told her ear was all white.  Photos taken but unable to be printed.  Hearing testing completed but no copy of audiogram for review.  Has some fullness in both ears.  No prior ear tubes or ear trauma.        Labs:  WBC   Date Value Ref Range Status   11/20/2024 11.15 3.90 - 12.70 K/uL Final     Hemoglobin   Date Value Ref Range Status   11/20/2024 15.5 12.0 - 16.0 g/dL Final     Platelets   Date Value Ref Range Status   11/20/2024 337 150 - 450 K/uL Final     Creatinine   Date Value Ref Range Status   12/05/2024 0.8 0.5 - 1.4 mg/dL Final     TSH   Date Value Ref Range Status   04/05/2023 0.711 0.400 - 4.000 uIU/mL Final     Hemoglobin A1C   Date Value Ref Range Status   12/05/2024 6.6 (H) 4.0 - 5.6 % Final     Comment:     ADA Screening Guidelines:  5.7-6.4%  Consistent with prediabetes  >or=6.5%  Consistent with diabetes    High levels of fetal hemoglobin interfere with the HbA1C  assay. Heterozygous hemoglobin variants (HbS, HgC, etc)do  not significantly interfere with this assay.   However, presence of multiple variants may affect accuracy.         Past Medical History  She has a past medical history of Allergy, Angio-edema, Degenerative disc disease, HTN (hypertension), Urticaria, and Vertigo.    Family / Surgical / Social History  Her family history includes Asthma in her  mother; Breast cancer in her paternal cousin; Cancer in her father; Heart disease in her mother.    Past Surgical History:   Procedure Laterality Date    ARTHROSCOPIC REPAIR OF ROTATOR CUFF OF SHOULDER Right 7/15/2024    Procedure: REPAIR, ROTATOR CUFF, ARTHROSCOPIC;  Surgeon: Dre Grover MD;  Location: [facility];  Service: Orthopedics;  Laterality: Right;    ARTHROSCOPIC TENOTOMY OF BICEPS TENDON Right 7/15/2024    Procedure: TENOTOMY, BICEPS, ARTHROSCOPIC;  Surgeon: Dre Grover MD;  Location: [facility] OR;  Service: Orthopedics;  Laterality: Right;    ARTHROSCOPY, SHOULDER (Right) Right 07/15/2024     SECTION      CHOLECYSTECTOMY  2024    COLONOSCOPY  2024    COLONOSCOPY N/A 2024    Procedure: COLONOSCOPY;  Surgeon: Manuel Roblero MD;  Location: [facility];  Service: Endoscopy;  Laterality: N/A;    DECOMPRESSION OF SUBACROMIAL SPACE Right 7/15/2024    Procedure: DECOMPRESSION, SUBACROMIAL SPACE;  Surgeon: Dre Grover MD;  Location: [facility];  Service: Orthopedics;  Laterality: Right;    DECOMPRESSION, SUBACROMIAL SPACE (Right) Right 07/15/2024    EGD, WITH CLOSED BIOPSY  2024    EPIDURAL STEROID INJECTION Bilateral 2023    Procedure: Injection, Steroid, Epidural;  Surgeon: Andi Watson MD;  Location: [facility];  Service: Neurosurgery;  Laterality: Bilateral;  Procedure:Bilateral SI joint block and steriod injection  Length of procedure:30 minutes  LOS:0 minutes  Anesthesia:MAC  Radiology:C-arm  Bed:Regular Bed  Position:Prone    ESOPHAGOGASTRODUODENOSCOPY N/A 2024    Procedure: EGD (ESOPHAGOGASTRODUODENOSCOPY);  Surgeon: Manuel Roblero MD;  Location: [facility];  Service: Endoscopy;  Laterality: N/A;    LAPAROSCOPIC CHOLECYSTECTOMY N/A 2024    Procedure: CHOLECYSTECTOMY, LAPAROSCOPIC;  Surgeon: Giovanni Gillespie MD;  Location: [facility];  Service: General;  Laterality: N/A;    REPAIR, ROTATOR CUFF, ARTHROSCOPIC  (Right) Right 07/15/2024    SHOULDER ARTHROSCOPY Right 7/15/2024    Procedure: ARTHROSCOPY, SHOULDER;  Surgeon: Dre Grover MD;  Location: [facility] OR;  Service: Orthopedics;  Laterality: Right;  right shoulder scope, DCE, RCR with SAD, biceps tenodesis, conmed video, beanbag, epi first 2 bags, IODINE allergy (clear)    TENOTOMY, BICEPS, ARTHROSCOPIC (Right) Right 07/15/2024    TONSILLECTOMY      TRANSFORAMINAL EPIDURAL INJECTION OF STEROID Bilateral 03/21/2019    Procedure: Injection,steroid,epidural,transforaminal approach---bilateral L5/S1;  Surgeon: Sridhar Albert III, MD;  Location: [facility] OR;  Service: Pain Management;  Laterality: Bilateral;       Social History     Tobacco Use    Smoking status: Never     Passive exposure: Never    Smokeless tobacco: Never   Substance and Sexual Activity    Alcohol use: No    Drug use: No    Sexual activity: Not Currently     Partners: Male     Birth control/protection: OCP       Medications  She has a current medication list which includes the following prescription(s): acetaminophen, amlodipine, atorvastatin, boric acid (bulk), celecoxib, clobetasol, diphenhydramine, fluticasone propionate, gabapentin, montelukast, myrbetriq, norethindrone, omeprazole, ondansetron, prochlorperazine, fluocinolone acetonide oil, and paroxetine, and the following Facility-Administered Medications: 0.9% nacl.      Allergies  Review of patient's allergies indicates:   Allergen Reactions    Augmentin [amoxicillin-pot clavulanate] Rash     Rash to arms, legs, shoulders and torso.  States has happened 2 times with this medication.    Iodine and iodide containing products Rash    Gemtesa [vibegron] Other (See Comments)     Weakness & dizziness    Shellfish containing products Hives     Other reaction(s): Hives    Tramadol Itching    Watermelon Other (See Comments)     Irritates esophagus    Amoxapine Rash    Levofloxacin Rash    Norco [hydrocodone-acetaminophen] Rash    Papaya Rash     "Penicillins Rash       All medications, allergies, and past history have been reviewed.    Objective:      Vitals:      12/6/2024    11:17 AM 12/16/2024    11:19 AM 12/20/2024     2:35 PM   Vitals - 1 value per visit   SYSTOLIC 116 107 119   DIASTOLIC 77 71 81   Pulse 70  65   Weight (lb) 139.33 135.58 139   Weight (kg) 63.2 61.5 63.05   Height 5' 3" (1.6 m) 5' 3" (1.6 m) 5' 3" (1.6 m)   BMI (Calculated) 24.7 24 24.6   Pain Score Zero Zero Six       Body surface area is 1.67 meters squared.    Physical Exam:    GENERAL  APPEARANCE -  alert, appears stated age, and cooperative  BARRIER(S) TO COMMUNICATION -  none VOICE - appropriate for age and gender    INTEGUMENTARY  no suspicious head and neck lesions    HEENT  HEAD: Normocephalic, without obvious abnormality, atraumatic  FACE: INSPECTION - Symmetric, no signs of trauma, no suspicious lesion(s)      STRENGTH - facial symmetry intact     EYES  Normal occular alignment and mobility with no visible nystagmus at rest    EARS/NOSE/MOUTH/THROAT  EARS  PINNAE AND EXTERNAL EARS - no suspicious lesion OTOSCOPIC EXAM (surgical microscopy was used for visualization/instrumentation): EAR EXAM - right ear healthy and normal.  Left ear with improved degree of granular changes throughout the posterior to superior aspect of the tympanic membrane, some puffy edema and granular change at the annulus and medial canal posteriorly.  Some wet squamous material cleared off the tympanic membrane as well as some honey crusting laterally removed with alligators.  There is a stable web-like scar extending from the posterior annulus just anterior to the inferior tympanic membrane and inserting at the tympanic membrane and annulus anterior inferiorly which is unchanged.  Today there is no appreciable mucus behind this and no appreciable perforation.  Generally less inflamed less granular but still with some red streaky scar spots posteriorly superiorly of the tympanic membrane to the annulus " and this curvilinear scar anterior to the annulus inferiorly HEARING - subjectively decreased on the left compared to right but not changed    NOSE AND SINUSES  EXTERNAL NOSE - Grossly normal for age/sex  MUCOSA - within normal limits     CHEST AND LUNG   INSPECTION & AUSCULTATION - normal effort, no stridor    NEUROLOGIC  MENTAL STATUS - alert, interactive CRANIAL NERVES - normal      Procedure(s):  Cerumen removal performed.  See procedure note.          Assessment:      Problem List Items Addressed This Visit    None  Visit Diagnoses       Chronic myringitis, left    -  Primary    Conductive hearing loss of left ear with unrestricted hearing of right ear                               Plan:      Inflammatory tympanic membrane issues which are longstanding but seem to be accelerating.  I have increase the concentration topical steroids with what appears to be some interval improvement.  May need to shift to tacrolimus.  No indication of any progression into the middle ear needing systemic therapy.  Patient may require evaluation by Neuro otology.  No indication to repeat audiogram at this time.      Follow up in 3 weeks.  Clobetasol refilled            Voice recognition software was used in the creation of this note/communication and any sound-alike errors which may have occurred from its use should be taken in context when interpreting.  If such errors prevent a clear understanding of the note/communication, please contact the office for clarification.

## 2024-12-30 ENCOUNTER — TELEPHONE (OUTPATIENT)
Dept: OBSTETRICS AND GYNECOLOGY | Facility: CLINIC | Age: 54
End: 2024-12-30
Payer: MEDICARE

## 2024-12-30 NOTE — TELEPHONE ENCOUNTER
Called patient to get her set up for f/u to go over results with  with a possible embx at visit patent didn't answer lwm for her to return

## 2025-01-03 ENCOUNTER — TELEPHONE (OUTPATIENT)
Dept: OBSTETRICS AND GYNECOLOGY | Facility: CLINIC | Age: 55
End: 2025-01-03
Payer: MEDICARE

## 2025-01-10 ENCOUNTER — OFFICE VISIT (OUTPATIENT)
Dept: OTOLARYNGOLOGY | Facility: CLINIC | Age: 55
End: 2025-01-10
Payer: MEDICARE

## 2025-01-10 VITALS
BODY MASS INDEX: 24.83 KG/M2 | SYSTOLIC BLOOD PRESSURE: 127 MMHG | WEIGHT: 140.13 LBS | DIASTOLIC BLOOD PRESSURE: 83 MMHG | HEIGHT: 63 IN | HEART RATE: 80 BPM

## 2025-01-10 DIAGNOSIS — G89.29 CHRONIC MIDLINE LOW BACK PAIN, UNSPECIFIED WHETHER SCIATICA PRESENT: ICD-10-CM

## 2025-01-10 DIAGNOSIS — M54.50 CHRONIC MIDLINE LOW BACK PAIN, UNSPECIFIED WHETHER SCIATICA PRESENT: ICD-10-CM

## 2025-01-10 DIAGNOSIS — H90.12 CONDUCTIVE HEARING LOSS OF LEFT EAR WITH UNRESTRICTED HEARING OF RIGHT EAR: ICD-10-CM

## 2025-01-10 DIAGNOSIS — H61.22 CERUMEN DEBRIS ON TYMPANIC MEMBRANE OF LEFT EAR: ICD-10-CM

## 2025-01-10 DIAGNOSIS — H73.12 CHRONIC MYRINGITIS, LEFT: Primary | ICD-10-CM

## 2025-01-10 PROCEDURE — 99999 PR PBB SHADOW E&M-EST. PATIENT-LVL IV: CPT | Mod: PBBFAC,HCNC,, | Performed by: OTOLARYNGOLOGY

## 2025-01-10 RX ORDER — MINERAL OIL 1000 MG/ML
LIQUID TOPICAL
Start: 2025-01-10

## 2025-01-10 NOTE — PATIENT INSTRUCTIONS
Discuss whether taking low-dose amitriptyline or nortriptyline in addition to the gabapentin/Neurontin might help not only with migraine prevention but with better control of neuromuscular pain.  Information provided.      With respect to the ear start using mineral oil into the ear for 5 drops with the syringe provided every single night and continue the clobetasol solution every morning.  If symptoms remain as they are with no discomfort or drainage or hearing loss or fullness after 3-4 weeks consider decreasing the clobetasol to every other day and continue with nightly mineral oil.      Audiogram in 1-2 months and follow up here in 6-8 weeks with a hearing test prior.    Voice recognition software was used in the creation of this note/communication and any sound-alike errors which may have occurred from its use should be taken in context when interpreting.  If such errors prevent a clear understanding of the note/communication, please contact the office for clarification.      DRY EAR PRECAUTIONS    Water should be kept out of the ears to prevent secondary infection.  If water gets trapped in the ear twisted tissue can be used a wick out the ear.  Cotton balls or cotton balls with Vaseline in the shower may help prevent water from getting in the ears.      ROUTINE EAR CARE    Keep the ears dry in general.  Water and soap dry the ears increases scaling by stripping away the natural oils of the ears.    A twisted single ply of facial tissue can be used to wick out moisture on the rare occasion when water gets stuck in the ear; or the water can be displaced with concentrated alcohol like OTC SwimEar or a few drops of 72-95% isopropyl alcohol to fill the ear canal.  Regular use will cause excess drying of the ears.    The ear should be kept moist in general with mineral oil. Three to four drops into the ear canal 2 to 3 times a week or even every night.    If the ears need to be irrigated use either a 50 50 mixture  of white vinegar and distilled water or 50 50 mixture of alcohol and white vinegar.    Painful draining ears that do not resolve with conservative care could represent infection and may need microscopic office debridement/clearing of the wax, and topical antibiotic drops with or without steroids may need to be prescribed.

## 2025-01-10 NOTE — Clinical Note
Myriam - Needs audiogram.  Dr. NESS - have you considered adding low dose TCA to her gabapentin for migraine and NM/chronic pain?

## 2025-01-10 NOTE — PROGRESS NOTES
[facility]    Subjective:      Patient: Carol Dewey Patient PCP: Robe Trinidad MD         :  1970     Sex:  female      MRN:  [id number]          Date of Visit: 01/10/2025      Chief Complaint: Follow-up (Left ear) and Headache (mirgraine)      Patient ID: Carol Dewey is a 54 y.o. female     01/10/2025 established patient visit patient last seen three-week ago with ongoing recurrent/chronic left-sided myringitis treated with be a/HC powders with some progression including a scar band and some squamous trapping noted change to clobetasol with the initial discomfort what appeared to be stable to slightly improved findings at our last visit.  She is here for three-week follow-up using clobetasol 0.05% external solution b.i.d..  We discussed the possibility of changing to tacrolimus.  Hearing subjectively stable with last audiogram 7-8 months ago.  She returns today with no active drainage bleeding pain or subjective change in hearing.  No longer as uncomfortable using the clobetasol.  Burned it 1st now barely uncomfortable.  Worsening right-sided temporal headaches (migraine).  Attributes to worsening shoulder and back pain.  Takes gabapentin.       2024 2 week follow-up visit for recurrent/chronic left-sided myringitis treated with boric acid hydrocortisone powders.  Findings of some scar band formation which seem to be new with some slight trapping.  Changed to clobetasol drops.  Some expected discomfort with the initiation of these drops but this is become less unless painful and of much shorter duration in the last week.  Hearing feels subjectively stable.  There has been no drainage or other concerning symptoms.      2024 established patient patient last seen over 3 months ago with recurrent left-sided otorrhea associated with chronic myringitis and secondary superficial fungal infection treated with debridement and topical boric acid hydrocortisone powders.  She returns  today for scheduled three-month follow-up was doing well.  Using boric acid hydrocortisone powders perhaps a few times a week.  She got an ear infection (drainage) and was treated with 2 different oral antibiotics 1 of which resulted in an allergic reaction.  No eardrops.  Feels better since completing antibiotic no active drainage.  Hearing subjectively stable.       08/30/2024 Scheduled 2 month follow up poor chronic left-sided otorrhea associated with myringitis with primary versus secondary fungal superficial infection.  Cultures obtained at our last visit grew only cutibacterium acnes and no growth on the aerobic culture.  No fungal culture sent.  Treated with a cortisone powder for acidification desiccation and anti-inflammatory treatment only in the absence of any identified pathogen needing topical antimicrobial therapy.  Using the powder regularly for quite a time using an otoscope with video.  Has a not used it in a few weeks.  Did use pretty regularly every day to twice a day for a month or so.  No drainage.  Hearing still diminished left compared to right.  No updated audiogram since May.      06/21/2024 6 week follow-up visit on chronic left-sided mucopurulent myringitis with secondary saprophytic fungal growth improved on the left side no active fungus or purulence but still some scarring and pink change of the tympanic membrane without formal granulation.  Additional fluocinolone for a week then routine ear care recommended as well as audiogram prior to follow up to determine if further evaluation including possible neuro otology consultation and imaging are appropriate.      Audiogram completed 05/17/2024 a week after our visit shows some normal to borderline right-sided thresholds with generally symmetric pattern of hearing loss in the left side with an overlying 5 to 20 dB asymmetry other than the highest frequencies at 6-8000 hertz with her has a larger asymmetry.  The left ear exhibits more  mobility rather than less compared to the right.  Audiologic testing to speech is preserved with a 15 dB right and 25 dB left SRT with 100% discrimination scores bilaterally.        05/10/2024 FOLLOW-UP VISIT patient seen today for weeks since our last visit for hearing loss with findings of some wax and mycotic otitis externa/saprophytic fungus on the right side and some granular myringitis and mucopus on the left side.  Treated with steroid fluoroquinolone drops on the left to be followed by routine ear care bilaterally with audiologic testing and follow-up.    No audiogram.  Never contacted.      Feels like the drainage on the left side resolved with the combination eye drops.  Within a week perhaps it started feeling like it was draining a bit.  She has continued to use Q-tips throughout but has made great efforts including by ear plugs to keep water out of the ears in the bath.  Restarted the drops with improvement in drainage.  No pain.  No fever.     04/12/2024 INITIAL PATIENT CONSULTATION Patient is a  lifelong NON-smoker with a past medical history of degenerative disc disease previously seen by amanda Murphy for tinnitus ear fullness and positional vertigo.  Audiologic testing, Eustachian tube care and Flonase, and referral to physical therapy provided (this was in 2022.  Patient is seen today  referred to me by Dr. José Miguel Begum in consultation for bilateral ear drainage.  No audiogram.  No PT notes in 2022 for vertigo.    Went to a free hearing screening was told she had no hearing in her left ear.  Also told her ear was all white.  Photos taken but unable to be printed.  Hearing testing completed but no copy of audiogram for review.  Has some fullness in both ears.  No prior ear tubes or ear trauma.        Labs:  WBC   Date Value Ref Range Status   11/20/2024 11.15 3.90 - 12.70 K/uL Final     Hemoglobin   Date Value Ref Range Status   11/20/2024 15.5 12.0 - 16.0 g/dL Final      Platelets   Date Value Ref Range Status   2024 337 150 - 450 K/uL Final     Creatinine   Date Value Ref Range Status   2024 0.8 0.5 - 1.4 mg/dL Final     TSH   Date Value Ref Range Status   2023 0.711 0.400 - 4.000 uIU/mL Final     Hemoglobin A1C   Date Value Ref Range Status   2024 6.6 (H) 4.0 - 5.6 % Final     Comment:     ADA Screening Guidelines:  5.7-6.4%  Consistent with prediabetes  >or=6.5%  Consistent with diabetes    High levels of fetal hemoglobin interfere with the HbA1C  assay. Heterozygous hemoglobin variants (HbS, HgC, etc)do  not significantly interfere with this assay.   However, presence of multiple variants may affect accuracy.         Past Medical History  She has a past medical history of Allergy, Angio-edema, Degenerative disc disease, HTN (hypertension), Urticaria, and Vertigo.    Family / Surgical / Social History  Her family history includes Asthma in her mother; Breast cancer in her paternal cousin; Cancer in her father; Heart disease in her mother.    Past Surgical History:   Procedure Laterality Date    ARTHROSCOPIC REPAIR OF ROTATOR CUFF OF SHOULDER Right 7/15/2024    Procedure: REPAIR, ROTATOR CUFF, ARTHROSCOPIC;  Surgeon: Dre Grover MD;  Location: [facility] OR;  Service: Orthopedics;  Laterality: Right;    ARTHROSCOPIC TENOTOMY OF BICEPS TENDON Right 7/15/2024    Procedure: TENOTOMY, BICEPS, ARTHROSCOPIC;  Surgeon: Dre Grover MD;  Location: [facility] OR;  Service: Orthopedics;  Laterality: Right;    ARTHROSCOPY, SHOULDER (Right) Right 07/15/2024     SECTION      CHOLECYSTECTOMY  2024    COLONOSCOPY  2024    COLONOSCOPY N/A 2024    Procedure: COLONOSCOPY;  Surgeon: Manuel Roblero MD;  Location: [facility] ENDO;  Service: Endoscopy;  Laterality: N/A;    DECOMPRESSION OF SUBACROMIAL SPACE Right 7/15/2024    Procedure: DECOMPRESSION, SUBACROMIAL SPACE;  Surgeon: Dre Grover MD;  Location: [facility] OR;  Service:  Orthopedics;  Laterality: Right;    DECOMPRESSION, SUBACROMIAL SPACE (Right) Right 07/15/2024    EGD, WITH CLOSED BIOPSY  01/25/2024    EPIDURAL STEROID INJECTION Bilateral 05/18/2023    Procedure: Injection, Steroid, Epidural;  Surgeon: Andi Watson MD;  [facility];  Service: Neurosurgery;  Laterality: Bilateral;  Procedure:Bilateral SI joint block and steriod injection  Length of procedure:30 minutes  LOS:0 minutes  Anesthesia:MAC  Radiology:C-arm  Bed:Regular Bed  Position:Prone    ESOPHAGOGASTRODUODENOSCOPY N/A 01/25/2024    Procedure: EGD (ESOPHAGOGASTRODUODENOSCOPY);  Surgeon: Manuel Roblero MD;  Location: [facility];  Service: Endoscopy;  Laterality: N/A;    LAPAROSCOPIC CHOLECYSTECTOMY N/A 03/27/2024    Procedure: CHOLECYSTECTOMY, LAPAROSCOPIC;  Surgeon: Giovanni Gillespie MD;  Location: [facility] OR;  Service: General;  Laterality: N/A;    REPAIR, ROTATOR CUFF, ARTHROSCOPIC (Right) Right 07/15/2024    SHOULDER ARTHROSCOPY Right 7/15/2024    Procedure: ARTHROSCOPY, SHOULDER;  Surgeon: Dre Grover MD;  Location: [facility] OR;  Service: Orthopedics;  Laterality: Right;  right shoulder scope, DCE, RCR with SAD, biceps tenodesis, conmed video, beanbag, epi first 2 bags, IODINE allergy (clear)    TENOTOMY, BICEPS, ARTHROSCOPIC (Right) Right 07/15/2024    TONSILLECTOMY      TRANSFORAMINAL EPIDURAL INJECTION OF STEROID Bilateral 03/21/2019    Procedure: Injection,steroid,epidural,transforaminal approach---bilateral L5/S1;  Surgeon: Sridhar Albert III, MD;  Location: [facility];  Service: Pain Management;  Laterality: Bilateral;       Social History     Tobacco Use    Smoking status: Never     Passive exposure: Never    Smokeless tobacco: Never   Substance and Sexual Activity    Alcohol use: No    Drug use: No    Sexual activity: Not Currently     Partners: Male     Birth control/protection: OCP       Medications  She has a current medication list which includes the following  "prescription(s): acetaminophen, amlodipine, atorvastatin, boric acid (bulk), celecoxib, clobetasol, diphenhydramine, fluocinolone acetonide oil, fluticasone propionate, gabapentin, montelukast, myrbetriq, norethindrone, omeprazole, ondansetron, paroxetine, and prochlorperazine, and the following Facility-Administered Medications: 0.9% nacl.      Allergies  Review of patient's allergies indicates:   Allergen Reactions    Augmentin [amoxicillin-pot clavulanate] Rash     Rash to arms, legs, shoulders and torso.  States has happened 2 times with this medication.    Iodine and iodide containing products Rash    Gemtesa [vibegron] Other (See Comments)     Weakness & dizziness    Shellfish containing products Hives     Other reaction(s): Hives    Tramadol Itching    Watermelon Other (See Comments)     Irritates esophagus    Amoxapine Rash    Levofloxacin Rash    Norco [hydrocodone-acetaminophen] Rash    Papaya Rash    Penicillins Rash       All medications, allergies, and past history have been reviewed.    Objective:      Vitals:      12/16/2024    11:19 AM 12/20/2024     2:35 PM 1/10/2025     2:06 PM   Vitals - 1 value per visit   SYSTOLIC 107 119 127   DIASTOLIC 71 81 83   Pulse  65 80   Weight (lb) 135.58 139 140.1   Weight (kg) 61.5 63.05 63.55   Height 5' 3" (1.6 m) 5' 3" (1.6 m) 5' 3" (1.6 m)   BMI (Calculated) 24 24.6 24.8   Pain Score Zero Six Three       Body surface area is 1.68 meters squared.    Physical Exam:    GENERAL  APPEARANCE -  alert, appears stated age, and cooperative  BARRIER(S) TO COMMUNICATION -  none VOICE - appropriate for age and gender    INTEGUMENTARY  no suspicious head and neck lesions    HEENT  HEAD: Normocephalic, without obvious abnormality, atraumatic  FACE: INSPECTION - Symmetric, no signs of trauma, no suspicious lesion(s)      STRENGTH - facial symmetry intact     EYES  Normal occular alignment and mobility with no visible nystagmus at rest    EARS/NOSE/MOUTH/THROAT  EARS  PINNAE AND " EXTERNAL EARS - no suspicious lesion OTOSCOPIC EXAM (surgical microscopy was used for visualization/instrumentation): EAR EXAM - right ear healthy and normal.  Left ear dramatically better.  There some crusting removed from the posterior-inferior mid canal which extends down to the tympanic membrane.  No gross scar band.  There still a bit of a step-off at the annulus posteriorly but no trapped mucosa or mucus.  No perforation or gross inflammatory tissue.  No bone exposure or fungus.  The crusting was removed with alligators without bleeding.  Minimal dryness and hyperemia underneath.    NOSE AND SINUSES  EXTERNAL NOSE - Grossly normal for age/sex  MUCOSA - within normal limits     CHEST AND LUNG   INSPECTION & AUSCULTATION - normal effort, no stridor    NEUROLOGIC  MENTAL STATUS - alert, interactive CRANIAL NERVES - normal      Procedure(s):  Cerumen removal performed.  See procedure note.          Assessment:      Problem List Items Addressed This Visit          Orthopedic    Chronic midline low back pain     Other Visit Diagnoses       Chronic myringitis, left    -  Primary    Conductive hearing loss of left ear with unrestricted hearing of right ear        Cerumen debris on tympanic membrane of left ear                                 Plan:      Discuss whether taking low-dose amitriptyline or nortriptyline in addition to the gabapentin/Neurontin might help not only with migraine prevention but with better control of neuromuscular pain.  Information provided.      With respect to the ear start using mineral oil into the ear for 5 drops with the syringe provided every single night and continue the clobetasol solution every morning.  If symptoms remain as they are with no discomfort or drainage or hearing loss or fullness after 3-4 weeks consider decreasing the clobetasol to every other day and continue with nightly mineral oil.      Audiogram in 1-2 months and follow up here in 6-8 weeks with a hearing test  prior.            Voice recognition software was used in the creation of this note/communication and any sound-alike errors which may have occurred from its use should be taken in context when interpreting.  If such errors prevent a clear understanding of the note/communication, please contact the office for clarification.

## 2025-01-17 DIAGNOSIS — M25.532 LEFT WRIST PAIN: Primary | ICD-10-CM

## 2025-01-17 DIAGNOSIS — M75.01 ADHESIVE CAPSULITIS OF RIGHT SHOULDER: Primary | ICD-10-CM

## 2025-01-20 ENCOUNTER — PATIENT MESSAGE (OUTPATIENT)
Dept: PEDIATRICS | Facility: CLINIC | Age: 55
End: 2025-01-20
Payer: MEDICARE

## 2025-01-21 ENCOUNTER — PATIENT MESSAGE (OUTPATIENT)
Dept: ORTHOPEDICS | Facility: CLINIC | Age: 55
End: 2025-01-21
Payer: MEDICARE

## 2025-01-24 ENCOUNTER — TELEPHONE (OUTPATIENT)
Dept: PRIMARY CARE CLINIC | Facility: CLINIC | Age: 55
End: 2025-01-24
Payer: MEDICARE

## 2025-01-24 ENCOUNTER — TELEPHONE (OUTPATIENT)
Dept: ORTHOPEDICS | Facility: CLINIC | Age: 55
End: 2025-01-24
Payer: MEDICARE

## 2025-01-24 NOTE — TELEPHONE ENCOUNTER
----- Message from Nurse Wilkins sent at 1/24/2025  1:51 PM CST -----    ----- Message -----  From: Edilma Singh  Sent: 1/24/2025   1:47 PM CST  To: [facility] Staff    Type:  Sooner Apoointment Request    Caller is requesting a sooner appointment.  Caller declined first available appointment listed below.  Caller will not accept being placed on the waitlist and is requesting a message be sent to doctor.  Name of Caller: pt  When is the first available appointment? 3/12/25  Symptoms: reschedule of 1/22/25 appt (pt mentioned it was regarding an appt before a possible surgery)  Would the patient rather a call back or a response via [facility]? call  Best Call Back Number: [phone]  Additional Information: pt would like this appt to be in time frame of 1/27/25- 1/31/25

## 2025-01-24 NOTE — TELEPHONE ENCOUNTER
----- Message from Cindy sent at 1/23/2025  4:02 PM CST -----  Regarding: Reschedule  Good Evening,  I reached out to the patient to reschedule her canceled appointment.The patient is requesting a sooner appointment.Can you please contact the patient to reschedule?Contact Information  [phone].      Thank you

## 2025-01-29 ENCOUNTER — OFFICE VISIT (OUTPATIENT)
Dept: PRIMARY CARE CLINIC | Facility: CLINIC | Age: 55
End: 2025-01-29
Payer: MEDICARE

## 2025-01-29 VITALS
BODY MASS INDEX: 24.18 KG/M2 | SYSTOLIC BLOOD PRESSURE: 122 MMHG | OXYGEN SATURATION: 98 % | HEIGHT: 63 IN | DIASTOLIC BLOOD PRESSURE: 76 MMHG | HEART RATE: 75 BPM | RESPIRATION RATE: 17 BRPM | WEIGHT: 136.44 LBS

## 2025-01-29 DIAGNOSIS — E78.2 MIXED HYPERLIPIDEMIA: ICD-10-CM

## 2025-01-29 DIAGNOSIS — R53.82 CHRONIC FATIGUE: ICD-10-CM

## 2025-01-29 DIAGNOSIS — I10 ESSENTIAL HYPERTENSION, BENIGN: ICD-10-CM

## 2025-01-29 DIAGNOSIS — R22.1 NECK NODULE: Primary | ICD-10-CM

## 2025-01-29 DIAGNOSIS — R73.03 PREDIABETES: ICD-10-CM

## 2025-01-29 DIAGNOSIS — T78.40XA ALLERGY, INITIAL ENCOUNTER: ICD-10-CM

## 2025-01-29 DIAGNOSIS — L50.9 URTICARIA: ICD-10-CM

## 2025-01-29 DIAGNOSIS — E16.2 HYPOGLYCEMIA: ICD-10-CM

## 2025-01-29 DIAGNOSIS — D21.9 FIBROIDS: ICD-10-CM

## 2025-01-29 DIAGNOSIS — R10.10 PAIN OF UPPER ABDOMEN: ICD-10-CM

## 2025-01-29 PROCEDURE — 99214 OFFICE O/P EST MOD 30 MIN: CPT | Mod: S$GLB,,, | Performed by: INTERNAL MEDICINE

## 2025-01-29 PROCEDURE — 3074F SYST BP LT 130 MM HG: CPT | Mod: CPTII,S$GLB,, | Performed by: INTERNAL MEDICINE

## 2025-01-29 PROCEDURE — 99999 PR PBB SHADOW E&M-EST. PATIENT-LVL IV: CPT | Mod: PBBFAC,,, | Performed by: INTERNAL MEDICINE

## 2025-01-29 PROCEDURE — 3078F DIAST BP <80 MM HG: CPT | Mod: CPTII,S$GLB,, | Performed by: INTERNAL MEDICINE

## 2025-01-29 PROCEDURE — 3008F BODY MASS INDEX DOCD: CPT | Mod: CPTII,S$GLB,, | Performed by: INTERNAL MEDICINE

## 2025-01-29 PROCEDURE — 1160F RVW MEDS BY RX/DR IN RCRD: CPT | Mod: CPTII,S$GLB,, | Performed by: INTERNAL MEDICINE

## 2025-01-29 PROCEDURE — 1159F MED LIST DOCD IN RCRD: CPT | Mod: CPTII,S$GLB,, | Performed by: INTERNAL MEDICINE

## 2025-01-29 NOTE — PROGRESS NOTES
Subjective:       Patient ID: Carol Dewey is a 54 y.o. female.    Chief Complaint: Follow-up (2 month)    HPI  History of Present Illness    CHIEF COMPLAINT:  Carol presents today with abdominal pain.    ABDOMINAL PAIN:  She reports bilateral upper abdominal pain described as uncomfortable and epigastric not related to food no n/v/d or constipation . No fever chill night sweat wt loss    FATIGUE AND HYPOGLYCEMIC EPISODES:  She experiences significant fatigue and weakness, often feeling the need to doze off while sitting. She reports episodes of feeling shaky with weakness that improve with eating.  Patient also c/o feeling a lump in her lower neck below her voice box no dysphagia  RECENT PROCEDURES:  Colonoscopy was normal.    LABS:  Blood glucose was 248 mg/dL two months ago. A1c was 6.2% five months ago.    MEDICATIONS:  She takes acid reducer 40 mg for abdominal pain and Montelukast for allergies.    ALLERGIES:  She has a history of severe allergic reaction to Levaquin requiring ER visit at 1 AM. Reaction manifested as widespread rash from head to toe with red and black discoloration of ears. Allergy medicine was ineffective for this reaction.  Pt also c/o episode of low glucose with sweating better whe consume sweet she does not have glucose meter to monitor no sycope    ROS:  General: -fever, -chills, +fatigue, -weight gain, -weight loss  Eyes: -vision changes, -redness, -discharge  ENT: -ear pain, -nasal congestion, -sore throat  Cardiovascular: -chest pain, -palpitations, -lower extremity edema  Respiratory: -cough, -shortness of breath  Gastrointestinal: +abdominal pain, -nausea, -vomiting, -diarrhea, -constipation, -blood in stool  Genitourinary: -dysuria, -hematuria, -frequency  Musculoskeletal: -joint pain, -muscle pain  Skin: -rash, -lesion  Neurological: -headache, -dizziness, -numbness, -tingling, +weakness  Psychiatric: -anxiety, -depression, -sleep difficulty       Review of Systems     Objective:      Physical Exam  Physical Exam    General: No acute distress. Well-developed. Well-nourished.  Eyes: EOMI. Sclerae anicteric.  HENT: Normocephalic. Atraumatic. Nares patent. Moist oral mucosa.  Ears: Bilateral TMs clear. Bilateral EACs clear.  Cardiovascular: Regular rate. Regular rhythm. No murmurs. No rubs. No gallops. Normal S1, S2.  Respiratory: Normal respiratory effort. Clear to auscultation bilaterally. No rales. No rhonchi. No wheezing.  Abdomen: Soft. Non-tender. Non-distended. Normoactive bowel sounds.  Musculoskeletal: No  obvious deformity.  Extremities: No lower extremity edema.  Neurological: Alert & oriented x3. No slurred speech. Normal gait.  Psychiatric: Normal mood. Normal affect. Good insight. Good judgment.  Skin: Warm. Dry. No rash.           Assessment:       1. Neck nodule    2. Urticaria    3. Pain of upper abdomen    4. Fibroids    5. Essential hypertension, benign    6. Prediabetes    7. Mixed hyperlipidemia    8. Chronic fatigue    9. Allergy, initial encounter    10. Hypoglycemia        Plan:       Neck nodule  -     US Thyroid; Future; Expected date: 01/31/2025    Urticaria  -     IGE; Future; Expected date: 01/29/2025    Pain of upper abdomen  -     US Abdomen Complete; Future; Expected date: 02/05/2025  -     CBC Auto Differential; Future; Expected date: 01/29/2025  -     Comprehensive Metabolic Panel; Future; Expected date: 01/29/2025  -     Lipase; Future; Expected date: 01/29/2025    Fibroids    Essential hypertension, benign  Comments:  BP normal continue with tx    Prediabetes  -     Hemoglobin A1C; Future; Expected date: 01/29/2025  -     blood-glucose meter kit; To check BG 2 times daily, to use with insurance preferred meter  Dispense: 1 each; Refill: 0  -     lancets Misc; To check BG 2 times daily, to use with insurance preferred meter  Dispense: 100 each; Refill: 1  -     blood sugar diagnostic Strp; To check BG 2 times daily, to use with insurance preferred  meter  Dispense: 100 strip; Refill: 1    Mixed hyperlipidemia  -     Lipid Panel; Future; Expected date: 01/29/2025    Chronic fatigue  -     TSH; Future; Expected date: 01/29/2025  -     T4, Free; Future; Expected date: 01/29/2025    Allergy, initial encounter  -     IGE; Future; Expected date: 01/29/2025    Hypoglycemia  -     blood-glucose meter kit; To check BG 2 times daily, to use with insurance preferred meter  Dispense: 1 each; Refill: 0  -     lancets Misc; To check BG 2 times daily, to use with insurance preferred meter  Dispense: 100 each; Refill: 1  -     blood sugar diagnostic Strp; To check BG 2 times daily, to use with insurance preferred meter  Dispense: 100 strip; Refill: 1      Assessment & Plan    IMPRESSION:  - Assessed abdominal pain, noting it is likely unrelated to uterine fibroids due to location  - Evaluated fatigue and weakness symptoms  - Reviewed recent lab results, noting elevated blood sugar (248) from 2 months ago  - Will recheck A1c due to time elapsed since last test (6.2% 5 months ago)  - Considered possibility of hypoglycemic episodes based on patient's reported symptoms  - Assessed neck symptoms, considering family history of neck surgery  - Noted patient's allergic reaction to previous antibiotic (likely levofloxacin)    GASTROESOPHAGEAL REFLUX DISEASE (GERD):  - Continue Omeprazole 40mg for acid reduction.  - Instruct the patient to continue taking antacid medication (40mg Omeprazole) for stomach protection.    ABDOMINAL PAIN:  - Order ultrasound of abdomen to evaluate liver, kidney, pancreas, stomach, and spleen.  - Carol reports ongoing abdominal pain on both sides of the upper abdomen, described as stabbing and uncomfortable, with the pain traveling.  - Acknowledge the pain location is in the upper abdomen, not related to the uterine fibroids.    UTERINE FIBROIDS:  - Explain that uterine fibroids are benign tumors that can cause pain and increased bleeding during  menstruation.  - Review follow-up ultrasound results showing thickening of the uterus wall and multiple fibroid tumors.  - Confirm the patient's follow-up visit with OB/GYN next month to discuss ultrasound results.    HYPERLIPIDEMIA:  - Continue cholesterol medication and provide refill.  - Note that the patient reports feeling better when taking cholesterol medicine.    DIABETES:  - Discuss the potential for hypoglycemia and its symptoms.  - Instruct the patient to monitor for signs of hypoglycemia.  - Order blood test to recheck A1c.  - Note that the patient reports feeling weak, shaky, and needing to eat.  - Record recent blood sugar was elevated at 248 mg/dL, with A1c from 5 months ago at 6.2%.  - Provide a glucose monitoring machine for home use.  - Discuss potential future treatments if A1c is high, including medication or weekly injections.    SEVERE ALLERGIC REACTION:  - Inform the patient about the risk of severe allergic reaction to levofloxacin in the future.  - Instruct the patient to avoid taking levofloxacin in the future due to allergic reaction.  - Prescribe oral steroid as needed for severe allergic reactions.  - Advise the patient to contact the office if experiencing severe allergic reactions.  - Note that the patient reports a severe allergic reaction to antibiotic, resulting in ER visit.  - Confirm anaphylactic reaction to levofloxacin (antibiotic).  - Prescribe Diphenhydramine and steroids for emergency use in case of future allergic reactions.  - Note the patient's history of severe allergic reaction to antibiotic.  - Acknowledge patient's allergy status and risk of future reactions.    ALLERGIES:  - Prescribe Montelukast for allergy symptoms.  - Prescribe Diphenhydramine as needed for allergic reactions.    NECK MASS:  - Order ultrasound of neck to evaluate for masses or lymph nodes.  - Note that the patient expresses concern about a lump in the neck area.  - Suspect a congenital cyst in the  "neck area.  - Mention possibility of biopsy if a solid mass is found.    SHOULDER CONDITION:  - Note that the patient reports worsening shoulder condition.  - Record that previous injection treatment was ineffective.  - Suggest the injection may have been in the wrong place or the condition is too severe for injection to help.  - Confirm the patient has an upcoming x-ray for the shoulder.    - Note the patient's desire for surgery on the shoulder.  - Recommend consultation with an orthopedic specialist for further treatment options.    FOLLOW UP:  - None Follow up after ultrasound results are available.           Medication List with Changes/Refills   New Medications    BLOOD SUGAR DIAGNOSTIC STRP    To check BG 2 times daily, to use with insurance preferred meter    BLOOD-GLUCOSE METER KIT    To check BG 2 times daily, to use with insurance preferred meter    LANCETS MISC    To check BG 2 times daily, to use with insurance preferred meter   Current Medications    ACETAMINOPHEN (TYLENOL) 650 MG TBSR    Take 1 tablet (650 mg total) by mouth 2 (two) times a day.    AMLODIPINE (NORVASC) 10 MG TABLET    TAKE ONE TABLET BY MOUTH ONCE DAILY    ATORVASTATIN (LIPITOR) 20 MG TABLET    Take 1 tablet (20 mg total) by mouth once daily.    BORIC ACID, BULK, POWD    Boric acid hydrocortisone powder to [employer] with insufflator.  Keep the ear slightly powdered like a sugar cookie".  Use an otoscope as discussed to be sure.  If filled up with powder use over-the-counter rubbing alcohol to dissolve and restart.    CELECOXIB (CELEBREX) 100 MG CAPSULE    TAKE 1 CAPSULE(100 MG) BY MOUTH TWICE DAILY    CLOBETASOL (TEMOVATE) 0.05 % EXTERNAL SOLUTION    Apply a for 5 drops to the LEFT ear BID for 1 week.  Repeat for 1 more week if necessary not for chronic use    DIPHENHYDRAMINE (BENADRYL) 25 MG CAPSULE    Take 1 capsule (25 mg total) by mouth every 6 (six) hours as needed for Itching or Allergies.    FLUOCINOLONE ACETONIDE OIL " 0.01 % DROP    3-4 drops to the affected ear(s) twice daily no more than a week at a time as needed for itching and scaling    FLUTICASONE PROPIONATE (FLONASE) 50 MCG/ACTUATION NASAL SPRAY    1 spray (50 mcg total) by Each Nostril route 2 (two) times daily.    GABAPENTIN (NEURONTIN) 300 MG CAPSULE    Take 1 capsule (300 mg total) by mouth 2 (two) times daily.    MINERAL OIL TOP (MINERAL OIL LIGHT) OIL    Apply 4-5 drops BOTH ear canals with a dropper nightly    MONTELUKAST (SINGULAIR) 10 MG TABLET    TAKE 1 TABLET(10 MG) BY MOUTH EVERY EVENING    MYRBETRIQ 50 MG TB24    TAKE 1 TABLET BY MOUTH EVERY DAY    NORETHINDRONE (MICRONOR) 0.35 MG TABLET    Take 1 tablet (0.35 mg total) by mouth once a week.    OMEPRAZOLE (PRILOSEC) 40 MG CAPSULE    Take 1 capsule (40 mg total) by mouth once daily.    ONDANSETRON (ZOFRAN-ODT) 4 MG TBDL    Take 1 tablet (4 mg total) by mouth every 8 (eight) hours as needed (nausea/vomiting).    PAROXETINE (PAXIL) 10 MG TABLET    TAKE 1 TABLET(10 MG) BY MOUTH EVERY MORNING    PROCHLORPERAZINE (COMPAZINE) 10 MG TABLET    Take 1 tablet (10 mg total) by mouth 3 (three) times daily.        This note was generated with the assistance of ambient listening technology. Verbal consent was obtained by the patient and accompanying visitor(s) for the recording of patient appointment to facilitate this note. I attest to having reviewed and edited the generated note for accuracy, though some syntax or spelling errors may persist. Please contact the author of this note for any clarification.

## 2025-01-30 ENCOUNTER — OFFICE VISIT (OUTPATIENT)
Dept: ORTHOPEDICS | Facility: CLINIC | Age: 55
End: 2025-01-30
Payer: MEDICARE

## 2025-01-30 VITALS
WEIGHT: 137.44 LBS | DIASTOLIC BLOOD PRESSURE: 85 MMHG | BODY MASS INDEX: 24.35 KG/M2 | SYSTOLIC BLOOD PRESSURE: 123 MMHG | HEART RATE: 94 BPM

## 2025-01-30 DIAGNOSIS — G56.02 LEFT CARPAL TUNNEL SYNDROME: Primary | ICD-10-CM

## 2025-01-30 PROCEDURE — 3008F BODY MASS INDEX DOCD: CPT | Mod: CPTII,S$GLB,,

## 2025-01-30 PROCEDURE — 99999 PR PBB SHADOW E&M-EST. PATIENT-LVL IV: CPT | Mod: PBBFAC,HCNC,,

## 2025-01-30 PROCEDURE — 3079F DIAST BP 80-89 MM HG: CPT | Mod: CPTII,S$GLB,,

## 2025-01-30 PROCEDURE — 1159F MED LIST DOCD IN RCRD: CPT | Mod: CPTII,S$GLB,,

## 2025-01-30 PROCEDURE — 99214 OFFICE O/P EST MOD 30 MIN: CPT | Mod: S$GLB,,,

## 2025-01-30 PROCEDURE — 3074F SYST BP LT 130 MM HG: CPT | Mod: CPTII,S$GLB,,

## 2025-01-30 NOTE — PROGRESS NOTES
SUBJECTIVE:      Chief Complaint: left carpal tunnel syndrome    History of Present Illness:  Patient is a 54 y.o. female who presents today with complaints of left hand pain.  She is 6 months status post right shoulder arthroscopy.  She previously saw Dr. Grover for left hand numbness and had an injection in October.  Previously had an EMG as well which showed mild-to-moderate carpal tunnel syndrome on the left.  Symptoms and median nerve distribution.  States injection did not help at her pain is overall very bothersome.  She is interested in definitive solution.       Past Medical History:   Diagnosis Date    Allergy     Angio-edema     Degenerative disc disease     HTN (hypertension)     Urticaria     Vertigo      Past Surgical History:   Procedure Laterality Date    ARTHROSCOPIC REPAIR OF ROTATOR CUFF OF SHOULDER Right 7/15/2024    Procedure: REPAIR, ROTATOR CUFF, ARTHROSCOPIC;  Surgeon: Dre Grover MD;  Location: [facility];  Service: Orthopedics;  Laterality: Right;    ARTHROSCOPIC TENOTOMY OF BICEPS TENDON Right 7/15/2024    Procedure: TENOTOMY, BICEPS, ARTHROSCOPIC;  Surgeon: Dre Grover MD;  Location: [facility] OR;  Service: Orthopedics;  Laterality: Right;    ARTHROSCOPY, SHOULDER (Right) Right 07/15/2024     SECTION      CHOLECYSTECTOMY  2024    COLONOSCOPY  2024    COLONOSCOPY N/A 2024    Procedure: COLONOSCOPY;  Surgeon: Manuel Roblero MD;  Location: [facility];  Service: Endoscopy;  Laterality: N/A;    DECOMPRESSION OF SUBACROMIAL SPACE Right 7/15/2024    Procedure: DECOMPRESSION, SUBACROMIAL SPACE;  Surgeon: Dre Grover MD;  Location: [facility] OR;  Service: Orthopedics;  Laterality: Right;    DECOMPRESSION, SUBACROMIAL SPACE (Right) Right 07/15/2024    EGD, WITH CLOSED BIOPSY  2024    EPIDURAL STEROID INJECTION Bilateral 2023    Procedure: Injection, Steroid, Epidural;  Surgeon: Andi Watson MD;  Location: [facility];  Service:  Neurosurgery;  Laterality: Bilateral;  Procedure:Bilateral SI joint block and steriod injection  Length of procedure:30 minutes  LOS:0 minutes  Anesthesia:MAC  Radiology:C-arm  Bed:Regular Bed  Position:Prone    ESOPHAGOGASTRODUODENOSCOPY N/A 01/25/2024    Procedure: EGD (ESOPHAGOGASTRODUODENOSCOPY);  Surgeon: Manuel Roblero MD;  Location: [facility];  Service: Endoscopy;  Laterality: N/A;    LAPAROSCOPIC CHOLECYSTECTOMY N/A 03/27/2024    Procedure: CHOLECYSTECTOMY, LAPAROSCOPIC;  Surgeon: Giovanni Gillespie MD;  [place] [facility] [place] [place] [place];  Service: General;  Laterality: N/A;    REPAIR, ROTATOR CUFF, ARTHROSCOPIC (Right) Right 07/15/2024    SHOULDER ARTHROSCOPY Right 7/15/2024    Procedure: ARTHROSCOPY, SHOULDER;  Surgeon: Dre Grover MD;  Location: [facility];  Service: Orthopedics;  Laterality: Right;  right shoulder scope, DCE, RCR with SAD, biceps tenodesis, conmed video, beanbag, epi first 2 bags, IODINE allergy (clear)    TENOTOMY, BICEPS, ARTHROSCOPIC (Right) Right 07/15/2024    TONSILLECTOMY      TRANSFORAMINAL EPIDURAL INJECTION OF STEROID Bilateral 03/21/2019    Procedure: Injection,steroid,epidural,transforaminal approach---bilateral L5/S1;  Surgeon: Sridhar Albert III, MD;  Location: [facility];  Service: Pain Management;  Laterality: Bilateral;     Review of patient's allergies indicates:   Allergen Reactions    Augmentin [amoxicillin-pot clavulanate] Rash     Rash to arms, legs, shoulders and torso.  States has happened 2 times with this medication.    Iodine and iodide containing products Rash    Gemtesa [vibegron] Other (See Comments)     Weakness & dizziness    Shellfish containing products Hives     Other reaction(s): Hives    Tramadol Itching    Watermelon Other (See Comments)     Irritates esophagus    Amoxapine Rash    Levofloxacin Rash    Norco [hydrocodone-acetaminophen] Rash    Papaya Rash    Penicillins Rash     Social History     Social History Narrative    Not on file  "    Family History   Problem Relation Name Age of Onset    Asthma Mother      Heart disease Mother      Cancer Father      Breast cancer Paternal Cousin      Colon cancer Neg Hx      Ovarian cancer Neg Hx           Current Outpatient Medications:     acetaminophen (TYLENOL) 650 MG TbSR, Take 1 tablet (650 mg total) by mouth 2 (two) times a day., Disp: 60 tablet, Rfl: 0    amLODIPine (NORVASC) 10 MG tablet, TAKE ONE TABLET BY MOUTH ONCE DAILY, Disp: 90 tablet, Rfl: 3    atorvastatin (LIPITOR) 20 MG tablet, Take 1 tablet (20 mg total) by mouth once daily., Disp: 90 tablet, Rfl: 3    boric acid, bulk, Powd, Boric acid hydrocortisone powder to [employer] with insufflator.  Keep the ear slightly powdered like a sugar cookie".  Use an otoscope as discussed to be sure.  If filled up with powder use over-the-counter rubbing alcohol to dissolve and restart., Disp: 1 each, Rfl: 1    celecoxib (CELEBREX) 100 MG capsule, TAKE 1 CAPSULE(100 MG) BY MOUTH TWICE DAILY, Disp: 60 capsule, Rfl: 1    clobetasoL (TEMOVATE) 0.05 % external solution, Apply a for 5 drops to the LEFT ear BID for 1 week.  Repeat for 1 more week if necessary not for chronic use, Disp: 25 mL, Rfl: 0    diphenhydrAMINE (BENADRYL) 25 mg capsule, Take 1 capsule (25 mg total) by mouth every 6 (six) hours as needed for Itching or Allergies., Disp: 20 capsule, Rfl: 0    fluocinolone acetonide oiL 0.01 % Drop, 3-4 drops to the affected ear(s) twice daily no more than a week at a time as needed for itching and scaling, Disp: 20 mL, Rfl: 0    fluticasone propionate (FLONASE) 50 mcg/actuation nasal spray, 1 spray (50 mcg total) by Each Nostril route 2 (two) times daily., Disp: 16 g, Rfl: 3    gabapentin (NEURONTIN) 300 MG capsule, Take 1 capsule (300 mg total) by mouth 2 (two) times daily., Disp: 60 capsule, Rfl: 11    mineral oil TOP (MINERAL OIL LIGHT) Oil, Apply 4-5 drops BOTH ear canals with a dropper nightly, Disp: , Rfl:     montelukast (SINGULAIR) 10 mg " tablet, TAKE 1 TABLET(10 MG) BY MOUTH EVERY EVENING, Disp: 90 tablet, Rfl: 3    MYRBETRIQ 50 mg Tb24, TAKE 1 TABLET BY MOUTH EVERY DAY, Disp: 90 tablet, Rfl: 0    norethindrone (MICRONOR) 0.35 mg tablet, Take 1 tablet (0.35 mg total) by mouth once a week., Disp: 28 tablet, Rfl: 11    omeprazole (PRILOSEC) 40 MG capsule, Take 1 capsule (40 mg total) by mouth once daily., Disp: 90 capsule, Rfl: 3    ondansetron (ZOFRAN-ODT) 4 MG TbDL, Take 1 tablet (4 mg total) by mouth every 8 (eight) hours as needed (nausea/vomiting)., Disp: 30 tablet, Rfl: 0    paroxetine (PAXIL) 10 MG tablet, TAKE 1 TABLET(10 MG) BY MOUTH EVERY MORNING, Disp: 60 tablet, Rfl: 0    prochlorperazine (COMPAZINE) 10 MG tablet, Take 1 tablet (10 mg total) by mouth 3 (three) times daily., Disp: 20 tablet, Rfl: 3  No current facility-administered medications for this visit.    Facility-Administered Medications Ordered in Other Visits:     0.9%  NaCl infusion, , Intravenous, Continuous, Manuel Roblero MD, New Bag at 07/15/24 1217      Review of Systems:  Constitutional: no fever or chills  Eyes: no visual changes  ENT: no nasal congestion or sore throat  Respiratory: no cough or shortness of breath  Cardiovascular: no chest pain  Gastrointestinal: no nausea or vomiting, tolerating diet  Musculoskeletal: numbness/tingling    OBJECTIVE:      Vital Signs (Most Recent):  Vitals:    01/30/25 1201   BP: 123/85   BP Location: Left arm   Patient Position: Sitting   Pulse: 94   Weight: 62.4 kg (137 lb 7.3 oz)     Body mass index is 24.35 kg/m².      Physical Exam:  Constitutional: The patient appears well-developed and well-nourished. No distress.   Skin: No lesions appreciated  Head: Normocephalic and atraumatic.   Nose: Nose normal.   Ears: No deformities seen  Eyes: Conjunctivae and EOM are normal.   Neck: No tracheal deviation present.   Cardiovascular: Normal rate and intact distal pulses.    Pulmonary/Chest: Effort normal. No respiratory distress.    Abdominal: There is no guarding.   Neurological: The patient is alert.   Psychiatric: The patient has a normal mood and affect.     Left Hand/Wrist Examination:    Observation/Inspection:  Swelling  none    Deformity  none  Discoloration  none     Scars   none    Atrophy  none    HAND/WRIST EXAMINATION:  Finkelstein's Test   Neg  WHAT Test    Neg  Snuff box tenderness   Neg  Tirado's Test    Neg  Hook of Hamate Tenderness  Neg  CMC grind    Neg  Circumduction test   Neg  TTP     None     Neurovascular Exam:  Digits WWP, brisk CR < 3s throughout  NVI motor/LTS to M/R/U nerves, radial pulse 2+  Tinel's Test - Carpal Tunnel  +  Tinel's Test - Cubital Tunnel  Neg  Phalen's Test    +  Median Nerve Compression Test +  AIN Intact (O-Juliet), PIN Intact (Thumbs Up), Ulnar Intact (scissors)    ROM hand full, painless    ROM wrist full, painless    ROM elbow full, painless    Abdomen not guarded  Respirations nonlabored  Perfusion intact    Diagnostic Results:     Imaging - I independently viewed the patient's imaging as well as the radiology report.  Xrays of the patient's bilateral hands  demonstrate no evidence of any obvious acute fractures or dislocations or significant degenerative changes.    EMG - mild on right, mild-to-moderate on left for carpal tunnel syndrome at the wrist    ASSESSMENT/PLAN:      1. Left carpal tunnel syndrome  Vital signs    Cleanse with Chlorhexidine (CHG)    Diet NPO    Place sequential compression device    Chlorohexidine Gluconate Bath    Case Request Operating Room: RELEASE, CARPAL TUNNEL    Full code    Place in Outpatient        Patient here for follow up of left carpal tunnel syndrome.  We discussed repeating injection today versus surgical intervention.  Discussed since injection did not help, it may not be the source of her pain however carpal tunnel surgery is lower risk.  We discussed primary goal is to preserve nerve function and secondary goal is symptoms resolve, which can take up  to a year.  Despite the risks patient elects to proceed with left carpal tunnel release.    I made the decision to obtain old records of the patient including previous notes and imaging. If new imaging was ordered today of the extremity or extremities evaluated. I independently reviewed and interpreted the xray as well as prior imaging. Reviewed imaging in detail with patient.     We discussed at length different treatment options including conservative vs surgical management. These include anti-inflammatories, acetaminophen, rest, ice, heat, formal physical therapy including strengthening and stretching exercises, home exercise programs, injections, and finally surgical intervention.      1. Imaging results reviewed today and discussed with Dre Grover MD. We reviewed with Carol Dewey today, the pathology and natural history of her diagnosis. We have discussed a variety of treatment options including medications, physical therapy and other alternative treatments. I also explained the indications, risks and benefits of surgery. After discussion, she decided to proceed with surgery. The decision was made to go forward with:  Left carpal tunnel release       The details of the surgical procedure were explained, including the location of probable incisions and a description of likely hardware and/or grafts to be used. The patient understands the likely convalescence after surgery. Also, we have thoroughly discussed the risks, benefits and alternatives to surgery, including, but not limited to, the risk of infection, joint stiffness, blood clot (including DVT and/or pulmonary embolus), neurologic and vascular injury.  It was explained that, if tissue has been repaired or reconstructed, there is a chance of failure, which may require further management.    I made the decision to obtain old records of the patient including previous notes and imaging. I independently reviewed and interpreted lab results today as  well as prior imaging.     2. Ice compress to the affected area 2-3x a day for 15-20 minutes as needed for pain management.    3. Case request placed, plan for surgery on 2/17/2025    Post-Op Medications to be prescribed:   Tylenol #1 Take 1-2 tablets every 4-6 hours PRN pain #12  Zofran 4mg oral disintegrating tablets every 8 hours PRN nausea/vomiting   Motrin 600 mg TID PRN     Medical Clearance: No  Hx of DVT,PE, anesthetic complications: No     Additional notes/concerns:  None     Opioid Disclosure  Today we discussed the reasons why the prescription is necessary as well as: the risks of addiction and overdose associated with opioid drugs and the dangers of taking opioid drugs with alcohol, benzodiazepines and other central nervous system depressants; alternative treatments that may be available; the risks associated with the use of the drugs being prescribed, specifically that opioids are highly addictive, even when taken as prescribed, that there is a risk of developing a physical or psychological dependence on the controlled dangerous substance, and that the risks of taking more opioids than prescribed or mixing sedatives, benzodiazepines or alcohol with opioids can result in fatal respiratory depression.    Follow up: 2 weeks post op  Xrays needed: none     All of the patient's questions were answered and the patient will contact us if they have any questions or concerns in the interim.

## 2025-01-30 NOTE — H&P
SUBJECTIVE:      Chief Complaint: left carpal tunnel syndrome    History of Present Illness:  Patient is a 54 y.o. female who presents today with complaints of left hand pain.  She is 6 months status post right shoulder arthroscopy.  She previously saw Dr. Grover for left hand numbness and had an injection in October.  Previously had an EMG as well which showed mild-to-moderate carpal tunnel syndrome on the left.  Symptoms and median nerve distribution.  States injection did not help at her pain is overall very bothersome.  She is interested in definitive solution.       Past Medical History:   Diagnosis Date    Allergy     Angio-edema     Degenerative disc disease     HTN (hypertension)     Urticaria     Vertigo      Past Surgical History:   Procedure Laterality Date    ARTHROSCOPIC REPAIR OF ROTATOR CUFF OF SHOULDER Right 7/15/2024    Procedure: REPAIR, ROTATOR CUFF, ARTHROSCOPIC;  Surgeon: Dre Grover MD;  Location: [facility];  Service: Orthopedics;  Laterality: Right;    ARTHROSCOPIC TENOTOMY OF BICEPS TENDON Right 7/15/2024    Procedure: TENOTOMY, BICEPS, ARTHROSCOPIC;  Surgeon: Dre Grover MD;  Location: [facility] OR;  Service: Orthopedics;  Laterality: Right;    ARTHROSCOPY, SHOULDER (Right) Right 07/15/2024     SECTION      CHOLECYSTECTOMY  2024    COLONOSCOPY  2024    COLONOSCOPY N/A 2024    Procedure: COLONOSCOPY;  Surgeon: Manuel Roblero MD;  Location: [facility];  Service: Endoscopy;  Laterality: N/A;    DECOMPRESSION OF SUBACROMIAL SPACE Right 7/15/2024    Procedure: DECOMPRESSION, SUBACROMIAL SPACE;  Surgeon: Dre Grover MD;  Location: [facility] OR;  Service: Orthopedics;  Laterality: Right;    DECOMPRESSION, SUBACROMIAL SPACE (Right) Right 07/15/2024    EGD, WITH CLOSED BIOPSY  2024    EPIDURAL STEROID INJECTION Bilateral 2023    Procedure: Injection, Steroid, Epidural;  Surgeon: Andi Watson MD;  Location: [facility];  Service:  Neurosurgery;  Laterality: Bilateral;  Procedure:Bilateral SI joint block and steriod injection  Length of procedure:30 minutes  LOS:0 minutes  Anesthesia:MAC  Radiology:C-arm  Bed:Regular Bed  Position:Prone    ESOPHAGOGASTRODUODENOSCOPY N/A 01/25/2024    Procedure: EGD (ESOPHAGOGASTRODUODENOSCOPY);  Surgeon: Manuel Roblero MD;  Location: [facility];  Service: Endoscopy;  Laterality: N/A;    LAPAROSCOPIC CHOLECYSTECTOMY N/A 03/27/2024    Procedure: CHOLECYSTECTOMY, LAPAROSCOPIC;  Surgeon: Giovanni Gillespie MD;  Location: [facility] OR;  Service: General;  Laterality: N/A;    REPAIR, ROTATOR CUFF, ARTHROSCOPIC (Right) Right 07/15/2024    SHOULDER ARTHROSCOPY Right 7/15/2024    Procedure: ARTHROSCOPY, SHOULDER;  Surgeon: Dre Grover MD;  Location: [facility];  Service: Orthopedics;  Laterality: Right;  right shoulder scope, DCE, RCR with SAD, biceps tenodesis, conmed video, beanbag, epi first 2 bags, IODINE allergy (clear)    TENOTOMY, BICEPS, ARTHROSCOPIC (Right) Right 07/15/2024    TONSILLECTOMY      TRANSFORAMINAL EPIDURAL INJECTION OF STEROID Bilateral 03/21/2019    Procedure: Injection,steroid,epidural,transforaminal approach---bilateral L5/S1;  Surgeon: Sridhar Albert III, MD;  Location: [facility];  Service: Pain Management;  Laterality: Bilateral;     Review of patient's allergies indicates:   Allergen Reactions    Augmentin [amoxicillin-pot clavulanate] Rash     Rash to arms, legs, shoulders and torso.  States has happened 2 times with this medication.    Iodine and iodide containing products Rash    Gemtesa [vibegron] Other (See Comments)     Weakness & dizziness    Shellfish containing products Hives     Other reaction(s): Hives    Tramadol Itching    Watermelon Other (See Comments)     Irritates esophagus    Amoxapine Rash    Levofloxacin Rash    Norco [hydrocodone-acetaminophen] Rash    Papaya Rash    Penicillins Rash     Social History     Social History Narrative    Not on file  "    Family History   Problem Relation Name Age of Onset    Asthma Mother      Heart disease Mother      Cancer Father      Breast cancer Paternal Cousin      Colon cancer Neg Hx      Ovarian cancer Neg Hx           Current Outpatient Medications:     acetaminophen (TYLENOL) 650 MG TbSR, Take 1 tablet (650 mg total) by mouth 2 (two) times a day., Disp: 60 tablet, Rfl: 0    amLODIPine (NORVASC) 10 MG tablet, TAKE ONE TABLET BY MOUTH ONCE DAILY, Disp: 90 tablet, Rfl: 3    atorvastatin (LIPITOR) 20 MG tablet, Take 1 tablet (20 mg total) by mouth once daily., Disp: 90 tablet, Rfl: 3    boric acid, bulk, Powd, Boric acid hydrocortisone powder to Professional Triggit with insufflator.  Keep the ear slightly powdered like a sugar cookie".  Use an otoscope as discussed to be sure.  If filled up with powder use over-the-counter rubbing alcohol to dissolve and restart., Disp: 1 each, Rfl: 1    celecoxib (CELEBREX) 100 MG capsule, TAKE 1 CAPSULE(100 MG) BY MOUTH TWICE DAILY, Disp: 60 capsule, Rfl: 1    clobetasoL (TEMOVATE) 0.05 % external solution, Apply a for 5 drops to the LEFT ear BID for 1 week.  Repeat for 1 more week if necessary not for chronic use, Disp: 25 mL, Rfl: 0    diphenhydrAMINE (BENADRYL) 25 mg capsule, Take 1 capsule (25 mg total) by mouth every 6 (six) hours as needed for Itching or Allergies., Disp: 20 capsule, Rfl: 0    fluocinolone acetonide oiL 0.01 % Drop, 3-4 drops to the affected ear(s) twice daily no more than a week at a time as needed for itching and scaling, Disp: 20 mL, Rfl: 0    fluticasone propionate (FLONASE) 50 mcg/actuation nasal spray, 1 spray (50 mcg total) by Each Nostril route 2 (two) times daily., Disp: 16 g, Rfl: 3    gabapentin (NEURONTIN) 300 MG capsule, Take 1 capsule (300 mg total) by mouth 2 (two) times daily., Disp: 60 capsule, Rfl: 11    mineral oil TOP (MINERAL OIL LIGHT) Oil, Apply 4-5 drops BOTH ear canals with a dropper nightly, Disp: , Rfl:     montelukast (SINGULAIR) 10 mg " tablet, TAKE 1 TABLET(10 MG) BY MOUTH EVERY EVENING, Disp: 90 tablet, Rfl: 3    MYRBETRIQ 50 mg Tb24, TAKE 1 TABLET BY MOUTH EVERY DAY, Disp: 90 tablet, Rfl: 0    norethindrone (MICRONOR) 0.35 mg tablet, Take 1 tablet (0.35 mg total) by mouth once a week., Disp: 28 tablet, Rfl: 11    omeprazole (PRILOSEC) 40 MG capsule, Take 1 capsule (40 mg total) by mouth once daily., Disp: 90 capsule, Rfl: 3    ondansetron (ZOFRAN-ODT) 4 MG TbDL, Take 1 tablet (4 mg total) by mouth every 8 (eight) hours as needed (nausea/vomiting)., Disp: 30 tablet, Rfl: 0    paroxetine (PAXIL) 10 MG tablet, TAKE 1 TABLET(10 MG) BY MOUTH EVERY MORNING, Disp: 60 tablet, Rfl: 0    prochlorperazine (COMPAZINE) 10 MG tablet, Take 1 tablet (10 mg total) by mouth 3 (three) times daily., Disp: 20 tablet, Rfl: 3  No current facility-administered medications for this visit.    Facility-Administered Medications Ordered in Other Visits:     0.9%  NaCl infusion, , Intravenous, Continuous, Manuel Roblero MD, New Bag at 07/15/24 1217      Review of Systems:  Constitutional: no fever or chills  Eyes: no visual changes  ENT: no nasal congestion or sore throat  Respiratory: no cough or shortness of breath  Cardiovascular: no chest pain  Gastrointestinal: no nausea or vomiting, tolerating diet  Musculoskeletal: numbness/tingling    OBJECTIVE:      Vital Signs (Most Recent):  Vitals:    01/30/25 1201   BP: 123/85   BP Location: Left arm   Patient Position: Sitting   Pulse: 94   Weight: 62.4 kg (137 lb 7.3 oz)     Body mass index is 24.35 kg/m².      Physical Exam:  Constitutional: The patient appears well-developed and well-nourished. No distress.   Skin: No lesions appreciated  Head: Normocephalic and atraumatic.   Nose: Nose normal.   Ears: No deformities seen  Eyes: Conjunctivae and EOM are normal.   Neck: No tracheal deviation present.   Cardiovascular: Normal rate and intact distal pulses.    Pulmonary/Chest: Effort normal. No respiratory distress.    Abdominal: There is no guarding.   Neurological: The patient is alert.   Psychiatric: The patient has a normal mood and affect.     Left Hand/Wrist Examination:    Observation/Inspection:  Swelling  none    Deformity  none  Discoloration  none     Scars   none    Atrophy  none    HAND/WRIST EXAMINATION:  Finkelstein's Test   Neg  WHAT Test    Neg  Snuff box tenderness   Neg  Tirado's Test    Neg  Hook of Hamate Tenderness  Neg  CMC grind    Neg  Circumduction test   Neg  TTP     None     Neurovascular Exam:  Digits WWP, brisk CR < 3s throughout  NVI motor/LTS to M/R/U nerves, radial pulse 2+  Tinel's Test - Carpal Tunnel  +  Tinel's Test - Cubital Tunnel  Neg  Phalen's Test    +  Median Nerve Compression Test +  AIN Intact (O-Juliet), PIN Intact (Thumbs Up), Ulnar Intact (scissors)    ROM hand full, painless    ROM wrist full, painless    ROM elbow full, painless    Abdomen not guarded  Respirations nonlabored  Perfusion intact    Diagnostic Results:     Imaging - I independently viewed the patient's imaging as well as the radiology report.  Xrays of the patient's bilateral hands  demonstrate no evidence of any obvious acute fractures or dislocations or significant degenerative changes.    EMG - mild on right, mild-to-moderate on left for carpal tunnel syndrome at the wrist    ASSESSMENT/PLAN:      1. Left carpal tunnel syndrome  Vital signs    Cleanse with Chlorhexidine (CHG)    Diet NPO    Place sequential compression device    Chlorohexidine Gluconate Bath    Case Request Operating Room: RELEASE, CARPAL TUNNEL    Full code    Place in Outpatient        Patient here for follow up of left carpal tunnel syndrome.  We discussed repeating injection today versus surgical intervention.  Discussed since injection did not help, it may not be the source of her pain however carpal tunnel surgery is lower risk.  We discussed primary goal is to preserve nerve function and secondary goal is symptoms resolve, which can take up  to a year.  Despite the risks patient elects to proceed with left carpal tunnel release.    I made the decision to obtain old records of the patient including previous notes and imaging. If new imaging was ordered today of the extremity or extremities evaluated. I independently reviewed and interpreted the xray as well as prior imaging. Reviewed imaging in detail with patient.     We discussed at length different treatment options including conservative vs surgical management. These include anti-inflammatories, acetaminophen, rest, ice, heat, formal physical therapy including strengthening and stretching exercises, home exercise programs, injections, and finally surgical intervention.      1. Imaging results reviewed today and discussed with Dre Grover MD. We reviewed with Carol Dewey today, the pathology and natural history of her diagnosis. We have discussed a variety of treatment options including medications, physical therapy and other alternative treatments. I also explained the indications, risks and benefits of surgery. After discussion, she decided to proceed with surgery. The decision was made to go forward with:  Left carpal tunnel release       The details of the surgical procedure were explained, including the location of probable incisions and a description of likely hardware and/or grafts to be used. The patient understands the likely convalescence after surgery. Also, we have thoroughly discussed the risks, benefits and alternatives to surgery, including, but not limited to, the risk of infection, joint stiffness, blood clot (including DVT and/or pulmonary embolus), neurologic and vascular injury.  It was explained that, if tissue has been repaired or reconstructed, there is a chance of failure, which may require further management.    I made the decision to obtain old records of the patient including previous notes and imaging. I independently reviewed and interpreted lab results today as  well as prior imaging.     2. Ice compress to the affected area 2-3x a day for 15-20 minutes as needed for pain management.    3. Case request placed, plan for surgery on 2/17/2025    Post-Op Medications to be prescribed:   Tylenol #1 Take 1-2 tablets every 4-6 hours PRN pain #12  Zofran 4mg oral disintegrating tablets every 8 hours PRN nausea/vomiting   Motrin 600 mg TID PRN     Medical Clearance: No  Hx of DVT,PE, anesthetic complications: No     Additional notes/concerns:  None     Opioid Disclosure  Today we discussed the reasons why the prescription is necessary as well as: the risks of addiction and overdose associated with opioid drugs and the dangers of taking opioid drugs with alcohol, benzodiazepines and other central nervous system depressants; alternative treatments that may be available; the risks associated with the use of the drugs being prescribed, specifically that opioids are highly addictive, even when taken as prescribed, that there is a risk of developing a physical or psychological dependence on the controlled dangerous substance, and that the risks of taking more opioids than prescribed or mixing sedatives, benzodiazepines or alcohol with opioids can result in fatal respiratory depression.    Follow up: 2 weeks post op  Xrays needed: none     All of the patient's questions were answered and the patient will contact us if they have any questions or concerns in the interim.

## 2025-01-31 ENCOUNTER — TELEPHONE (OUTPATIENT)
Dept: PRIMARY CARE CLINIC | Facility: CLINIC | Age: 55
End: 2025-01-31
Payer: MEDICARE

## 2025-01-31 RX ORDER — INSULIN PUMP SYRINGE, 3 ML
EACH MISCELLANEOUS
Qty: 1 EACH | Refills: 0 | Status: SHIPPED | OUTPATIENT
Start: 2025-01-31 | End: 2026-01-31

## 2025-01-31 RX ORDER — LANCETS
EACH MISCELLANEOUS
Qty: 100 EACH | Refills: 1 | Status: SHIPPED | OUTPATIENT
Start: 2025-01-31

## 2025-01-31 NOTE — TELEPHONE ENCOUNTER
----- Message from Elizabeth sent at 1/31/2025  9:54 AM CST -----  Patient said that Dr Trinidad was going to order a test for her throat please check on this

## 2025-01-31 NOTE — TELEPHONE ENCOUNTER
Patient called and informed that you were supposed to order rx for her diabetes and check her blood sugar. Please advise.

## 2025-02-04 ENCOUNTER — CLINICAL SUPPORT (OUTPATIENT)
Dept: AUDIOLOGY | Facility: CLINIC | Age: 55
End: 2025-02-04
Payer: MEDICARE

## 2025-02-04 ENCOUNTER — TELEPHONE (OUTPATIENT)
Dept: PRIMARY CARE CLINIC | Facility: CLINIC | Age: 55
End: 2025-02-04
Payer: MEDICARE

## 2025-02-04 DIAGNOSIS — H93.12 TINNITUS, LEFT EAR: ICD-10-CM

## 2025-02-04 DIAGNOSIS — H90.72 MIXED CONDUCTIVE AND SENSORINEURAL HEARING LOSS OF LEFT EAR WITH UNRESTRICTED HEARING OF RIGHT EAR: Primary | ICD-10-CM

## 2025-02-04 DIAGNOSIS — J30.2 SEASONAL ALLERGIC RHINITIS, UNSPECIFIED TRIGGER: ICD-10-CM

## 2025-02-04 DIAGNOSIS — H90.12 CONDUCTIVE HEARING LOSS OF LEFT EAR WITH UNRESTRICTED HEARING OF RIGHT EAR: ICD-10-CM

## 2025-02-04 DIAGNOSIS — H73.12 CHRONIC MYRINGITIS, LEFT: ICD-10-CM

## 2025-02-04 DIAGNOSIS — E11.9 CONTROLLED TYPE 2 DIABETES MELLITUS WITHOUT COMPLICATION, WITHOUT LONG-TERM CURRENT USE OF INSULIN: Primary | ICD-10-CM

## 2025-02-04 PROCEDURE — 92557 COMPREHENSIVE HEARING TEST: CPT | Mod: S$GLB,,, | Performed by: AUDIOLOGIST

## 2025-02-04 PROCEDURE — 92567 TYMPANOMETRY: CPT | Mod: S$GLB,,, | Performed by: AUDIOLOGIST

## 2025-02-04 RX ORDER — LEVOCETIRIZINE DIHYDROCHLORIDE 5 MG/1
5 TABLET, FILM COATED ORAL NIGHTLY
Qty: 30 TABLET | Refills: 1 | Status: SHIPPED | OUTPATIENT
Start: 2025-02-04 | End: 2026-02-04

## 2025-02-04 RX ORDER — MONTELUKAST SODIUM 10 MG/1
10 TABLET ORAL NIGHTLY
Qty: 90 TABLET | Refills: 3 | Status: SHIPPED | OUTPATIENT
Start: 2025-02-04

## 2025-02-04 RX ORDER — METFORMIN HYDROCHLORIDE 500 MG/1
500 TABLET ORAL 2 TIMES DAILY WITH MEALS
Qty: 180 TABLET | Refills: 3 | Status: SHIPPED | OUTPATIENT
Start: 2025-02-04 | End: 2026-02-04

## 2025-02-04 NOTE — PROGRESS NOTES
Carol Dewey was seen today in the clinic for a follow-up audiologic evaluation as referred by Dr. Keene in ENT. Mrs. Dewey reported she perceives her left ear as significantly improved since her last audiogram. She noted some burning when she uses the drops in her ears but it goes away quickly. Mrs. Dewey reported that her hearing seems to be getting better. She noted occasional tinnitus in her left ear that is only noticeable every so often that does not disrupt sleep or concentration. She noted vertigo described as room spinning that lasts a few minutes and goes away if she lays down for a few minutes. Mrs. Dewey noted that she is not sure if it is due to her diabetes numbers or her ears. Mrs. Dewey is not currently working due to multiple health challenges.     Otoscopy revealed clear EAC's with visible TM's in both ears.    Tympanometry revealed Type A in the right ear and Type A in the left ear.     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      Speech reception thresholds were noted at 10 dB in the right ear and 15 dB in the left ear.    Speech discrimination scores were 96% in the right ear and 96% in the left ear.    Results were reviewed with Mrs. Dewey in detail following testing; hearing aids were not recommended at this time. She was advised to follow-up with ENT as scheduled in March.     Recommendations:  Follow-up with ENT as scheduled  Annual audiogram  Hearing protection when in noise

## 2025-02-04 NOTE — TELEPHONE ENCOUNTER
----- Message from Lakesha sent at 2/4/2025  4:23 PM CST -----  Contact: Self/[phone]  .1MEDICALADVICE     Patient is calling for Medical Advice regarding:medication not sent to the pharmacy       Pharmacy name and phone#: [id number] - [place] - [address] AT [facility] [address]  [address] [address]  Phone: [phone] Fax: [phone]       Patient wants a call back or thru [facility]: call back     Comments: pt said that she is calling in regards to the doctor did not send her rx's for her Diabetes and allergy medication to the pharmacy, pt called on yesterday and never received a return call and the medications still have not been sent to the pharmacy pt is requesting that the doctor send the rx's asap bc her blood sugar is still elevated and she does not feel well , she stated that the rx's were supposed to be sent since last week when she was seen in the office

## 2025-02-07 ENCOUNTER — TELEPHONE (OUTPATIENT)
Dept: PRIMARY CARE CLINIC | Facility: CLINIC | Age: 55
End: 2025-02-07
Payer: MEDICARE

## 2025-02-07 NOTE — TELEPHONE ENCOUNTER
----- Message from Christiano sent at 2/6/2025  4:23 PM CST -----  Regarding: Returning Call for Office  Contact: Pt [phone]  .1MEDICALADVICE     Patient is calling for Medical Advice regarding: Patient says she got a call from this office in regard to her results for US on Thyroid. She said she believes there was something abnormal in her results. She would like a call back from the office when available. Please call patient back to discuss results at number provided.    How long has patient had these symptoms:    Pharmacy name and phone#:    Patient wants a call back or thru [facility]: Call    Comments:    Please advise patient replies from provider may take up to 48 hours.

## 2025-02-11 ENCOUNTER — PROCEDURE VISIT (OUTPATIENT)
Dept: OBSTETRICS AND GYNECOLOGY | Facility: CLINIC | Age: 55
End: 2025-02-11
Payer: MEDICARE

## 2025-02-11 VITALS
HEIGHT: 63 IN | WEIGHT: 137.56 LBS | DIASTOLIC BLOOD PRESSURE: 72 MMHG | BODY MASS INDEX: 24.38 KG/M2 | SYSTOLIC BLOOD PRESSURE: 121 MMHG

## 2025-02-11 DIAGNOSIS — R93.89 INCREASED ENDOMETRIAL STRIPE THICKNESS: Primary | ICD-10-CM

## 2025-02-11 PROCEDURE — 99213 OFFICE O/P EST LOW 20 MIN: CPT | Mod: 25,HCNC,S$GLB, | Performed by: OBSTETRICS & GYNECOLOGY

## 2025-02-11 PROCEDURE — 58100 BIOPSY OF UTERUS LINING: CPT | Mod: HCNC,S$GLB,, | Performed by: OBSTETRICS & GYNECOLOGY

## 2025-02-11 PROCEDURE — 88305 TISSUE EXAM BY PATHOLOGIST: CPT | Mod: HCNC | Performed by: PATHOLOGY

## 2025-02-11 RX ORDER — LANCETS 33 GAUGE
EACH MISCELLANEOUS
COMMUNITY
Start: 2025-01-31

## 2025-02-11 RX ORDER — BLOOD-GLUCOSE METER
EACH MISCELLANEOUS
COMMUNITY
Start: 2025-01-31

## 2025-02-11 NOTE — PROGRESS NOTES
"Subjective     Patient ID: Carol Dewey is a 54 y.o. female.    Chief Complaint:  reults and embx      History of Present Illness  HPI  54 y.o.  presents to discuss hormone labs and pelvic US.  Labs and US done last visit due to irregular spotting.  Last cycle in 2024, but will occ have irreg spotting.  No new complaints today.  Currently on POPs.  +HF, but improving with paxil.      GYN & OB History  Patient's last menstrual period was 2024 (approximate).   Date of Last Pap: 2024    OB History    Para Term  AB Living   2 1 1   1     SAB IAB Ectopic Multiple Live Births   1              # Outcome Date GA Lbr Darrick/2nd Weight Sex Type Anes PTL Lv   2 SAB            1 Term                Review of Systems  Review of Systems   Constitutional:  Negative for chills and fever.   Respiratory:  Negative for shortness of breath.    Cardiovascular:  Negative for chest pain.   Gastrointestinal:  Negative for abdominal pain.   Genitourinary:  Negative for pelvic pain.   Musculoskeletal:  Negative for myalgias.          Objective   Physical Exam    /72   Ht 5' 3" (1.6 m)   Wt 62.4 kg (137 lb 9.1 oz)   LMP 2024 (Approximate)   BMI 24.37 kg/m²     Gen: NAD  Resp: Normal respiratory effort  Pelvic: see procedure note  Ext: normal ROM  Psych: appropriate affect  Neuro: grossly intact    Pelvic US:   FINDINGS:  Uterus:     Size: 8.5 x 5.6 x 4.6 cm   Masses: There are multiple uterine fibroids.  The largest fibroids are as follows:   Midline intramural; 1.9 x 1.7 x 1.5 cm.   Left intramural; 2.6 x 1.9 x 1.8 cm.   Subserosal left; 1.8 x 1.6 x 1.6 cm.   Intramural left; 1.6 x 1.4 x 1.3 cm.   Endometrium: The endometrium measures 7 mm which is considered thickened in a postmenopausal patient however clinical history suggests that the patient is Ely menopausal and, therefore, the findings are nonspecific.     Right ovary:   Size: 2.3 x 2.1 x 1.1 cm   Appearance: Normal   Vascular " flow: Normal.     Left ovary:   Not seen.  Either small or surgically absent.     Free Fluid:   None.     Impression:   Uterine fibroids.   The endometrium measures 7 mm in maximum diameter as described.  See comment.   This report was flagged in Epic as abnormal.      FSH 28.        Assessment and Plan     Carol was seen today for reults and embx.    Diagnoses and all orders for this visit:    Increased endometrial stripe thickness          Plan:   Discussed FSH of 28 with EMS of 7mm - likely perimenopausal and wnl but would recommend EMB.  All questions answered, patient agrees to proceed with EMB.   EMB today - see procedure note.   Counseling done, precautions given, all questions answered.  If EMB neg, will continue to monitor symptoms.

## 2025-02-11 NOTE — PROCEDURES
Endometrial biopsy    Date/Time: 2/11/2025 1:00 PM    Performed by: Aylin Reyna MD  Authorized by: Aylin Reyna MD    Consent:     Prior to procedure the appropriate consent was completed and verified      Consent given by:  Patient    Patient questions answered: yes    Indication:     Indications comment:  Thickened endometrial stripe  Procedure:     Procedure: endometrial biopsy with Pipelle      Cervix cleaned and prepped: yes      The cervix was dilated using cervical dilators: yes      Use of single-tooth tenaculum: yes      Uterus sounded: yes      Uterus sound depth (cm):  8    Curettes used:  1    Specimen collected: specimen collected and sent to pathology      Patient tolerated procedure well with no complications: yes

## 2025-02-13 LAB
FINAL PATHOLOGIC DIAGNOSIS: NORMAL
GROSS: NORMAL
Lab: NORMAL

## 2025-02-17 ENCOUNTER — TELEPHONE (OUTPATIENT)
Dept: PRIMARY CARE CLINIC | Facility: CLINIC | Age: 55
End: 2025-02-17
Payer: MEDICARE

## 2025-02-17 DIAGNOSIS — G56.02 LEFT CARPAL TUNNEL SYNDROME: ICD-10-CM

## 2025-02-17 DIAGNOSIS — G89.18 ACUTE POST-OPERATIVE PAIN: Primary | ICD-10-CM

## 2025-02-17 RX ORDER — ACETAMINOPHEN AND CODEINE PHOSPHATE 300; 30 MG/1; MG/1
1 TABLET ORAL EVERY 4 HOURS PRN
Qty: 20 TABLET | Refills: 0 | Status: SHIPPED | OUTPATIENT
Start: 2025-02-17

## 2025-02-17 RX ORDER — IBUPROFEN 600 MG/1
600 TABLET ORAL 3 TIMES DAILY
Qty: 60 TABLET | Refills: 1 | Status: SHIPPED | OUTPATIENT
Start: 2025-02-17

## 2025-02-17 RX ORDER — ONDANSETRON 4 MG/1
4 TABLET, ORALLY DISINTEGRATING ORAL EVERY 8 HOURS PRN
Qty: 30 TABLET | Refills: 0 | Status: SHIPPED | OUTPATIENT
Start: 2025-02-17

## 2025-02-17 NOTE — TELEPHONE ENCOUNTER
----- Message from Nicole sent at 2/17/2025 11:10 AM CST -----  Contact: [phone] Patient  .1MEDICALADVICE Patient is calling for Medical Advice regarding: Pt sugar is still high even after metformin. Pt would like to know if there is any other medication that might help.How long has patient had these symptoms:[id number] - [place] - [address] [facility] [address] [address]Srepu: [phone] Fax: [phone] wants a call back or thru [facility]: call backComments:Please advise patient replies from provider may take up to 48 hours.

## 2025-02-17 NOTE — TELEPHONE ENCOUNTER
----- Message from Nicole sent at 2/17/2025 11:23 AM CST -----  Contact: [phone] Patient  Type: Returning a callWho left a message? Gucci did the practice call? todayDoes patient know what this is regarding: LVM to call officeWould the patient rather a call back or a response via [facility]? Call back Comments:

## 2025-02-18 ENCOUNTER — TELEPHONE (OUTPATIENT)
Dept: PRIMARY CARE CLINIC | Facility: CLINIC | Age: 55
End: 2025-02-18
Payer: MEDICARE

## 2025-02-18 NOTE — TELEPHONE ENCOUNTER
----- Message from Miriam sent at 2/18/2025 10:21 AM CST -----  Contact: [phone]  Type: Returning a callWho left a message?Yancy Jimenez MAWhen did the practice call?2/17/25Does patient know what this is regarding:returning callWould the patient rather a call back or a response via [facility]? callbackComments:

## 2025-02-18 NOTE — TELEPHONE ENCOUNTER
Her Hgba1c is 6.3 which is very good she can take 2 metformin in am and 1 in pm and monitor glucose and record and f/u with me in clinic in about 4 -6 weeks

## 2025-02-21 DIAGNOSIS — Z00.00 ENCOUNTER FOR MEDICARE ANNUAL WELLNESS EXAM: ICD-10-CM

## 2025-02-25 ENCOUNTER — OFFICE VISIT (OUTPATIENT)
Dept: ORTHOPEDICS | Facility: CLINIC | Age: 55
End: 2025-02-25
Payer: MEDICARE

## 2025-02-25 VITALS
WEIGHT: 137.38 LBS | HEART RATE: 74 BPM | HEIGHT: 63 IN | SYSTOLIC BLOOD PRESSURE: 113 MMHG | DIASTOLIC BLOOD PRESSURE: 74 MMHG | BODY MASS INDEX: 24.34 KG/M2

## 2025-02-25 DIAGNOSIS — G56.02 CARPAL TUNNEL SYNDROME OF LEFT WRIST: Primary | ICD-10-CM

## 2025-02-25 PROCEDURE — 1159F MED LIST DOCD IN RCRD: CPT | Mod: HCNC,CPTII,S$GLB,

## 2025-02-25 PROCEDURE — 3044F HG A1C LEVEL LT 7.0%: CPT | Mod: HCNC,CPTII,S$GLB,

## 2025-02-25 PROCEDURE — 3074F SYST BP LT 130 MM HG: CPT | Mod: HCNC,CPTII,S$GLB,

## 2025-02-25 PROCEDURE — 99024 POSTOP FOLLOW-UP VISIT: CPT | Mod: HCNC,S$GLB,,

## 2025-02-25 PROCEDURE — 99999 PR PBB SHADOW E&M-EST. PATIENT-LVL V: CPT | Mod: PBBFAC,HCNC,,

## 2025-02-25 PROCEDURE — 3078F DIAST BP <80 MM HG: CPT | Mod: HCNC,CPTII,S$GLB,

## 2025-02-25 NOTE — PATIENT INSTRUCTIONS
In 24 hours, you may shower without covering your incision.  Do not submerge your incision for another 2 weeks.  If steri strips applied, they can get wet, remove in 48-72 hours if not falling off on their own. Do not submerge incision for another 2 weeks. You may start to do a scar massage with vitamin-E oil/cocoa butter to your incisions. Please inform the clinic if you experience any bleeding or discharge, warmth, or redness.

## 2025-02-25 NOTE — PROGRESS NOTES
Post-op Note    HPI    Carol Dewey is here 8 days s/p the following procedure:     2/17/2025  Left carpal tunnel release    Overall doing well. Pain controlled on current regimen. Not in PT. Denies any chest pain or shortness of breathe. Denies any drainage from the incision. Denies any fevers, chills or paresthesias. Pain and numbness resolving compared to prior to surgery.      Physical Exam:     Patient is alert and oriented no acute distress.   Assistive Device: none    Left wrist: sutures removed. Incision(s) are well healed.  There is no evidence of dehiscence.  There is no induration erythema or signs of infection.  Appropriate soft tissue swelling.  Compartments are soft and compressible.  Warm well-perfused extremity.    Assessment    Carol Dewey is 8 days Post-op     Plan:    Overall doing as expected.  We discussed expectations of surgery and postoperative course.     Pain: Continued postoperative pain regimen -- OTC meds prn  PT/OT: Continue/Initiate physical therapy (weight bearing status: cup of coffee until 1 month post op)    In 24 hours, you may shower without covering your incision.  Do not submerge your incision for another 2 weeks.  If steri strips applied, they can get wet, remove in 48-72 hours if not falling off on their own. Do not submerge incision for another 2 weeks. You may start to do a scar massage with vitamin-E oil/cocoa butter to your incisions. Please inform the clinic if you experience any bleeding or discharge, warmth, or redness.        Follow-up: 4 weeks   X-rays next visit: none

## 2025-03-02 DIAGNOSIS — K21.9 GASTROESOPHAGEAL REFLUX DISEASE, UNSPECIFIED WHETHER ESOPHAGITIS PRESENT: ICD-10-CM

## 2025-03-02 NOTE — TELEPHONE ENCOUNTER
No care due was identified.  [facility] Embedded Care Due Messages. Reference number: [id number].   3/02/2025 5:59:34 PM CST

## 2025-03-03 RX ORDER — OMEPRAZOLE 40 MG/1
CAPSULE, DELAYED RELEASE ORAL
Qty: 90 CAPSULE | Refills: 3 | Status: SHIPPED | OUTPATIENT
Start: 2025-03-03 | End: 2025-03-06 | Stop reason: SDUPTHER

## 2025-03-03 NOTE — TELEPHONE ENCOUNTER
Refill Routing Note   Medication(s) are not appropriate for processing by [facility] for the following reason(s):        No active prescription written by provider    [facility] action(s):  Defer             Appointments  past 12m or future 3m with PCP    Date Provider   Last Visit   1/29/2025 Robe Trinidad MD   Next Visit   3/6/2025 Robe Trinidad MD   ED visits in past 90 days: 0        Note composed:9:32 PM 03/02/2025

## 2025-03-06 ENCOUNTER — OFFICE VISIT (OUTPATIENT)
Dept: PRIMARY CARE CLINIC | Facility: CLINIC | Age: 55
End: 2025-03-06
Payer: MEDICARE

## 2025-03-06 VITALS
WEIGHT: 136.13 LBS | SYSTOLIC BLOOD PRESSURE: 118 MMHG | OXYGEN SATURATION: 98 % | HEART RATE: 70 BPM | RESPIRATION RATE: 18 BRPM | BODY MASS INDEX: 24.12 KG/M2 | DIASTOLIC BLOOD PRESSURE: 80 MMHG | HEIGHT: 63 IN

## 2025-03-06 DIAGNOSIS — E11.9 CONTROLLED TYPE 2 DIABETES MELLITUS WITHOUT COMPLICATION, WITHOUT LONG-TERM CURRENT USE OF INSULIN: Primary | ICD-10-CM

## 2025-03-06 DIAGNOSIS — K21.9 GASTROESOPHAGEAL REFLUX DISEASE, UNSPECIFIED WHETHER ESOPHAGITIS PRESENT: ICD-10-CM

## 2025-03-06 DIAGNOSIS — I10 ESSENTIAL HYPERTENSION: ICD-10-CM

## 2025-03-06 DIAGNOSIS — E78.2 MIXED HYPERLIPIDEMIA: ICD-10-CM

## 2025-03-06 PROCEDURE — 1160F RVW MEDS BY RX/DR IN RCRD: CPT | Mod: HCNC,CPTII,S$GLB, | Performed by: INTERNAL MEDICINE

## 2025-03-06 PROCEDURE — 99999 PR PBB SHADOW E&M-EST. PATIENT-LVL V: CPT | Mod: PBBFAC,HCNC,, | Performed by: INTERNAL MEDICINE

## 2025-03-06 PROCEDURE — 99214 OFFICE O/P EST MOD 30 MIN: CPT | Mod: HCNC,S$GLB,, | Performed by: INTERNAL MEDICINE

## 2025-03-06 PROCEDURE — 3079F DIAST BP 80-89 MM HG: CPT | Mod: HCNC,CPTII,S$GLB, | Performed by: INTERNAL MEDICINE

## 2025-03-06 PROCEDURE — 3008F BODY MASS INDEX DOCD: CPT | Mod: HCNC,CPTII,S$GLB, | Performed by: INTERNAL MEDICINE

## 2025-03-06 PROCEDURE — 1159F MED LIST DOCD IN RCRD: CPT | Mod: HCNC,CPTII,S$GLB, | Performed by: INTERNAL MEDICINE

## 2025-03-06 PROCEDURE — 3044F HG A1C LEVEL LT 7.0%: CPT | Mod: HCNC,CPTII,S$GLB, | Performed by: INTERNAL MEDICINE

## 2025-03-06 PROCEDURE — 3074F SYST BP LT 130 MM HG: CPT | Mod: HCNC,CPTII,S$GLB, | Performed by: INTERNAL MEDICINE

## 2025-03-06 RX ORDER — TIRZEPATIDE 5 MG/.5ML
5 INJECTION, SOLUTION SUBCUTANEOUS
Qty: 2 ML | Refills: 2 | Status: SHIPPED | OUTPATIENT
Start: 2025-03-06

## 2025-03-06 RX ORDER — AMLODIPINE BESYLATE 10 MG/1
10 TABLET ORAL DAILY
Qty: 90 TABLET | Refills: 3 | Status: SHIPPED | OUTPATIENT
Start: 2025-03-06

## 2025-03-06 RX ORDER — OMEPRAZOLE 40 MG/1
40 CAPSULE, DELAYED RELEASE ORAL EVERY MORNING
Qty: 90 CAPSULE | Refills: 3 | Status: SHIPPED | OUTPATIENT
Start: 2025-03-06

## 2025-03-06 RX ORDER — ATORVASTATIN CALCIUM 20 MG/1
20 TABLET, FILM COATED ORAL DAILY
Qty: 90 TABLET | Refills: 3 | Status: SHIPPED | OUTPATIENT
Start: 2025-03-06 | End: 2026-03-06

## 2025-03-06 NOTE — PROGRESS NOTES
Subjective:       Patient ID: Carol Dewey is a 54 y.o. female.    Chief Complaint: Follow-up (6 week) and Medication Refill    HPI  History of Present Illness    CHIEF COMPLAINT:  Carol presents today for follow-up on diabetes management and allergies.    DIABETES:  She reports difficulty controlling blood sugar with morning reading of 139 and evening reading of 215 despite not eating. She currently takes one pill twice daily for diabetes management. She expresses interest in switching to a weekly injection to reduce pill burden due to multiple medications.    ALLERGIES AND FOOD SENSITIVITIES:  She experiences allergic reactions to various fruits including berries (blueberries, strawberries), watermelon, papaya, avocado, and other tropical fruits. Symptoms include rashes, throat discomfort, and facial swelling. Previous allergy labs revealed elevated IgE levels with abnormal results for rye wheat, Russian walnut oil, and silver birch allergen. She requires daily Singulair for allergy management but continues to experience breakthrough reactions despite medication compliance.    RECENT SURGICAL HISTORY:  She underwent surgery one week ago with IV fluid administration prior to the procedure.      ROS:  General: -fever, -chills, -fatigue, -weight gain, -weight loss  Eyes: -vision changes, -redness, -discharge  ENT: -ear pain, -nasal congestion, -sore throat  Cardiovascular: -chest pain, -palpitations, -lower extremity edema  Respiratory: -cough, -shortness of breath  Gastrointestinal: -abdominal pain, -nausea, -vomiting, -diarrhea, -constipation, -blood in stool  Genitourinary: -dysuria, -hematuria, -frequency  Musculoskeletal: -joint pain, -muscle pain  Skin: +rash, -lesion  Neurological: -headache, -dizziness, -numbness, -tingling  Psychiatric: -anxiety, -depression, -sleep difficulty       Review of Systems    Objective:      Physical Exam  Physical Exam    General: No acute distress. Well-developed.  Well-nourished.  Eyes: EOMI. Sclerae anicteric.  HENT: Normocephalic. Atraumatic. Nares patent. Moist oral mucosa.  Ears: Bilateral TMs clear. Bilateral EACs clear.  Cardiovascular: Regular rate. Regular rhythm. No murmurs. No rubs. No gallops. Normal S1, S2.  Respiratory: Normal respiratory effort. Clear to auscultation bilaterally. No rales. No rhonchi. No wheezing.  Abdomen: Soft. Non-tender. Non-distended. Normoactive bowel sounds.  Musculoskeletal: No  obvious deformity.  Extremities: No lower extremity edema.  Neurological: Alert & oriented x3. No slurred speech. Normal gait.  Psychiatric: Normal mood. Normal affect. Good insight. Good judgment.  Skin: Warm. Dry. No rash.           Assessment:       1. Controlled type 2 diabetes mellitus without complication, without long-term current use of insulin    2. Essential hypertension    3. Gastroesophageal reflux disease, unspecified whether esophagitis present    4. Mixed hyperlipidemia        Plan:       Controlled type 2 diabetes mellitus without complication, without long-term current use of insulin  Comments:  will begin GLP1 qweek  and monitor  Orders:  -     tirzepatide (MOUNJARO) 5 mg/0.5 mL PnIj; Inject 5 mg into the skin every 7 days.  Dispense: 2 mL; Refill: 2  -     Hemoglobin A1C; Future; Expected date: 03/06/2025    Essential hypertension  Comments:  BP well controlled no change in tx  Orders:  -     amLODIPine (NORVASC) 10 MG tablet; Take 1 tablet (10 mg total) by mouth once daily.  Dispense: 90 tablet; Refill: 3  -     CBC Auto Differential; Future; Expected date: 03/06/2025  -     Comprehensive Metabolic Panel; Future; Expected date: 03/06/2025    Gastroesophageal reflux disease, unspecified whether esophagitis present  Comments:  continue wit acid reflus precautions and medication  Orders:  -     omeprazole (PRILOSEC) 40 MG capsule; Take 1 capsule (40 mg total) by mouth every morning.  Dispense: 90 capsule; Refill: 3    Mixed hyperlipidemia  -      atorvastatin (LIPITOR) 20 MG tablet; Take 1 tablet (20 mg total) by mouth once daily.  Dispense: 90 tablet; Refill: 3  -     Lipid Panel; Future; Expected date: 03/06/2025      Assessment & Plan    IMPRESSION:  - Assessed diabetes management, noting elevated blood sugar post-surgery  - Considered impact of recent surgery and pre-operative IV fluids on current glucose readings  - Evaluated effectiveness of current oral diabetes medication regimen  - Proposed weekly injectable medication as alternative to additional daily oral medication  - Reviewed allergy test results, noting elevated IgE levels for various allergens  - Considered referral to allergist for comprehensive testing and potential desensitization therapy    DIABETES:  - Evaluated the patient's blood sugar readings, noting they are high despite medication.  - Explained potential causes of elevated blood sugar post-surgery, including effects of IV fluids and surgical stress.  - Discussed alternative treatment options, including a weekly injection or daily pill.  - Initiated weekly injectable diabetes medication to replace current twice-daily oral medication.  - Instructed the patient to monitor blood sugar closely and report any persistent elevations.  - Evaluated the effectiveness of current twice-daily oral hypoglycemic medication regimen.  - Discussed alternative treatment options, including continuing current medication or switching to weekly injection.  - Initiated weekly injectable diabetes medication to replace current twice-daily oral medication.  - Advised the patient on proper discontinuation of oral medication and transition to injectable therapy.    FOOD ALLERGIES:  - Noted the patient's ongoing allergic reactions to various foods, including fruits.  - Discussed limitations of current allergy testing and benefits of more comprehensive allergen panel.  - Provided information on allergy desensitization therapy process and potential benefits.  -  Referred the patient to Dr. Cash for comprehensive allergy testing and potential desensitization therapy.  - Continued current daily allergy medication regimen.  - Noted the patient's allergic reactions to various fruits and foods.  - Reviewed previous blood test results showing high IgE levels for various allergens.  - Recommend comprehensive allergy testing to identify specific food allergies.  - Referred the patient to an allergist for further testing and potential desensitization treatment.  - Advised the patient to keep a food diary to track potential allergens.  - Noted the patient's reports of experiencing rashes and swelling after eating certain foods.  - Continued current daily allergy medication regimen to manage symptoms.  - Instructed the patient to document occurrences of urticaria, including potential triggers and duration.  - Noted the patient's reports of lip swelling after eating certain foods.  - Advised the patient to seek immediate medical attention if experiencing severe swelling or difficulty breathing.  - Noted the patient's reports of throat discomfort after eating certain foods.  - Instructed the patient to monitor breathing difficulties closely and seek emergency care if experiencing severe symptoms.  - Recommend carrying an epinephrine auto-injector if prescribed by the allergist.    PEANUT ALLERGY:  - Mentioned peanut allergy as an example of specific allergy testing.  - Advised the patient to maintain strict avoidance of peanuts and peanut-containing products.    ENVIRONMENTAL ALLERGIES:  - Noted the patient's allergic reactions to various environmental allergens.  - Reviewed previous blood test results showing high IgE levels for various allergens.  - Recommend comprehensive allergy testing to identify specific allerge  ns.  - Referred the patient to an allergist for further testing and potential desensitization treatment.  - Continued Singulair for allergy management.  - Advised the  patient on environmental control measures to reduce allergen exposure.           Medication List with Changes/Refills   New Medications    TIRZEPATIDE (MOUNJARO) 5 MG/0.5 ML PNIJ    Inject 5 mg into the skin every 7 days.   Current Medications    ACETAMINOPHEN (TYLENOL) 650 MG TBSR    Take 1 tablet (650 mg total) by mouth 2 (two) times a day.    ACETAMINOPHEN-CODEINE 300-30MG (TYLENOL #3) 300-30 MG TAB    Take 1 tablet by mouth every 4 (four) hours as needed.    BLOOD SUGAR DIAGNOSTIC STRP    To check BG 2 times daily, to use with insurance preferred meter    BLOOD-GLUCOSE METER KIT    To check BG 2 times daily, to use with insurance preferred meter    CLOBETASOL (TEMOVATE) 0.05 % EXTERNAL SOLUTION    Apply a for 5 drops to the LEFT ear BID for 1 week.  Repeat for 1 more week if necessary not for chronic use    DIPHENHYDRAMINE (BENADRYL) 25 MG CAPSULE    Take 1 capsule (25 mg total) by mouth every 6 (six) hours as needed for Itching or Allergies.    FLUOCINOLONE ACETONIDE OIL 0.01 % DROP    3-4 drops to the affected ear(s) twice daily no more than a week at a time as needed for itching and scaling    FLUTICASONE PROPIONATE (FLONASE) 50 MCG/ACTUATION NASAL SPRAY    1 spray (50 mcg total) by Each Nostril route 2 (two) times daily.    GABAPENTIN (NEURONTIN) 300 MG CAPSULE    Take 1 capsule (300 mg total) by mouth 2 (two) times daily.    IBUPROFEN (ADVIL,MOTRIN) 600 MG TABLET    Take 1 tablet (600 mg total) by mouth 3 (three) times daily.    LANCETS MISC    To check BG 2 times daily, to use with insurance preferred meter    LEVOCETIRIZINE (XYZAL) 5 MG TABLET    Take 1 tablet (5 mg total) by mouth every evening. For allergy    METFORMIN (GLUCOPHAGE) 500 MG TABLET    Take 1 tablet (500 mg total) by mouth 2 (two) times daily with meals.    MINERAL OIL TOP (MINERAL OIL LIGHT) OIL    Apply 4-5 drops BOTH ear canals with a dropper nightly    MONTELUKAST (SINGULAIR) 10 MG TABLET    Take 1 tablet (10 mg total) by mouth every  evening.    MYRBETRIQ 50 MG TB24    TAKE 1 TABLET BY MOUTH EVERY DAY    ONDANSETRON (ZOFRAN-ODT) 4 MG TBDL    Take 1 tablet (4 mg total) by mouth every 8 (eight) hours as needed (nausea/vomiting).    PAROXETINE (PAXIL) 10 MG TABLET    TAKE 1 TABLET(10 MG) BY MOUTH EVERY MORNING    PROCHLORPERAZINE (COMPAZINE) 10 MG TABLET    Take 1 tablet (10 mg total) by mouth 3 (three) times daily.    TRUE METRIX GLUCOSE METER MISC        TRUEPLUS LANCETS 33 GAUGE MISC       Changed and/or Refilled Medications    Modified Medication Previous Medication    AMLODIPINE (NORVASC) 10 MG TABLET amLODIPine (NORVASC) 10 MG tablet       Take 1 tablet (10 mg total) by mouth once daily.    TAKE ONE TABLET BY MOUTH ONCE DAILY    ATORVASTATIN (LIPITOR) 20 MG TABLET atorvastatin (LIPITOR) 20 MG tablet       Take 1 tablet (20 mg total) by mouth once daily.    Take 1 tablet (20 mg total) by mouth once daily.    OMEPRAZOLE (PRILOSEC) 40 MG CAPSULE omeprazole (PRILOSEC) 40 MG capsule       Take 1 capsule (40 mg total) by mouth every morning.    TAKE 1 CAPSULE(40 MG) BY MOUTH DAILY        This note was generated with the assistance of ambient listening technology. Verbal consent was obtained by the patient and accompanying visitor(s) for the recording of patient appointment to facilitate this note. I attest to having reviewed and edited the generated note for accuracy, though some syntax or spelling errors may persist. Please contact the author of this note for any clarification.

## 2025-03-07 ENCOUNTER — OFFICE VISIT (OUTPATIENT)
Dept: OTOLARYNGOLOGY | Facility: CLINIC | Age: 55
End: 2025-03-07
Payer: MEDICARE

## 2025-03-07 VITALS
HEIGHT: 63 IN | WEIGHT: 136 LBS | DIASTOLIC BLOOD PRESSURE: 78 MMHG | SYSTOLIC BLOOD PRESSURE: 113 MMHG | HEART RATE: 70 BPM | BODY MASS INDEX: 24.1 KG/M2

## 2025-03-07 DIAGNOSIS — H90.12 CONDUCTIVE HEARING LOSS OF LEFT EAR WITH UNRESTRICTED HEARING OF RIGHT EAR: ICD-10-CM

## 2025-03-07 DIAGNOSIS — H73.12 CHRONIC MYRINGITIS, LEFT: Primary | ICD-10-CM

## 2025-03-07 DIAGNOSIS — H61.22 CERUMEN DEBRIS ON TYMPANIC MEMBRANE OF LEFT EAR: ICD-10-CM

## 2025-03-07 PROCEDURE — 99999 PR PBB SHADOW E&M-EST. PATIENT-LVL IV: CPT | Mod: PBBFAC,HCNC,, | Performed by: OTOLARYNGOLOGY

## 2025-03-07 NOTE — PROGRESS NOTES
[facility]    Subjective:      Patient: Carol Dewey Patient PCP: Robe Trinidad MD         :  1970     Sex:  female      MRN:  [id number]          Date of Visit: 2025      Chief Complaint: Follow-up      Patient ID: Carol Dewey is a 54 y.o. 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  Treated with clobetasol solution with significant discomfort ultimately improving.  At our last visit there was significant improvement but still some posterior-inferior inflammatory granular change for which daily clobetasol and nightly mineral oil was recommended.    Audiogram shows some high-frequency mid frequency asymmetry on the affected left side with a low amplitude type a tympanogram.  The mid frequency asymmetry appears to be mixed where as the 4K asymmetry appears to be purely conductive.  This represents a reasonably stable to slightly improved pattern of hearing loss on the affected left side.        01/10/2025 established patient visit patient last seen three-week ago with ongoing recurrent/chronic left-sided myringitis treated with be a/HC powders with some progression including a scar band and some squamous trapping noted change to clobetasol with the initial discomfort what appeared to be stable to slightly improved findings at our last visit.  She is here for three-week follow-up using clobetasol 0.05% external solution b.i.d..  We discussed the possibility of changing to tacrolimus.  Hearing subjectively stable with last audiogram 7-8 months ago.  She returns today with no active drainage bleeding pain or subjective change in hearing.  No longer as uncomfortable using the clobetasol.  Burned it 1st now barely uncomfortable.  Worsening right-sided temporal headaches (migraine).  Attributes to worsening shoulder and  back pain.  Takes gabapentin.       12/20/2024 2 week follow-up visit for recurrent/chronic left-sided myringitis treated with boric acid hydrocortisone powders.  Findings of some scar band formation which seem to be new with some slight trapping.  Changed to clobetasol drops.  Some expected discomfort with the initiation of these drops but this is become less unless painful and of much shorter duration in the last week.  Hearing feels subjectively stable.  There has been no drainage or other concerning symptoms.      12/06/2024 established patient patient last seen over 3 months ago with recurrent left-sided otorrhea associated with chronic myringitis and secondary superficial fungal infection treated with debridement and topical boric acid hydrocortisone powders.  She returns today for scheduled three-month follow-up was doing well.  Using boric acid hydrocortisone powders perhaps a few times a week.  She got an ear infection (drainage) and was treated with 2 different oral antibiotics 1 of which resulted in an allergic reaction.  No eardrops.  Feels better since completing antibiotic no active drainage.  Hearing subjectively stable.       08/30/2024 Scheduled 2 month follow up poor chronic left-sided otorrhea associated with myringitis with primary versus secondary fungal superficial infection.  Cultures obtained at our last visit grew only cutibacterium acnes and no growth on the aerobic culture.  No fungal culture sent.  Treated with a cortisone powder for acidification desiccation and anti-inflammatory treatment only in the absence of any identified pathogen needing topical antimicrobial therapy.  Using the powder regularly for quite a time using an otoscope with video.  Has a not used it in a few weeks.  Did use pretty regularly every day to twice a day for a month or so.  No drainage.  Hearing still diminished left compared to right.  No updated audiogram since May.      06/21/2024 6 week follow-up visit on  chronic left-sided mucopurulent myringitis with secondary saprophytic fungal growth improved on the left side no active fungus or purulence but still some scarring and pink change of the tympanic membrane without formal granulation.  Additional fluocinolone for a week then routine ear care recommended as well as audiogram prior to follow up to determine if further evaluation including possible neuro otology consultation and imaging are appropriate.      Audiogram completed 05/17/2024 a week after our visit shows some normal to borderline right-sided thresholds with generally symmetric pattern of hearing loss in the left side with an overlying 5 to 20 dB asymmetry other than the highest frequencies at 6-8000 hertz with her has a larger asymmetry.  The left ear exhibits more mobility rather than less compared to the right.  Audiologic testing to speech is preserved with a 15 dB right and 25 dB left SRT with 100% discrimination scores bilaterally.        05/10/2024 FOLLOW-UP VISIT patient seen today for weeks since our last visit for hearing loss with findings of some wax and mycotic otitis externa/saprophytic fungus on the right side and some granular myringitis and mucopus on the left side.  Treated with steroid fluoroquinolone drops on the left to be followed by routine ear care bilaterally with audiologic testing and follow-up.    No audiogram.  Never contacted.      Feels like the drainage on the left side resolved with the combination eye drops.  Within a week perhaps it started feeling like it was draining a bit.  She has continued to use Q-tips throughout but has made great efforts including by ear plugs to keep water out of the ears in the bath.  Restarted the drops with improvement in drainage.  No pain.  No fever.     04/12/2024 INITIAL PATIENT CONSULTATION Patient is a  lifelong NON-smoker with a past medical history of degenerative disc disease previously seen by amanda Murphy for tinnitus ear  fullness and positional vertigo.  Audiologic testing, Eustachian tube care and Flonase, and referral to physical therapy provided (this was in 2022.  Patient is seen today  referred to me by Dr. José Miguel Begum in consultation for bilateral ear drainage.  No audiogram.  No PT notes in 2022 for vertigo.    Went to a free hearing screening was told she had no hearing in her left ear.  Also told her ear was all white.  Photos taken but unable to be printed.  Hearing testing completed but no copy of audiogram for review.  Has some fullness in both ears.  No prior ear tubes or ear trauma.        Labs:  WBC   Date Value Ref Range Status   02/05/2025 9.66 3.90 - 12.70 K/uL Final     Hemoglobin   Date Value Ref Range Status   02/05/2025 15.0 12.0 - 16.0 g/dL Final     Platelets   Date Value Ref Range Status   02/05/2025 286 150 - 450 K/uL Final     Creatinine   Date Value Ref Range Status   02/05/2025 0.8 0.5 - 1.4 mg/dL Final     TSH   Date Value Ref Range Status   02/05/2025 0.861 0.400 - 4.000 uIU/mL Final     Hemoglobin A1C   Date Value Ref Range Status   02/05/2025 6.3 (H) 4.0 - 5.6 % Final     Comment:     ADA Screening Guidelines:  5.7-6.4%  Consistent with prediabetes  >or=6.5%  Consistent with diabetes    High levels of fetal hemoglobin interfere with the HbA1C  assay. Heterozygous hemoglobin variants (HbS, HgC, etc)do  not significantly interfere with this assay.   However, presence of multiple variants may affect accuracy.         Past Medical History  She has a past medical history of Allergy, Angio-edema, Degenerative disc disease, HTN (hypertension), Urticaria, and Vertigo.    Family / Surgical / Social History  Her family history includes Asthma in her mother; Breast cancer in her paternal cousin; Cancer in her father; Heart disease in her mother.    Past Surgical History:   Procedure Laterality Date    ARTHROSCOPIC REPAIR OF ROTATOR CUFF OF SHOULDER Right 07/15/2024    Procedure: REPAIR, ROTATOR CUFF,  ARTHROSCOPIC;  Surgeon: Dre Grover MD;  Location: [facility];  Service: Orthopedics;  Laterality: Right;    ARTHROSCOPIC TENOTOMY OF BICEPS TENDON Right 07/15/2024    Procedure: TENOTOMY, BICEPS, ARTHROSCOPIC;  Surgeon: Dre Grover MD;  Location: [facility];  Service: Orthopedics;  Laterality: Right;    ARTHROSCOPY, SHOULDER (Right) Right 07/15/2024    CARPAL TUNNEL RELEASE Left 2025    Procedure: RELEASE, CARPAL TUNNEL;  Surgeon: Dre Grover MD;  Location: [facility] OR;  Service: Orthopedics;  Laterality: Left;  left carpal tunnel release, hand table     SECTION      CHOLECYSTECTOMY  2024    COLONOSCOPY  2024    COLONOSCOPY N/A 2024    Procedure: COLONOSCOPY;  Surgeon: Manuel Roblero MD;  Location: [facility];  Service: Endoscopy;  Laterality: N/A;    DECOMPRESSION OF SUBACROMIAL SPACE Right 07/15/2024    Procedure: DECOMPRESSION, SUBACROMIAL SPACE;  Surgeon: Dre Grover MD;  Location: [facility];  Service: Orthopedics;  Laterality: Right;    DECOMPRESSION, SUBACROMIAL SPACE (Right) Right 07/15/2024    EGD, WITH CLOSED BIOPSY  2024    EPIDURAL STEROID INJECTION Bilateral 2023    Procedure: Injection, Steroid, Epidural;  Surgeon: Andi Watson MD;  Location: [facility];  Service: Neurosurgery;  Laterality: Bilateral;  Procedure:Bilateral SI joint block and steriod injection  Length of procedure:30 minutes  LOS:0 minutes  Anesthesia:MAC  Radiology:C-arm  Bed:Regular Bed  Position:Prone    ESOPHAGOGASTRODUODENOSCOPY N/A 2024    Procedure: EGD (ESOPHAGOGASTRODUODENOSCOPY);  Surgeon: Manuel Roblero MD;  Location: [facility];  Service: Endoscopy;  Laterality: N/A;    LAPAROSCOPIC CHOLECYSTECTOMY N/A 2024    Procedure: CHOLECYSTECTOMY, LAPAROSCOPIC;  Surgeon: Giovanni Gillespie MD;  Location: [facility];  Service: General;  Laterality: N/A;    RELEASE, CARPAL TUNNEL - Left Left 2025    REPAIR, ROTATOR CUFF,  ARTHROSCOPIC (Right) Right 07/15/2024    SHOULDER ARTHROSCOPY Right 07/15/2024    Procedure: ARTHROSCOPY, SHOULDER;  Surgeon: Dre Grover MD;  Location: [facility] OR;  Service: Orthopedics;  Laterality: Right;  right shoulder scope, DCE, RCR with SAD, biceps tenodesis, conmed video, beanbag, epi first 2 bags, IODINE allergy (clear)    TENOTOMY, BICEPS, ARTHROSCOPIC (Right) Right 07/15/2024    TONSILLECTOMY      TRANSFORAMINAL EPIDURAL INJECTION OF STEROID Bilateral 03/21/2019    Procedure: Injection,steroid,epidural,transforaminal approach---bilateral L5/S1;  Surgeon: Sridhar Albert III, MD;  Location: [facility] OR;  Service: Pain Management;  Laterality: Bilateral;       Social History     Tobacco Use    Smoking status: Never     Passive exposure: Never    Smokeless tobacco: Never   Substance and Sexual Activity    Alcohol use: No    Drug use: No    Sexual activity: Not Currently     Partners: Male     Birth control/protection: OCP       Medications  She has a current medication list which includes the following prescription(s): acetaminophen, acetaminophen-codeine 300-30mg, amlodipine, atorvastatin, blood sugar diagnostic, blood-glucose meter, clobetasol, diphenhydramine, fluocinolone acetonide oil, fluticasone propionate, gabapentin, ibuprofen, lancets, levocetirizine, metformin, mineral oil light, montelukast, myrbetriq, omeprazole, ondansetron, paroxetine, prochlorperazine, mounjaro, true metrix glucose meter, and trueplus lancets, and the following Facility-Administered Medications: 0.9% nacl.      Allergies  Review of patient's allergies indicates:   Allergen Reactions    Augmentin [amoxicillin-pot clavulanate] Rash     Rash to arms, legs, shoulders and torso.  States has happened 2 times with this medication.    Iodine and iodide containing products Rash    Gemtesa [vibegron] Other (See Comments)     Weakness & dizziness    Shellfish containing products Hives     Other reaction(s): Hives    Tramadol  "Itching    Watermelon Other (See Comments)     Irritates esophagus    Amoxapine Rash    Levofloxacin Rash    Norco [hydrocodone-acetaminophen] Rash    Papaya Rash    Penicillins Rash       All medications, allergies, and past history have been reviewed.    Objective:      Vitals:      2/25/2025    10:14 AM 3/6/2025     2:05 PM 3/7/2025     2:30 PM   Vitals - 1 value per visit   SYSTOLIC 113 118 113   DIASTOLIC 74 80 78   Pulse 74 70 70   Resp  18    SPO2  98 %    Weight (lb) 137.35 136.13 136.02   Weight (kg) 62.3 61.75 61.7   Height 5' 3" (1.6 m) 5' 3" (1.6 m) 5' 3" (1.6 m)   BMI (Calculated) 24.3 24.1 24.1   Pain Score Zero Zero Zero       Body surface area is 1.66 meters squared.    Physical Exam:    GENERAL  APPEARANCE -  alert, appears stated age, and cooperative  BARRIER(S) TO COMMUNICATION -  none VOICE - appropriate for age and gender    INTEGUMENTARY  no suspicious head and neck lesions    HEENT  HEAD: Normocephalic, without obvious abnormality, atraumatic  FACE: INSPECTION - Symmetric, no signs of trauma, no suspicious lesion(s)      STRENGTH - facial symmetry intact     EYES  Normal occular alignment and mobility with no visible nystagmus at rest    EARS/NOSE/MOUTH/THROAT  EARS  PINNAE AND EXTERNAL EARS - no suspicious lesion OTOSCOPIC EXAM (surgical microscopy was used for visualization/instrumentation): EAR EXAM - right ear healthy and normal.  Left ear dramatically better.  There some crusting removed from the posterior tympanic membrane to inferior mid canal.  There is a small scar band just lateral to the annulus but no trapped mucosa or mucus.  No perforation or gross inflammatory tissue.  No bone exposure or fungus.  The crusting was removed with suctioned without bleeding.  Minimal hyperemia /hypervascularity just lateral to the annulus underneath.    NOSE AND SINUSES  EXTERNAL NOSE - Grossly normal for age/sex  MUCOSA - within normal limits     CHEST AND LUNG   INSPECTION & AUSCULTATION - normal " effort, no stridor    NEUROLOGIC  MENTAL STATUS - alert, interactive CRANIAL NERVES - normal      Procedure(s):  Cerumen removal performed.  See procedure note.          Assessment:      Problem List Items Addressed This Visit    None  Visit Diagnoses         Chronic myringitis, left    -  Primary      Conductive hearing loss of left ear with unrestricted hearing of right ear          Cerumen debris on tympanic membrane of left ear                                   Plan:      Clobetasol solution daily to twice daily to the left ear once or twice a week.  More if an irritation or drainage develops.  Mineral oil every single night.      Follow up in three-month.  Return sooner if the ear feels full, uncomfortable or draining not controlled with the above.            Voice recognition software was used in the creation of this note/communication and any sound-alike errors which may have occurred from its use should be taken in context when interpreting.  If such errors prevent a clear understanding of the note/communication, please contact the office for clarification.

## 2025-03-07 NOTE — PATIENT INSTRUCTIONS
Clobetasol solution daily to twice daily to the left ear once or twice a week.  More if an irritation or drainage develops.  Mineral oil every single night.      Follow up in three-month.  Return sooner if the ear feels full, uncomfortable or draining not controlled with the above.    Voice recognition software was used in the creation of this note/communication and any sound-alike errors which may have occurred from its use should be taken in context when interpreting.  If such errors prevent a clear understanding of the note/communication, please contact the office for clarification.      DRY EAR PRECAUTIONS    Water should be kept out of the ears to prevent secondary infection.  If water gets trapped in the ear twisted tissue can be used a wick out the ear.  Cotton balls or cotton balls with Vaseline in the shower may help prevent water from getting in the ears.

## 2025-03-07 NOTE — PROCEDURES
EAR DEBRIDEMENT / CERUMEN DISIMPACTION, 72721     Indication: Excessive cerumen with symptoms, limiting complete ear exam    Side: left    Findings: See physical exam    Procedure: Ear canal(s) cleared completely using suction with microscopy.  Wax was not hydrolyzed with peroxide.  Topical medication was not applied.    Complications: none

## 2025-03-18 ENCOUNTER — TELEPHONE (OUTPATIENT)
Dept: PRIMARY CARE CLINIC | Facility: CLINIC | Age: 55
End: 2025-03-18
Payer: MEDICARE

## 2025-03-18 NOTE — TELEPHONE ENCOUNTER
Called [facility] and was informed that PA for Saad was approved 3/9/25 to 12/31/25.  Called Lesia and informed them.

## 2025-03-18 NOTE — TELEPHONE ENCOUNTER
----- Message from Pooja sent at 3/18/2025  1:41 PM CDT -----  Contact: [phone]  Pharmacy is calling to clarify an RX.RX name:  tirzepatide (MOUNJARO) 5 mg/0.5 mL PnIj What do they need to clarify:  PA needs to be submitted Comments: Pt requesting this be taken care of urgently b/c he blood sugar has been high. [id number] [place] [facility] [address] AT [facility] [id number] [id number]: [phone] Fax: [phone]

## 2025-03-25 ENCOUNTER — OFFICE VISIT (OUTPATIENT)
Dept: ORTHOPEDICS | Facility: CLINIC | Age: 55
End: 2025-03-25
Payer: MEDICARE

## 2025-03-25 VITALS
HEIGHT: 63 IN | DIASTOLIC BLOOD PRESSURE: 72 MMHG | WEIGHT: 135.81 LBS | HEART RATE: 75 BPM | SYSTOLIC BLOOD PRESSURE: 107 MMHG | BODY MASS INDEX: 24.06 KG/M2

## 2025-03-25 DIAGNOSIS — Z98.890 S/P CARPAL TUNNEL RELEASE: Primary | ICD-10-CM

## 2025-03-25 PROCEDURE — 99999 PR PBB SHADOW E&M-EST. PATIENT-LVL IV: CPT | Mod: PBBFAC,HCNC,,

## 2025-03-25 PROCEDURE — 3074F SYST BP LT 130 MM HG: CPT | Mod: HCNC,CPTII,S$GLB,

## 2025-03-25 PROCEDURE — 1159F MED LIST DOCD IN RCRD: CPT | Mod: HCNC,CPTII,S$GLB,

## 2025-03-25 PROCEDURE — 99024 POSTOP FOLLOW-UP VISIT: CPT | Mod: HCNC,S$GLB,,

## 2025-03-25 PROCEDURE — 3044F HG A1C LEVEL LT 7.0%: CPT | Mod: HCNC,CPTII,S$GLB,

## 2025-03-25 PROCEDURE — 3078F DIAST BP <80 MM HG: CPT | Mod: HCNC,CPTII,S$GLB,

## 2025-03-25 NOTE — PROGRESS NOTES
Post-op Note    HPI    Carol Dewey is here 5 weeks s/p the following procedure:     2/17/2025  Left carpal tunnel release    Overall doing well. Pain controlled on current regimen. Not in PT. Denies any chest pain or shortness of breathe. Denies any drainage from the incision. Denies any fevers, chills or paresthesias. Pain and numbness resolving compared to prior to surgery.      Physical Exam:     Patient is alert and oriented no acute distress.   Assistive Device: none    Left wrist:  Incision(s) are well healed.  There is no evidence of dehiscence.  There is no induration erythema or signs of infection.  Appropriate soft tissue swelling.  Compartments are soft and compressible.  Warm well-perfused extremity. Mildly positive tinels at the wrist. Negative median nerve compression test    Assessment    Carol Dewey is 5 weeks Post-op     Plan:    Overall doing as expected.  We discussed expectations of surgery and postoperative course.     Pain: Continued postoperative pain regimen -- OTC meds prn  PT/OT: Continue/Initiate physical therapy: may return to normal activity as tolerated. Discussed symptoms can take up to a year to fully resolve, education of scar tissue massage provided.    Follow-up: prn  X-rays next visit: none

## 2025-03-27 ENCOUNTER — TELEPHONE (OUTPATIENT)
Dept: PRIMARY CARE CLINIC | Facility: CLINIC | Age: 55
End: 2025-03-27
Payer: MEDICARE

## 2025-03-27 NOTE — TELEPHONE ENCOUNTER
----- Message from Mitra sent at 3/27/2025 11:37 AM CDT -----  Contact: Patient, [phone]  .[email or web address] Patient is calling for Medical Advice regarding: ConstipationHow long has patient had these symptoms: Since SaturdayPharmacy name and phone#: [facility] #[id number] - [place], [place] - [address] AT [facility] [id number] [id number]: [phone] Fax: [phone] Patient wants a call back or thru [facility]: Call backComments: Calling to request medication.Please advise patient replies from provider may take up to 48 hours.  
Spoke with pt. Regarding constipation since Saturday - 5 days since last bowel movement. Wanting medication called into the pharmacy - offered pt. Apt. With Our NP today at 3pm. Pt verbalized understanding  
verbal cues/nonverbal cues (demo/gestures)/1 person assist

## 2025-05-05 DIAGNOSIS — E16.2 HYPOGLYCEMIA: ICD-10-CM

## 2025-05-05 DIAGNOSIS — R73.03 PREDIABETES: ICD-10-CM

## 2025-05-05 NOTE — TELEPHONE ENCOUNTER
----- Message from More sent at 5/5/2025  1:40 PM CDT -----  Contact: [phone]  Requesting an RX refill or new RX.Is this a refill or new RX: refill 1RX name and strength (copy/paste from chart):  Glucose stripIs this a 30 day or 90 day RX: Pharmacy name and phone # (copy/paste from chart):  [facility] #[id number] - [place], [place] - [facility] [address] AT [facility] [address] Phone: [phone]: [phone] doctors have asked that we provide their patients with the following 2 reminders -- prescription refills can take up to 72 hours, and a friendly reminder that in the future you can use your [facility] account to request refills:

## 2025-05-05 NOTE — TELEPHONE ENCOUNTER
No care due was identified.  [facility] Embedded Care Due Messages. Reference number: [id number].   5/05/2025 2:00:08 PM CDT

## 2025-05-06 NOTE — TELEPHONE ENCOUNTER
Refill Decision Note   Carol Zuleima  is requesting a refill authorization.  Brief Assessment and Rationale for Refill:  Approve     Medication Therapy Plan:        Comments:     Note composed:10:17 PM 05/05/2025

## 2025-05-14 ENCOUNTER — OFFICE VISIT (OUTPATIENT)
Dept: PRIMARY CARE CLINIC | Facility: CLINIC | Age: 55
End: 2025-05-14
Payer: MEDICARE

## 2025-05-14 VITALS
HEART RATE: 90 BPM | DIASTOLIC BLOOD PRESSURE: 76 MMHG | SYSTOLIC BLOOD PRESSURE: 116 MMHG | WEIGHT: 125.44 LBS | RESPIRATION RATE: 19 BRPM | HEIGHT: 63 IN | BODY MASS INDEX: 22.23 KG/M2 | OXYGEN SATURATION: 99 %

## 2025-05-14 DIAGNOSIS — G89.29 CHRONIC MIDLINE LOW BACK PAIN, UNSPECIFIED WHETHER SCIATICA PRESENT: Primary | ICD-10-CM

## 2025-05-14 DIAGNOSIS — E78.2 MIXED HYPERLIPIDEMIA: ICD-10-CM

## 2025-05-14 DIAGNOSIS — M54.50 CHRONIC MIDLINE LOW BACK PAIN, UNSPECIFIED WHETHER SCIATICA PRESENT: Primary | ICD-10-CM

## 2025-05-14 DIAGNOSIS — R20.0 BILATERAL HAND NUMBNESS: ICD-10-CM

## 2025-05-14 DIAGNOSIS — G89.18 ACUTE POST-OPERATIVE PAIN: ICD-10-CM

## 2025-05-14 DIAGNOSIS — I10 ESSENTIAL HYPERTENSION, BENIGN: ICD-10-CM

## 2025-05-14 DIAGNOSIS — J30.2 SEASONAL ALLERGIC RHINITIS, UNSPECIFIED TRIGGER: ICD-10-CM

## 2025-05-14 DIAGNOSIS — L30.9 ECZEMA OF FACE: ICD-10-CM

## 2025-05-14 DIAGNOSIS — N39.3 STRESS INCONTINENCE OF URINE: ICD-10-CM

## 2025-05-14 PROCEDURE — 99214 OFFICE O/P EST MOD 30 MIN: CPT | Mod: HCNC,S$GLB,, | Performed by: INTERNAL MEDICINE

## 2025-05-14 PROCEDURE — 3008F BODY MASS INDEX DOCD: CPT | Mod: CPTII,HCNC,S$GLB, | Performed by: INTERNAL MEDICINE

## 2025-05-14 PROCEDURE — 3078F DIAST BP <80 MM HG: CPT | Mod: CPTII,HCNC,S$GLB, | Performed by: INTERNAL MEDICINE

## 2025-05-14 PROCEDURE — 1159F MED LIST DOCD IN RCRD: CPT | Mod: CPTII,HCNC,S$GLB, | Performed by: INTERNAL MEDICINE

## 2025-05-14 PROCEDURE — 3074F SYST BP LT 130 MM HG: CPT | Mod: CPTII,HCNC,S$GLB, | Performed by: INTERNAL MEDICINE

## 2025-05-14 PROCEDURE — 3044F HG A1C LEVEL LT 7.0%: CPT | Mod: CPTII,HCNC,S$GLB, | Performed by: INTERNAL MEDICINE

## 2025-05-14 PROCEDURE — 1160F RVW MEDS BY RX/DR IN RCRD: CPT | Mod: CPTII,HCNC,S$GLB, | Performed by: INTERNAL MEDICINE

## 2025-05-14 PROCEDURE — 99999 PR PBB SHADOW E&M-EST. PATIENT-LVL IV: CPT | Mod: PBBFAC,HCNC,, | Performed by: INTERNAL MEDICINE

## 2025-05-14 RX ORDER — LEVOCETIRIZINE DIHYDROCHLORIDE 5 MG/1
5 TABLET, FILM COATED ORAL NIGHTLY
Qty: 30 TABLET | Refills: 1 | Status: SHIPPED | OUTPATIENT
Start: 2025-05-14 | End: 2026-05-14

## 2025-05-14 RX ORDER — TRIAMCINOLONE ACETONIDE 5 MG/G
CREAM TOPICAL 2 TIMES DAILY
Qty: 30 G | Refills: 1 | Status: SHIPPED | OUTPATIENT
Start: 2025-05-14

## 2025-05-14 RX ORDER — MONTELUKAST SODIUM 10 MG/1
10 TABLET ORAL NIGHTLY
Qty: 90 TABLET | Refills: 3 | Status: SHIPPED | OUTPATIENT
Start: 2025-05-14

## 2025-05-14 RX ORDER — IBUPROFEN 600 MG/1
600 TABLET, FILM COATED ORAL 3 TIMES DAILY
Qty: 60 TABLET | Refills: 1 | Status: SHIPPED | OUTPATIENT
Start: 2025-05-14

## 2025-05-15 NOTE — PROGRESS NOTES
Subjective:       Patient ID: Carol Dewey is a 54 y.o. female.    Chief Complaint: Medication Refill, Follow-up (2 month ), and Low-back Pain    HPI  History of Present Illness    CHIEF COMPLAINT:  Carol presents today for follow up of chronic back pain since felt at work but did not report it    BACK PAIN:  She has chronic back pain since 2009 following an unreported work-related fall. She experiences bilateral sciatica. She has received injections for pain management but declines surgical intervention despite it being offered. She also had recent surgery left wrist CTS   Pt also report fluid in her legs when standing but no sob cp [facility]   ENDOCRINE:  She has diabetes managed with metformin and injectable medication. Her glucose control has improved with current regimen, previously having readings of 187-197. She has thyroid dysfunction with a small thyroid and takes herbal supplements from the [facility] for management.    HEPATIC:  Liver ultrasound showed fatty liver and multiple cysts. She is taking herbal liver supplements from the [facility] for management.    SURGICAL HISTORY:  History includes left wrist carpal tunnel surgery, right shoulder surgery, and cholecystectomy.    ALLERGIES:  She has a zaira allergy presenting with facial rash, bumps, and itching despite taking montelukast at night.    CURRENT MEDICATIONS:  She takes montelukast, ibuprofen, metformin, injectable diabetes medication, and herbal supplements from the [facility] for thyroid and liver.      ROS:  General: -fever, -chills, -fatigue, -weight gain, -weight loss  Eyes: -vision changes, -redness, -discharge  ENT: -ear pain, -nasal congestion, -sore throat  Cardiovascular: -chest pain, -palpitations, +lower extremity edema  Respiratory: -cough, -shortness of breath  Gastrointestinal: -abdominal pain, -nausea, -vomiting, -diarrhea, -constipation, -blood in stool  Genitourinary: -dysuria, -hematuria, -frequency  Musculoskeletal: -joint  pain, -muscle pain, +back pain  Skin: +rash, -lesion, +itching  Neurological: -headache, -dizziness, -numbness, +tingling  Psychiatric: -anxiety, -depression, -sleep difficulty       Review of Systems    Objective:      Physical Exam  Physical Exam    General: No acute distress. Well-developed. Well-nourished.  Eyes: EOMI. Sclerae anicteric.  HENT: Normocephalic. Atraumatic. Nares patent. Moist oral mucosa.  Ears: Bilateral TMs clear. Bilateral EACs clear.  Cardiovascular: Regular rate. Regular rhythm. No murmurs. No rubs. No gallops. Normal S1, S2.  Respiratory: Normal respiratory effort. Clear to auscultation bilaterally. No rales. No rhonchi. No wheezing.  Abdomen: Soft. Non-tender. Non-distended. Normoactive bowel sounds.  Musculoskeletal: No  obvious deformity.  Extremities: No lower extremity edema.  Neurological: Alert & oriented x3. No slurred speech. Normal gait.  Psychiatric: Normal mood. Normal affect. Good insight. Good judgment.  Skin: Warm. Dry. No rash.           Assessment:       1. Chronic midline low back pain, unspecified whether sciatica present    2. Seasonal allergic rhinitis, unspecified trigger    3. Acute post-operative pain    4. Eczema of face    5. Bilateral hand numbness    6. Essential hypertension, benign    7. Mixed hyperlipidemia        Plan:       Chronic midline low back pain, unspecified whether sciatica present  Comments:  contnue with tx po medication PT when flare up and NS f/u if not better    Seasonal allergic rhinitis, unspecified trigger  -     levocetirizine (XYZAL) 5 MG tablet; Take 1 tablet (5 mg total) by mouth every evening. For allergy  Dispense: 30 tablet; Refill: 1  -     montelukast (SINGULAIR) 10 mg tablet; Take 1 tablet (10 mg total) by mouth every evening.  Dispense: 90 tablet; Refill: 3    Acute post-operative pain  -     ibuprofen (ADVIL,MOTRIN) 600 MG tablet; Take 1 tablet (600 mg total) by mouth 3 (three) times daily.  Dispense: 60 tablet; Refill:  1    Eczema of face  -     triamcinolone acetonide 0.5% (KENALOG) 0.5 % Crea; Apply topically 2 (two) times daily.  Dispense: 30 g; Refill: 1    Bilateral hand numbness  Comments:  s/p CTS surgerty    Essential hypertension, benign  Comments:  BP fairly controlled no change in tx    Mixed hyperlipidemia  Comments:  cholesterol well controlled triglyceride still high but better with diet exercise      Assessment & Plan    M54.50 Low back pain, unspecified  M54.16 Radiculopathy, lumbar region  E11.8 Type 2 diabetes mellitus with unspecified complications  E07.9 Disorder of thyroid, unspecified  K76.0 Fatty (change of) liver, not elsewhere classified  L30.9 Dermatitis, unspecified  Z90.49 Acquired absence of other specified parts of digestive tract  Z91.018 Allergy to other foods  Z79.84 Long term (current) use of oral hypoglycemic drugs    LOW BACK PAIN:  - Monitored chronic back pain ongoing since 2009 due to work-related fall, which is worsened by standing for long periods.  - Carol shows improvement compared to last year with reduced numbness and tingling.  - Carol has been offered surgery but prefers injections due to concerns about surgical outcomes.  - Will consider physical therapy as a potential treatment option.  - Refilled ibuprofen for pain management.    LUMBAR RADICULOPATHY (SCIATICA):  - Monitored pain and swelling in the left leg, likely related to sciatica.  - Improvement noted in numbness and tingling symptoms compared to previous year.  - Considering sciatica as potential cause for bilateral leg pain.    TYPE 2 DIABETES MELLITUS:  - Glucose levels are in control with current medication regimen.  - Ordered labs to assess diabetes management.  - Refilled Metformin.  - Carol also receives an injection for diabetes management.    THYROID DISORDER:  - Monitored thyroid condition which remains small and has not been surgically removed.  - Noted thyroid concerns based on previous ultrasound results.  -  Carol takes herbal supplements from the [facility] for thyroid management.    FATTY LIVER:  - Noted fatty liver concerns based on previous ultrasound results.  - Carol takes herbal supplements for liver health.    DERMATITIS:  - Monitored facial rash and itching, possibly related to Montelukast.  - Prescribed topical cream for symptom management.    ACQUIRED ABSENCE OF GALLBLADDER:  - Documented history of cholecystectomy (gallbladder removal).    FOOD ALLERGY (ZAIRA):  - Carol is allergic to zaira, which causes facial rash and itching.  - Continued Montelukast for allergy management.  - Refilled Levocetirizine (Xyzal) 5 mg for allergy symptoms.    LONG TERM USE OF ORAL HYPOGLYCEMIC DRUGS:  - Refilled Metformin for continued glycemic control.    OTHER SURGICAL HISTORY:  - Multiple surgeries including left wrist carpal tunnel, right shoulder, and cholecystectomy.    HERBAL SUPPLEMENT USE:  - Carol takes herbal supplements from the [facility] for various conditions including thyroid, liver, and bladder issues.    FOLLOW-UP:  - Carol to complete ordered labs anytime before next appointment.  - Follow up in February.           Medication List with Changes/Refills   New Medications    TRIAMCINOLONE ACETONIDE 0.5% (KENALOG) 0.5 % CREA    Apply topically 2 (two) times daily.   Current Medications    ACETAMINOPHEN (TYLENOL) 650 MG TBSR    Take 1 tablet (650 mg total) by mouth 2 (two) times a day.    ACETAMINOPHEN-CODEINE 300-30MG (TYLENOL #3) 300-30 MG TAB    Take 1 tablet by mouth every 4 (four) hours as needed.    AMLODIPINE (NORVASC) 10 MG TABLET    Take 1 tablet (10 mg total) by mouth once daily.    ATORVASTATIN (LIPITOR) 20 MG TABLET    Take 1 tablet (20 mg total) by mouth once daily.    BLOOD SUGAR DIAGNOSTIC STRP    To check BG 2 times daily, to use with insurance preferred meter    BLOOD-GLUCOSE METER KIT    To check BG 2 times daily, to use with insurance preferred meter    CLOBETASOL (TEMOVATE) 0.05 %  EXTERNAL SOLUTION    Apply a for 5 drops to the LEFT ear BID for 1 week.  Repeat for 1 more week if necessary not for chronic use    DIPHENHYDRAMINE (BENADRYL) 25 MG CAPSULE    Take 1 capsule (25 mg total) by mouth every 6 (six) hours as needed for Itching or Allergies.    FLUOCINOLONE ACETONIDE OIL 0.01 % DROP    3-4 drops to the affected ear(s) twice daily no more than a week at a time as needed for itching and scaling    FLUTICASONE PROPIONATE (FLONASE) 50 MCG/ACTUATION NASAL SPRAY    1 spray (50 mcg total) by Each Nostril route 2 (two) times daily.    GABAPENTIN (NEURONTIN) 300 MG CAPSULE    Take 1 capsule (300 mg total) by mouth 2 (two) times daily.    LANCETS MISC    To check BG 2 times daily, to use with insurance preferred meter    METFORMIN (GLUCOPHAGE) 500 MG TABLET    Take 1 tablet (500 mg total) by mouth 2 (two) times daily with meals.    MINERAL OIL TOP (MINERAL OIL LIGHT) OIL    Apply 4-5 drops BOTH ear canals with a dropper nightly    MYRBETRIQ 50 MG TB24    TAKE 1 TABLET BY MOUTH EVERY DAY    OMEPRAZOLE (PRILOSEC) 40 MG CAPSULE    Take 1 capsule (40 mg total) by mouth every morning.    ONDANSETRON (ZOFRAN-ODT) 4 MG TBDL    Take 1 tablet (4 mg total) by mouth every 8 (eight) hours as needed (nausea/vomiting).    PAROXETINE (PAXIL) 10 MG TABLET    TAKE 1 TABLET(10 MG) BY MOUTH EVERY MORNING    PROCHLORPERAZINE (COMPAZINE) 10 MG TABLET    Take 1 tablet (10 mg total) by mouth 3 (three) times daily.    TIRZEPATIDE (MOUNJARO) 5 MG/0.5 ML PNIJ    Inject 5 mg into the skin every 7 days.    TRUE METRIX GLUCOSE METER MISC        TRUEPLUS LANCETS 33 GAUGE MISC       Changed and/or Refilled Medications    Modified Medication Previous Medication    IBUPROFEN (ADVIL,MOTRIN) 600 MG TABLET ibuprofen (ADVIL,MOTRIN) 600 MG tablet       Take 1 tablet (600 mg total) by mouth 3 (three) times daily.    Take 1 tablet (600 mg total) by mouth 3 (three) times daily.    LEVOCETIRIZINE (XYZAL) 5 MG TABLET levocetirizine (XYZAL)  5 MG tablet       Take 1 tablet (5 mg total) by mouth every evening. For allergy    Take 1 tablet (5 mg total) by mouth every evening. For allergy    MONTELUKAST (SINGULAIR) 10 MG TABLET montelukast (SINGULAIR) 10 mg tablet       Take 1 tablet (10 mg total) by mouth every evening.    Take 1 tablet (10 mg total) by mouth every evening.        This note was generated with the assistance of ambient listening technology. Verbal consent was obtained by the patient and accompanying visitor(s) for the recording of patient appointment to facilitate this note. I attest to having reviewed and edited the generated note for accuracy, though some syntax or spelling errors may persist. Please contact the author of this note for any clarification.

## 2025-05-26 ENCOUNTER — TELEPHONE (OUTPATIENT)
Dept: PRIMARY CARE CLINIC | Facility: CLINIC | Age: 55
End: 2025-05-26
Payer: MEDICARE

## 2025-05-26 DIAGNOSIS — E78.2 MIXED HYPERLIPIDEMIA: ICD-10-CM

## 2025-05-26 RX ORDER — ATORVASTATIN CALCIUM 20 MG/1
20 TABLET, FILM COATED ORAL DAILY
Qty: 100 TABLET | Refills: 3 | Status: SHIPPED | OUTPATIENT
Start: 2025-05-26 | End: 2026-05-26

## 2025-06-02 ENCOUNTER — RESULTS FOLLOW-UP (OUTPATIENT)
Dept: PRIMARY CARE CLINIC | Facility: CLINIC | Age: 55
End: 2025-06-02

## 2025-06-13 ENCOUNTER — PATIENT MESSAGE (OUTPATIENT)
Dept: PRIMARY CARE CLINIC | Facility: CLINIC | Age: 55
End: 2025-06-13
Payer: MEDICARE

## 2025-06-16 DIAGNOSIS — E11.9 CONTROLLED TYPE 2 DIABETES MELLITUS WITHOUT COMPLICATION, WITHOUT LONG-TERM CURRENT USE OF INSULIN: ICD-10-CM

## 2025-06-16 NOTE — TELEPHONE ENCOUNTER
Copied from CRM #[id number]. Topic: Medications - Medication Status Check   >> Jun 16, 2025  4:22 PM Nicole wrote:  .1MEDICALADVICE     Patient is calling for Medical Advice regarding: Pt in need of status of Mounjaro Injection - Pt has missed injection scheduled for yesterday  - needed asap    How long has patient had these symptoms:    Pharmacy name and phone#:   [id number] - [place] - [address] [facility] [address]  [address] [address]  Phone: [phone] Fax: [phone]    Patient wants a call back or thru [facility], provide patient's call back phone number: call back [phone] (M)    Comments: Pt thinks Pharm may have reached out already - founded in chart    Please advise patient replies from provider may take up to 48 hours.

## 2025-06-16 NOTE — TELEPHONE ENCOUNTER
No care due was identified.  [facility] Embedded Care Due Messages. Reference number: [id number].   6/16/2025 11:43:46 AM CDT

## 2025-06-16 NOTE — TELEPHONE ENCOUNTER
Copied from CRM #[id number]. Topic: Medications - Medication Refill  >> Jun 16, 2025 11:36 AM Christiano wrote:  Requesting an RX refill or new RX.    Is this a refill or new RX: Refill    RX name and strength (copy/paste from chart):  tirzepatide (MOUNJARO) 5 mg/0.5 mL PnIj      Is this a 30 day or 90 day RX:     Pharmacy name and phone # (copy/paste from chart):      [facility] #[id number] - [place] - [address] AT [facility] [address]  [address] [address]  Phone: [phone] Fax: [phone]       Who called and call back number: Call; [phone] (M)

## 2025-06-17 RX ORDER — TIRZEPATIDE 5 MG/.5ML
5 INJECTION, SOLUTION SUBCUTANEOUS
Qty: 2 ML | Refills: 2 | Status: SHIPPED | OUTPATIENT
Start: 2025-06-17

## 2025-07-24 NOTE — TELEPHONE ENCOUNTER
No care due was identified.  [facility] Embedded Care Due Messages. Reference number: [id number].   7/24/2025 3:12:58 PM CDT

## 2025-07-24 NOTE — TELEPHONE ENCOUNTER
Copied from CRM #[id number]. Topic: Medications - Medication Refill  >> Jul 24, 2025  3:01 PM Mary wrote:  Requesting an RX refill or new RX.    Is this a refill or new RX:     RX name and strength (copy/paste from chart):  TRUEPLUS LANCETS 33 gauge Misc    Is this a 30 day or 90 day RX:     Pharmacy name and phone # (copy/paste from chart):      [facility] #[id number] - [place] - [address] AT [facility] [address]  [address] [address]  Phone: [phone] Fax: [phone]      Who called and call back number:    The doctors have asked that we provide their patients with the following 2 reminders -- prescription refills can take up to 72 hours, and a friendly reminder that in the future you can use your [facility] account to request refills:

## 2025-07-25 NOTE — TELEPHONE ENCOUNTER
Refill Routing Note   Medication(s) are not appropriate for processing by [facility] for the following reason(s):        No active prescription written by provider    [facility] action(s):  Defer             Appointments  past 12m or future 3m with PCP    Date Provider   Last Visit   5/14/2025 Robe Trinidad MD   Next Visit   8/14/2025 Robe Trinidad MD   ED visits in past 90 days: 0        Note composed:11:23 AM 07/25/2025

## 2025-07-26 RX ORDER — LANCETS 33 GAUGE
EACH MISCELLANEOUS
Qty: 100 EACH | Refills: 5 | Status: SHIPPED | OUTPATIENT
Start: 2025-07-26

## 2025-08-14 ENCOUNTER — OFFICE VISIT (OUTPATIENT)
Dept: PRIMARY CARE CLINIC | Facility: CLINIC | Age: 55
End: 2025-08-14
Payer: MEDICARE

## 2025-08-14 VITALS
HEART RATE: 71 BPM | RESPIRATION RATE: 16 BRPM | WEIGHT: 124.13 LBS | TEMPERATURE: 99 F | HEIGHT: 63 IN | OXYGEN SATURATION: 98 % | BODY MASS INDEX: 21.99 KG/M2 | DIASTOLIC BLOOD PRESSURE: 62 MMHG | SYSTOLIC BLOOD PRESSURE: 100 MMHG

## 2025-08-14 DIAGNOSIS — R60.0 PERIPHERAL EDEMA: ICD-10-CM

## 2025-08-14 DIAGNOSIS — J30.2 SEASONAL ALLERGIC RHINITIS, UNSPECIFIED TRIGGER: ICD-10-CM

## 2025-08-14 DIAGNOSIS — E11.9 CONTROLLED TYPE 2 DIABETES MELLITUS WITHOUT COMPLICATION, WITHOUT LONG-TERM CURRENT USE OF INSULIN: Primary | ICD-10-CM

## 2025-08-14 DIAGNOSIS — G89.18 ACUTE POST-OPERATIVE PAIN: ICD-10-CM

## 2025-08-14 DIAGNOSIS — I10 ESSENTIAL HYPERTENSION: ICD-10-CM

## 2025-08-14 PROCEDURE — 1159F MED LIST DOCD IN RCRD: CPT | Mod: CPTII,HCNC,S$GLB, | Performed by: INTERNAL MEDICINE

## 2025-08-14 PROCEDURE — 99999 PR PBB SHADOW E&M-EST. PATIENT-LVL V: CPT | Mod: PBBFAC,HCNC,, | Performed by: INTERNAL MEDICINE

## 2025-08-14 PROCEDURE — 3078F DIAST BP <80 MM HG: CPT | Mod: CPTII,HCNC,S$GLB, | Performed by: INTERNAL MEDICINE

## 2025-08-14 PROCEDURE — 3008F BODY MASS INDEX DOCD: CPT | Mod: CPTII,HCNC,S$GLB, | Performed by: INTERNAL MEDICINE

## 2025-08-14 PROCEDURE — 3074F SYST BP LT 130 MM HG: CPT | Mod: CPTII,HCNC,S$GLB, | Performed by: INTERNAL MEDICINE

## 2025-08-14 PROCEDURE — 3044F HG A1C LEVEL LT 7.0%: CPT | Mod: CPTII,HCNC,S$GLB, | Performed by: INTERNAL MEDICINE

## 2025-08-14 PROCEDURE — 99214 OFFICE O/P EST MOD 30 MIN: CPT | Mod: HCNC,S$GLB,, | Performed by: INTERNAL MEDICINE

## 2025-08-14 PROCEDURE — 1160F RVW MEDS BY RX/DR IN RCRD: CPT | Mod: CPTII,HCNC,S$GLB, | Performed by: INTERNAL MEDICINE

## 2025-08-14 RX ORDER — AMLODIPINE BESYLATE 5 MG/1
5 TABLET ORAL DAILY
Qty: 90 TABLET | Refills: 3 | Status: SHIPPED | OUTPATIENT
Start: 2025-08-14

## 2025-08-14 RX ORDER — LEVOCETIRIZINE DIHYDROCHLORIDE 5 MG/1
5 TABLET, FILM COATED ORAL NIGHTLY
Qty: 30 TABLET | Refills: 1 | Status: SHIPPED | OUTPATIENT
Start: 2025-08-14 | End: 2026-08-14

## 2025-08-14 RX ORDER — ACETAMINOPHEN AND CODEINE PHOSPHATE 300; 30 MG/1; MG/1
1 TABLET ORAL EVERY 4 HOURS PRN
Qty: 20 TABLET | Refills: 0 | Status: SHIPPED | OUTPATIENT
Start: 2025-08-14

## (undated) DEVICE — DRESSING LEUKOPLAST FLEX 1X3IN